# Patient Record
Sex: FEMALE | Race: WHITE | NOT HISPANIC OR LATINO | Employment: OTHER | URBAN - NONMETROPOLITAN AREA
[De-identification: names, ages, dates, MRNs, and addresses within clinical notes are randomized per-mention and may not be internally consistent; named-entity substitution may affect disease eponyms.]

---

## 2017-06-19 ENCOUNTER — OFFICE VISIT (OUTPATIENT)
Dept: URGENT CARE | Facility: PHYSICIAN GROUP | Age: 62
End: 2017-06-19
Payer: MEDICARE

## 2017-06-19 VITALS
SYSTOLIC BLOOD PRESSURE: 150 MMHG | HEART RATE: 88 BPM | RESPIRATION RATE: 14 BRPM | TEMPERATURE: 97.4 F | DIASTOLIC BLOOD PRESSURE: 92 MMHG | OXYGEN SATURATION: 98 %

## 2017-06-19 DIAGNOSIS — S91.301A WOUND, OPEN, FOOT, RIGHT, INITIAL ENCOUNTER: ICD-10-CM

## 2017-06-19 PROCEDURE — 99285 EMERGENCY DEPT VISIT HI MDM: CPT

## 2017-06-19 PROCEDURE — 94760 N-INVAS EAR/PLS OXIMETRY 1: CPT

## 2017-06-19 PROCEDURE — 99213 OFFICE O/P EST LOW 20 MIN: CPT | Performed by: PHYSICIAN ASSISTANT

## 2017-06-20 ENCOUNTER — APPOINTMENT (OUTPATIENT)
Dept: RADIOLOGY | Facility: MEDICAL CENTER | Age: 62
DRG: 593 | End: 2017-06-20
Attending: EMERGENCY MEDICINE
Payer: MEDICARE

## 2017-06-20 ENCOUNTER — APPOINTMENT (OUTPATIENT)
Dept: RADIOLOGY | Facility: MEDICAL CENTER | Age: 62
DRG: 593 | End: 2017-06-20
Attending: INTERNAL MEDICINE
Payer: MEDICARE

## 2017-06-20 ENCOUNTER — HOSPITAL ENCOUNTER (INPATIENT)
Facility: MEDICAL CENTER | Age: 62
LOS: 11 days | DRG: 593 | End: 2017-07-01
Attending: EMERGENCY MEDICINE | Admitting: INTERNAL MEDICINE
Payer: MEDICARE

## 2017-06-20 ENCOUNTER — RESOLUTE PROFESSIONAL BILLING HOSPITAL PROF FEE (OUTPATIENT)
Dept: HOSPITALIST | Facility: MEDICAL CENTER | Age: 62
End: 2017-06-20
Payer: MEDICARE

## 2017-06-20 DIAGNOSIS — L97.519 FOOT ULCER, RIGHT, WITH UNSPECIFIED SEVERITY (HCC): ICD-10-CM

## 2017-06-20 DIAGNOSIS — R29.898 WEAKNESS OF RIGHT LEG: ICD-10-CM

## 2017-06-20 LAB
ALBUMIN SERPL BCP-MCNC: 3.7 G/DL (ref 3.2–4.9)
ALP SERPL-CCNC: 69 U/L (ref 30–99)
ALT SERPL-CCNC: 37 U/L (ref 2–50)
ANION GAP SERPL CALC-SCNC: 14 MMOL/L (ref 0–11.9)
APTT PPP: 25.1 SEC (ref 24.7–36)
AST SERPL-CCNC: 38 U/L (ref 12–45)
BASOPHILS # BLD AUTO: 0.4 % (ref 0–1.8)
BASOPHILS # BLD: 0.04 K/UL (ref 0–0.12)
BILIRUB CONJ SERPL-MCNC: <0.1 MG/DL (ref 0.1–0.5)
BILIRUB INDIRECT SERPL-MCNC: NORMAL MG/DL (ref 0–1)
BILIRUB SERPL-MCNC: 0.4 MG/DL (ref 0.1–1.5)
BUN SERPL-MCNC: 9 MG/DL (ref 8–22)
CALCIUM SERPL-MCNC: 9.2 MG/DL (ref 8.5–10.5)
CHLORIDE SERPL-SCNC: 108 MMOL/L (ref 96–112)
CO2 SERPL-SCNC: 14 MMOL/L (ref 20–33)
CREAT SERPL-MCNC: 0.73 MG/DL (ref 0.5–1.4)
CRP SERPL HS-MCNC: 0.62 MG/DL (ref 0–0.75)
EOSINOPHIL # BLD AUTO: 0.09 K/UL (ref 0–0.51)
EOSINOPHIL NFR BLD: 0.9 % (ref 0–6.9)
ERYTHROCYTE [DISTWIDTH] IN BLOOD BY AUTOMATED COUNT: 47.8 FL (ref 35.9–50)
ERYTHROCYTE [SEDIMENTATION RATE] IN BLOOD BY WESTERGREN METHOD: 27 MM/HOUR (ref 0–30)
EST. AVERAGE GLUCOSE BLD GHB EST-MCNC: 111 MG/DL
GFR SERPL CREATININE-BSD FRML MDRD: >60 ML/MIN/1.73 M 2
GLUCOSE SERPL-MCNC: 108 MG/DL (ref 65–99)
GRAM STN SPEC: NORMAL
HBA1C MFR BLD: 5.5 % (ref 0–5.6)
HCT VFR BLD AUTO: 43.8 % (ref 37–47)
HGB BLD-MCNC: 14.3 G/DL (ref 12–16)
IMM GRANULOCYTES # BLD AUTO: 0.04 K/UL (ref 0–0.11)
IMM GRANULOCYTES NFR BLD AUTO: 0.4 % (ref 0–0.9)
INR PPP: 0.96 (ref 0.87–1.13)
LACTATE BLD-SCNC: 1.3 MMOL/L (ref 0.5–2)
LACTATE BLD-SCNC: 1.6 MMOL/L (ref 0.5–2)
LYMPHOCYTES # BLD AUTO: 3.5 K/UL (ref 1–4.8)
LYMPHOCYTES NFR BLD: 35.8 % (ref 22–41)
MAGNESIUM SERPL-MCNC: 1.8 MG/DL (ref 1.5–2.5)
MCH RBC QN AUTO: 28.5 PG (ref 27–33)
MCHC RBC AUTO-ENTMCNC: 32.6 G/DL (ref 33.6–35)
MCV RBC AUTO: 87.4 FL (ref 81.4–97.8)
MONOCYTES # BLD AUTO: 0.52 K/UL (ref 0–0.85)
MONOCYTES NFR BLD AUTO: 5.3 % (ref 0–13.4)
NEUTROPHILS # BLD AUTO: 5.59 K/UL (ref 2–7.15)
NEUTROPHILS NFR BLD: 57.2 % (ref 44–72)
NRBC # BLD AUTO: 0 K/UL
NRBC BLD AUTO-RTO: 0 /100 WBC
PHOSPHATE SERPL-MCNC: 2.4 MG/DL (ref 2.5–4.5)
PLATELET # BLD AUTO: 491 K/UL (ref 164–446)
PMV BLD AUTO: 9 FL (ref 9–12.9)
POTASSIUM SERPL-SCNC: 3.6 MMOL/L (ref 3.6–5.5)
PREALB SERPL-MCNC: 28 MG/DL (ref 18–38)
PROT SERPL-MCNC: 7.3 G/DL (ref 6–8.2)
PROTHROMBIN TIME: 13.1 SEC (ref 12–14.6)
RBC # BLD AUTO: 5.01 M/UL (ref 4.2–5.4)
SIGNIFICANT IND 70042: NORMAL
SITE SITE: NORMAL
SODIUM SERPL-SCNC: 136 MMOL/L (ref 135–145)
SOURCE SOURCE: NORMAL
TSH SERPL DL<=0.005 MIU/L-ACNC: 0.75 UIU/ML (ref 0.3–3.7)
WBC # BLD AUTO: 9.8 K/UL (ref 4.8–10.8)

## 2017-06-20 PROCEDURE — 85652 RBC SED RATE AUTOMATED: CPT

## 2017-06-20 PROCEDURE — 85025 COMPLETE CBC W/AUTO DIFF WBC: CPT

## 2017-06-20 PROCEDURE — 97161 PT EVAL LOW COMPLEX 20 MIN: CPT

## 2017-06-20 PROCEDURE — 84100 ASSAY OF PHOSPHORUS: CPT

## 2017-06-20 PROCEDURE — 99223 1ST HOSP IP/OBS HIGH 75: CPT | Mod: AI | Performed by: INTERNAL MEDICINE

## 2017-06-20 PROCEDURE — 84134 ASSAY OF PREALBUMIN: CPT

## 2017-06-20 PROCEDURE — 97597 DBRDMT OPN WND 1ST 20 CM/<: CPT

## 2017-06-20 PROCEDURE — 94760 N-INVAS EAR/PLS OXIMETRY 1: CPT

## 2017-06-20 PROCEDURE — 83605 ASSAY OF LACTIC ACID: CPT

## 2017-06-20 PROCEDURE — G8991 OTHER PT/OT GOAL STATUS: HCPCS | Mod: CH

## 2017-06-20 PROCEDURE — 85730 THROMBOPLASTIN TIME PARTIAL: CPT

## 2017-06-20 PROCEDURE — 87205 SMEAR GRAM STAIN: CPT

## 2017-06-20 PROCEDURE — 700105 HCHG RX REV CODE 258

## 2017-06-20 PROCEDURE — A9579 GAD-BASE MR CONTRAST NOS,1ML: HCPCS | Performed by: INTERNAL MEDICINE

## 2017-06-20 PROCEDURE — 87040 BLOOD CULTURE FOR BACTERIA: CPT | Mod: 91

## 2017-06-20 PROCEDURE — 73630 X-RAY EXAM OF FOOT: CPT | Mod: RT

## 2017-06-20 PROCEDURE — 700102 HCHG RX REV CODE 250 W/ 637 OVERRIDE(OP): Performed by: INTERNAL MEDICINE

## 2017-06-20 PROCEDURE — 84443 ASSAY THYROID STIM HORMONE: CPT

## 2017-06-20 PROCEDURE — 700105 HCHG RX REV CODE 258: Performed by: INTERNAL MEDICINE

## 2017-06-20 PROCEDURE — 83036 HEMOGLOBIN GLYCOSYLATED A1C: CPT

## 2017-06-20 PROCEDURE — 80048 BASIC METABOLIC PNL TOTAL CA: CPT

## 2017-06-20 PROCEDURE — 770006 HCHG ROOM/CARE - MED/SURG/GYN SEMI*

## 2017-06-20 PROCEDURE — 96375 TX/PRO/DX INJ NEW DRUG ADDON: CPT

## 2017-06-20 PROCEDURE — 80076 HEPATIC FUNCTION PANEL: CPT

## 2017-06-20 PROCEDURE — G8990 OTHER PT/OT CURRENT STATUS: HCPCS | Mod: CH

## 2017-06-20 PROCEDURE — 73719 MRI LOWER EXTREMITY W/DYE: CPT | Mod: RT

## 2017-06-20 PROCEDURE — 85610 PROTHROMBIN TIME: CPT

## 2017-06-20 PROCEDURE — 36415 COLL VENOUS BLD VENIPUNCTURE: CPT

## 2017-06-20 PROCEDURE — 93971 EXTREMITY STUDY: CPT

## 2017-06-20 PROCEDURE — A9270 NON-COVERED ITEM OR SERVICE: HCPCS | Performed by: INTERNAL MEDICINE

## 2017-06-20 PROCEDURE — 700111 HCHG RX REV CODE 636 W/ 250 OVERRIDE (IP)

## 2017-06-20 PROCEDURE — 83735 ASSAY OF MAGNESIUM: CPT

## 2017-06-20 PROCEDURE — 87077 CULTURE AEROBIC IDENTIFY: CPT

## 2017-06-20 PROCEDURE — 96366 THER/PROPH/DIAG IV INF ADDON: CPT

## 2017-06-20 PROCEDURE — 93971 EXTREMITY STUDY: CPT | Mod: 26 | Performed by: SURGERY

## 2017-06-20 PROCEDURE — 700117 HCHG RX CONTRAST REV CODE 255: Performed by: INTERNAL MEDICINE

## 2017-06-20 PROCEDURE — 700111 HCHG RX REV CODE 636 W/ 250 OVERRIDE (IP): Performed by: INTERNAL MEDICINE

## 2017-06-20 PROCEDURE — 87070 CULTURE OTHR SPECIMN AEROBIC: CPT

## 2017-06-20 PROCEDURE — 700111 HCHG RX REV CODE 636 W/ 250 OVERRIDE (IP): Performed by: EMERGENCY MEDICINE

## 2017-06-20 PROCEDURE — 86140 C-REACTIVE PROTEIN: CPT

## 2017-06-20 PROCEDURE — 96365 THER/PROPH/DIAG IV INF INIT: CPT

## 2017-06-20 RX ORDER — CHLORHEXIDINE GLUCONATE ORAL RINSE 1.2 MG/ML
15 SOLUTION DENTAL 2 TIMES DAILY
Status: DISCONTINUED | OUTPATIENT
Start: 2017-06-20 | End: 2017-06-22

## 2017-06-20 RX ORDER — BISACODYL 10 MG
10 SUPPOSITORY, RECTAL RECTAL
Status: DISCONTINUED | OUTPATIENT
Start: 2017-06-20 | End: 2017-07-01 | Stop reason: HOSPADM

## 2017-06-20 RX ORDER — HEPARIN SODIUM 5000 [USP'U]/ML
5000 INJECTION, SOLUTION INTRAVENOUS; SUBCUTANEOUS EVERY 8 HOURS
Status: DISCONTINUED | OUTPATIENT
Start: 2017-06-20 | End: 2017-07-01 | Stop reason: HOSPADM

## 2017-06-20 RX ORDER — TELMISARTAN 40 MG/1
40 TABLET ORAL DAILY
Status: DISCONTINUED | OUTPATIENT
Start: 2017-06-20 | End: 2017-07-01 | Stop reason: HOSPADM

## 2017-06-20 RX ORDER — OXYCODONE HYDROCHLORIDE 5 MG/1
5 TABLET ORAL
Status: DISCONTINUED | OUTPATIENT
Start: 2017-06-20 | End: 2017-06-25

## 2017-06-20 RX ORDER — ONDANSETRON 4 MG/1
4 TABLET, ORALLY DISINTEGRATING ORAL EVERY 4 HOURS PRN
Status: DISCONTINUED | OUTPATIENT
Start: 2017-06-20 | End: 2017-07-01 | Stop reason: HOSPADM

## 2017-06-20 RX ORDER — ZOLPIDEM TARTRATE 5 MG/1
10 TABLET ORAL NIGHTLY PRN
Status: DISCONTINUED | OUTPATIENT
Start: 2017-06-20 | End: 2017-06-20

## 2017-06-20 RX ORDER — PREGABALIN 75 MG/1
75 CAPSULE ORAL 3 TIMES DAILY
Status: DISCONTINUED | OUTPATIENT
Start: 2017-06-20 | End: 2017-06-24

## 2017-06-20 RX ORDER — MORPHINE SULFATE 4 MG/ML
2 INJECTION, SOLUTION INTRAMUSCULAR; INTRAVENOUS
Status: DISCONTINUED | OUTPATIENT
Start: 2017-06-20 | End: 2017-06-24

## 2017-06-20 RX ORDER — ONDANSETRON 2 MG/ML
4 INJECTION INTRAMUSCULAR; INTRAVENOUS EVERY 4 HOURS PRN
Status: DISCONTINUED | OUTPATIENT
Start: 2017-06-20 | End: 2017-07-01 | Stop reason: HOSPADM

## 2017-06-20 RX ORDER — CLONAZEPAM 0.5 MG/1
0.5 TABLET ORAL 2 TIMES DAILY PRN
Status: DISCONTINUED | OUTPATIENT
Start: 2017-06-20 | End: 2017-07-01 | Stop reason: HOSPADM

## 2017-06-20 RX ORDER — LABETALOL HYDROCHLORIDE 5 MG/ML
10 INJECTION, SOLUTION INTRAVENOUS EVERY 4 HOURS PRN
Status: DISCONTINUED | OUTPATIENT
Start: 2017-06-20 | End: 2017-06-25

## 2017-06-20 RX ORDER — ACETAMINOPHEN 325 MG/1
650 TABLET ORAL EVERY 6 HOURS PRN
Status: DISCONTINUED | OUTPATIENT
Start: 2017-06-20 | End: 2017-06-25

## 2017-06-20 RX ORDER — OXYCODONE HYDROCHLORIDE 5 MG/1
2.5 TABLET ORAL
Status: DISCONTINUED | OUTPATIENT
Start: 2017-06-20 | End: 2017-06-25

## 2017-06-20 RX ORDER — SODIUM CHLORIDE 9 MG/ML
INJECTION, SOLUTION INTRAVENOUS CONTINUOUS
Status: DISCONTINUED | OUTPATIENT
Start: 2017-06-20 | End: 2017-06-21

## 2017-06-20 RX ORDER — POLYETHYLENE GLYCOL 3350 17 G/17G
1 POWDER, FOR SOLUTION ORAL
Status: DISCONTINUED | OUTPATIENT
Start: 2017-06-20 | End: 2017-07-01 | Stop reason: HOSPADM

## 2017-06-20 RX ORDER — SODIUM CHLORIDE 9 MG/ML
1000 INJECTION, SOLUTION INTRAVENOUS
Status: DISCONTINUED | OUTPATIENT
Start: 2017-06-20 | End: 2017-07-01 | Stop reason: HOSPADM

## 2017-06-20 RX ORDER — DULOXETIN HYDROCHLORIDE 30 MG/1
30 CAPSULE, DELAYED RELEASE ORAL DAILY
Status: DISCONTINUED | OUTPATIENT
Start: 2017-06-20 | End: 2017-06-20

## 2017-06-20 RX ORDER — LABETALOL HYDROCHLORIDE 5 MG/ML
10 INJECTION, SOLUTION INTRAVENOUS EVERY 4 HOURS PRN
Status: DISCONTINUED | OUTPATIENT
Start: 2017-06-20 | End: 2017-06-20

## 2017-06-20 RX ORDER — NAPROXEN 250 MG/1
500-1000 TABLET ORAL PRN
COMMUNITY
End: 2017-07-17

## 2017-06-20 RX ORDER — AMOXICILLIN 250 MG
2 CAPSULE ORAL 2 TIMES DAILY
Status: DISCONTINUED | OUTPATIENT
Start: 2017-06-20 | End: 2017-07-01 | Stop reason: HOSPADM

## 2017-06-20 RX ORDER — POTASSIUM CHLORIDE 20 MEQ/1
40 TABLET, EXTENDED RELEASE ORAL ONCE
Status: COMPLETED | OUTPATIENT
Start: 2017-06-20 | End: 2017-06-20

## 2017-06-20 RX ORDER — VENLAFAXINE HYDROCHLORIDE 75 MG/1
75 CAPSULE, EXTENDED RELEASE ORAL DAILY
Status: DISCONTINUED | OUTPATIENT
Start: 2017-06-20 | End: 2017-06-20

## 2017-06-20 RX ORDER — SODIUM CHLORIDE 9 MG/ML
1000 INJECTION, SOLUTION INTRAVENOUS ONCE
Status: COMPLETED | OUTPATIENT
Start: 2017-06-20 | End: 2017-06-20

## 2017-06-20 RX ADMIN — OXYCODONE HYDROCHLORIDE 5 MG: 5 TABLET ORAL at 20:07

## 2017-06-20 RX ADMIN — CEFTRIAXONE SODIUM 2 G: 2 INJECTION, POWDER, FOR SOLUTION INTRAMUSCULAR; INTRAVENOUS at 09:41

## 2017-06-20 RX ADMIN — SODIUM CHLORIDE 1000 ML: 9 INJECTION, SOLUTION INTRAVENOUS at 09:52

## 2017-06-20 RX ADMIN — FENTANYL CITRATE 50 MCG: 50 INJECTION, SOLUTION INTRAMUSCULAR; INTRAVENOUS at 03:20

## 2017-06-20 RX ADMIN — HEPARIN SODIUM 5000 UNITS: 5000 INJECTION, SOLUTION INTRAVENOUS; SUBCUTANEOUS at 20:07

## 2017-06-20 RX ADMIN — SODIUM CHLORIDE: 9 INJECTION, SOLUTION INTRAVENOUS at 09:52

## 2017-06-20 RX ADMIN — PREGABALIN 75 MG: 75 CAPSULE ORAL at 09:40

## 2017-06-20 RX ADMIN — OXYCODONE HYDROCHLORIDE 5 MG: 5 TABLET ORAL at 11:39

## 2017-06-20 RX ADMIN — SENNOSIDES AND DOCUSATE SODIUM 2 TABLET: 8.6; 5 TABLET ORAL at 20:06

## 2017-06-20 RX ADMIN — GADODIAMIDE 20 ML: 287 INJECTION INTRAVENOUS at 18:31

## 2017-06-20 RX ADMIN — PREGABALIN 75 MG: 75 CAPSULE ORAL at 15:25

## 2017-06-20 RX ADMIN — HEPARIN SODIUM 5000 UNITS: 5000 INJECTION, SOLUTION INTRAVENOUS; SUBCUTANEOUS at 09:40

## 2017-06-20 RX ADMIN — TELMISARTAN 40 MG: 40 TABLET ORAL at 09:40

## 2017-06-20 RX ADMIN — POTASSIUM CHLORIDE 40 MEQ: 1500 TABLET, EXTENDED RELEASE ORAL at 09:40

## 2017-06-20 RX ADMIN — HEPARIN SODIUM 5000 UNITS: 5000 INJECTION, SOLUTION INTRAVENOUS; SUBCUTANEOUS at 13:14

## 2017-06-20 RX ADMIN — MORPHINE SULFATE 2 MG: 4 INJECTION INTRAVENOUS at 09:39

## 2017-06-20 RX ADMIN — CHLORHEXIDINE GLUCONATE 15 ML: 1.2 RINSE ORAL at 20:07

## 2017-06-20 RX ADMIN — ONDANSETRON 4 MG: 2 INJECTION INTRAMUSCULAR; INTRAVENOUS at 09:43

## 2017-06-20 RX ADMIN — OXYCODONE HYDROCHLORIDE 5 MG: 5 TABLET ORAL at 16:13

## 2017-06-20 RX ADMIN — VANCOMYCIN HYDROCHLORIDE 2000 MG: 100 INJECTION, POWDER, LYOPHILIZED, FOR SOLUTION INTRAVENOUS at 03:20

## 2017-06-20 RX ADMIN — PREGABALIN 75 MG: 75 CAPSULE ORAL at 23:42

## 2017-06-20 ASSESSMENT — ENCOUNTER SYMPTOMS
WEIGHT LOSS: 0
ABDOMINAL PAIN: 0
VOMITING: 0
FEVER: 1
FALLS: 1
DIARRHEA: 0
MYALGIAS: 1
CONSTIPATION: 0
COUGH: 0
CLAUDICATION: 0
NAUSEA: 1
CHILLS: 1
SHORTNESS OF BREATH: 0

## 2017-06-20 ASSESSMENT — COPD QUESTIONNAIRES
DURING THE PAST 4 WEEKS HOW MUCH DID YOU FEEL SHORT OF BREATH: NONE/LITTLE OF THE TIME
DO YOU EVER COUGH UP ANY MUCUS OR PHLEGM?: NO/ONLY WITH OCCASIONAL COLDS OR INFECTIONS
HAVE YOU SMOKED AT LEAST 100 CIGARETTES IN YOUR ENTIRE LIFE: NO/DON'T KNOW
COPD SCREENING SCORE: 2

## 2017-06-20 ASSESSMENT — PATIENT HEALTH QUESTIONNAIRE - PHQ9
SUM OF ALL RESPONSES TO PHQ9 QUESTIONS 1 AND 2: 0
2. FEELING DOWN, DEPRESSED, IRRITABLE, OR HOPELESS: NOT AT ALL
1. LITTLE INTEREST OR PLEASURE IN DOING THINGS: NOT AT ALL
SUM OF ALL RESPONSES TO PHQ QUESTIONS 1-9: 0

## 2017-06-20 ASSESSMENT — PAIN SCALES - GENERAL
PAINLEVEL_OUTOF10: 7
PAINLEVEL_OUTOF10: 0
PAINLEVEL_OUTOF10: 4
PAINLEVEL_OUTOF10: 8
PAINLEVEL_OUTOF10: 5
PAINLEVEL_OUTOF10: 4
PAINLEVEL_OUTOF10: 6
PAINLEVEL_OUTOF10: 3

## 2017-06-20 ASSESSMENT — COGNITIVE AND FUNCTIONAL STATUS - GENERAL
MOVING FROM LYING ON BACK TO SITTING ON SIDE OF FLAT BED: A LITTLE
MOBILITY SCORE: 18
CLIMB 3 TO 5 STEPS WITH RAILING: TOTAL
DAILY ACTIVITIY SCORE: 24
WALKING IN HOSPITAL ROOM: A LOT
SUGGESTED CMS G CODE MODIFIER DAILY ACTIVITY: CH
SUGGESTED CMS G CODE MODIFIER MOBILITY: CK

## 2017-06-20 ASSESSMENT — LIFESTYLE VARIABLES
ALCOHOL_USE: NO
EVER_SMOKED: NEVER

## 2017-06-20 NOTE — ED NOTES
"Chief Complaint   Patient presents with   • Abscess     R foot      Pt partially paralyzed to R foot, did not notice wound to foot. Went to [facility] today and was told to come to ER for IV antibiotics. 2 cm open wound noted to R lateral foot.        /114 mmHg  Pulse 118  Temp(Src) 36.9 °C (98.5 °F)  Resp 18  Ht 1.676 m (5' 6\")  Wt 81.647 kg (180 lb)  BMI 29.07 kg/m2  SpO2 97%  LMP 04/09/1977      Pt Informed regarding triage process and verbalized understanding to inform triage tech or RN for any changes in condition.  Placed in lobby.    "

## 2017-06-20 NOTE — PROGRESS NOTES
Pt a+ox4, up from ED. Pt oriented to room. Commode at bedside - pt with paralyzation of R leg- leg elevated ,wound consult placed for R ankle wound. Pt able to transfer to commode with SBA  - alarm on. Pt with order for MRI - screening sheet faxed and sent. Pt medicated for pain per MAR . 2 RN skin check with Radha RN- wound to R ankle- wound care consult in; r heel red but blanching. All other areas intact .

## 2017-06-20 NOTE — IP AVS SNAPSHOT
7/1/2017    Joan Olivares  [address] [place] [address]    Dear Joan:    [facility] wants to ensure your discharge home is safe and you or your loved ones have had all of your questions answered regarding your care after you leave the hospital.    Below is a list of resources and contact information should you have any questions regarding your hospital stay, follow-up instructions, or active medical symptoms.    Questions or Concerns Regarding… Contact   Medical Questions Related to Your Discharge  (7 days a week, 8am-5pm) Contact a Nurse Care Coordinator   [phone]   Medical Questions Not Related to Your Discharge  (24 hours a day / 7 days a week)  Contact the Nurse Health Line   [phone]    Medications or Discharge Instructions Refer to your discharge packet   or contact your [facility] Primary Care Provider   [phone]   Follow-up Appointment(s) Schedule your appointment via [employer]   or contact Scheduling [phone]   Billing Review your statement via [employer]  or contact Billing [phone]   Medical Records Review your records via [employer]   or contact Medical Records [phone]     You may receive a telephone call within two days of discharge. This call is to make certain you understand your discharge instructions and have the opportunity to have any questions answered. You can also easily access your medical information, test results and upcoming appointments via the [employer] free online health management tool. You can learn more and sign up at [employer]. For assistance setting up your [employer] account, please call [phone].    Once again, we want to ensure your discharge home is safe and that you have a clear understanding of any next steps in your care. If you have any questions or concerns, please do not hesitate to contact us, we are here for you. Thank you for choosing [facility] for your healthcare needs.    Sincerely,    Your [facility] Healthcare Team

## 2017-06-20 NOTE — ED NOTES
"Pt requesting pain medication for \"my whole right side of my body and my lymph nodes are swollen\"   "

## 2017-06-20 NOTE — H&P
DATE OF SERVICE:  06/20/2017    CHIEF COMPLAINT:  Right foot ulcer.    HISTORY OF PRESENTING ILLNESS:  This is a pleasant 62-year-old female who   presents to the emergency room for further evaluation of her right dorsal foot   ulcer.  Patient reports that roughly 2 years ago, she was found unresponsive   at her home for 3 days after she was persistently laying on her right lower   extremity and this was complicated by development of right lower extremity   neuropathy and chronic weakness.  She reports that she is chronically weak in   the right lower extremity and does not feel anything in the right lower   extremity inferior to her knee.  She reports living with her niece and son.    Patient reports that roughly 3-4 weeks ago, she started having some pain in   her right foot, but given she does not feel anything in the leg or cannot see   anything on this leg, she continued to ignore these findings.  She reports   that roughly 3 days ago, her niece noticed that there was blood on her socks   and subsequently she took them off and noticed that there was a big ulcer on   the medial aspect of the dorsal aspect of the right foot.  She subsequently   put some Betadine on this and started triple antibiotic ointment on it.    Patient reports that because of her prior injury, she is chronically   wheelchair bound.  She reports that she uses appropriate right foot to   transfer from bed to the wheelchair and from wheelchair out.  She reports that   this has been worsening over the last 3 days now.  She was evaluated in   urgent care yesterday and upon evaluation, she was advised to come into the   emergency room for initiation of intravenous antibiotics and consideration of   debridement.  Otherwise, patient reports that over the course of last 3 weeks,   she has had worsening fatigue, lethargy, and malaise.  She denies having any   fevers, but has been having chills at home and has been having nausea and lack   of  appetite with these symptoms.  She otherwise denies any vomiting.  She   denies any chest pain, shortness of breath, or cough.  She denies any   abdominal pain, diarrhea, constipation, blood in bowel movements or melena.    She has chronic history of alternating diarrhea and constipation, but at this   point in time, she reports having normal bowel movements.  Otherwise, she   denies any dysuria, frequency, urgency, or hematuria.  She has chronic   bilateral lower extremity swelling.  She does have a history of deep venous   thrombosis and pulmonary embolism for which she was previously on Eliquis,   which has been stopped.    HOME MEDICATIONS:  As listed in the electronic medical record system.  These   have been reviewed by me.  Please refer to the electronic medical record   system.    PAST MEDICAL HISTORY:  1.  Hypertension.  2.  Chronic pain syndrome.  3.  Fibromyalgia.  4.  Anxiety.  5.  Neuropathy.  6.  Depression.  7.  Gastroesophageal reflux disease.  8.  Scarlet fever.  9.  Pneumonia.  10.  Migraines.  11.  History of deep venous thrombosis and pulmonary embolism.  12.  Hemorrhoids.  13.  Arthritis.  14.  Nephrolithiasis.  15.  History of being unconscious on the right side of her body for 3 days   with subsequent right lower extremity weakness.    PAST SURGICAL HISTORY:  1.  Tonsillectomy.  2.  Right leg open reduction and internal fixation.  3.  Appendectomy.  4.  Thyroid lobectomy.  5.  Hysterectomy.    FAMILY HISTORY:  Father with history of lung cancer.    SOCIAL HISTORY:  Patient denies smoking, use of alcohol, or illicit drugs of   abuse.    ALLERGIES:  Patient has reported ALLERGIES TO PENICILLIN.    REVIEW OF SYSTEMS:  A detailed review of system was reviewed with the patient   and negative other than as listed in the history of presenting illness and   past medical history.    PHYSICAL EXAMINATION:  VITAL SIGNS:  On presentation, temperature 36.9 degrees Celsius, pulse of 118,   respiratory rate  of 18, blood pressure of 190/114, weight of 81.6 kg, and   oxygen saturation of 97% on room air.  GENERAL:  Patient is alert and oriented x4 in no acute distress.  HEAD, EYES, AND ENT:  Head is normocephalic.  Extraocular movements are   intact.  Bilateral pupils are equal and reactive to light and accommodation.    No conjunctival pallor or scleral icterus.  No nasal discharge.  No   oropharyngeal exudates or erythema.  Overall, patient is noted to have   abnormal gums and poor dental hygiene.  Gum swelling is noted.  NECK:  No jugular venous distention.  CARDIOVASCULAR:  Regular rate and rhythm.  S1 plus S2.  No murmurs, rubs, or   gallops.  RESPIRATORY:  Clear to auscultation bilaterally, slightly diminished in the   bases, otherwise no wheezes, rhonchi, or bronchial breath sounds.  ABDOMEN:  Soft, nontender, and nondistended.  Bowel sounds positive and normal   in frequency.  GENITOURINARY:  System not examined.  MUSCULOSKELETAL:  No joint swelling or tenderness on examination.  Skin exam   as below.  SKIN:  The patient is noted to have an ulcer overlying the dorsal aspect of   the foot near the medial malleolus.  This roughly measures 2 cm in diameter.    Surrounding this ulcer, there is a rim of erythema and overall swelling.    There appears to be an underlying fluid collection.  Overall, the ulcer base   appears to be noninfected.  NEUROLOGICAL:  Cranial nerves without any gross deficit.  Motor strength of   5/5 in bilateral upper and lower extremities.  No gross sensory deficits.  EXTREMITIES:  Pulses 1+ in bilateral lower extremity.  Bilateral lower   extremity edema.  No cyanosis.    LABORATORY STUDIES:  White blood cell count of 9.8, hemoglobin of 14.3, and   platelet count of 491.  Sodium of 136, potassium of 3.6, BUN of 9, and   creatinine of 0.73.    IMAGING STUDIES:  A foot x-ray obtained on presentation reveals diffuse   lateral soft tissue swelling of right foot with foot ulceration over the  fifth   metatarsal base.  No gross bony destruction; however, osteomyelitis is not   excluded by pain.  No fracture or dislocation.  Possible metallic foreign body   within the plantar soft tissue of great toe.    ASSESSMENT:  1.  Right dorsal foot ulcer with underlying fluid collection.  2.  Chronic right lower extremity weakness and sensory loss.  3.  Chronic debility, wheelchair bound status.  4.  History of deep venous thrombosis and pulmonary embolism.  5.  Gum swelling.  6.  History of hypertension.  7.  Chronic pain syndrome.  8.  Fibromyalgia.  9.  Anxiety.  10.  Neuropathy.  11.  Depression  12.  History of gastroesophageal reflux disease.  13.  Arthritis.  14.  Insomnia.    PLAN:  At this point, this patient will be admitted to the hospital.  Based on   this patient's presentation, I anticipate her inpatient stay will exceed   greater than 2 midnights in duration, requiring use of intravenous antibiotics   and surgical consultation and potentially debridement of the underlying ulcer   base.  At this point in time, I would recommend further evaluation with a   contrasted MRI of her right foot.  This has been requested by me and pending   at the time of this dictation.  In addition, recommend evaluation for deep   venous thrombosis with duplex study, this has been requested.  Meanwhile,   patient will be initiated on intravenous antibiotics including the use of   intravenous vancomycin and ceftriaxone therapy.  Antibiotics will be   deescalated as clinically appropriate.  We will request consultation from limb   preservation team.  Recommend consultation from the surgical team and   infectious diseases surgical team to consider debridement for underlying   abscess/fluid collection.  Otherwise, blood cultures will be obtained.  ESR,   CRP will be checked.  Limb preservation team will be consulted.  Continue   broad-spectrum antibiotics and deescalate as clinically appropriate.    Recommend checking a  hemoglobin A1c to rule out underlying diabetes mellitus.    Patient does not have any evidence of sepsis on presentation.  Irrespectively   given her acidosis and concern for development of sepsis, she will be   initiated on sepsis protocol with ongoing monitoring of her lactic acid levels   during her hospitalization and ongoing monitoring of her hemodynamic   parameters per sepsis protocol.  Otherwise, this patient is noted to have   gingival swelling for which we will check a B12, folate, and B1 levels.    Patient will be initiated on oral Peridex solution.  Otherwise, we will   continue this patient's chronic medications for underlying chronic comorbid   conditions.  Patient has stopped taking his Eliquis.  Hypertensive control   will be initiated and as needed intravenous labetalol will be available.  This   will be titrated and adequate hypertension control will be achieved during   hospitalization.  Otherwise, DVT preventive prophylaxis with sequential   compression device and subcutaneous heparin has been ordered.  Stress ulcer   prophylaxis are not indicated.  Patient is being admitted to the hospital   under a full code status.       ____________________________________     MD PRASHANTH ESTRADA / FELICITAS    DD:  06/20/2017 07:39:37  DT:  06/20/2017 08:33:30    D#:  [id number]  Job#:  [id number]

## 2017-06-20 NOTE — PROGRESS NOTES
Chief Complaint   Patient presents with   • Foot Problem     possible infection       HISTORY OF PRESENT ILLNESS: Patient is a 62 y.o. female who presents today because she has an open wound on the right side of her foot. She has no feeling in her right leg from previous injury. This is been getting worse over the last several weeks at least, but she has not noticed it, has not noticed that it has been getting. Her friend. She will return to the urgent care for evaluation.    Patient Active Problem List    Diagnosis Date Noted   • HTN (hypertension) 11/10/2015     Priority: High   • DVT (deep venous thrombosis) (CMS-HCC) 11/10/2015     Priority: High   • Syncope 11/09/2015     Priority: High   • Fibromyalgia 11/10/2015   • Chronic pain syndrome 11/10/2015   • Anxiety 11/10/2015   • Vaginal high risk HPV DNA test positive 02/17/2012       Allergies:Penicillins    Current Outpatient Prescriptions Ordered in [facility]   Medication Sig Dispense Refill   • estradiol (ESTRACE) 2 MG TABS Take 2 mg by mouth every day.     • telmisartan (MICARDIS) 40 MG Tab Take 1 Tab by mouth every day. 30 Tab 0   • pregabalin (LYRICA) 75 MG Cap Take 75 mg by mouth 3 times a day.     • zolpidem (AMBIEN) 10 MG Tab Take 10 mg by mouth at bedtime as needed for Sleep.     • clonazepam (KLONOPIN) 0.5 MG Tab Take 0.5 mg by mouth 2 times a day.     • apixaban (ELIQUIS) 5mg Tab Take 5 mg by mouth 2 Times a Day.     • potassium chloride SA (K-DUR) 10 MEQ Tab CR Take 10 mEq by mouth every day.     • furosemide (LASIX) 40 MG Tab Take 40 mg by mouth every day.     • carisoprodol (SOMA) 350 MG Tab Take 350 mg by mouth 4 times a day.     • venlafaxine XR (EFFEXOR XR) 75 MG CAPSULE SR 24 HR Take 75 mg by mouth every day.     • duloxetine (CYMBALTA) 30 MG Cap DR Particles Take 30 mg by mouth every day.       No current [facility]-ordered facility-administered medications on file.       Past Medical History   Diagnosis Date   • Heart burn    • Pain    • Unspecified  disorder of thyroid    • Kidney stones    • Arthritis    • ASTHMA    • Back pain    • Bladder infection    • Bronchitis    • Hemorrhoids    • Migraine    • Pleurisy    • Pneumonia    • Ulcer (CMS-HCC)    • Scarlet fever    • Hypertension        Social History   Substance Use Topics   • Smoking status: Never Smoker    • Smokeless tobacco: None   • Alcohol Use: No       Family Status   Relation Status Death Age   • Mother     • Father       Family History   Problem Relation Age of Onset   • Lung Disease Mother    • Cancer Father    • Heart Disease Father    • Lung Disease Father    • Thyroid Sister    • Cancer Brother    • Psychiatry Son        ROS:  Review of Systems   Constitutional: Negative for fever, chills, weight loss and malaise/fatigue.   HENT: Negative for ear pain, nosebleeds, congestion, sore throat and neck pain.    Eyes: Negative for blurred vision.   Respiratory: Negative for cough, sputum production, shortness of breath and wheezing.    Cardiovascular: Negative for chest pain, palpitations, orthopnea and leg swelling.   Gastrointestinal: Negative for heartburn, nausea, vomiting and abdominal pain.       Exam:  Blood pressure 150/92, pulse 88, temperature 36.3 °C (97.4 °F), resp. rate 14, last menstrual period 1977, SpO2 98 %.  General:  Well nourished, well developed female in NAD  Head:Normocephalic, atraumatic  Extremities: no clubbing, cyanosis, or edema. On the lateral aspect of her right foot, along the fifth metatarsal, she has a 3-4 cm diameter open sores surrounding erythema, and the underlying tissue is erythematous without any significant drainage.    Please note that this dictation was created using voice recognition software. I have made every reasonable attempt to correct obvious errors, but I expect that there are errors of grammar and possibly content that I did not discover before finalizing the note.    Assessment/Plan:  1. Wound, open, foot, right, initial  encounter      patient was advised to go to the emergency room. She made debridement, further evaluation and treatment since she has no feeling in her right foot.    Patient has someone here who will drive her to the emergency room.

## 2017-06-20 NOTE — IP AVS SNAPSHOT
" <p align=\"LEFT\"><IMG SRC=\"//[email or web address]\" alt=\"Image\" WIDTH=\"50%\" HEIGHT=\"200\" BORDER=\"\"></p>                   Name:Joan Olivares  Medical Record Number:[id number]  CSN: [id number]    YOB: 1955   Age: 62 y.o.  Sex: female  HT:1.676 m (5' 6\") WT: 113.5 kg (250 lb 3.6 oz)          Admit Date: 6/20/2017     Discharge Date:   Today's Date: 7/1/2017  Attending Doctor:  Shameka Phelan D.O.                  Allergies:  Penicillins          Follow-up Information     1. Follow up with Dl Tsai M.D.. Schedule an appointment as soon as possible for a visit in 2 weeks.    Specialty:  Internal Medicine    Why:  Hospital follow-up appointment with PCP    Contact information    [address]  [place] [address]  [phone]          2. Follow up with Trino Bocanegra ([facility]).    Specialty:  Home Health Services    Contact information    [address]  [place] [address]  [phone]        3. Follow up with Dl Tsai M.D. In 1 week.    Specialty:  Internal Medicine    Contact information    [address]  [place] [address]  [phone]           Medication List      Take these Medications        Instructions    albuterol 108 (90 BASE) MCG/ACT Aers inhalation aerosol    Inhale 2 Puffs by mouth every four hours as needed.   Dose:  2 Puff       alprazolam 0.25 MG Tabs   Commonly known as:  XANAX    Take 1 Tab by mouth 3 times a day as needed for Anxiety.   Dose:  0.25 mg       carvedilol 6.25 MG Tabs   Commonly known as:  COREG    Take 1 Tab by mouth 2 times a day, with meals.   Dose:  6.25 mg       estradiol 2 MG Tabs   Commonly known as:  ESTRACE    Take 2 mg by mouth every day.   Dose:  2 mg       linezolid 600 MG Tabs   Start taking on:  7/17/2017   Commonly known as:  ZYVOX    Take 1 Tab by mouth 2 times a day for 14 days.   Dose:  600 mg       naproxen 250 MG Tabs   Commonly known as:  NAPROSYN    Take 500-1,000 mg by mouth as needed. " Indications: Mild to Moderate Pain   Dose:  500-1000 mg       NS SOLN 100 mL with cefTRIAXone 2 GM SOLR 2 g    2 g by Intravenous route every 24 hours for 18 days.   Dose:  2 g       nystatin powder   Commonly known as:  MYCOSTATIN    apply to affected area bid       * oxyCODONE CR 20 MG T12a   Commonly known as:  OXYCONTIN    Take 1 Tab by mouth every 12 hours.   Dose:  20 mg       * oxycodone immediate-release 5 MG Tabs   Commonly known as:  ROXICODONE    Take 1 Tab by mouth every 3 hours as needed for Severe Pain.   Dose:  5 mg       pregabalin 75 MG Caps   Commonly known as:  LYRICA    Take 75 mg by mouth 3 times a day.   Dose:  75 mg       prochlorperazine 10 MG Tabs   Commonly known as:  COMPAZINE    Take 1 Tab by mouth every 6 hours as needed for Nausea/Vomiting.   Dose:  10 mg       senna-docusate 8.6-50 MG Tabs   Commonly known as:  PERICOLACE or SENOKOT S    Take 2 Tabs by mouth 2 Times a Day.   Dose:  2 Tab       telmisartan 40 MG Tabs   Commonly known as:  MICARDIS    Take 1 Tab by mouth every day.   Dose:  40 mg       TYLENOL PM EXTRA STRENGTH PO    Take 2-4 Tabs by mouth as needed (For pain and sleep).   Dose:  2-4 Tab       * Notice:  This list has 2 medication(s) that are the same as other medications prescribed for you. Read the directions carefully, and ask your doctor or other care provider to review them with you.

## 2017-06-20 NOTE — ED PROVIDER NOTES
ED Provider Note    Scribed for Radha Wilburn M.D. by Candelario Martin. 6/20/2017, 1:36 AM.    Primary care provider: Pcp Not In Computer  Means of arrival: Wheel Chair  History obtained from: Patient  History limited by: None    CHIEF COMPLAINT  Chief Complaint   Patient presents with   • Abscess     R foot        HPI  Joan Olivares is a 62 y.o. female who presents to the Emergency Department complaining of an ulcer to the right foot onset 2-3 week ago. She went to an Urgent Care in [place] where she was told to come to the ER to receive IV antibiotics. The patient's right leg is paralyzed from the knee down due to a trauma multiple years ago. Due to the patient's size and paralysis, she did not notice the issue with her feet until recently. The patient has tried physical therapy on two different occasions to try and fix her leg. Denies a history of diabetes. Her last tetanus shot was multiple years ago.     REVIEW OF SYSTEMS  See HPI for further details. All other systems are negative.    C.    PAST MEDICAL HISTORY   has a past medical history of Heart burn; Pain; Unspecified disorder of thyroid; Kidney stones; Arthritis; ASTHMA; Back pain; Bladder infection; Bronchitis; Hemorrhoids; Migraine; Pleurisy; Pneumonia; Ulcer (CMS-HCC); Scarlet fever; Hypertension; and Paresthesia of right foot.    SURGICAL HISTORY   has past surgical history that includes tonsillectomy and adenoidectomy; other orthopedic surgery (2004); appendectomy; thyroid lobectomy (11/11/2009); and hysterectomy laparoscopy.    SOCIAL HISTORY  Social History   Substance Use Topics   • Smoking status: Never Smoker    • Alcohol Use: No      History   Drug Use No       FAMILY HISTORY  Family History   Problem Relation Age of Onset   • Lung Disease Mother    • Cancer Father    • Heart Disease Father    • Lung Disease Father    • Thyroid Sister    • Cancer Brother    • Psychiatry Son        CURRENT MEDICATIONS  Reviewed. See Encounter Summary.  "    ALLERGIES  Allergies   Allergen Reactions   • Penicillins Swelling       PHYSICAL EXAM  VITAL SIGNS: /114 mmHg  Pulse 118  Temp(Src) 36.9 °C (98.5 °F)  Resp 18  Ht 1.676 m (5' 6\")  Wt 81.647 kg (180 lb)  BMI 29.07 kg/m2  SpO2 97%  LMP 04/09/1977   Pulse ox interpretation: I interpret this pulse ox as normal.  Constitutional: Alert in no apparent distress.  HENT: No signs of trauma, Bilateral external ears normal, Nose normal.   Eyes: Pupils are equal and reactive, Conjunctiva normal, Non-icteric.   Neck: Normal range of motion, No tenderness, Supple, No stridor.   Lymphatic: No lymphadenopathy noted.   Cardiovascular: Regular rate and rhythm, no murmurs.   Thorax & Lungs: Normal breath sounds, No respiratory distress, No wheezing, No chest tenderness.   Abdomen: Bowel sounds normal, Soft, No tenderness, No masses, No pulsatile masses. No peritoneal signs.  Skin: Warm, Dry, 2cm open ulcer to right lateral aspect of dorsum of foot with surrounding erythema, non tender.  Back: No bony tenderness, No CVA tenderness.   Extremities: Intact distal pulses, 2cm open ulcer to right lateral aspect of dorsum of foot with surrounding erythema, non tender, right foot is medially rotated and extended without sensation, no movement at ankle. No tenderness, No cyanosis,  Negative Lucina's sign.   Musculoskeletal: Good range of motion in all major joints. No tenderness to palpation or major deformities noted.   Neurologic: Alert , Normal motor function, Normal sensory function, No focal deficits noted.   Psychiatric: Affect normal, Judgment normal, Mood normal.    DIFFERENTIAL DIAGNOSIS AND WORK UP PLAN    1:36 AM Patient seen and examined at bedside. The patient presents with right foot ulcer and the differential diagnosis includes but is not limited to Pressure ulcer, infection vs osteomyelitis. Ordered for Estimated GFR, CBC, BMP, Westergren SED Rate, DX-Foot to evaluate.    DIAGNOSTIC STUDIES / PROCEDURES "     LABS  Labs Reviewed   CBC WITH DIFFERENTIAL - Abnormal; Notable for the following:     MCHC 32.6 (*)     Platelet Count 491 (*)     All other components within normal limits   BASIC METABOLIC PANEL - Abnormal; Notable for the following:     Co2 14 (*)     Glucose 108 (*)     Anion Gap 14.0 (*)     All other components within normal limits   WESTERGREN SED RATE   ESTIMATED GFR     All labs were reviewed by me.    RADIOLOGY  DX-FOOT-COMPLETE 3+ RIGHT   Final Result      1.  Diffuse lateral soft tissue swelling of RIGHT foot with focal ulceration over the 5th metatarsal base.   2.  No gross bony destruction, however osteomyelitis is not excluded by plain film.   3.  No fracture or dislocation.   4.  Possible metallic foreign body within the plantar soft tissues of great toe.        The radiologist's interpretation of all radiological studies have been reviewed by me.    COURSE & MEDICAL DECISION MAKING  Pertinent Labs & Imaging studies reviewed. (See chart for details)    2:54 AM - Treated with 50mcg Sublimaze.    3:34 AM - Paged Hospitalist.    3:53 AM - spoke w Dr Escobar for admission    This is a 62 y.o. year old female who presents with infected ulcer of the right foot to the history of paralysis that foot however she does use a part of her leg to transfer and cannot do currently due to pain in the infection. Patient was given vancomycin on arrival has a normal ESR I doubt that she has osteomyelitis without a history of diabetes. She'll be admitted for further wound care and antibiotic    DISPOSITION:  Patient will be admitted to [facility] in guarded condition.    FINAL IMPRESSION  1. Infected foot ulcer  2. R lower extremity paralysis  3. RLE contracture       Candelario LAIRD (Scrjaime), am scribing for, and in the presence of, Radha Wilburn M.D..    Electronically signed by: Candelario Martin (Jayjay), 6/20/2017    Radha LAIRD M.D. personally performed the services described in this documentation, as  scribed by Candelario Martin in my presence, and it is both accurate and complete.    The note accurately reflects work and decisions made by me.  Radha Cosmo  6/20/2017  4:35 AM    This dictation has been created using voice recognition software and/or scribes. The accuracy of the dictation is limited by the abilities of the software and the expertise of the scribes. I expect there may be some errors of grammar and possibly content. I made every attempt to manually correct the errors within my dictation. However, errors related to voice recognition software and/or scribes may still exist and should be interpreted within the appropriate context.

## 2017-06-20 NOTE — CARE PLAN
Problem: Safety  Goal: Will remain free from falls  Intervention: Assess risk factors for falls  Bed alarm placed on pt. Call bell within reach

## 2017-06-20 NOTE — ED NOTES
Assumed care of patient. Patient complains of right foot ulcer x days and went to Urgent Care today and sent to ED for IV antibiotics.  Patient alert and oriented x 4. Patient denies any other complaints at this time. Patient side rails up x 2 and call bell within reach.

## 2017-06-20 NOTE — IP AVS SNAPSHOT
[employer] Access Code: [email or web address]  Expires: 7/31/2017 12:23 PM    Your email address is not on file at [facility].  Email Addresses are required for you to sign up for [employer], please contact [phone] to verify your personal information and to provide your email address prior to attempting to register for [employer].    Joan Laureano Olivares  [address] [place] [address] [place] [address]    [employer]  A secure, online tool to manage your health information     [facility]’s [employer]® is a secure, online tool that connects you to your personalized health information from the privacy of your home -- day or night - making it very easy for you to manage your healthcare. Once the activation process is completed, you can even access your medical information using the [employer] vinicio, which is available for free in the Apple Vinicio store or Google Play store.     To learn more about [employer], visit [email or web address]    There are two levels of access available (as shown below):   My Chart Features  [facility] Primary Care Doctor [facility]  Specialists [facility]  Urgent  Care Non-[facility] Primary Care Doctor   Email your healthcare team securely and privately 24/7 X X X    Manage appointments: schedule your next appointment; view details of past/upcoming appointments X      Request prescription refills. X      View recent personal medical records, including lab and immunizations X X X X   View health record, including health history, allergies, medications X X X X   Read reports about your outpatient visits, procedures, consult and ER notes X X X X   See your discharge summary, which is a recap of your hospital and/or ER visit that includes your diagnosis, lab results, and care plan X X  X     How to register for [employer]:  Once your e-mail address has been verified, follow the following steps to sign up for [employer].     1. Go to  [email or web address]  2. Click on the Sign Up Now box, which takes you to the New Member Sign Up page.  You will need to provide the following information:  a. Enter your [employer] Access Code exactly as it appears at the top of this page. (You will not need to use this code after you’ve completed the sign-up process. If you do not sign up before the expiration date, you must request a new code.)   b. Enter your date of birth.   c. Enter your home email address.   d. Click Submit, and follow the next screen’s instructions.  3. Create a [employer] ID. This will be your [employer] login ID and cannot be changed, so think of one that is secure and easy to remember.  4. Create a [employer] password. You can change your password at any time.  5. Enter your Password Reset Question and Answer. This can be used at a later time if you forget your password.   6. Enter your e-mail address. This allows you to receive e-mail notifications when new information is available in [employer].  7. Click Sign Up. You can now view your health information.    For assistance activating your [employer] account, call [phone]

## 2017-06-20 NOTE — WOUND TEAM
"[facility]  Inpatient Services  Initial Wound & Skin Care Evaluation    Admission Date: 6/20/17          HPI, PMH, SH: Reviewed  Unit where seen by Wound Team:  S [address]2    WOUND CONSULT RELATED TO:  Right lateral foot wound     SUBJECTIVE:  \"I have no feeling in the right lower leg or foot.\"     Self Report / Pain Level: 0/10 - insensate secondary to neurological injury     OBJECTIVE:  Pt supine in bed, wound to right lateral foot covered with gauze and tape     WOUND TYPE, LOCATION, CHARACTERISTICS (Pressure ulcers: location, stage, POA or date identified)    Wound Type/Location:  Neuropathic ulceration, right lateral foot     Periwound: erythema, edema, warmth      Drainage:  Min serosanguinous      Tissue Type and %:  50% red, 50% yellow    Wound Edges:  Open, attached     Odor:  none     Exposed structure(s):  none   S&S of Infection:  Erythema, edema, warmth     Measurements: taken 6/20/17    Length:    2.0cm   Width:     2.0cm   Depth:    ua   Tracts/undermining:   none      INTERVENTIONS BY WOUND TEAM:   Previous dressing removed, cleansed site with NS and patted dry.  Using curette, CSWD to remove ~50% adherent necrotic tissue to reveal bleeding base beneath.  Measured and photographed.  Covered wound bed with hydrafera blue followed by silicone ad foam.      Dressing types: hydrafera blue, ad foam     Interdisciplinary Collaboration: RN, Pt, Dr Wu     EVALUATION:  Pt presents with a wound to the lateral aspect of her right foot.  Pt's foot is deformed secondary to neurological injury 1-2 years ago.  Dr Wu with ortho present with wound team to assess the site and make recommendations for corrective surgery to address the deformity of the right foot hopefully to prevent future skin breakdown events.  Wound team to retun and apply a NPWT dressing in 24 hours as requested by ortho.          Factors affecting wound healing:  Neuro damage, insensate, chronicity, infection, foot deformity "     Goals:  Wound to be 80% viable in 24 hours    NURSING PLAN OF CARE: (X)  Dressing changes: See Dressing Maintenance orders: X  Skin care: See Skin Care orders:   Rectal tube care: See Rectal Tube Care orders:   Other orders:    RSKIN: CURRENT (X) ORDERED (O)  Q shift Brad:  X  Q shift pressure point assessments:  X  Atmosair   X     PALOMA      Bariatric PALOMA      Bariatric foam        Heel float boots       Heels floated on pillows    X  Barrier wipes      Barrier Cream      Barrier paste      Sacral silicone dressing      Silicone O2 tubing      Anchorfast      Trach with Optifoam split foam       Waffle cushion      Rectal tube or BMS      Antifungal tx    Turn q 2 hours X  Up to chair    Ambulate   PT/OT     Dietician      PO  X   TF   TPN     PVN    NPO   # days   Other       WOUND TEAM PLAN OF CARE (X):   NPWT change 3 x week:        Dressing changes by wound team:       Follow up as needed:  X - wound team to return 6/21/17 for NPWT dressing placement as per ortho      Other (explain):    Anticipated discharge plans (X):  SNF:           Home Care:           Outpatient Wound Center:            Self Care:            Other:  TBD

## 2017-06-21 PROBLEM — L97.519 FOOT ULCER, RIGHT (HCC): Status: ACTIVE | Noted: 2017-06-21

## 2017-06-21 PROBLEM — G83.10: Status: ACTIVE | Noted: 2017-06-21

## 2017-06-21 LAB
ALBUMIN SERPL BCP-MCNC: 3.1 G/DL (ref 3.2–4.9)
ALBUMIN/GLOB SERPL: 1.1 G/DL
ALP SERPL-CCNC: 62 U/L (ref 30–99)
ALT SERPL-CCNC: 24 U/L (ref 2–50)
ANION GAP SERPL CALC-SCNC: 5 MMOL/L (ref 0–11.9)
AST SERPL-CCNC: 25 U/L (ref 12–45)
BILIRUB SERPL-MCNC: 0.3 MG/DL (ref 0.1–1.5)
BUN SERPL-MCNC: 13 MG/DL (ref 8–22)
CALCIUM SERPL-MCNC: 8.8 MG/DL (ref 8.5–10.5)
CHLORIDE SERPL-SCNC: 113 MMOL/L (ref 96–112)
CHOLEST SERPL-MCNC: 154 MG/DL (ref 100–199)
CO2 SERPL-SCNC: 20 MMOL/L (ref 20–33)
CREAT SERPL-MCNC: 0.89 MG/DL (ref 0.5–1.4)
ERYTHROCYTE [DISTWIDTH] IN BLOOD BY AUTOMATED COUNT: 49.9 FL (ref 35.9–50)
GFR SERPL CREATININE-BSD FRML MDRD: >60 ML/MIN/1.73 M 2
GLOBULIN SER CALC-MCNC: 2.8 G/DL (ref 1.9–3.5)
GLUCOSE SERPL-MCNC: 110 MG/DL (ref 65–99)
HCT VFR BLD AUTO: 37.6 % (ref 37–47)
HDLC SERPL-MCNC: 29 MG/DL
HGB BLD-MCNC: 12 G/DL (ref 12–16)
LDLC SERPL CALC-MCNC: 86 MG/DL
MAGNESIUM SERPL-MCNC: 1.8 MG/DL (ref 1.5–2.5)
MCH RBC QN AUTO: 28.9 PG (ref 27–33)
MCHC RBC AUTO-ENTMCNC: 31.9 G/DL (ref 33.6–35)
MCV RBC AUTO: 90.6 FL (ref 81.4–97.8)
PHOSPHATE SERPL-MCNC: 2.6 MG/DL (ref 2.5–4.5)
PLATELET # BLD AUTO: 363 K/UL (ref 164–446)
PMV BLD AUTO: 8.9 FL (ref 9–12.9)
POTASSIUM SERPL-SCNC: 4.2 MMOL/L (ref 3.6–5.5)
PROT SERPL-MCNC: 5.9 G/DL (ref 6–8.2)
RBC # BLD AUTO: 4.15 M/UL (ref 4.2–5.4)
SODIUM SERPL-SCNC: 138 MMOL/L (ref 135–145)
TRIGL SERPL-MCNC: 195 MG/DL (ref 0–149)
WBC # BLD AUTO: 6.2 K/UL (ref 4.8–10.8)

## 2017-06-21 PROCEDURE — 306263 VAC CANNISTER W/GEL 500ML: Performed by: ORTHOPAEDIC SURGERY

## 2017-06-21 PROCEDURE — 770006 HCHG ROOM/CARE - MED/SURG/GYN SEMI*

## 2017-06-21 PROCEDURE — G8978 MOBILITY CURRENT STATUS: HCPCS | Mod: CJ

## 2017-06-21 PROCEDURE — 36415 COLL VENOUS BLD VENIPUNCTURE: CPT

## 2017-06-21 PROCEDURE — 80061 LIPID PANEL: CPT

## 2017-06-21 PROCEDURE — 85027 COMPLETE CBC AUTOMATED: CPT

## 2017-06-21 PROCEDURE — 700111 HCHG RX REV CODE 636 W/ 250 OVERRIDE (IP)

## 2017-06-21 PROCEDURE — 700102 HCHG RX REV CODE 250 W/ 637 OVERRIDE(OP): Performed by: NURSE PRACTITIONER

## 2017-06-21 PROCEDURE — 700105 HCHG RX REV CODE 258: Performed by: INTERNAL MEDICINE

## 2017-06-21 PROCEDURE — A9270 NON-COVERED ITEM OR SERVICE: HCPCS | Performed by: INTERNAL MEDICINE

## 2017-06-21 PROCEDURE — 700102 HCHG RX REV CODE 250 W/ 637 OVERRIDE(OP): Performed by: INTERNAL MEDICINE

## 2017-06-21 PROCEDURE — G8987 SELF CARE CURRENT STATUS: HCPCS | Mod: CI

## 2017-06-21 PROCEDURE — 97162 PT EVAL MOD COMPLEX 30 MIN: CPT

## 2017-06-21 PROCEDURE — 99233 SBSQ HOSP IP/OBS HIGH 50: CPT | Performed by: INTERNAL MEDICINE

## 2017-06-21 PROCEDURE — G8988 SELF CARE GOAL STATUS: HCPCS | Mod: CH

## 2017-06-21 PROCEDURE — 80053 COMPREHEN METABOLIC PANEL: CPT

## 2017-06-21 PROCEDURE — 83735 ASSAY OF MAGNESIUM: CPT

## 2017-06-21 PROCEDURE — 97165 OT EVAL LOW COMPLEX 30 MIN: CPT

## 2017-06-21 PROCEDURE — A9270 NON-COVERED ITEM OR SERVICE: HCPCS | Performed by: NURSE PRACTITIONER

## 2017-06-21 PROCEDURE — G8979 MOBILITY GOAL STATUS: HCPCS | Mod: CI

## 2017-06-21 PROCEDURE — 306373 DRESSING,VAC SIMPLACE SMALL: Performed by: ORTHOPAEDIC SURGERY

## 2017-06-21 PROCEDURE — 84100 ASSAY OF PHOSPHORUS: CPT

## 2017-06-21 PROCEDURE — 700111 HCHG RX REV CODE 636 W/ 250 OVERRIDE (IP): Performed by: INTERNAL MEDICINE

## 2017-06-21 PROCEDURE — 700105 HCHG RX REV CODE 258

## 2017-06-21 PROCEDURE — 97605 NEG PRS WND THER DME<=50SQCM: CPT

## 2017-06-21 RX ORDER — ZOLPIDEM TARTRATE 5 MG/1
5 TABLET ORAL NIGHTLY PRN
Status: DISCONTINUED | OUTPATIENT
Start: 2017-06-21 | End: 2017-07-01 | Stop reason: HOSPADM

## 2017-06-21 RX ADMIN — OXYCODONE HYDROCHLORIDE 5 MG: 5 TABLET ORAL at 21:42

## 2017-06-21 RX ADMIN — OXYCODONE HYDROCHLORIDE 5 MG: 5 TABLET ORAL at 11:32

## 2017-06-21 RX ADMIN — HEPARIN SODIUM 5000 UNITS: 5000 INJECTION, SOLUTION INTRAVENOUS; SUBCUTANEOUS at 05:14

## 2017-06-21 RX ADMIN — SENNOSIDES AND DOCUSATE SODIUM 2 TABLET: 8.6; 5 TABLET ORAL at 20:22

## 2017-06-21 RX ADMIN — OXYCODONE HYDROCHLORIDE 5 MG: 5 TABLET ORAL at 04:14

## 2017-06-21 RX ADMIN — ZOLPIDEM TARTRATE 5 MG: 5 TABLET, FILM COATED ORAL at 21:42

## 2017-06-21 RX ADMIN — PREGABALIN 75 MG: 75 CAPSULE ORAL at 08:09

## 2017-06-21 RX ADMIN — HEPARIN SODIUM 5000 UNITS: 5000 INJECTION, SOLUTION INTRAVENOUS; SUBCUTANEOUS at 13:38

## 2017-06-21 RX ADMIN — POLYETHYLENE GLYCOL 3350 1 PACKET: 17 POWDER, FOR SOLUTION ORAL at 16:09

## 2017-06-21 RX ADMIN — OXYCODONE HYDROCHLORIDE 5 MG: 5 TABLET ORAL at 08:09

## 2017-06-21 RX ADMIN — OXYCODONE HYDROCHLORIDE 5 MG: 5 TABLET ORAL at 15:16

## 2017-06-21 RX ADMIN — SODIUM CHLORIDE: 9 INJECTION, SOLUTION INTRAVENOUS at 00:00

## 2017-06-21 RX ADMIN — TELMISARTAN 40 MG: 40 TABLET ORAL at 08:09

## 2017-06-21 RX ADMIN — OXYCODONE HYDROCHLORIDE 5 MG: 5 TABLET ORAL at 18:18

## 2017-06-21 RX ADMIN — VANCOMYCIN HYDROCHLORIDE 1600 MG: 100 INJECTION, POWDER, LYOPHILIZED, FOR SOLUTION INTRAVENOUS at 04:14

## 2017-06-21 RX ADMIN — SENNOSIDES AND DOCUSATE SODIUM 2 TABLET: 8.6; 5 TABLET ORAL at 08:09

## 2017-06-21 RX ADMIN — CEFTRIAXONE SODIUM 2 G: 2 INJECTION, POWDER, FOR SOLUTION INTRAMUSCULAR; INTRAVENOUS at 08:09

## 2017-06-21 RX ADMIN — HEPARIN SODIUM 5000 UNITS: 5000 INJECTION, SOLUTION INTRAVENOUS; SUBCUTANEOUS at 20:22

## 2017-06-21 RX ADMIN — PREGABALIN 75 MG: 75 CAPSULE ORAL at 15:16

## 2017-06-21 ASSESSMENT — PAIN SCALES - GENERAL
PAINLEVEL_OUTOF10: 5
PAINLEVEL_OUTOF10: 5
PAINLEVEL_OUTOF10: 3
PAINLEVEL_OUTOF10: 3
PAINLEVEL_OUTOF10: 5
PAINLEVEL_OUTOF10: 6
PAINLEVEL_OUTOF10: 5
PAINLEVEL_OUTOF10: 5

## 2017-06-21 ASSESSMENT — ENCOUNTER SYMPTOMS
DIZZINESS: 0
COUGH: 0
HEADACHES: 0
FEVER: 0
HEARTBURN: 0

## 2017-06-21 ASSESSMENT — COGNITIVE AND FUNCTIONAL STATUS - GENERAL
MOBILITY SCORE: 18
CLIMB 3 TO 5 STEPS WITH RAILING: A LOT
SUGGESTED CMS G CODE MODIFIER DAILY ACTIVITY: CH
DAILY ACTIVITIY SCORE: 24
SUGGESTED CMS G CODE MODIFIER MOBILITY: CK
MOVING FROM LYING ON BACK TO SITTING ON SIDE OF FLAT BED: A LITTLE
STANDING UP FROM CHAIR USING ARMS: A LITTLE
WALKING IN HOSPITAL ROOM: A LOT

## 2017-06-21 ASSESSMENT — ACTIVITIES OF DAILY LIVING (ADL): TOILETING: INDEPENDENT

## 2017-06-21 ASSESSMENT — GAIT ASSESSMENTS: GAIT LEVEL OF ASSIST: UNABLE TO PARTICIPATE

## 2017-06-21 NOTE — PROGRESS NOTES
"Pharmacy Kinetics 62 y.o. female on vancomycin day #1 2017    Received vancomycin 25mg/kg in the Er (2000mg)    Indication for Treatment: SSTI    Pertinent history per medical record: Admitted on 2017 for right foot ulcer.     Other antibiotics: Ceftriaxone 2g q24hr    Allergies: Penicillins     List concerns for renal function: obesity, low muscle mass due to chronic weakness/wheelchair bound    Pertinent cultures to date:   17 - blood cultures x2 needs to be colelcted  17 - wound culture in process    Recent Labs      17   0153   WBC  9.8   NEUTSPOLYS  57.20     Recent Labs      17   0153   BUN  9   CREATININE  0.73     No results for input(s): VANCOTROUGH, VANCOPEAK, VANCORANDOM in the last 72 hours.  Intake/Output Summary (Last 24 hours) at 17 1702  Last data filed at 17 1100   Gross per 24 hour   Intake    100 ml   Output      0 ml   Net    100 ml      Blood pressure 121/79, pulse 94, temperature 36.9 °C (98.4 °F), resp. rate 16, height 1.676 m (5' 6\"), weight 81.647 kg (180 lb), last menstrual period 1977, SpO2 97 %, not currently breastfeeding. Temp (24hrs), Av.9 °C (98.4 °F), Min:36.8 °C (98.3 °F), Max:36.9 °C (98.5 °F)      A/P   1. Vancomycin dose change: Start vancomycin 20 mg/kg (1600mg q24hr)  2. Next vancomycin level: Prior to the 3rd or 4th dose or sooner if clinically indicated  3. Goal trough: 12-16 mcg/ml  4. Comments: Examined wound in patients room. Minimal erythema around ulcer. Possible abscess, if abscess is excluded likely does not need vancomycin. Dosing per protocol above. Monitor renal closely due to above risk factors. Pharmacy will follow. Narrow therapy as able.     Jose Alcocer, PharmD, BCPS      "

## 2017-06-21 NOTE — PROGRESS NOTES
"Pharmacy Kinetics 62 y.o. female on vancomycin day #2  2017    Currently on Vancomycin 1600 mg iv q24hr (0400)    Indication for Treatment: SSTI - RLE cellulitis and ulcer    Pertinent history per medical record: Admitted on 2017 for worsening ulcer on the right foot.  Pt noted blood on her socks but was unsure of any drainage as she cannot physically see the wound.  An xray of the foot was done upon admission which revealed possible metallic foreign body within the plantar soft tissues of great toes.  ID and LPS consulted.  Surgery planned for Friday for tendon release.    Other antibiotics: rocephin 2 g IV q24h    Allergies: Penicillins     List concerns for renal function: contrast     Pertinent cultures to date:   17: wound (right foot) POS - streptococcus agalactiae (group B), light growth    Recent Labs      17   0153  17   0225   WBC  9.8  6.2   NEUTSPOLYS  57.20   --      Recent Labs      17   0153  17   1146  17   0225   BUN  9   --   13   CREATININE  0.73   --   0.89   ALBUMIN   --   3.7  3.1*     Intake/Output Summary (Last 24 hours) at 17 1410  Last data filed at 17 0921   Gross per 24 hour   Intake    580 ml   Output      0 ml   Net    580 ml      Blood pressure 129/79, pulse 86, temperature 36.7 °C (98.1 °F), resp. rate 14, height 1.676 m (5' 6\"), weight 81.647 kg (180 lb), last menstrual period 1977, SpO2 96 %, not currently breastfeeding. Temp (24hrs), Av.8 °C (98.2 °F), Min:36.6 °C (97.8 °F), Max:37 °C (98.6 °F)      A/P   1. Vancomycin dose change: not indicated  2. Next vancomycin level:  @ 0330 (not ordered)  3. Goal trough: 12-16 mcg/ml  4. Comments: minimal concern for accumulation.  Will continue current dosing and plan for a level prior to 4th total dose.  Pharmacy will continue to monitor and adjust dosing if needed.      Denisha Frausto, PharmD        "

## 2017-06-21 NOTE — DIETARY
Nutrition Services: Pt with noted poor PO per admit screen. Diet= regular. Labs/Meds reviewed. Wt is 81.6 kg/ 180 lbs and BMI of 29.07. Per wound care RN assessment, pt with Neuropathic ulceration, right lateral foot. Per ADL, pt ate 50-75% of lunch yesterday (minimal PO records available).    Plan/Rec:  Nutrition Representative to see pt daily for snacks/meal preferences as appropriate with diet order. RD to monitor for consistently adequate intake.

## 2017-06-21 NOTE — PROGRESS NOTES
Pt is a/o x 4, shift assessment completed.C/o of pain, meds given. Pt is resting comfortably at this time. No unmet needs. Will continue to monitor.

## 2017-06-21 NOTE — THERAPY
"Occupational Therapy Evaluation completed.   Functional Status:  Pt very pleasant & motivated. Pt able to perform basic ADL's with SBA from a seated base.  Pt is able to transfer with SBA but has very poor technique which is what likely lead her to having a wound on her foot.  Pt instructed to use FWW for transfers & to avoid twisting & WB on her right ankle during transfers.  Plan of Care: Will benefit from Occupational Therapy 2 times per week  Discharge Recommendations:  Equipment: Will Continue to Assess for Equipment Needs. Post-acute therapy Discharge may depend on medical course during hospital stay.    See \"Rehab Therapy-Acute\" Patient Summary Report for complete documentation.    "

## 2017-06-21 NOTE — CARE PLAN
Problem: Safety  Goal: Will remain free from injury  Outcome: PROGRESSING AS EXPECTED  Bed in lowest position, call light within reach.
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
## 2017-06-21 NOTE — CARE PLAN
Problem: Safety  Goal: Will remain free from injury  Intervention: Provide assistance with mobility  Bed alarm on, call bell within reach. Commode stored in bathroom
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
## 2017-06-21 NOTE — CARE PLAN
Problem: Pain Management  Goal: Pain level will decrease to patient’s comfort goal  Intervention: Follow pain managment plan developed in collaboration with patient and Interdisciplinary Team  Pain assessed, meds given in a timely manner.

## 2017-06-21 NOTE — WOUND TEAM
"[facility]  Inpatient Services  Wound & Skin Care Treatment    Admission Date: 6/20/17          HPI, PMH, SH: Reviewed  Unit where seen by Wound Team:  S [address]-2    WOUND CONSULT RELATED TO:  Right lateral foot wound - placement of NPWT     SUBJECTIVE:  \"I'm excited to have this thing heal.\"     Self Report / Pain Level: 0/10 - insensate secondary to neurological injury     OBJECTIVE:  Pt supine in bed, wound to right lateral foot covered with wound team dressing form 6/20/17     WOUND TYPE, LOCATION, CHARACTERISTICS (Pressure ulcers: location, stage, POA or date identified)    Wound Type/Location:  Neuropathic ulceration, right lateral foot     Periwound: pink, mild maceration      Drainage:  Min serosanguinous      Tissue Type and %:  80% red, 20% yellow    Wound Edges:  Open, attached     Odor:  none     Exposed structure(s):  none   S&S of Infection:  none     Measurements: taken 6/20/17    Length:    2.0cm   Width:     2.0cm   Depth:    ua   Tracts/undermining:   none      INTERVENTIONS BY WOUND TEAM:   Previous dressing removed, cleansed site with NS and patted dry.  Benzoin and drape to periwound skin,  covered wound bed with 1 piece of black sponge and applied the trac pad.  125 mmHg negative pressure initiated without issue.       Dressing types: NPWT @ 125mmHg    Interdisciplinary Collaboration: RN, Pt, Dr Wu     EVALUATION:  Wound with increased viable tissue from initial evaluation on 6/20/17.  NPWT to expedite healing and promote granular tissue formation.         Factors affecting wound healing:  Neuro damage, insensate, chronicity, infection, foot deformity     Goals:  Wound to be 1% smaller in 1 week    NURSING PLAN OF CARE: (X)  Dressing changes: See Dressing Maintenance orders: X  Skin care: See Skin Care orders:   Rectal tube care: See Rectal Tube Care orders:   Other orders:    WOUND TEAM PLAN OF CARE (X):   NPWT change 3 x week:    X    Dressing changes by wound team:     "   Follow up as needed:    Other (explain):    Anticipated discharge plans (X):  SNF:           Home Care:           Outpatient Wound Center:            Self Care:            Other:  TBD

## 2017-06-21 NOTE — PROGRESS NOTES
Pt a+ox4, complaints of R leg pain medicated per MAR. Pt cont on iv abx. dsg to R ankle CDI . SBA to BSC. Pt with no further complaints at this time     --wound vac placed in am- working appropriately. Authorization for release of health information faxed to [facility] per nurse communication order.     --pt refusing bed alarm- educated on safety. Pt calling appropriately before attempting to get oob.

## 2017-06-22 PROBLEM — R29.898 WEAKNESS OF RIGHT LEG: Status: ACTIVE | Noted: 2017-06-22

## 2017-06-22 LAB
ALBUMIN SERPL BCP-MCNC: 3 G/DL (ref 3.2–4.9)
ALBUMIN/GLOB SERPL: 1.1 G/DL
ALP SERPL-CCNC: 57 U/L (ref 30–99)
ALT SERPL-CCNC: 19 U/L (ref 2–50)
ANION GAP SERPL CALC-SCNC: 4 MMOL/L (ref 0–11.9)
AST SERPL-CCNC: 18 U/L (ref 12–45)
BACTERIA WND AEROBE CULT: ABNORMAL
BACTERIA WND AEROBE CULT: ABNORMAL
BASOPHILS # BLD AUTO: 0.7 % (ref 0–1.8)
BASOPHILS # BLD: 0.04 K/UL (ref 0–0.12)
BILIRUB SERPL-MCNC: 0.2 MG/DL (ref 0.1–1.5)
BUN SERPL-MCNC: 11 MG/DL (ref 8–22)
CALCIUM SERPL-MCNC: 8.7 MG/DL (ref 8.5–10.5)
CHLORIDE SERPL-SCNC: 110 MMOL/L (ref 96–112)
CO2 SERPL-SCNC: 22 MMOL/L (ref 20–33)
CREAT SERPL-MCNC: 0.74 MG/DL (ref 0.5–1.4)
EOSINOPHIL # BLD AUTO: 0.34 K/UL (ref 0–0.51)
EOSINOPHIL NFR BLD: 6.1 % (ref 0–6.9)
ERYTHROCYTE [DISTWIDTH] IN BLOOD BY AUTOMATED COUNT: 49.7 FL (ref 35.9–50)
GFR SERPL CREATININE-BSD FRML MDRD: >60 ML/MIN/1.73 M 2
GLOBULIN SER CALC-MCNC: 2.7 G/DL (ref 1.9–3.5)
GLUCOSE SERPL-MCNC: 121 MG/DL (ref 65–99)
GRAM STN SPEC: ABNORMAL
HCT VFR BLD AUTO: 35.5 % (ref 37–47)
HGB BLD-MCNC: 11.3 G/DL (ref 12–16)
IMM GRANULOCYTES # BLD AUTO: 0.03 K/UL (ref 0–0.11)
IMM GRANULOCYTES NFR BLD AUTO: 0.5 % (ref 0–0.9)
LYMPHOCYTES # BLD AUTO: 2.45 K/UL (ref 1–4.8)
LYMPHOCYTES NFR BLD: 44.1 % (ref 22–41)
MCH RBC QN AUTO: 28.5 PG (ref 27–33)
MCHC RBC AUTO-ENTMCNC: 31.8 G/DL (ref 33.6–35)
MCV RBC AUTO: 89.6 FL (ref 81.4–97.8)
MONOCYTES # BLD AUTO: 0.45 K/UL (ref 0–0.85)
MONOCYTES NFR BLD AUTO: 8.1 % (ref 0–13.4)
NEUTROPHILS # BLD AUTO: 2.24 K/UL (ref 2–7.15)
NEUTROPHILS NFR BLD: 40.5 % (ref 44–72)
NRBC # BLD AUTO: 0 K/UL
NRBC BLD AUTO-RTO: 0 /100 WBC
PLATELET # BLD AUTO: 344 K/UL (ref 164–446)
PMV BLD AUTO: 9.2 FL (ref 9–12.9)
POTASSIUM SERPL-SCNC: 3.7 MMOL/L (ref 3.6–5.5)
PROT SERPL-MCNC: 5.7 G/DL (ref 6–8.2)
RBC # BLD AUTO: 3.96 M/UL (ref 4.2–5.4)
SIGNIFICANT IND 70042: ABNORMAL
SITE SITE: ABNORMAL
SODIUM SERPL-SCNC: 136 MMOL/L (ref 135–145)
SOURCE SOURCE: ABNORMAL
WBC # BLD AUTO: 5.6 K/UL (ref 4.8–10.8)

## 2017-06-22 PROCEDURE — 770006 HCHG ROOM/CARE - MED/SURG/GYN SEMI*

## 2017-06-22 PROCEDURE — 36415 COLL VENOUS BLD VENIPUNCTURE: CPT

## 2017-06-22 PROCEDURE — 700102 HCHG RX REV CODE 250 W/ 637 OVERRIDE(OP): Performed by: NURSE PRACTITIONER

## 2017-06-22 PROCEDURE — 700111 HCHG RX REV CODE 636 W/ 250 OVERRIDE (IP)

## 2017-06-22 PROCEDURE — 85025 COMPLETE CBC W/AUTO DIFF WBC: CPT

## 2017-06-22 PROCEDURE — 700105 HCHG RX REV CODE 258: Performed by: INTERNAL MEDICINE

## 2017-06-22 PROCEDURE — 700102 HCHG RX REV CODE 250 W/ 637 OVERRIDE(OP): Performed by: INTERNAL MEDICINE

## 2017-06-22 PROCEDURE — 700111 HCHG RX REV CODE 636 W/ 250 OVERRIDE (IP): Performed by: INTERNAL MEDICINE

## 2017-06-22 PROCEDURE — 700105 HCHG RX REV CODE 258

## 2017-06-22 PROCEDURE — A9270 NON-COVERED ITEM OR SERVICE: HCPCS | Performed by: INTERNAL MEDICINE

## 2017-06-22 PROCEDURE — 99232 SBSQ HOSP IP/OBS MODERATE 35: CPT | Performed by: INTERNAL MEDICINE

## 2017-06-22 PROCEDURE — 80053 COMPREHEN METABOLIC PANEL: CPT

## 2017-06-22 PROCEDURE — A9270 NON-COVERED ITEM OR SERVICE: HCPCS | Performed by: NURSE PRACTITIONER

## 2017-06-22 RX ADMIN — OXYCODONE HYDROCHLORIDE 5 MG: 5 TABLET ORAL at 17:29

## 2017-06-22 RX ADMIN — VANCOMYCIN HYDROCHLORIDE 1600 MG: 100 INJECTION, POWDER, LYOPHILIZED, FOR SOLUTION INTRAVENOUS at 04:30

## 2017-06-22 RX ADMIN — HEPARIN SODIUM 5000 UNITS: 5000 INJECTION, SOLUTION INTRAVENOUS; SUBCUTANEOUS at 21:58

## 2017-06-22 RX ADMIN — SENNOSIDES AND DOCUSATE SODIUM 2 TABLET: 8.6; 5 TABLET ORAL at 21:58

## 2017-06-22 RX ADMIN — MORPHINE SULFATE 2 MG: 4 INJECTION INTRAVENOUS at 19:56

## 2017-06-22 RX ADMIN — CEFTRIAXONE SODIUM 2 G: 2 INJECTION, POWDER, FOR SOLUTION INTRAMUSCULAR; INTRAVENOUS at 10:37

## 2017-06-22 RX ADMIN — TELMISARTAN 40 MG: 40 TABLET ORAL at 09:04

## 2017-06-22 RX ADMIN — PREGABALIN 75 MG: 75 CAPSULE ORAL at 00:00

## 2017-06-22 RX ADMIN — PREGABALIN 75 MG: 75 CAPSULE ORAL at 09:03

## 2017-06-22 RX ADMIN — OXYCODONE HYDROCHLORIDE 5 MG: 5 TABLET ORAL at 10:37

## 2017-06-22 RX ADMIN — OXYCODONE HYDROCHLORIDE 5 MG: 5 TABLET ORAL at 14:32

## 2017-06-22 RX ADMIN — OXYCODONE HYDROCHLORIDE 5 MG: 5 TABLET ORAL at 01:15

## 2017-06-22 RX ADMIN — PREGABALIN 75 MG: 75 CAPSULE ORAL at 21:58

## 2017-06-22 RX ADMIN — PREGABALIN 75 MG: 75 CAPSULE ORAL at 16:24

## 2017-06-22 RX ADMIN — MORPHINE SULFATE 2 MG: 4 INJECTION INTRAVENOUS at 23:50

## 2017-06-22 RX ADMIN — MORPHINE SULFATE 2 MG: 4 INJECTION INTRAVENOUS at 09:16

## 2017-06-22 RX ADMIN — OXYCODONE HYDROCHLORIDE 5 MG: 5 TABLET ORAL at 21:58

## 2017-06-22 RX ADMIN — SENNOSIDES AND DOCUSATE SODIUM 2 TABLET: 8.6; 5 TABLET ORAL at 09:03

## 2017-06-22 RX ADMIN — ZOLPIDEM TARTRATE 5 MG: 5 TABLET, FILM COATED ORAL at 23:50

## 2017-06-22 RX ADMIN — MORPHINE SULFATE 2 MG: 4 INJECTION INTRAVENOUS at 16:29

## 2017-06-22 ASSESSMENT — ENCOUNTER SYMPTOMS
HEADACHES: 1
DIZZINESS: 0
HEADACHES: 0
NAUSEA: 0
SENSORY CHANGE: 1
ABDOMINAL PAIN: 0
FEVER: 0
COUGH: 0
HEARTBURN: 0
CHILLS: 0
DIARRHEA: 0
WHEEZING: 1
SHORTNESS OF BREATH: 0
CONSTIPATION: 1
VOMITING: 0

## 2017-06-22 ASSESSMENT — PAIN SCALES - GENERAL
PAINLEVEL_OUTOF10: 5
PAINLEVEL_OUTOF10: 8
PAINLEVEL_OUTOF10: 7

## 2017-06-22 NOTE — PROGRESS NOTES
Pt is a/o x 4, shift assessment completed.C/o of pain, meds given. Pt is resting comfortably at this time. No unmet needs. Bed in lowest position, call light within reach. Will continue to monitor.

## 2017-06-22 NOTE — CARE PLAN
Problem: Safety  Goal: Will remain free from injury  Patient will be reminded to use the call light when in need of assistance

## 2017-06-22 NOTE — ASSESSMENT & PLAN NOTE
Dr. Wu (ortho) does not plan to do any surgery unless there is proof the patient's leg weakness is irreversible.  Continue woundvac.  Home health / wound vac  Needs home abx infusion arranged

## 2017-06-22 NOTE — ASSESSMENT & PLAN NOTE
MRI of lumbar spine neg for cause.  Outpatient for nerve conduction study.   Ordered DME for home use.

## 2017-06-22 NOTE — PROGRESS NOTES
"Ortho PN    Pt in house w vac on RLE.  Concerned about her son in [place] today    /76 mmHg  Pulse 102  Temp(Src) 37 °C (98.6 °F)  Resp 13  Ht 1.676 m (5' 6\")  Wt 81.647 kg (180 lb)  BMI 29.07 kg/m2  SpO2 97%  LMP 04/09/1977  Breastfeeding? No      RLE: Fixed equinovarus, palp pulses, no sensation below knee      -Continue wound care per inpatient serivce  -Recommend complete neurologic workup including nerve conduction studies prior to proceeding with any reconstructive procedure.  Continue wound care per LPS.  I am happy to follow pt if workup done in house or have her present to my clinic as an outpatient.    Koko Wu MD    "

## 2017-06-22 NOTE — ASSESSMENT & PLAN NOTE
Min narc and use conservative measures when possible.  Pain improved control  On oxycontin 20 bid,   Limit prn use  Encourage PT/OT
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
## 2017-06-22 NOTE — PROGRESS NOTES
Pt is under the impression she is going to have surgery on her foot tomorrow 6/23 by orthopedic surgeon. No orders for surgery have been placed. Left message with [facility]/Dr. Wu to clarify pt need for surgery, if any.     1426 Call back from Dr. Wu's Medical Assistant, Alie. She will speak with Dr. Wu to clarify his consult notes and call this RN back.     1540 Received call-back from Alie, Dr. Wu does not have any surgeries scheduled nor planned for patient at this time.

## 2017-06-22 NOTE — CARE PLAN
Problem: Pain Management  Goal: Pain level will decrease to patient’s comfort goal  Intervention: Follow pain managment plan developed in collaboration with patient and Interdisciplinary Team  Pain assessed, pain meds given in a timely manner.
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
## 2017-06-23 LAB
ALBUMIN SERPL BCP-MCNC: 3.1 G/DL (ref 3.2–4.9)
ALBUMIN/GLOB SERPL: 1.1 G/DL
ALP SERPL-CCNC: 57 U/L (ref 30–99)
ALT SERPL-CCNC: 15 U/L (ref 2–50)
ANION GAP SERPL CALC-SCNC: 5 MMOL/L (ref 0–11.9)
AST SERPL-CCNC: 15 U/L (ref 12–45)
BASOPHILS # BLD AUTO: 0.6 % (ref 0–1.8)
BASOPHILS # BLD: 0.03 K/UL (ref 0–0.12)
BILIRUB SERPL-MCNC: 0.2 MG/DL (ref 0.1–1.5)
BUN SERPL-MCNC: 8 MG/DL (ref 8–22)
CALCIUM SERPL-MCNC: 8.8 MG/DL (ref 8.5–10.5)
CHLORIDE SERPL-SCNC: 110 MMOL/L (ref 96–112)
CO2 SERPL-SCNC: 25 MMOL/L (ref 20–33)
CREAT SERPL-MCNC: 0.6 MG/DL (ref 0.5–1.4)
EOSINOPHIL # BLD AUTO: 0.25 K/UL (ref 0–0.51)
EOSINOPHIL NFR BLD: 5.1 % (ref 0–6.9)
ERYTHROCYTE [DISTWIDTH] IN BLOOD BY AUTOMATED COUNT: 48.8 FL (ref 35.9–50)
GFR SERPL CREATININE-BSD FRML MDRD: >60 ML/MIN/1.73 M 2
GLOBULIN SER CALC-MCNC: 2.8 G/DL (ref 1.9–3.5)
GLUCOSE SERPL-MCNC: 114 MG/DL (ref 65–99)
HCT VFR BLD AUTO: 36.1 % (ref 37–47)
HGB BLD-MCNC: 11.5 G/DL (ref 12–16)
IMM GRANULOCYTES # BLD AUTO: 0.01 K/UL (ref 0–0.11)
IMM GRANULOCYTES NFR BLD AUTO: 0.2 % (ref 0–0.9)
LYMPHOCYTES # BLD AUTO: 2.08 K/UL (ref 1–4.8)
LYMPHOCYTES NFR BLD: 42 % (ref 22–41)
MCH RBC QN AUTO: 28.8 PG (ref 27–33)
MCHC RBC AUTO-ENTMCNC: 31.9 G/DL (ref 33.6–35)
MCV RBC AUTO: 90.3 FL (ref 81.4–97.8)
MONOCYTES # BLD AUTO: 0.47 K/UL (ref 0–0.85)
MONOCYTES NFR BLD AUTO: 9.5 % (ref 0–13.4)
NEUTROPHILS # BLD AUTO: 2.11 K/UL (ref 2–7.15)
NEUTROPHILS NFR BLD: 42.6 % (ref 44–72)
NRBC # BLD AUTO: 0 K/UL
NRBC BLD AUTO-RTO: 0 /100 WBC
PLATELET # BLD AUTO: 325 K/UL (ref 164–446)
PMV BLD AUTO: 9.2 FL (ref 9–12.9)
POTASSIUM SERPL-SCNC: 3.9 MMOL/L (ref 3.6–5.5)
PROT SERPL-MCNC: 5.9 G/DL (ref 6–8.2)
RBC # BLD AUTO: 4 M/UL (ref 4.2–5.4)
SODIUM SERPL-SCNC: 140 MMOL/L (ref 135–145)
WBC # BLD AUTO: 5 K/UL (ref 4.8–10.8)

## 2017-06-23 PROCEDURE — A9270 NON-COVERED ITEM OR SERVICE: HCPCS | Performed by: NURSE PRACTITIONER

## 2017-06-23 PROCEDURE — 99232 SBSQ HOSP IP/OBS MODERATE 35: CPT | Performed by: INTERNAL MEDICINE

## 2017-06-23 PROCEDURE — 80053 COMPREHEN METABOLIC PANEL: CPT

## 2017-06-23 PROCEDURE — 700101 HCHG RX REV CODE 250

## 2017-06-23 PROCEDURE — 94640 AIRWAY INHALATION TREATMENT: CPT

## 2017-06-23 PROCEDURE — 97535 SELF CARE MNGMENT TRAINING: CPT

## 2017-06-23 PROCEDURE — 36415 COLL VENOUS BLD VENIPUNCTURE: CPT

## 2017-06-23 PROCEDURE — A9270 NON-COVERED ITEM OR SERVICE: HCPCS | Performed by: INTERNAL MEDICINE

## 2017-06-23 PROCEDURE — 700102 HCHG RX REV CODE 250 W/ 637 OVERRIDE(OP): Performed by: INTERNAL MEDICINE

## 2017-06-23 PROCEDURE — 97605 NEG PRS WND THER DME<=50SQCM: CPT

## 2017-06-23 PROCEDURE — 700105 HCHG RX REV CODE 258: Performed by: INTERNAL MEDICINE

## 2017-06-23 PROCEDURE — 85025 COMPLETE CBC W/AUTO DIFF WBC: CPT

## 2017-06-23 PROCEDURE — 770006 HCHG ROOM/CARE - MED/SURG/GYN SEMI*

## 2017-06-23 PROCEDURE — 700111 HCHG RX REV CODE 636 W/ 250 OVERRIDE (IP): Performed by: INTERNAL MEDICINE

## 2017-06-23 PROCEDURE — 94760 N-INVAS EAR/PLS OXIMETRY 1: CPT

## 2017-06-23 PROCEDURE — 700102 HCHG RX REV CODE 250 W/ 637 OVERRIDE(OP): Performed by: NURSE PRACTITIONER

## 2017-06-23 RX ORDER — ALBUTEROL SULFATE 90 UG/1
2 AEROSOL, METERED RESPIRATORY (INHALATION) EVERY 4 HOURS PRN
Status: DISCONTINUED | OUTPATIENT
Start: 2017-06-23 | End: 2017-07-01 | Stop reason: HOSPADM

## 2017-06-23 RX ORDER — LORAZEPAM 2 MG/ML
1 INJECTION INTRAMUSCULAR
Status: DISCONTINUED | OUTPATIENT
Start: 2017-06-23 | End: 2017-06-25

## 2017-06-23 RX ORDER — IPRATROPIUM BROMIDE AND ALBUTEROL SULFATE 2.5; .5 MG/3ML; MG/3ML
SOLUTION RESPIRATORY (INHALATION)
Status: COMPLETED
Start: 2017-06-23 | End: 2017-06-23

## 2017-06-23 RX ADMIN — TELMISARTAN 40 MG: 40 TABLET ORAL at 08:35

## 2017-06-23 RX ADMIN — OXYCODONE HYDROCHLORIDE 5 MG: 5 TABLET ORAL at 20:37

## 2017-06-23 RX ADMIN — HEPARIN SODIUM 5000 UNITS: 5000 INJECTION, SOLUTION INTRAVENOUS; SUBCUTANEOUS at 20:40

## 2017-06-23 RX ADMIN — HEPARIN SODIUM 5000 UNITS: 5000 INJECTION, SOLUTION INTRAVENOUS; SUBCUTANEOUS at 14:40

## 2017-06-23 RX ADMIN — OXYCODONE HYDROCHLORIDE 5 MG: 5 TABLET ORAL at 17:22

## 2017-06-23 RX ADMIN — SENNOSIDES AND DOCUSATE SODIUM 2 TABLET: 8.6; 5 TABLET ORAL at 08:35

## 2017-06-23 RX ADMIN — MORPHINE SULFATE 2 MG: 4 INJECTION INTRAVENOUS at 04:07

## 2017-06-23 RX ADMIN — CEFTRIAXONE SODIUM 2 G: 2 INJECTION, POWDER, FOR SOLUTION INTRAMUSCULAR; INTRAVENOUS at 11:05

## 2017-06-23 RX ADMIN — PREGABALIN 75 MG: 75 CAPSULE ORAL at 14:40

## 2017-06-23 RX ADMIN — PREGABALIN 75 MG: 75 CAPSULE ORAL at 23:07

## 2017-06-23 RX ADMIN — MORPHINE SULFATE 2 MG: 4 INJECTION INTRAVENOUS at 20:45

## 2017-06-23 RX ADMIN — OXYCODONE HYDROCHLORIDE 5 MG: 5 TABLET ORAL at 06:00

## 2017-06-23 RX ADMIN — IPRATROPIUM BROMIDE AND ALBUTEROL SULFATE: .5; 3 SOLUTION RESPIRATORY (INHALATION) at 08:00

## 2017-06-23 RX ADMIN — OXYCODONE HYDROCHLORIDE 5 MG: 5 TABLET ORAL at 11:05

## 2017-06-23 RX ADMIN — MORPHINE SULFATE 2 MG: 4 INJECTION INTRAVENOUS at 14:33

## 2017-06-23 RX ADMIN — SENNOSIDES AND DOCUSATE SODIUM 2 TABLET: 8.6; 5 TABLET ORAL at 20:43

## 2017-06-23 RX ADMIN — ALBUTEROL SULFATE 2 PUFF: 90 AEROSOL, METERED RESPIRATORY (INHALATION) at 23:46

## 2017-06-23 RX ADMIN — PREGABALIN 75 MG: 75 CAPSULE ORAL at 08:35

## 2017-06-23 RX ADMIN — OXYCODONE HYDROCHLORIDE 5 MG: 5 TABLET ORAL at 02:06

## 2017-06-23 RX ADMIN — HEPARIN SODIUM 5000 UNITS: 5000 INJECTION, SOLUTION INTRAVENOUS; SUBCUTANEOUS at 05:59

## 2017-06-23 RX ADMIN — MORPHINE SULFATE 2 MG: 4 INJECTION INTRAVENOUS at 08:36

## 2017-06-23 RX ADMIN — ZOLPIDEM TARTRATE 5 MG: 5 TABLET, FILM COATED ORAL at 23:48

## 2017-06-23 ASSESSMENT — PAIN SCALES - GENERAL
PAINLEVEL_OUTOF10: 8
PAINLEVEL_OUTOF10: 8
PAINLEVEL_OUTOF10: 5
PAINLEVEL_OUTOF10: 7
PAINLEVEL_OUTOF10: 6
PAINLEVEL_OUTOF10: 8
PAINLEVEL_OUTOF10: 8
PAINLEVEL_OUTOF10: 5
PAINLEVEL_OUTOF10: 8

## 2017-06-23 ASSESSMENT — ENCOUNTER SYMPTOMS
SHORTNESS OF BREATH: 0
SENSORY CHANGE: 1
FEVER: 0
CONSTIPATION: 1
DIZZINESS: 0
WHEEZING: 1
CHILLS: 0
HEADACHES: 0
HEARTBURN: 0
COUGH: 0
ABDOMINAL PAIN: 0
NAUSEA: 0
DIARRHEA: 0
VOMITING: 0

## 2017-06-23 ASSESSMENT — COGNITIVE AND FUNCTIONAL STATUS - GENERAL
PERSONAL GROOMING: A LITTLE
DAILY ACTIVITIY SCORE: 18
DRESSING REGULAR UPPER BODY CLOTHING: A LITTLE
SUGGESTED CMS G CODE MODIFIER DAILY ACTIVITY: CK
DRESSING REGULAR LOWER BODY CLOTHING: A LITTLE
EATING MEALS: A LITTLE
TOILETING: A LITTLE
HELP NEEDED FOR BATHING: A LITTLE

## 2017-06-23 NOTE — DISCHARGE PLANNING
PMR order received from Dr. Lewis.  A foot ulcer with no surgical intervention does not meet CMS criteria.  Perhaps HH with wound follow up. This referral will not be forwarded to Physiatry.  I do appreciate the referral.

## 2017-06-23 NOTE — PROGRESS NOTES
Pt received on her room AxOx4, with PIV on her left FA patent with IVF running. With wound vac on her right foot CDI. TORREZ, numbness on her right LE noted, POA. Up to the commode with SBA and she tolerated well. Saturating well on RA. Pain well controlled with Morphine and Oxy IR given alternatively. Needs attended. Call light within reach. Hourly rounding practiced.

## 2017-06-23 NOTE — FACE TO FACE
Face to Face Note  -  Durable Medical Equipment    Edson Lewis D.O. - NPI: [id number]  I certify that this patient is under my care and that they have had a durable medical equipment(DME)face to face encounter by myself that meets the physician DME face-to-face encounter requirements with this patient on:    Date of encounter:   Patient:                    MRN:                       YOB: 2019  Joan Olivares  [id number]  1955     The encounter with the patient was in whole, or in part, for the following medical condition, which is the primary reason for durable medical equipment:  Wound Care and Other - abnormal gait due to right leg weakness    I certify that, based on my findings, the following durable medical equipment is medically necessary:  Walkers, Wound Vac, 3 in 1 Bedside Comode and Other DME Equipment - shower chair and grab bars.    HOME O2 Saturation Measurements:(Values must be present for Home Oxygen orders)         ,     ,         My Clinical findings support the need for the above equipment due to:  Abnormal Gait, Wound infection    Supporting Symptoms: right leg numbness and weakness     ------------------------------------------------------------------------------------------------------------------    Face to Face Supporting Documentation - Home Health    The encounter with this patient was in whole or in part the primary reason for home health admission.    Date of encounter:   Patient:                    MRN:                       YOB: 2019  Joan Tinsley Marshall  [id number]  1955     Home health to see patient for:  Skilled Nursing care for assessment, interventions & education, Wound Care, Physical Therapy evaluation and treatment and Occupational therapy evaluation and treatment    Skilled need for:  Comment: wound care    Skilled nursing interventions to include:  Wound Care    Homebound evidenced status by:  Need the aid of supportive  devices such as crutches, canes, wheelchairs or walkers. Leaving home must require a considerable and taxing effort. There must exist a normal inability to leave the home.    Community Physician to provide follow up care: Pcp Not In Computer     Optional Interventions    Wound information & treatment:    Home Infusion Therapy orders:    Line/Drain/Airway:    I certify the face to face encounter for this home care referral meets the CMS requirements and the encounter/clinical assessment with the patient was, in whole, or in part, for the medical condition(s) listed above, which is the primary reason for home health care. Based on my clinical findings: the service(s) are medically necessary, support the need for home health care, and the homebound criteria are met.  I certify that this patient has had a face to face encounter by myself.  Edson Lewis D.O. - NPI: [id number]    *Debility, frailty and advanced age in the absence of an acute deterioration or exacerbation of a condition do not qualify a patient for home health.

## 2017-06-23 NOTE — THERAPY
"Occupational Therapy Treatment completed with focus on ADLs, ADL transfers and patient education.  Functional Status:  CG/S with ADLs on BSC and CG/SBA with FWW mobility benefiting from cues to maintain NWB t/o each transfer.   Plan of Care: Will benefit from Occupational Therapy 2 times per week  Discharge Recommendations:  Equipment Front-Wheel Walker, Shower Chair, Bedside Commode, Grab Bars and Will Continue to Assess for Equipment Needs. Post-acute therapy Discharge to home with outpatient or home health for additional skilled therapy services.    Patient seen for OT treat focused on ADLs and transfers while maintaining R LE NWB to promote PLOF and healing. Patient required CG/S with ADLs on BSC and CG/SBA with FWW mobility benefiting from cues to maintain NWB t/o each transfer. Patient report steps to safe ADL routine at home. Patient pending for sx and nerve activation f/u prior to making decision regarding next steps in R LE needs/capacity. Patient would continue to benefit from skilled OT in this setting prior to DC home with . x2 week.     See \"Rehab Therapy-Acute\" Patient Summary Report for complete documentation.   "

## 2017-06-23 NOTE — CARE PLAN
Problem: Safety  Goal: Free from accidental injury  Calls appropriately. Call light within reach. Hourly rounding practiced.     Problem: Risk for Deep Vein Thrombosis/Venous Thromboembolism  Goal: DVT/VTE Prevention Measures in Place  Heparin SQ given per order.    Goal: Patient participates in DVT/VTE Prevention Measures  Intervention: Ambulates in Hallway three times per day  Ambulated to the  BR and commode. Denies any discomfort when walking.    Problem: Pain  Goal: Alleviation of Pain or a reduction in pain to the patient’s comfort goal  Pain well controlled with Oxy 5 mg tab and Morphine given alternatively per pt request.
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
## 2017-06-23 NOTE — CARE PLAN
Problem: Safety  Goal: Will remain free from injury  Intervention: Provide assistance with mobility  Remind pt to call for assistance.
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
## 2017-06-24 LAB
ALBUMIN SERPL BCP-MCNC: 3.2 G/DL (ref 3.2–4.9)
ALBUMIN/GLOB SERPL: 1.1 G/DL
ALP SERPL-CCNC: 58 U/L (ref 30–99)
ALT SERPL-CCNC: 13 U/L (ref 2–50)
ANION GAP SERPL CALC-SCNC: 7 MMOL/L (ref 0–11.9)
APPEARANCE UR: CLEAR
AST SERPL-CCNC: 13 U/L (ref 12–45)
BASOPHILS # BLD AUTO: 0.8 % (ref 0–1.8)
BASOPHILS # BLD: 0.05 K/UL (ref 0–0.12)
BILIRUB SERPL-MCNC: 0.2 MG/DL (ref 0.1–1.5)
BILIRUB UR QL STRIP.AUTO: NEGATIVE
BUN SERPL-MCNC: 12 MG/DL (ref 8–22)
CALCIUM SERPL-MCNC: 9.1 MG/DL (ref 8.5–10.5)
CHLORIDE SERPL-SCNC: 105 MMOL/L (ref 96–112)
CO2 SERPL-SCNC: 25 MMOL/L (ref 20–33)
COLOR UR: NORMAL
CREAT SERPL-MCNC: 0.71 MG/DL (ref 0.5–1.4)
EOSINOPHIL # BLD AUTO: 0.24 K/UL (ref 0–0.51)
EOSINOPHIL NFR BLD: 3.7 % (ref 0–6.9)
ERYTHROCYTE [DISTWIDTH] IN BLOOD BY AUTOMATED COUNT: 47.8 FL (ref 35.9–50)
GFR SERPL CREATININE-BSD FRML MDRD: >60 ML/MIN/1.73 M 2
GLOBULIN SER CALC-MCNC: 2.9 G/DL (ref 1.9–3.5)
GLUCOSE SERPL-MCNC: 108 MG/DL (ref 65–99)
GLUCOSE UR STRIP.AUTO-MCNC: NEGATIVE MG/DL
HCT VFR BLD AUTO: 36.8 % (ref 37–47)
HGB BLD-MCNC: 11.7 G/DL (ref 12–16)
IMM GRANULOCYTES # BLD AUTO: 0.02 K/UL (ref 0–0.11)
IMM GRANULOCYTES NFR BLD AUTO: 0.3 % (ref 0–0.9)
KETONES UR STRIP.AUTO-MCNC: NEGATIVE MG/DL
LEUKOCYTE ESTERASE UR QL STRIP.AUTO: NEGATIVE
LYMPHOCYTES # BLD AUTO: 2.34 K/UL (ref 1–4.8)
LYMPHOCYTES NFR BLD: 36.3 % (ref 22–41)
MCH RBC QN AUTO: 28.2 PG (ref 27–33)
MCHC RBC AUTO-ENTMCNC: 31.8 G/DL (ref 33.6–35)
MCV RBC AUTO: 88.7 FL (ref 81.4–97.8)
MICRO URNS: NORMAL
MONOCYTES # BLD AUTO: 0.48 K/UL (ref 0–0.85)
MONOCYTES NFR BLD AUTO: 7.4 % (ref 0–13.4)
NEUTROPHILS # BLD AUTO: 3.32 K/UL (ref 2–7.15)
NEUTROPHILS NFR BLD: 51.5 % (ref 44–72)
NITRITE UR QL STRIP.AUTO: NEGATIVE
NRBC # BLD AUTO: 0 K/UL
NRBC BLD AUTO-RTO: 0 /100 WBC
PH UR STRIP.AUTO: 6.5 [PH]
PLATELET # BLD AUTO: 349 K/UL (ref 164–446)
PMV BLD AUTO: 9.5 FL (ref 9–12.9)
POTASSIUM SERPL-SCNC: 3.8 MMOL/L (ref 3.6–5.5)
PROT SERPL-MCNC: 6.1 G/DL (ref 6–8.2)
PROT UR QL STRIP: NEGATIVE MG/DL
RBC # BLD AUTO: 4.15 M/UL (ref 4.2–5.4)
RBC UR QL AUTO: NEGATIVE
SODIUM SERPL-SCNC: 137 MMOL/L (ref 135–145)
SP GR UR STRIP.AUTO: 1.01
WBC # BLD AUTO: 6.5 K/UL (ref 4.8–10.8)

## 2017-06-24 PROCEDURE — 85025 COMPLETE CBC W/AUTO DIFF WBC: CPT

## 2017-06-24 PROCEDURE — 770006 HCHG ROOM/CARE - MED/SURG/GYN SEMI*

## 2017-06-24 PROCEDURE — 36415 COLL VENOUS BLD VENIPUNCTURE: CPT

## 2017-06-24 PROCEDURE — 700111 HCHG RX REV CODE 636 W/ 250 OVERRIDE (IP): Performed by: INTERNAL MEDICINE

## 2017-06-24 PROCEDURE — 99232 SBSQ HOSP IP/OBS MODERATE 35: CPT | Performed by: INTERNAL MEDICINE

## 2017-06-24 PROCEDURE — 81003 URINALYSIS AUTO W/O SCOPE: CPT

## 2017-06-24 PROCEDURE — A9270 NON-COVERED ITEM OR SERVICE: HCPCS | Performed by: INTERNAL MEDICINE

## 2017-06-24 PROCEDURE — 700101 HCHG RX REV CODE 250: Performed by: INTERNAL MEDICINE

## 2017-06-24 PROCEDURE — 80053 COMPREHEN METABOLIC PANEL: CPT

## 2017-06-24 PROCEDURE — A9270 NON-COVERED ITEM OR SERVICE: HCPCS | Performed by: NURSE PRACTITIONER

## 2017-06-24 PROCEDURE — 700102 HCHG RX REV CODE 250 W/ 637 OVERRIDE(OP): Performed by: NURSE PRACTITIONER

## 2017-06-24 PROCEDURE — 700105 HCHG RX REV CODE 258: Performed by: INTERNAL MEDICINE

## 2017-06-24 PROCEDURE — 700102 HCHG RX REV CODE 250 W/ 637 OVERRIDE(OP): Performed by: INTERNAL MEDICINE

## 2017-06-24 RX ORDER — OXYCODONE HCL 10 MG/1
10 TABLET, FILM COATED, EXTENDED RELEASE ORAL EVERY 12 HOURS
Status: DISCONTINUED | OUTPATIENT
Start: 2017-06-24 | End: 2017-06-29

## 2017-06-24 RX ORDER — IPRATROPIUM BROMIDE AND ALBUTEROL SULFATE 2.5; .5 MG/3ML; MG/3ML
3 SOLUTION RESPIRATORY (INHALATION)
Status: DISCONTINUED | OUTPATIENT
Start: 2017-06-24 | End: 2017-07-01 | Stop reason: HOSPADM

## 2017-06-24 RX ORDER — IPRATROPIUM BROMIDE AND ALBUTEROL SULFATE 2.5; .5 MG/3ML; MG/3ML
3 SOLUTION RESPIRATORY (INHALATION) ONCE
Status: DISCONTINUED | OUTPATIENT
Start: 2017-06-24 | End: 2017-06-24

## 2017-06-24 RX ORDER — PREGABALIN 75 MG/1
75 CAPSULE ORAL 3 TIMES DAILY
Status: DISCONTINUED | OUTPATIENT
Start: 2017-06-25 | End: 2017-07-01 | Stop reason: HOSPADM

## 2017-06-24 RX ADMIN — ONDANSETRON 4 MG: 4 TABLET, ORALLY DISINTEGRATING ORAL at 18:02

## 2017-06-24 RX ADMIN — PREGABALIN 75 MG: 75 CAPSULE ORAL at 07:45

## 2017-06-24 RX ADMIN — HEPARIN SODIUM 5000 UNITS: 5000 INJECTION, SOLUTION INTRAVENOUS; SUBCUTANEOUS at 14:12

## 2017-06-24 RX ADMIN — OXYCODONE HYDROCHLORIDE 5 MG: 5 TABLET ORAL at 10:49

## 2017-06-24 RX ADMIN — ZOLPIDEM TARTRATE 5 MG: 5 TABLET, FILM COATED ORAL at 23:25

## 2017-06-24 RX ADMIN — CEFTRIAXONE SODIUM 2 G: 2 INJECTION, POWDER, FOR SOLUTION INTRAMUSCULAR; INTRAVENOUS at 07:45

## 2017-06-24 RX ADMIN — OXYCODONE HYDROCHLORIDE 5 MG: 5 TABLET ORAL at 05:44

## 2017-06-24 RX ADMIN — OXYCODONE HYDROCHLORIDE 5 MG: 5 TABLET ORAL at 14:12

## 2017-06-24 RX ADMIN — OXYCODONE HYDROCHLORIDE 5 MG: 5 TABLET ORAL at 01:34

## 2017-06-24 RX ADMIN — OXYCODONE HYDROCHLORIDE 5 MG: 5 TABLET ORAL at 18:02

## 2017-06-24 RX ADMIN — MORPHINE SULFATE 2 MG: 4 INJECTION INTRAVENOUS at 03:29

## 2017-06-24 RX ADMIN — TELMISARTAN 40 MG: 40 TABLET ORAL at 07:46

## 2017-06-24 RX ADMIN — HEPARIN SODIUM 5000 UNITS: 5000 INJECTION, SOLUTION INTRAVENOUS; SUBCUTANEOUS at 21:06

## 2017-06-24 RX ADMIN — OXYCODONE HYDROCHLORIDE 10 MG: 10 TABLET, FILM COATED, EXTENDED RELEASE ORAL at 12:40

## 2017-06-24 RX ADMIN — CLONAZEPAM 0.5 MG: 0.5 TABLET ORAL at 21:06

## 2017-06-24 RX ADMIN — LABETALOL HYDROCHLORIDE 10 MG: 5 INJECTION, SOLUTION INTRAVENOUS at 16:18

## 2017-06-24 RX ADMIN — MORPHINE SULFATE 2 MG: 4 INJECTION INTRAVENOUS at 07:47

## 2017-06-24 RX ADMIN — PREGABALIN 75 MG: 75 CAPSULE ORAL at 21:04

## 2017-06-24 RX ADMIN — OXYCODONE HYDROCHLORIDE 10 MG: 10 TABLET, FILM COATED, EXTENDED RELEASE ORAL at 21:03

## 2017-06-24 RX ADMIN — PREGABALIN 75 MG: 75 CAPSULE ORAL at 16:18

## 2017-06-24 RX ADMIN — HEPARIN SODIUM 5000 UNITS: 5000 INJECTION, SOLUTION INTRAVENOUS; SUBCUTANEOUS at 05:44

## 2017-06-24 RX ADMIN — ONDANSETRON 4 MG: 4 TABLET, ORALLY DISINTEGRATING ORAL at 14:11

## 2017-06-24 ASSESSMENT — ENCOUNTER SYMPTOMS
CONSTIPATION: 1
HEARTBURN: 0
CHILLS: 0
HEADACHES: 0
COUGH: 0
SENSORY CHANGE: 1
WHEEZING: 1
DIZZINESS: 0
SHORTNESS OF BREATH: 0
DIARRHEA: 0
FEVER: 0
VOMITING: 0
NAUSEA: 0
ABDOMINAL PAIN: 0

## 2017-06-24 ASSESSMENT — PAIN SCALES - GENERAL
PAINLEVEL_OUTOF10: 3
PAINLEVEL_OUTOF10: 8
PAINLEVEL_OUTOF10: 7
PAINLEVEL_OUTOF10: 8
PAINLEVEL_OUTOF10: 8
PAINLEVEL_OUTOF10: 6
PAINLEVEL_OUTOF10: 8
PAINLEVEL_OUTOF10: 2
PAINLEVEL_OUTOF10: 8

## 2017-06-24 NOTE — PROGRESS NOTES
"LIMB PRESERVATION SERVICE     62-year-old female with a history of fibromyalgia and right lower leg paralysis, admitted for an infected ulcer to her right lateral foot. 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    Dr. Wu consulted on 6/20, recommended full neuro workup, and wound vac to open wound.  Possible surgery in the future to correct deformity. No plans at this time.   Discussed with Dr. Lewis who spoke with Neuro Dr. Harvey who states nerve conduction studies are completed as outpt.  MRI L spine pending.     /87 mmHg  Pulse 98  Temp(Src) 36.8 °C (98.2 °F)  Resp 17  Ht 1.676 m (5' 6\")  Wt 81.647 kg (180 lb)  BMI 29.07 kg/m2  SpO2 92%  LMP 04/09/1977  Breastfeeding? No  MRI R foot completed-neg osteo    R lateral foot:  Wound VAC in place, changed yesterday  Equinovarus deformity  Pulses present    IV Ceftriaxone per ID        PLAN  -Continue wound care per IP wound team  -NWB on right foot  -Abx per ID    D/C plan: home with  for wound VAC changes  F/U with Jai, F/U with Dr. Wu once nerve conduction studies completed.  "

## 2017-06-24 NOTE — CARE PLAN
Problem: Safety  Goal: Will remain free from injury  Outcome: PROGRESSING AS EXPECTED  Pt calls appropriately, bed in low locked position. Call light within reach.    Problem: Pain Management  Goal: Pain level will decrease to patient’s comfort goal  Outcome: PROGRESSING AS EXPECTED  Pt c/o pain and was medicated per MAR.

## 2017-06-24 NOTE — PROGRESS NOTES
"LIMB PRESERVATION SERVICE     62-year-old female with a history of fibromyalgia and right lower leg paralysis, admitted for an infected ulcer to her right lateral foot. 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    Dr. Wu consulted on 6/20, recommended full neuro workup, and wound vac to open wound.  Possible surgery in the future to correct deformity.    Wound VAC in place, changed today by wound team.    No neuro consult as yet.      IV Ceftriaxone per ID    /69 mmHg  Pulse 90  Temp(Src) 36.3 °C (97.4 °F)  Resp 17  Ht 1.676 m (5' 6\")  Wt 81.647 kg (180 lb)  BMI 29.07 kg/m2  SpO2 92%  LMP 04/09/1977  Breastfeeding? No     PLAN  -Continue wound care per IP wound team  -NWB on right foot  -Abx per ID  -Ck with hospitalist re neurology consult  -LPS to follow  "

## 2017-06-24 NOTE — PROGRESS NOTES
Pt requested to have private room.  Dale ASH informed.  He already knew that pt's request.  No private room available at this time.  If no private room, pt wants bed by window and closer to bathroom.  Silvio RN informed.

## 2017-06-24 NOTE — PROGRESS NOTES
Pt asked for morphine.  RN went in to give morphine but iv wasn't working.  New iv started and gave morphine.  Pt also asked for Ambien.  RN tried not to give them together but pt stated that she got them last night.  Pt did receive morphine and Ambien together previous night shift.

## 2017-06-24 NOTE — WOUND TEAM
[facility]  Inpatient Services  Wound & Skin Care Treatment Note    Admission Date: 6/20/17          HPI, PMH, SH: Reviewed  Unit where seen by Wound Team:  S6    WOUND CONSULT RELATED TO:  Scheduled npwt drsg change     SUBJECTIVE:  Pleasant/agreeable    Self Report / Pain Level: 0/10 - insensate secondary to neurological injury     OBJECTIVE:  Prev npwt drsg remained intact    WOUND TYPE, LOCATION, CHARACTERISTICS (Pressure ulcers: location, stage, POA or date identified)    Wound Type/Location:   Neuropathic ulceration, right lateral foot     Periwound:   Intact; excoriated     Drainage:     Scant serosanguinous      Tissue Type and %:    100% pink/red   Wound Edges:   attached     Odor:     none     Exposed structure(s):   none   S&S of Infection:    none     Measurements:   taken 6/20/17    Length:    2.0cm   Width:     2.0cm   Depth:    ua   Tracts/undermining:   none      INTERVENTIONS BY WOUND TEAM:   Previous dressing removed, cleansed site with wnd cleanser and patted dry.  Benzoin and drape to periwound skin. Wnd covered with 1 piece black foam/bridge plus a button on the dorsum of the foot. Sealed with drape and neg pressure applied at 125mmhg/dpc      Interdisciplinary Collaboration: RN, Pt     EVALUATION:  wnd appears  and should cont to progress with npwt         Factors affecting wound healing:  Neuro damage, insensate, chronicity, infection, foot deformity     Goals:  Wound to be 1% smaller in 1 week -- 100% granulation in 2 weeks    NURSING PLAN OF CARE: (x)  Dressing changes: See Dressing Maintenance orders: x  Skin care: See Skin Care orders: x  Rectal tube care: See Rectal Tube Care orders:   Other orders:    WOUND TEAM PLAN OF CARE (x):   NPWT change 3 x week:    x    Dressing changes by wound team:       Follow up as needed:  x  Other (explain):    Anticipated discharge plans (x):  SNF:           Home Care:           Outpatient Wound Center:            Self Care:             Other:  tbd

## 2017-06-24 NOTE — PROGRESS NOTES
Pt AOx4. Pt refuses bed alarm. Education provided regarding increased fall risk, patient safety, and bed alarm rationale. Pt verbalized understanding of education and continues to refuse bed alarm.

## 2017-06-24 NOTE — CARE PLAN
Problem: Pain Management  Goal: Pain level will decrease to patient’s comfort goal  Intervention: Follow pain managment plan developed in collaboration with patient and Interdisciplinary Team  Pain med given.        Problem: Respiratory:  Goal: Respiratory status will improve  Intervention: Collaborate with respiratory therapist and Interdisciplinary Team on treatment measures to improve respiratory function  Wheezing noted.  RT called for treatment.

## 2017-06-24 NOTE — PROGRESS NOTES
Pt's wound vac tubing got detach from black foam.  Re attached and tried to create seal with transparent dressing.  Seems like it's working again.  Will call wound RN and leave message to see pt to just make sure everything is working properly.

## 2017-06-24 NOTE — PROGRESS NOTES
Alert and oriented x 4. Pt refused the bed alarm despite education about the importance of bed alarm for safety. Encouraged pt to use the call light and telephone to call for assistance.  Pt verbalized understanding.  Other fall precaution measures in place.
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
## 2017-06-25 ENCOUNTER — APPOINTMENT (OUTPATIENT)
Dept: RADIOLOGY | Facility: MEDICAL CENTER | Age: 62
DRG: 593 | End: 2017-06-25
Attending: INTERNAL MEDICINE
Payer: MEDICARE

## 2017-06-25 PROBLEM — N20.0 NEPHROLITHIASIS: Status: ACTIVE | Noted: 2017-06-25

## 2017-06-25 PROBLEM — G47.00 INSOMNIA DISORDER: Status: ACTIVE | Noted: 2017-06-25

## 2017-06-25 PROBLEM — R53.81 PHYSICAL DEBILITY: Status: ACTIVE | Noted: 2017-06-25

## 2017-06-25 PROBLEM — E66.01 MORBID OBESITY WITH BMI OF 40.0-44.9, ADULT (HCC): Status: ACTIVE | Noted: 2017-06-25

## 2017-06-25 LAB
ALBUMIN SERPL BCP-MCNC: 3.5 G/DL (ref 3.2–4.9)
ALBUMIN/GLOB SERPL: 1.3 G/DL
ALP SERPL-CCNC: 53 U/L (ref 30–99)
ALT SERPL-CCNC: 8 U/L (ref 2–50)
ANION GAP SERPL CALC-SCNC: 8 MMOL/L (ref 0–11.9)
AST SERPL-CCNC: 10 U/L (ref 12–45)
BACTERIA BLD CULT: NORMAL
BACTERIA BLD CULT: NORMAL
BASOPHILS # BLD AUTO: 0.6 % (ref 0–1.8)
BASOPHILS # BLD: 0.03 K/UL (ref 0–0.12)
BILIRUB SERPL-MCNC: 0.3 MG/DL (ref 0.1–1.5)
BUN SERPL-MCNC: 11 MG/DL (ref 8–22)
CALCIUM SERPL-MCNC: 9.2 MG/DL (ref 8.5–10.5)
CHLORIDE SERPL-SCNC: 106 MMOL/L (ref 96–112)
CO2 SERPL-SCNC: 27 MMOL/L (ref 20–33)
CREAT SERPL-MCNC: 0.63 MG/DL (ref 0.5–1.4)
EOSINOPHIL # BLD AUTO: 0.25 K/UL (ref 0–0.51)
EOSINOPHIL NFR BLD: 4.9 % (ref 0–6.9)
ERYTHROCYTE [DISTWIDTH] IN BLOOD BY AUTOMATED COUNT: 47.8 FL (ref 35.9–50)
GFR SERPL CREATININE-BSD FRML MDRD: >60 ML/MIN/1.73 M 2
GLOBULIN SER CALC-MCNC: 2.8 G/DL (ref 1.9–3.5)
GLUCOSE SERPL-MCNC: 110 MG/DL (ref 65–99)
HCT VFR BLD AUTO: 37.4 % (ref 37–47)
HGB BLD-MCNC: 11.8 G/DL (ref 12–16)
IMM GRANULOCYTES # BLD AUTO: 0.02 K/UL (ref 0–0.11)
IMM GRANULOCYTES NFR BLD AUTO: 0.4 % (ref 0–0.9)
LYMPHOCYTES # BLD AUTO: 2.38 K/UL (ref 1–4.8)
LYMPHOCYTES NFR BLD: 47 % (ref 22–41)
MCH RBC QN AUTO: 28.1 PG (ref 27–33)
MCHC RBC AUTO-ENTMCNC: 31.6 G/DL (ref 33.6–35)
MCV RBC AUTO: 89 FL (ref 81.4–97.8)
MONOCYTES # BLD AUTO: 0.42 K/UL (ref 0–0.85)
MONOCYTES NFR BLD AUTO: 8.3 % (ref 0–13.4)
NEUTROPHILS # BLD AUTO: 1.96 K/UL (ref 2–7.15)
NEUTROPHILS NFR BLD: 38.8 % (ref 44–72)
NRBC # BLD AUTO: 0 K/UL
NRBC BLD AUTO-RTO: 0 /100 WBC
PLATELET # BLD AUTO: 328 K/UL (ref 164–446)
PMV BLD AUTO: 9.4 FL (ref 9–12.9)
POTASSIUM SERPL-SCNC: 3.9 MMOL/L (ref 3.6–5.5)
PROT SERPL-MCNC: 6.3 G/DL (ref 6–8.2)
RBC # BLD AUTO: 4.2 M/UL (ref 4.2–5.4)
SIGNIFICANT IND 70042: NORMAL
SIGNIFICANT IND 70042: NORMAL
SITE SITE: NORMAL
SITE SITE: NORMAL
SODIUM SERPL-SCNC: 141 MMOL/L (ref 135–145)
SOURCE SOURCE: NORMAL
SOURCE SOURCE: NORMAL
WBC # BLD AUTO: 5.1 K/UL (ref 4.8–10.8)

## 2017-06-25 PROCEDURE — 700102 HCHG RX REV CODE 250 W/ 637 OVERRIDE(OP): Performed by: NURSE PRACTITIONER

## 2017-06-25 PROCEDURE — 700102 HCHG RX REV CODE 250 W/ 637 OVERRIDE(OP): Performed by: HOSPITALIST

## 2017-06-25 PROCEDURE — 700102 HCHG RX REV CODE 250 W/ 637 OVERRIDE(OP): Performed by: INTERNAL MEDICINE

## 2017-06-25 PROCEDURE — 700111 HCHG RX REV CODE 636 W/ 250 OVERRIDE (IP): Performed by: INTERNAL MEDICINE

## 2017-06-25 PROCEDURE — 85025 COMPLETE CBC W/AUTO DIFF WBC: CPT

## 2017-06-25 PROCEDURE — 80053 COMPREHEN METABOLIC PANEL: CPT

## 2017-06-25 PROCEDURE — 72148 MRI LUMBAR SPINE W/O DYE: CPT

## 2017-06-25 PROCEDURE — A9270 NON-COVERED ITEM OR SERVICE: HCPCS | Performed by: INTERNAL MEDICINE

## 2017-06-25 PROCEDURE — A9270 NON-COVERED ITEM OR SERVICE: HCPCS | Performed by: HOSPITALIST

## 2017-06-25 PROCEDURE — 36415 COLL VENOUS BLD VENIPUNCTURE: CPT

## 2017-06-25 PROCEDURE — A9270 NON-COVERED ITEM OR SERVICE: HCPCS | Performed by: NURSE PRACTITIONER

## 2017-06-25 PROCEDURE — 99233 SBSQ HOSP IP/OBS HIGH 50: CPT | Performed by: HOSPITALIST

## 2017-06-25 PROCEDURE — 700105 HCHG RX REV CODE 258: Performed by: HOSPITALIST

## 2017-06-25 PROCEDURE — 700111 HCHG RX REV CODE 636 W/ 250 OVERRIDE (IP): Performed by: HOSPITALIST

## 2017-06-25 PROCEDURE — 700105 HCHG RX REV CODE 258: Performed by: INTERNAL MEDICINE

## 2017-06-25 PROCEDURE — 770006 HCHG ROOM/CARE - MED/SURG/GYN SEMI*

## 2017-06-25 RX ORDER — SODIUM CHLORIDE 9 MG/ML
INJECTION, SOLUTION INTRAVENOUS CONTINUOUS
Status: DISCONTINUED | OUTPATIENT
Start: 2017-06-25 | End: 2017-06-28

## 2017-06-25 RX ORDER — ACETAMINOPHEN 325 MG/1
650 TABLET ORAL EVERY 6 HOURS
Status: DISCONTINUED | OUTPATIENT
Start: 2017-06-25 | End: 2017-07-01 | Stop reason: HOSPADM

## 2017-06-25 RX ORDER — CARVEDILOL 3.12 MG/1
3.12 TABLET ORAL 2 TIMES DAILY WITH MEALS
Status: DISCONTINUED | OUTPATIENT
Start: 2017-06-25 | End: 2017-06-25

## 2017-06-25 RX ORDER — OXYCODONE HYDROCHLORIDE 10 MG/1
10 TABLET ORAL EVERY 4 HOURS PRN
Status: DISCONTINUED | OUTPATIENT
Start: 2017-06-25 | End: 2017-06-26

## 2017-06-25 RX ORDER — OXYCODONE HYDROCHLORIDE 5 MG/1
5 TABLET ORAL EVERY 4 HOURS PRN
Status: DISCONTINUED | OUTPATIENT
Start: 2017-06-25 | End: 2017-06-26

## 2017-06-25 RX ORDER — LORAZEPAM 2 MG/ML
1 INJECTION INTRAMUSCULAR ONCE
Status: COMPLETED | OUTPATIENT
Start: 2017-06-25 | End: 2017-06-25

## 2017-06-25 RX ORDER — ESTRADIOL 1 MG/1
2 TABLET ORAL DAILY
Status: DISCONTINUED | OUTPATIENT
Start: 2017-06-25 | End: 2017-07-01 | Stop reason: HOSPADM

## 2017-06-25 RX ORDER — CARVEDILOL 6.25 MG/1
6.25 TABLET ORAL 2 TIMES DAILY WITH MEALS
Status: DISCONTINUED | OUTPATIENT
Start: 2017-06-25 | End: 2017-07-01 | Stop reason: HOSPADM

## 2017-06-25 RX ADMIN — ACETAMINOPHEN 650 MG: 325 TABLET, FILM COATED ORAL at 17:50

## 2017-06-25 RX ADMIN — SENNOSIDES AND DOCUSATE SODIUM 2 TABLET: 8.6; 5 TABLET ORAL at 09:52

## 2017-06-25 RX ADMIN — OXYCODONE HYDROCHLORIDE 10 MG: 10 TABLET, FILM COATED, EXTENDED RELEASE ORAL at 20:42

## 2017-06-25 RX ADMIN — OXYCODONE HYDROCHLORIDE 10 MG: 10 TABLET ORAL at 20:41

## 2017-06-25 RX ADMIN — OXYCODONE HYDROCHLORIDE 5 MG: 5 TABLET ORAL at 00:28

## 2017-06-25 RX ADMIN — PREGABALIN 75 MG: 75 CAPSULE ORAL at 14:40

## 2017-06-25 RX ADMIN — TELMISARTAN 40 MG: 40 TABLET ORAL at 09:51

## 2017-06-25 RX ADMIN — SENNOSIDES AND DOCUSATE SODIUM 2 TABLET: 8.6; 5 TABLET ORAL at 20:41

## 2017-06-25 RX ADMIN — LORAZEPAM 1 MG: 2 INJECTION INTRAMUSCULAR; INTRAVENOUS at 09:58

## 2017-06-25 RX ADMIN — CARVEDILOL 6.25 MG: 6.25 TABLET, FILM COATED ORAL at 17:50

## 2017-06-25 RX ADMIN — ACETAMINOPHEN 650 MG: 325 TABLET, FILM COATED ORAL at 09:52

## 2017-06-25 RX ADMIN — HEPARIN SODIUM 5000 UNITS: 5000 INJECTION, SOLUTION INTRAVENOUS; SUBCUTANEOUS at 06:19

## 2017-06-25 RX ADMIN — OXYCODONE HYDROCHLORIDE 10 MG: 10 TABLET, FILM COATED, EXTENDED RELEASE ORAL at 09:52

## 2017-06-25 RX ADMIN — HEPARIN SODIUM 5000 UNITS: 5000 INJECTION, SOLUTION INTRAVENOUS; SUBCUTANEOUS at 20:43

## 2017-06-25 RX ADMIN — ZOLPIDEM TARTRATE 5 MG: 5 TABLET, FILM COATED ORAL at 23:49

## 2017-06-25 RX ADMIN — OXYCODONE HYDROCHLORIDE 10 MG: 10 TABLET ORAL at 11:42

## 2017-06-25 RX ADMIN — SODIUM CHLORIDE: 9 INJECTION, SOLUTION INTRAVENOUS at 09:00

## 2017-06-25 RX ADMIN — CARVEDILOL 6.25 MG: 6.25 TABLET, FILM COATED ORAL at 09:52

## 2017-06-25 RX ADMIN — OXYCODONE HYDROCHLORIDE 10 MG: 10 TABLET ORAL at 15:51

## 2017-06-25 RX ADMIN — ACETAMINOPHEN 650 MG: 325 TABLET, FILM COATED ORAL at 23:49

## 2017-06-25 RX ADMIN — PREGABALIN 75 MG: 75 CAPSULE ORAL at 09:52

## 2017-06-25 RX ADMIN — CEFTRIAXONE SODIUM 2 G: 2 INJECTION, POWDER, FOR SOLUTION INTRAMUSCULAR; INTRAVENOUS at 09:52

## 2017-06-25 RX ADMIN — PREGABALIN 75 MG: 75 CAPSULE ORAL at 20:42

## 2017-06-25 RX ADMIN — ALBUTEROL SULFATE 2 PUFF: 90 AEROSOL, METERED RESPIRATORY (INHALATION) at 20:48

## 2017-06-25 RX ADMIN — ESTRADIOL 2 MG: 1 TABLET ORAL at 15:51

## 2017-06-25 RX ADMIN — HEPARIN SODIUM 5000 UNITS: 5000 INJECTION, SOLUTION INTRAVENOUS; SUBCUTANEOUS at 14:41

## 2017-06-25 RX ADMIN — OXYCODONE HYDROCHLORIDE 5 MG: 5 TABLET ORAL at 07:25

## 2017-06-25 RX ADMIN — ONDANSETRON 4 MG: 4 TABLET, ORALLY DISINTEGRATING ORAL at 16:38

## 2017-06-25 ASSESSMENT — PAIN SCALES - GENERAL
PAINLEVEL_OUTOF10: 5
PAINLEVEL_OUTOF10: 8
PAINLEVEL_OUTOF10: 0
PAINLEVEL_OUTOF10: 0
PAINLEVEL_OUTOF10: 3
PAINLEVEL_OUTOF10: 3
PAINLEVEL_OUTOF10: 9
PAINLEVEL_OUTOF10: 8
PAINLEVEL_OUTOF10: 7
PAINLEVEL_OUTOF10: 0
PAINLEVEL_OUTOF10: 7

## 2017-06-25 ASSESSMENT — ENCOUNTER SYMPTOMS
FEVER: 0
DIARRHEA: 0
COUGH: 0
SENSORY CHANGE: 1
SHORTNESS OF BREATH: 0
VOMITING: 0
PALPITATIONS: 0
HEADACHES: 0
CONSTIPATION: 1
ABDOMINAL PAIN: 0
CHILLS: 0
WHEEZING: 1
NAUSEA: 0

## 2017-06-25 NOTE — PROGRESS NOTES
AAox4, TORREZ while in bed, foot drop on RLE, paresthesia on RLE from knee down, wound vac cdi, prn meds per MAR, POC discussed, MRI and Iv antibiotics.

## 2017-06-25 NOTE — CARE PLAN
Problem: Knowledge Deficit  Goal: Knowledge of disease process/condition, treatment plan, diagnostic tests, and medications will improve  Intervention: Explain information regarding disease process/condition, treatment plan, diagnostic tests, and medications and document in education  MRI LUmbar, wound vac, iv antibiotics      Problem: Pain Management  Goal: Pain level will decrease to patient’s comfort goal  Intervention: Follow pain managment plan developed in collaboration with patient and Interdisciplinary Team  Prn meds per MAR

## 2017-06-25 NOTE — PROGRESS NOTES
[facility]    Date of Service: 2017    Chief Complaint  62 y.o. female admitted 2017 with R-foot ulcer.    Interval Problem Update  S/p wound vac placement.  Just returned from MRI.  No new complaints.    Consultants/Specialty  ID and ortho.    Disposition          Review of Systems   Respiratory: Negative for cough.    Cardiovascular: Negative for chest pain and palpitations.   Gastrointestinal: Negative for nausea and vomiting.   Skin: Negative for itching and rash.   All other systems reviewed and are negative.     Physical Exam  Laboratory/Imaging   Hemodynamics  Temp (24hrs), Av.7 °C (98.1 °F), Min:36.2 °C (97.2 °F), Max:37.2 °C (98.9 °F)   Temperature: 36.2 °C (97.2 °F)  Pulse  Av.6  Min: 74  Max: 118   Blood Pressure: 154/86 mmHg      Respiratory      Respiration: 18, Pulse Oximetry: 92 %        RUL Breath Sounds: Clear, RML Breath Sounds: Clear, RLL Breath Sounds: Clear, EL Breath Sounds: Clear, LLL Breath Sounds: Clear    Fluids  No intake or output data in the 24 hours ending 17 1405    Nutrition  Orders Placed This Encounter   Procedures   • Diet Order     Standing Status: Standing      Number of Occurrences: 1      Standing Expiration Date:      Order Specific Question:  Diet:     Answer:  Regular [1]     Physical Exam  Nursing note and vitals reviewed.  Constitutional: She is oriented to person, place, and time. She appears well-developed,  well-nourished and obese.   HENT:   Head: Normocephalic and atraumatic.   Right Ear: External ear normal.   Left Ear: External ear normal.   Nose: Nose normal.   Mouth/Throat: Oropharynx is small with Mallanpati score of 4.  Mucosa is clear and moist.   Eyes: Conjunctivae and extraocular motions are normal. Pupils are equal, round, and reactive to light.   Neck: Normal range of motion. Neck supple.   Cardiovascular: Normal rate, regular rhythm, normal heart sounds and intact distal pulses.    Pulmonary/Chest: Effort  normal and breath sounds normal.   Abdominal: Soft. Bowel sounds are normal.   Musculoskeletal: Normal range of motion.   Neurological: She is alert and oriented to person, place, and time.   Skin: Skin is warm and dry.  R-foot wound vac in place.      Recent Labs      06/23/17 0311 06/24/17 0214 06/25/17 0319   WBC  5.0  6.5  5.1   RBC  4.00*  4.15*  4.20   HEMOGLOBIN  11.5*  11.7*  11.8*   HEMATOCRIT  36.1*  36.8*  37.4   MCV  90.3  88.7  89.0   MCH  28.8  28.2  28.1   MCHC  31.9*  31.8*  31.6*   RDW  48.8  47.8  47.8   PLATELETCT  325  349  328   MPV  9.2  9.5  9.4     Recent Labs      06/23/17 0311 06/24/17 0214 06/25/17 0319   SODIUM  140  137  141   POTASSIUM  3.9  3.8  3.9   CHLORIDE  110  105  106   CO2  25  25  27   GLUCOSE  114*  108*  110*   BUN  8  12  11   CREATININE  0.60  0.71  0.63   CALCIUM  8.8  9.1  9.2                      Assessment/Plan     * Foot ulcer, right (CMS-HCC)  Assessment & Plan  Per Dr. Wu, he does not plan to do any surgery unless there is proof the patient's leg weakness is irreversible.  Continue woundvac.  Home health referral and to outpatient wound care clinic.  Referral to outpatient PMR clinic.    HTN (hypertension) (present on admission)  Assessment & Plan  Start Coreg.    Fibromyalgia (present on admission)  Assessment & Plan  As above.    Chronic pain syndrome (present on admission)  Assessment & Plan  Min narc and use conservative measures when possible.    Monoparesis of lower extremity (CMS-HCC)  Assessment & Plan  Awaiting for MRI of lumbar spine to be done.   Discussed the patient's case with neurologist, Dr. Harvey, who states that nerve conduction study is an outpatient procedure.  He recommends that the patient follow up with neurologist outpatient for nerve conduction study.   Ordered DME for home use.    Morbid obesity with BMI of 40.0-44.9, adult (CMS-HCC)  Assessment & Plan  Encourage Kcal restriction.    Nephrolithiasis  Assessment &  Plan  Asymptomatic.  Outpatient follow up.    Insomnia disorder  Assessment & Plan  Outpatient follow up.    Physical debility  Assessment & Plan  PT/OT and increase activity.    Medications reviewed, Radiology images reviewed and Labs reviewed  Brady catheter: No Brady      DVT Prophylaxis: Heparin    Ulcer prophylaxis: Not indicated  Antibiotics: Treating active infection/contamination beyond 24 hours perioperative coverage  Assessed for rehab: Patient was assess for and/or received rehabilitation services during this hospitalization      Stable issues - med hx (hemorrhoids, migrain, scarlet fever, anx/dep),    Preventives - IS, Vax, stool soft, DVTP.    Dispo - complex/guarded.

## 2017-06-25 NOTE — CARE PLAN
Problem: Safety  Goal: Will remain free from injury  Refuses the bed alarm.  Safety teaching reinforced

## 2017-06-25 NOTE — PROGRESS NOTES
Infectious Disease Progress Note    Author: Guerline Reed M.D. DOS & Time created: 2017  11:35 AM    Chief Complaint:  Chief Complaint   Patient presents with   • Abscess     R foot        Interval History:   AF, WBC 5.6, c/o headache and sore gums, c/o wheezing but no SOB, tolerating abx without issues, has not had a BM in 5 days, cx +grp B strep    AF, WBC 5, still has some wheezing but respiratory treatments helping, no plans for surgery, plan for wound vac change today   AF WBC 6.5 continued wheezing   AF WBC 5 up to commade-breathing better today  Labs Reviewed, Medications Reviewed, Radiology Reviewed and Wound Reviewed.    Review of Systems:  Review of Systems   Constitutional: Negative for fever and chills.   Respiratory: Positive for wheezing. Negative for shortness of breath.         Improved   Gastrointestinal: Positive for constipation. Negative for nausea, vomiting, abdominal pain and diarrhea.   Genitourinary: Negative for dysuria.   Neurological: Positive for sensory change. Negative for headaches.       Hemodynamics:  Temp (24hrs), Av.7 °C (98.1 °F), Min:36.2 °C (97.2 °F), Max:37.2 °C (98.9 °F)  Temperature: 36.2 °C (97.2 °F)  Pulse  Av.8  Min: 74  Max: 118  Blood Pressure: (!) 173/108 mmHg (nurse aware)       Physical Exam:  Physical Exam   Constitutional: She is oriented to person, place, and time. She appears well-developed. No distress.   Obese   HENT:   Head: Normocephalic and atraumatic.   Eyes: EOM are normal. Pupils are equal, round, and reactive to light.   Neck: Neck supple.   Cardiovascular: Normal rate and regular rhythm.    Pulmonary/Chest: Effort normal. No respiratory distress. She has wheezes. She has no rales.   Abdominal: Soft. She exhibits no distension. There is no tenderness.   Musculoskeletal: She exhibits edema.   R foot deformity  Wound vac on dorsum of R foot    R TKA surgical scar well healed and clean   Neurological: She is alert and  oriented to person, place, and time.   Nursing note and vitals reviewed.      Meds:    Current facility-administered medications:   •  acetaminophen (TYLENOL) tablet 650 mg, 650 mg, Oral, Q6HRS, Noel Green M.D., 650 mg at 06/25/17 0952  •  oxycodone immediate-release (ROXICODONE) tablet 5 mg, 5 mg, Oral, Q4HRS PRN **OR** oxycodone immediate release (ROXICODONE) tablet 10 mg, 10 mg, Oral, Q4HRS PRN, Noel Green M.D.  •  NS infusion, , Intravenous, Continuous, Noel Green M.D., Last Rate: 60 mL/hr at 06/25/17 0900  •  carvedilol (COREG) tablet 6.25 mg, 6.25 mg, Oral, BID WITH MEALS, Noel Green M.D., 6.25 mg at 06/25/17 0952  •  oxyCODONE CR (OXYCONTIN) tablet 10 mg, 10 mg, Oral, Q12HRS, Edson Lewis D.OJacki, 10 mg at 06/25/17 0952  •  ipratropium-albuterol (DUONEB) nebulizer solution 3 mL, 3 mL, Nebulization, Q4H PRN (RT), FADI Martin.LINWOOD.  •  pregabalin (LYRICA) capsule 75 mg, 75 mg, Oral, TID, Grace Escobar M.D., 75 mg at 06/25/17 0952  •  albuterol inhaler 2 Puff, 2 Puff, Inhalation, Q4HRS PRN, Edson Lewis D.OJacki, 2 Puff at 06/23/17 2346  •  zolpidem (AMBIEN) tablet 5 mg, 5 mg, Oral, HS PRN, Liz Norris, A.P.N., 5 mg at 06/24/17 2325  •  cefTRIAXone (ROCEPHIN) 2 g in  mL IVPB, 2 g, Intravenous, Q24HRS, Grace Escobar M.D., Last Rate: 200 mL/hr at 06/25/17 0952, 2 g at 06/25/17 0952  •  NS (BOLUS) infusion 1,000 mL, 1,000 mL, Intravenous, Once PRN, Grace Escobar M.D.  •  senna-docusate (PERICOLACE or SENOKOT S) 8.6-50 MG per tablet 2 Tab, 2 Tab, Oral, BID, 2 Tab at 06/25/17 0952 **AND** polyethylene glycol/lytes (MIRALAX) PACKET 1 Packet, 1 Packet, Oral, QDAY PRN, 1 Packet at 06/21/17 1609 **AND** magnesium hydroxide (MILK OF MAGNESIA) suspension 30 mL, 30 mL, Oral, QDAY PRN **AND** bisacodyl (DULCOLAX) suppository 10 mg, 10 mg, Rectal, QDAY PRN, Grace Escobar M.D.  •  Respiratory Care per Protocol, , Nebulization, Continuous RT, Grace Escobar M.D.  •  heparin injection 5,000 Units, 5,000 Units,  Subcutaneous, Q8HRS, Grace Escobar M.D., 5,000 Units at 06/25/17 0619  •  ondansetron (ZOFRAN) syringe/vial injection 4 mg, 4 mg, Intravenous, Q4HRS PRN, Grace Escobar M.D., 4 mg at 06/20/17 0943  •  ondansetron (ZOFRAN ODT) dispertab 4 mg, 4 mg, Oral, Q4HRS PRN, Grace Escobar M.D., 4 mg at 06/24/17 1802  •  Notify provider if pain remains uncontrolled, , , CONTINUOUS **AND** Use the numeric rating scale (NRS-11) on regular floors and Critical-Care Pain Observation Tool (CPOT) on ICUs/Trauma to assess pain, , , CONTINUOUS **AND** Pulse Ox (Oximetry), , , CONTINUOUS **AND** Pharmacy Consult Request ...Pain Management Review, , Other, PRN **AND** If patient difficult to arouse and/or has respiratory depression, stop any opiates that are currently infusing and call a Rapid Response., , , CONTINUOUS **AND** [DISCONTINUED] oxycodone immediate-release (ROXICODONE) tablet 2.5 mg, 2.5 mg, Oral, Q3HRS PRN, Stopped at 06/24/17 1411 **AND** [DISCONTINUED] oxycodone immediate-release (ROXICODONE) tablet 5 mg, 5 mg, Oral, Q3HRS PRN, 5 mg at 06/25/17 0725 **AND** [DISCONTINUED] morphine (pf) 4 mg/ml injection 2 mg, 2 mg, Intravenous, Q3HRS PRN, Grace Escobar M.D., 2 mg at 06/24/17 0747  •  clonazepam (KLONOPIN) tablet 0.5 mg, 0.5 mg, Oral, BID PRN, Grace Escobar M.D., 0.5 mg at 06/24/17 2106  •  telmisartan (MICARDIS) tablet 40 mg, 40 mg, Oral, DAILY, Grace Escobar M.D., 40 mg at 06/25/17 0951    Labs:  Recent Labs      06/23/17   0311  06/24/17 0214 06/25/17 0319   WBC  5.0  6.5  5.1   RBC  4.00*  4.15*  4.20   HEMOGLOBIN  11.5*  11.7*  11.8*   HEMATOCRIT  36.1*  36.8*  37.4   MCV  90.3  88.7  89.0   MCH  28.8  28.2  28.1   RDW  48.8  47.8  47.8   PLATELETCT  325  349  328   MPV  9.2  9.5  9.4   NEUTSPOLYS  42.60*  51.50  38.80*   LYMPHOCYTES  42.00*  36.30  47.00*   MONOCYTES  9.50  7.40  8.30   EOSINOPHILS  5.10  3.70  4.90   BASOPHILS  0.60  0.80  0.60     Recent Labs      06/23/17 0311 06/24/17 0214 06/25/17 0319    SODIUM  140  137  141   POTASSIUM  3.9  3.8  3.9   CHLORIDE  110  105  106   CO2  25  25  27   GLUCOSE  114*  108*  110*   BUN  8  12  11     Recent Labs      06/23/17   0311  06/24/17   0214  06/25/17   0319   ALBUMIN  3.1*  3.2  3.5   TBILIRUBIN  0.2  0.2  0.3   ALKPHOSPHAT  57  58  53   TOTPROTEIN  5.9*  6.1  6.3   ALTSGPT  15  13  8   ASTSGOT  15  13  10*   CREATININE  0.60  0.71  0.63       Imaging:  Dx-foot-complete 3+ Right  6/20/2017  1.  Diffuse lateral soft tissue swelling of RIGHT foot with focal ulceration over the 5th metatarsal base. 2.  No gross bony destruction, however osteomyelitis is not excluded by plain film. 3.  No fracture or dislocation. 4.  Possible metallic foreign body within the plantar soft tissues of great toe.    Mr-foot-with Right  6/21/2017  Fourth and fifth TMT centered marrow edema, marginal spurring and joint effusion. No clear bony destruction. 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    Micro:  BLOOD CULTURE   Date Value Ref Range Status   06/20/2017   Preliminary    No Growth    Note: Blood cultures are incubated for 5 days and  are monitored continuously.Positive blood cultures  are called to the RN and reported as soon as  they are identified.        Results     Procedure Component Value Units Date/Time    URINALYSIS [[id number]] Collected:  06/24/17 1200    Order Status:  Completed Updated:  06/24/17 1237     Color Lt. Yellow      Character Clear      Specific Gravity 1.009      Ph 6.5      Glucose Negative mg/dL      Ketones  "Negative mg/dL      Protein Negative mg/dL      Bilirubin Negative      Nitrite Negative      Leukocyte Esterase Negative      Occult Blood Negative      Micro Urine Req see below      Comment: Microscopic examination not performed when specimen is clear  and chemically negative for protein, blood, leukocyte esterase  and nitrite.         URINALYSIS [[id number]]     Order Status:  No result Specimen Information:  Urine from Urine, Clean Catch     CULTURE WOUND W/ GRAM STAIN [[id number]]  (Abnormal) Collected:  06/20/17 0810    Order Status:  Completed Specimen Information:  Wound from Right Foot Updated:  06/22/17 0846     Gram Stain Result --      Result:        Rare WBCs.  Rare Gram positive cocci.       Significant Indicator POS (POS)      Source WND      Site RIGHT FOOT      Culture Result Wound -- (A)      Culture Result Wound -- (A)      Result:        Streptococcus agalactiae (Group B)  Light growth      BLOOD CULTURE [[id number]] Collected:  06/20/17 1904    Order Status:  Completed Specimen Information:  Blood from Peripheral Updated:  06/21/17 0633     Significant Indicator NEG      Source BLD      Site PERIPHERAL      Blood Culture --      Result:        No Growth    Note: Blood cultures are incubated for 5 days and  are monitored continuously.Positive blood cultures  are called to the RN and reported as soon as  they are identified.      Narrative:      Per Hospital Policy: Only change Specimen Src: to \"Line\" if  specified by physician order.    BLOOD CULTURE [[id number]] Collected:  06/20/17 1907    Order Status:  Completed Specimen Information:  Blood from Peripheral Updated:  06/21/17 0633     Significant Indicator NEG      Source BLD      Site PERIPHERAL      Blood Culture --      Result:        No Growth    Note: Blood cultures are incubated for 5 days and  are monitored continuously.Positive blood cultures  are called to the RN and reported as soon as  they are identified.      Narrative:      Per " "Hospital Policy: Only change Specimen Src: to \"Line\" if  specified by physician order.    GRAM STAIN [[id number]] Collected:  06/20/17 0810    Order Status:  Completed Specimen Information:  Wound Updated:  06/20/17 2146     Significant Indicator .      Source WND      Site RIGHT FOOT      Gram Stain Result --      Result:        Rare WBCs.  Rare Gram positive cocci.              Assessment:  Active Hospital Problems    Diagnosis   • *Foot ulcer, right (CMS-HCC) [L97.519]   • HTN (hypertension) [I10]   • Monoparesis of lower extremity (CMS-[facility]) [G83.10]   • Fibromyalgia [M79.7]   • Chronic pain syndrome [G89.4]       Plan:  Right foot ulcer  Afebrile  No leukocytosis  Wound cx - grp B strep  MRI possible OM  Continue ceftriaxone (PCN allergy).   Anticipate 4-6 weeks of abx   Last 2 weeks can be Zyvox if does not tolerat IV  No plans for surgery at this time per ortho  Wound care - wound vac in place.   Order PICC  Estim stop date 8/1/2017    No s/RLE paresthesias  2/2 traumatic injury 2 years ago  Resulting in above    Discussed with Dr. Lewis  "

## 2017-06-25 NOTE — FACE TO FACE
Face to Face Supporting Documentation - Home Health    The encounter with this patient was in whole or in part the primary reason for home health admission.    Date of encounter:   Patient:                    MRN:                       YOB: 2019  Joan Olivares  [id number]  1955     Home health to see patient for:  Wound Care    Skilled need for:  New Onset Medical Diagnosis R-foot ulcer     Skilled nursing interventions to include:  Wound Care    Homebound status evidenced by:  Needs the assistance of another person in order to leave the home. Leaving home requires a considerable and taxing effort. There is a normal inability to leave the home.    Community Physician to provide follow up care: Dl Tsai M.D.     Optional Interventions? No      I certify the face to face encounter for this home health care referral meets the CMS requirements and the encounter/clinical assessment with the patient was, in whole, or in part, for the medical condition(s) listed above, which is the primary reason for home health care. Based on my clinical findings: the service(s) are medically necessary, support the need for home health care, and the homebound criteria are met.  I certify that this patient has had a face to face encounter by myself.  Noel Green M.D. - NPI: [id number]

## 2017-06-26 ENCOUNTER — APPOINTMENT (OUTPATIENT)
Dept: RADIOLOGY | Facility: MEDICAL CENTER | Age: 62
DRG: 593 | End: 2017-06-26
Attending: INTERNAL MEDICINE
Payer: MEDICARE

## 2017-06-26 PROBLEM — M62.838 MUSCLE SPASMS OF LOWER EXTREMITY: Status: ACTIVE | Noted: 2017-06-26

## 2017-06-26 PROCEDURE — 700105 HCHG RX REV CODE 258: Performed by: HOSPITALIST

## 2017-06-26 PROCEDURE — 700102 HCHG RX REV CODE 250 W/ 637 OVERRIDE(OP): Performed by: INTERNAL MEDICINE

## 2017-06-26 PROCEDURE — 700102 HCHG RX REV CODE 250 W/ 637 OVERRIDE(OP): Performed by: HOSPITALIST

## 2017-06-26 PROCEDURE — 36569 INSJ PICC 5 YR+ W/O IMAGING: CPT

## 2017-06-26 PROCEDURE — 700105 HCHG RX REV CODE 258: Performed by: INTERNAL MEDICINE

## 2017-06-26 PROCEDURE — A9270 NON-COVERED ITEM OR SERVICE: HCPCS | Performed by: NURSE PRACTITIONER

## 2017-06-26 PROCEDURE — 700102 HCHG RX REV CODE 250 W/ 637 OVERRIDE(OP): Performed by: NURSE PRACTITIONER

## 2017-06-26 PROCEDURE — 700111 HCHG RX REV CODE 636 W/ 250 OVERRIDE (IP): Performed by: INTERNAL MEDICINE

## 2017-06-26 PROCEDURE — A9270 NON-COVERED ITEM OR SERVICE: HCPCS | Performed by: INTERNAL MEDICINE

## 2017-06-26 PROCEDURE — 770006 HCHG ROOM/CARE - MED/SURG/GYN SEMI*

## 2017-06-26 PROCEDURE — 76937 US GUIDE VASCULAR ACCESS: CPT

## 2017-06-26 PROCEDURE — A9270 NON-COVERED ITEM OR SERVICE: HCPCS | Performed by: HOSPITALIST

## 2017-06-26 PROCEDURE — 97605 NEG PRS WND THER DME<=50SQCM: CPT

## 2017-06-26 PROCEDURE — 99232 SBSQ HOSP IP/OBS MODERATE 35: CPT | Performed by: HOSPITALIST

## 2017-06-26 RX ORDER — OXYCODONE HYDROCHLORIDE 5 MG/1
5 TABLET ORAL
Status: DISCONTINUED | OUTPATIENT
Start: 2017-06-26 | End: 2017-07-01 | Stop reason: HOSPADM

## 2017-06-26 RX ORDER — OXYCODONE HYDROCHLORIDE 10 MG/1
10 TABLET ORAL EVERY 4 HOURS PRN
Status: DISCONTINUED | OUTPATIENT
Start: 2017-06-26 | End: 2017-07-01 | Stop reason: HOSPADM

## 2017-06-26 RX ADMIN — CARVEDILOL 6.25 MG: 6.25 TABLET, FILM COATED ORAL at 07:50

## 2017-06-26 RX ADMIN — OXYCODONE HYDROCHLORIDE 10 MG: 10 TABLET ORAL at 18:57

## 2017-06-26 RX ADMIN — CARVEDILOL 6.25 MG: 6.25 TABLET, FILM COATED ORAL at 17:35

## 2017-06-26 RX ADMIN — OXYCODONE HYDROCHLORIDE 10 MG: 10 TABLET ORAL at 04:03

## 2017-06-26 RX ADMIN — ESTRADIOL 2 MG: 1 TABLET ORAL at 07:49

## 2017-06-26 RX ADMIN — PREGABALIN 75 MG: 75 CAPSULE ORAL at 07:49

## 2017-06-26 RX ADMIN — CEFTRIAXONE SODIUM 2 G: 2 INJECTION, POWDER, FOR SOLUTION INTRAMUSCULAR; INTRAVENOUS at 07:49

## 2017-06-26 RX ADMIN — HEPARIN SODIUM 5000 UNITS: 5000 INJECTION, SOLUTION INTRAVENOUS; SUBCUTANEOUS at 21:23

## 2017-06-26 RX ADMIN — SODIUM CHLORIDE: 9 INJECTION, SOLUTION INTRAVENOUS at 07:48

## 2017-06-26 RX ADMIN — OXYCODONE HYDROCHLORIDE 10 MG: 10 TABLET, FILM COATED, EXTENDED RELEASE ORAL at 21:23

## 2017-06-26 RX ADMIN — OXYCODONE HYDROCHLORIDE 10 MG: 10 TABLET ORAL at 00:42

## 2017-06-26 RX ADMIN — ALBUTEROL SULFATE 2 PUFF: 90 AEROSOL, METERED RESPIRATORY (INHALATION) at 01:17

## 2017-06-26 RX ADMIN — HEPARIN SODIUM 5000 UNITS: 5000 INJECTION, SOLUTION INTRAVENOUS; SUBCUTANEOUS at 14:26

## 2017-06-26 RX ADMIN — PREGABALIN 75 MG: 75 CAPSULE ORAL at 14:26

## 2017-06-26 RX ADMIN — ALBUTEROL SULFATE 2 PUFF: 90 AEROSOL, METERED RESPIRATORY (INHALATION) at 21:27

## 2017-06-26 RX ADMIN — OXYCODONE HYDROCHLORIDE 10 MG: 10 TABLET ORAL at 23:44

## 2017-06-26 RX ADMIN — ACETAMINOPHEN 650 MG: 325 TABLET, FILM COATED ORAL at 05:19

## 2017-06-26 RX ADMIN — SENNOSIDES AND DOCUSATE SODIUM 2 TABLET: 8.6; 5 TABLET ORAL at 07:52

## 2017-06-26 RX ADMIN — TELMISARTAN 40 MG: 40 TABLET ORAL at 07:49

## 2017-06-26 RX ADMIN — SENNOSIDES AND DOCUSATE SODIUM 2 TABLET: 8.6; 5 TABLET ORAL at 21:22

## 2017-06-26 RX ADMIN — PREGABALIN 75 MG: 75 CAPSULE ORAL at 21:23

## 2017-06-26 RX ADMIN — OXYCODONE HYDROCHLORIDE 5 MG: 5 TABLET ORAL at 15:16

## 2017-06-26 RX ADMIN — ZOLPIDEM TARTRATE 5 MG: 5 TABLET, FILM COATED ORAL at 23:44

## 2017-06-26 RX ADMIN — HEPARIN SODIUM 5000 UNITS: 5000 INJECTION, SOLUTION INTRAVENOUS; SUBCUTANEOUS at 05:19

## 2017-06-26 RX ADMIN — OXYCODONE HYDROCHLORIDE 10 MG: 10 TABLET, FILM COATED, EXTENDED RELEASE ORAL at 09:13

## 2017-06-26 RX ADMIN — OXYCODONE HYDROCHLORIDE 10 MG: 10 TABLET ORAL at 07:52

## 2017-06-26 RX ADMIN — ACETAMINOPHEN 650 MG: 325 TABLET, FILM COATED ORAL at 17:35

## 2017-06-26 RX ADMIN — ACETAMINOPHEN 650 MG: 325 TABLET, FILM COATED ORAL at 11:10

## 2017-06-26 RX ADMIN — OXYCODONE HYDROCHLORIDE 10 MG: 10 TABLET ORAL at 12:16

## 2017-06-26 RX ADMIN — ACETAMINOPHEN 650 MG: 325 TABLET, FILM COATED ORAL at 23:44

## 2017-06-26 ASSESSMENT — PAIN SCALES - GENERAL
PAINLEVEL_OUTOF10: 8
PAINLEVEL_OUTOF10: 3
PAINLEVEL_OUTOF10: 7
PAINLEVEL_OUTOF10: 8
PAINLEVEL_OUTOF10: 8
PAINLEVEL_OUTOF10: 10
PAINLEVEL_OUTOF10: 7
PAINLEVEL_OUTOF10: 5
PAINLEVEL_OUTOF10: 7
PAINLEVEL_OUTOF10: 8

## 2017-06-26 ASSESSMENT — ENCOUNTER SYMPTOMS
ABDOMINAL PAIN: 0
SENSORY CHANGE: 1
HEADACHES: 0
DIARRHEA: 0
COUGH: 0
WHEEZING: 1
NAUSEA: 0
MYALGIAS: 1
PALPITATIONS: 0
VOMITING: 0
CHILLS: 0
SHORTNESS OF BREATH: 0
FEVER: 0
CONSTIPATION: 1

## 2017-06-26 NOTE — PROGRESS NOTES
Infectious Disease Progress Note    Author: Guerline Reed M.D. DOS & Time created: 2017  1:50 PM    Chief Complaint:  Chief Complaint   Patient presents with   • Abscess     R foot        Interval History:   AF, WBC 5.6, c/o headache and sore gums, c/o wheezing but no SOB, tolerating abx without issues, has not had a BM in 5 days, cx +grp B strep    AF, WBC 5, still has some wheezing but respiratory treatments helping, no plans for surgery, plan for wound vac change today   AF WBC 6.5 continued wheezing   AF WBC 5 up to commade-breathing better today   AF 5.1 got PICC today-still wheezing Foot hurts more today  Labs Reviewed, Medications Reviewed, Radiology Reviewed and Wound Reviewed.    Review of Systems:  Review of Systems   Constitutional: Negative for fever and chills.   Respiratory: Positive for wheezing. Negative for shortness of breath.         Improved   Gastrointestinal: Positive for constipation. Negative for nausea, vomiting, abdominal pain and diarrhea.   Genitourinary: Negative for dysuria.   Neurological: Positive for sensory change. Negative for headaches.       Hemodynamics:  Temp (24hrs), Av.6 °C (97.9 °F), Min:36.1 °C (96.9 °F), Max:37 °C (98.6 °F)  Temperature: 36.6 °C (97.8 °F)  Pulse  Av.5  Min: 74  Max: 118  Blood Pressure: 128/78 mmHg (Simultaneous filing. User may not have seen previous data.)       Physical Exam:  Physical Exam   Constitutional: She is oriented to person, place, and time. She appears well-developed. No distress.   Obese   HENT:   Head: Normocephalic and atraumatic.   Eyes: EOM are normal. Pupils are equal, round, and reactive to light.   Neck: Neck supple.   Cardiovascular: Normal rate and regular rhythm.    Pulmonary/Chest: Effort normal. No respiratory distress. She has wheezes. She has no rales.   Abdominal: Soft. She exhibits no distension. There is no tenderness.   Musculoskeletal: She exhibits edema.   R foot deformity  Wound  vac on dorsum of R foot  Edema ankle    LUE PICC   Neurological: She is alert and oriented to person, place, and time.   Skin: No rash noted.   Nursing note and vitals reviewed.      Meds:    Current facility-administered medications:   •  PICC Line Insertion has been implemented, , , Once **AND** May use Lidocaine 1% not to exceed 3 mls for local at insertion site, , , CONTINUOUS **AND** NOTIFY MD, , , Once **AND** Tip to dwell in the superior vena cava, , , CONTINUOUS **AND** Do not use PICC Line until placement verified by Chest X Ray, , , CONTINUOUS **AND** DX-CHEST-FOR PICC LINE Perform procedure in:: PICC Room, , , Once **AND** If radiologist reading of chest X-ray states any of the following the PICC should be used, , , CONTINUOUS **AND** Further evaluation of the PICC placement can be retrieved from X-Ray and Imaging, , , CONTINUOUS **AND** Blood draws through PICC line; draws by RN only, , , CONTINUOUS **AND** FLUSHING GUIDELINES WHEN IN USE, , , CONTINUOUS **AND** normal saline PF 10-20 mL, 10-20 mL, Intravenous, PRN **AND** FLUSHING GUIDELINES WHEN NOT IN USE, , , CONTINUOUS **AND** DRESSING MAINTENANCE, , , Once **AND** Change needleless pressure ports and IV tubing every 72 hours per hospital policy, , , CONTINUOUS **AND** TUBING, , , CONTINUOUS **AND** If there is an MD order to remove the PICC line, any RN may remove the PICC line, , , CONTINUOUS **AND** [] PATIENT EDUCATION MATERIALS, , , Prior to discharge **AND** NURSING COMMUNICATION, , , CONTINUOUS, Guerline Reed M.D.  •  acetaminophen (TYLENOL) tablet 650 mg, 650 mg, Oral, Q6HRS, Noel Green M.D., 650 mg at 17 1110  •  oxycodone immediate-release (ROXICODONE) tablet 5 mg, 5 mg, Oral, Q4HRS PRN **OR** oxycodone immediate release (ROXICODONE) tablet 10 mg, 10 mg, Oral, Q4HRS PRN, Noel Green M.D., 10 mg at 17 1216  •  NS infusion, , Intravenous, Continuous, Noel Green M.D., Last Rate: 60 mL/hr at 17 3832  •   carvedilol (COREG) tablet 6.25 mg, 6.25 mg, Oral, BID WITH MEALS, Noel Green M.D., 6.25 mg at 06/26/17 0750  •  estradiol (ESTRACE) tablet 2 mg, 2 mg, Oral, DAILY, Noel Green M.D., 2 mg at 06/26/17 0749  •  oxyCODONE CR (OXYCONTIN) tablet 10 mg, 10 mg, Oral, Q12HRS, Edson Lewis D.O., 10 mg at 06/26/17 0913  •  ipratropium-albuterol (DUONEB) nebulizer solution 3 mL, 3 mL, Nebulization, Q4H PRN (RT), Edson Lewis D.O.  •  pregabalin (LYRICA) capsule 75 mg, 75 mg, Oral, TID, Grace Escobar M.D., 75 mg at 06/26/17 0749  •  albuterol inhaler 2 Puff, 2 Puff, Inhalation, Q4HRS PRN, Edson Lewis D.O., 2 Puff at 06/26/17 0117  •  zolpidem (AMBIEN) tablet 5 mg, 5 mg, Oral, HS PRN, Liz Norris, A.P.N., 5 mg at 06/25/17 2349  •  cefTRIAXone (ROCEPHIN) 2 g in  mL IVPB, 2 g, Intravenous, Q24HRS, Grace Escobar M.D., Stopped at 06/26/17 0819  •  NS (BOLUS) infusion 1,000 mL, 1,000 mL, Intravenous, Once PRN, Grace Escobar M.D.  •  senna-docusate (PERICOLACE or SENOKOT S) 8.6-50 MG per tablet 2 Tab, 2 Tab, Oral, BID, 2 Tab at 06/26/17 0752 **AND** polyethylene glycol/lytes (MIRALAX) PACKET 1 Packet, 1 Packet, Oral, QDAY PRN, 1 Packet at 06/21/17 1609 **AND** magnesium hydroxide (MILK OF MAGNESIA) suspension 30 mL, 30 mL, Oral, QDAY PRN **AND** bisacodyl (DULCOLAX) suppository 10 mg, 10 mg, Rectal, QDAY PRN, Grace Escobar M.D.  •  Respiratory Care per Protocol, , Nebulization, Continuous RT, Grace Escobar M.D.  •  heparin injection 5,000 Units, 5,000 Units, Subcutaneous, Q8HRS, Grace Escobar M.D., 5,000 Units at 06/26/17 0519  •  ondansetron (ZOFRAN) syringe/vial injection 4 mg, 4 mg, Intravenous, Q4HRS PRN, Grace Escobar M.D., 4 mg at 06/20/17 0943  •  ondansetron (ZOFRAN ODT) dispertab 4 mg, 4 mg, Oral, Q4HRS PRN, Grace Escobar M.D., 4 mg at 06/25/17 1638  •  Notify provider if pain remains uncontrolled, , , CONTINUOUS **AND** Use the numeric rating scale (NRS-11) on regular floors and Critical-Care Pain Observation Tool  (CPOT) on ICUs/Trauma to assess pain, , , CONTINUOUS **AND** Pulse Ox (Oximetry), , , CONTINUOUS **AND** Pharmacy Consult Request ...Pain Management Review, , Other, PRN **AND** If patient difficult to arouse and/or has respiratory depression, stop any opiates that are currently infusing and call a Rapid Response., , , CONTINUOUS **AND** [DISCONTINUED] oxycodone immediate-release (ROXICODONE) tablet 2.5 mg, 2.5 mg, Oral, Q3HRS PRN, Stopped at 06/24/17 1411 **AND** [DISCONTINUED] oxycodone immediate-release (ROXICODONE) tablet 5 mg, 5 mg, Oral, Q3HRS PRN, 5 mg at 06/25/17 0725 **AND** [DISCONTINUED] morphine (pf) 4 mg/ml injection 2 mg, 2 mg, Intravenous, Q3HRS PRN, Grace Escobar M.D., 2 mg at 06/24/17 0747  •  clonazepam (KLONOPIN) tablet 0.5 mg, 0.5 mg, Oral, BID PRN, Grace Escobar M.D., 0.5 mg at 06/24/17 2106  •  telmisartan (MICARDIS) tablet 40 mg, 40 mg, Oral, DAILY, Grace Escobar M.D., 40 mg at 06/26/17 0749    Labs:  Recent Labs      06/24/17 0214 06/25/17 0319   WBC  6.5  5.1   RBC  4.15*  4.20   HEMOGLOBIN  11.7*  11.8*   HEMATOCRIT  36.8*  37.4   MCV  88.7  89.0   MCH  28.2  28.1   RDW  47.8  47.8   PLATELETCT  349  328   MPV  9.5  9.4   NEUTSPOLYS  51.50  38.80*   LYMPHOCYTES  36.30  47.00*   MONOCYTES  7.40  8.30   EOSINOPHILS  3.70  4.90   BASOPHILS  0.80  0.60     Recent Labs      06/24/17 0214 06/25/17 0319   SODIUM  137  141   POTASSIUM  3.8  3.9   CHLORIDE  105  106   CO2  25  27   GLUCOSE  108*  110*   BUN  12  11     Recent Labs      06/24/17   0214  06/25/17   0319   ALBUMIN  3.2  3.5   TBILIRUBIN  0.2  0.3   ALKPHOSPHAT  58  53   TOTPROTEIN  6.1  6.3   ALTSGPT  13  8   ASTSGOT  13  10*   CREATININE  0.71  0.63       Imaging:  Dx-foot-complete 3+ Right  6/20/2017  1.  Diffuse lateral soft tissue swelling of RIGHT foot with focal ulceration over the 5th metatarsal base. 2.  No gross bony destruction, however osteomyelitis is not excluded by plain film. 3.  No fracture or dislocation. 4.   "Possible metallic foreign body within the plantar soft tissues of great toe.    Mr-foot-with Right  6/21/2017  Fourth and fifth TMT centered marrow edema, marginal spurring and joint effusion. No clear bony destruction. This is more likely secondary to degenerative change than septic arthropathy and osteomyelitis although given the overlying skin ulceration, infection cannot be excluded Lateral forefoot cellulitis with medium depth ulceration overlying the fifth TMT Multiple bone infarctions without articular surface collapse    Le Venous Duplex - Dvt ([facility] Only)    6/20/2017   Vascular Laboratory  CONCLUSIONS  Normal right lower extremity superficial and deep venous examination.    Micro:  BLOOD CULTURE   Date Value Ref Range Status   06/20/2017 No growth after 5 days of incubation.  Final      Results     Procedure Component Value Units Date/Time    BLOOD CULTURE [[id number]] Collected:  06/20/17 1907    Order Status:  Completed Specimen Information:  Blood from Peripheral Updated:  06/25/17 2100     Significant Indicator NEG      Source BLD      Site PERIPHERAL      Blood Culture No growth after 5 days of incubation.     Narrative:      Per Hospital Policy: Only change Specimen Src: to \"Line\" if  specified by physician order.    BLOOD CULTURE [[id number]] Collected:  06/20/17 1904    Order Status:  Completed Specimen Information:  Blood from Peripheral Updated:  06/25/17 2100     Significant Indicator NEG      Source BLD      Site PERIPHERAL      Blood Culture No growth after 5 days of incubation.     Narrative:      Per Hospital Policy: Only change Specimen Src: to \"Line\" if  specified by physician order.    URINALYSIS [[id number]] Collected:  06/24/17 1200    Order Status:  Completed Updated:  06/24/17 1237     Color Lt. Yellow      Character Clear      Specific Gravity 1.009      Ph 6.5      Glucose Negative mg/dL      Ketones Negative mg/dL      Protein Negative mg/dL      Bilirubin Negative "      Nitrite Negative      Leukocyte Esterase Negative      Occult Blood Negative      Micro Urine Req see below      Comment: Microscopic examination not performed when specimen is clear  and chemically negative for protein, blood, leukocyte esterase  and nitrite.         URINALYSIS [[id number]]     Order Status:  No result Specimen Information:  Urine from Urine, Clean Catch     CULTURE WOUND W/ GRAM STAIN [[id number]]  (Abnormal) Collected:  06/20/17 0810    Order Status:  Completed Specimen Information:  Wound from Right Foot Updated:  06/22/17 0846     Gram Stain Result --      Result:        Rare WBCs.  Rare Gram positive cocci.       Significant Indicator POS (POS)      Source WND      Site RIGHT FOOT      Culture Result Wound -- (A)      Culture Result Wound -- (A)      Result:        Streptococcus agalactiae (Group B)  Light growth      GRAM STAIN [[id number]] Collected:  06/20/17 0810    Order Status:  Completed Specimen Information:  Wound Updated:  06/20/17 2146     Significant Indicator .      Source WND      Site RIGHT FOOT      Gram Stain Result --      Result:        Rare WBCs.  Rare Gram positive cocci.              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   Anticipate 4-6 weeks of abx   Last 2 weeks can be Zyvox if does not tolerat IV  Wound care - wound vac in place.   Estim stop date 8/1/2017    No s/RLE paresthesias  2/2 traumatic injury 2 years ago  Resulting in above

## 2017-06-26 NOTE — CARE PLAN
Problem: Nutritional:  Goal: Achieve adequate nutritional intake  Patient will consume >50% of meals   Outcome: MET Date Met:  06/26/17

## 2017-06-26 NOTE — PROGRESS NOTES
Patient alert and oriented, vss, complains of pain in R foot and leg. Relieved with PRN pain medication. Patient had PICC placed in L arm today- chest xray done for PICC placement confirmation. PICC ready to use per chest xray. Patient continues on IV antibiotics. Planning for long term antibiotics outpatient now that PICC is in place. Patient up independently to commode and assessed by RN- patient steady while moving to commode. Patient refusing bed alarm and stating will call before moving out of bed any further than commode. Will monitor.

## 2017-06-26 NOTE — CARE PLAN
Problem: Pain Management  Goal: Pain level will decrease to patient’s comfort goal  Pain assessed Q2h. Pain medication given per orders, see MAR.

## 2017-06-26 NOTE — PROGRESS NOTES
Pt is A&Ox4.  VSS.  C/O pain, left foot.  Will give pain meds per orders.  SBA up to BSC.  Calls appropriately.  Wound vac dressings to left foot are CDI. Pt updated on POC, needs met and questions answered.  Showered tonight.  Call light within reach and working properly.

## 2017-06-26 NOTE — PROGRESS NOTES
"Pt calls stating she needs pain meds (2nd time within an hour), informed pt earlier pain meds are not due till almost an hour.  Pain is 8/10, right foot, shooting.  Pt states \"I was given IV Morphine before alternated with my pills\".  Informed pt IV pain meds were dc'd but will call MD to ask for order.  Spoke to Liz HAWK who states it is ok to give ordered Roxicodone early now x1.    TO/ Liz Norris RBO/ Carly Domingo RN  "

## 2017-06-26 NOTE — PROGRESS NOTES
Wound vac tubing became doslodged from foam dreessing- called wound team and given instructions to replace tubing. Dislodged at 3pm.     Tubing replaced and wound vac intact and functioning.

## 2017-06-26 NOTE — PROGRESS NOTES
[facility] Hospitalist Progress Note    Date of Service: 2017    Chief Complaint  62 y.o. female admitted 2017 with R-foot ulcer.    Interval Problem Update  S/p wound vac placement.  Haiving spasm of RLE.  She would like muscle relaxants.    Consultants/Specialty  ID and ortho.    Disposition          Review of Systems   Respiratory: Negative for cough.    Cardiovascular: Negative for chest pain and palpitations.   Gastrointestinal: Negative for nausea and vomiting.   Musculoskeletal: Positive for myalgias.   All other systems reviewed and are negative.     Physical Exam  Laboratory/Imaging   Hemodynamics  Temp (24hrs), Av.6 °C (97.9 °F), Min:36.1 °C (96.9 °F), Max:37 °C (98.6 °F)   Temperature: 36.6 °C (97.8 °F)  Pulse  Av.5  Min: 74  Max: 118   Blood Pressure: 128/78 mmHg (Simultaneous filing. User may not have seen previous data.)      Respiratory      Respiration: 16, Pulse Oximetry: 90 %     Work Of Breathing / Effort: Mild  RUL Breath Sounds: Expiratory Wheezes, RML Breath Sounds: Diminished, RLL Breath Sounds: Diminished, EL Breath Sounds: Expiratory Wheezes, LLL Breath Sounds: Diminished    Fluids    Intake/Output Summary (Last 24 hours) at 17 1317  Last data filed at 17 0500   Gross per 24 hour   Intake    140 ml   Output      0 ml   Net    140 ml       Nutrition  Orders Placed This Encounter   Procedures   • Diet Order     Standing Status: Standing      Number of Occurrences: 1      Standing Expiration Date:      Order Specific Question:  Diet:     Answer:  Regular [1]     Physical Exam  Nursing note and vitals reviewed.  Constitutional: She is oriented to person, place, and time. She appears well-developed and well-nourished.   HENT:   Head: Normocephalic and atraumatic.   Right Ear: External ear normal.   Left Ear: External ear normal.   Nose: Nose normal.   Eyes: Conjunctivae and extraocular motions are normal.    Neck: Normal range of motion. Neck supple.   Cardiovascular:  Normal rate.    Pulmonary/Chest: Effort normal.   Abdominal: Soft. Bowel sounds are normal.   Musculoskeletal: Normal range of motion.   Neurological: She is alert and oriented to person, place, and time.   Skin: Skin is warm and dry.  R-foot wound vac in place.      Recent Labs      06/24/17 0214 06/25/17 0319   WBC  6.5  5.1   RBC  4.15*  4.20   HEMOGLOBIN  11.7*  11.8*   HEMATOCRIT  36.8*  37.4   MCV  88.7  89.0   MCH  28.2  28.1   MCHC  31.8*  31.6*   RDW  47.8  47.8   PLATELETCT  349  328   MPV  9.5  9.4     Recent Labs      06/24/17 0214 06/25/17 0319   SODIUM  137  141   POTASSIUM  3.8  3.9   CHLORIDE  105  106   CO2  25  27   GLUCOSE  108*  110*   BUN  12  11   CREATININE  0.71  0.63   CALCIUM  9.1  9.2                      Assessment/Plan     * Foot ulcer, right (CMS-HCC)  Assessment & Plan  Dr. Wu (ortho) does not plan to do any surgery unless there is proof the patient's leg weakness is irreversible.  Continue woundvac.  Home health referral and to outpatient wound care clinic.  Referral to outpatient wound clinic.    Fibromyalgia (present on admission)  Assessment & Plan  As above.    Chronic pain syndrome (present on admission)  Assessment & Plan  Min narc and use conservative measures when possible.    Monoparesis of lower extremity (CMS-HCC)  Assessment & Plan  MRI of lumbar spine neg for cause.  Outpatient for nerve conduction study.   Ordered DME for home use.    Weakness of right leg  Assessment & Plan  PT/OT and increase activity.    Morbid obesity with BMI of 40.0-44.9, adult (CMS-HCC)  Assessment & Plan  Encourage Kcal restriction.    Nephrolithiasis  Assessment & Plan  Asymptomatic.  Outpatient follow up.    Insomnia disorder  Assessment & Plan  Outpatient follow up.    Physical debility  Assessment & Plan  PT/OT and increase activity.    Muscle spasms of lower extremity  Assessment & Plan  PRN heat pack.      Medications reviewed and Labs reviewed  Brady catheter: No  Brady      DVT Prophylaxis: Heparin    Ulcer prophylaxis: Not indicated  Antibiotics: Treating active infection/contamination beyond 24 hours perioperative coverage  Assessed for rehab: Patient was assess for and/or received rehabilitation services during this hospitalization      Stable issues - med hx (hemorrhoids, migrain, scarlet fever, anx/dep), preventives, HTN    Dispo - complex/guarded.

## 2017-06-26 NOTE — PROGRESS NOTES
Patient complains of 10/10 pain in R foot post wound vac change with no PRN medication available to give at thsi time. Called hospitalist RN to ask Dr. Green for an additional pain medication. Will monitor.

## 2017-06-26 NOTE — PROGRESS NOTES
PICC Insertion Procedure Note    Consents confirmed, vessel patency confirmed with ultrasound. Risks and benefits of procedure explained to patient/family and education regarding central line associated bloodstream infections provided. Questions answered.     PICC placed in LUE per MD order with ultrasound guidance. 4 Fr, single lumen PICC placed in brachial vein after 1 attempt(s). 2 cc's of 1% lidocaine injected intradermally, 21 gauge microintroducer needle and modified Seldinger technique used. 43 cm catheter inserted with good blood return. Secured at 0cm marker. Each lumen flushed without resistance with 10 mL 0.9% normal saline. PICC line secured with Biopatch, statlock and Tegaderm.    CXR ordered, PICC placement confirmation will be provided by the Radiologist via interpretation of the follow-up chest x-ray to confirm tip location. Pt tolerated procedure well.  Patient condition relayed to unit RN or ordering physician via this post procedure note in the EMR.     Gen One Cig Power PICC ref # [id number], Lot # [id number]

## 2017-06-27 PROBLEM — R06.2 WHEEZE: Status: ACTIVE | Noted: 2017-06-27

## 2017-06-27 PROCEDURE — 700102 HCHG RX REV CODE 250 W/ 637 OVERRIDE(OP): Performed by: INTERNAL MEDICINE

## 2017-06-27 PROCEDURE — 770006 HCHG ROOM/CARE - MED/SURG/GYN SEMI*

## 2017-06-27 PROCEDURE — 700111 HCHG RX REV CODE 636 W/ 250 OVERRIDE (IP): Performed by: INTERNAL MEDICINE

## 2017-06-27 PROCEDURE — A9270 NON-COVERED ITEM OR SERVICE: HCPCS | Performed by: INTERNAL MEDICINE

## 2017-06-27 PROCEDURE — L4398 FOOT DROP SPLINT PRE OTS: HCPCS

## 2017-06-27 PROCEDURE — A9270 NON-COVERED ITEM OR SERVICE: HCPCS | Performed by: HOSPITALIST

## 2017-06-27 PROCEDURE — 99233 SBSQ HOSP IP/OBS HIGH 50: CPT | Performed by: INTERNAL MEDICINE

## 2017-06-27 PROCEDURE — 700102 HCHG RX REV CODE 250 W/ 637 OVERRIDE(OP): Performed by: HOSPITALIST

## 2017-06-27 PROCEDURE — 700105 HCHG RX REV CODE 258: Performed by: INTERNAL MEDICINE

## 2017-06-27 PROCEDURE — 700105 HCHG RX REV CODE 258: Performed by: HOSPITALIST

## 2017-06-27 RX ADMIN — OXYCODONE HYDROCHLORIDE 10 MG: 10 TABLET ORAL at 03:28

## 2017-06-27 RX ADMIN — OXYCODONE HYDROCHLORIDE 10 MG: 10 TABLET, FILM COATED, EXTENDED RELEASE ORAL at 08:06

## 2017-06-27 RX ADMIN — SENNOSIDES AND DOCUSATE SODIUM 2 TABLET: 8.6; 5 TABLET ORAL at 08:07

## 2017-06-27 RX ADMIN — SODIUM CHLORIDE: 9 INJECTION, SOLUTION INTRAVENOUS at 20:11

## 2017-06-27 RX ADMIN — OXYCODONE HYDROCHLORIDE 10 MG: 10 TABLET ORAL at 18:10

## 2017-06-27 RX ADMIN — ACETAMINOPHEN 650 MG: 325 TABLET, FILM COATED ORAL at 11:32

## 2017-06-27 RX ADMIN — CARVEDILOL 6.25 MG: 6.25 TABLET, FILM COATED ORAL at 08:04

## 2017-06-27 RX ADMIN — PREGABALIN 75 MG: 75 CAPSULE ORAL at 20:07

## 2017-06-27 RX ADMIN — ACETAMINOPHEN 650 MG: 325 TABLET, FILM COATED ORAL at 05:42

## 2017-06-27 RX ADMIN — PREGABALIN 75 MG: 75 CAPSULE ORAL at 08:07

## 2017-06-27 RX ADMIN — HEPARIN SODIUM 5000 UNITS: 5000 INJECTION, SOLUTION INTRAVENOUS; SUBCUTANEOUS at 05:42

## 2017-06-27 RX ADMIN — TELMISARTAN 40 MG: 40 TABLET ORAL at 08:07

## 2017-06-27 RX ADMIN — OXYCODONE HYDROCHLORIDE 10 MG: 10 TABLET ORAL at 22:17

## 2017-06-27 RX ADMIN — HEPARIN SODIUM 5000 UNITS: 5000 INJECTION, SOLUTION INTRAVENOUS; SUBCUTANEOUS at 14:00

## 2017-06-27 RX ADMIN — SENNOSIDES AND DOCUSATE SODIUM 2 TABLET: 8.6; 5 TABLET ORAL at 20:06

## 2017-06-27 RX ADMIN — HEPARIN SODIUM 5000 UNITS: 5000 INJECTION, SOLUTION INTRAVENOUS; SUBCUTANEOUS at 20:07

## 2017-06-27 RX ADMIN — SODIUM CHLORIDE: 9 INJECTION, SOLUTION INTRAVENOUS at 02:37

## 2017-06-27 RX ADMIN — CARVEDILOL 6.25 MG: 6.25 TABLET, FILM COATED ORAL at 18:10

## 2017-06-27 RX ADMIN — PREGABALIN 75 MG: 75 CAPSULE ORAL at 14:01

## 2017-06-27 RX ADMIN — OXYCODONE HYDROCHLORIDE 10 MG: 10 TABLET, FILM COATED, EXTENDED RELEASE ORAL at 20:07

## 2017-06-27 RX ADMIN — OXYCODONE HYDROCHLORIDE 10 MG: 10 TABLET ORAL at 10:02

## 2017-06-27 RX ADMIN — ALBUTEROL SULFATE 2 PUFF: 90 AEROSOL, METERED RESPIRATORY (INHALATION) at 08:06

## 2017-06-27 RX ADMIN — CEFTRIAXONE SODIUM 2 G: 2 INJECTION, POWDER, FOR SOLUTION INTRAMUSCULAR; INTRAVENOUS at 08:05

## 2017-06-27 RX ADMIN — ESTRADIOL 2 MG: 1 TABLET ORAL at 08:05

## 2017-06-27 RX ADMIN — OXYCODONE HYDROCHLORIDE 10 MG: 10 TABLET ORAL at 14:02

## 2017-06-27 ASSESSMENT — ENCOUNTER SYMPTOMS
NAUSEA: 0
FEVER: 0
HEADACHES: 0
SENSORY CHANGE: 1
CONSTIPATION: 1
BACK PAIN: 0
WHEEZING: 1
DIARRHEA: 0
NERVOUS/ANXIOUS: 0
ABDOMINAL PAIN: 0
COUGH: 0
DEPRESSION: 0
MYALGIAS: 1
VOMITING: 0
CHILLS: 0
FOCAL WEAKNESS: 0
WEAKNESS: 1
PALPITATIONS: 0
SHORTNESS OF BREATH: 0

## 2017-06-27 ASSESSMENT — PAIN SCALES - GENERAL
PAINLEVEL_OUTOF10: 8
PAINLEVEL_OUTOF10: 9
PAINLEVEL_OUTOF10: 8
PAINLEVEL_OUTOF10: 7
PAINLEVEL_OUTOF10: 6
PAINLEVEL_OUTOF10: 9
PAINLEVEL_OUTOF10: 8
PAINLEVEL_OUTOF10: 8

## 2017-06-27 NOTE — PROGRESS NOTES
Patient c/o constant pain to right foot and back. Taking prn and scheduled pain meds every 4 hours. Wound vac intact with no leaks and dressing dry and intact. OOB to BSC with stand by assist. PICC intact, flushes easily with good blood return.

## 2017-06-27 NOTE — PROGRESS NOTES
Pt a&o.  Up to bedside commode, pivots. Non-weight bearing RLE. Wound vac intact and functioning. Complaints of back and leg pain. Med given with good result. Pt has expiratory wheezing tonight with exertion. Albuterol given. IV antibiotics given as ordered. Resting and calm right now. Calls appropriately.

## 2017-06-27 NOTE — WOUND TEAM
"[facility]  Inpatient Services  Wound & Skin Care Treatment Note    Admission Date: 6/20/17          HPI, PMH, SH: Reviewed  Unit where seen by Wound Team:  S6    WOUND CONSULT RELATED TO:  Scheduled npwt drsg change     SUBJECTIVE:  Pleasant/agreeable    Self Report / Pain Level:  C/o foot pain \"inside\" not related to drsg change    OBJECTIVE:  Prev npwt drsg remained intact    WOUND TYPE, LOCATION, CHARACTERISTICS (Pressure ulcers: location, stage, POA or date identified)    Wound Type/Location:   Neuropathic ulceration, right lateral foot     Periwound:   Intact; excoriated     Drainage:     Scant serosanguinous      Tissue Type and %:    100% pink/red   Wound Edges:   attached     Odor:     none     Exposed structure(s):   none   S&S of Infection:    None      Measurements: (cm)  taken 6/26/17    Length:    1.7  Width:     2.0  Depth:    0.3      INTERVENTIONS BY WOUND TEAM:   Prev drsg removed -- wnd irrigated with wnd cleanser -- measurements and photograph done -- benzoin and drape aquilino-wnd -- wnd covered with 1 piece black foam/button -- sealed with drape and neg pressure applied at 125mmhg/dpc      Interdisciplinary Collaboration: RN, Pt     EVALUATION:  wnd remains clean; scant drainage; measurements slightly smaller; should cont to progress with npwt       Factors affecting wound healing:  Neuro damage, insensate, chronicity, infection, foot deformity     Goals:  Wound to be 5% smaller in 2 weeks -- 100% granulation in 2 weeks    NURSING PLAN OF CARE: (x)  Dressing changes: See Dressing Maintenance orders: x  Skin care: See Skin Care orders: x  Rectal tube care: See Rectal Tube Care orders:   Other orders:    WOUND TEAM PLAN OF CARE (x):   NPWT change 3 x week:    x    Dressing changes by wound team:       Follow up as needed:  x  Other (explain):    Anticipated discharge plans (x):  SNF:           Home Care:           Outpatient Wound Center:            Self Care:            Other:  tbd  "

## 2017-06-27 NOTE — PROGRESS NOTES
Infectious Disease Progress Note    Author: Guerline Reed M.D. DOS & Time created: 2017  1:07 PM    Chief Complaint:  Chief Complaint   Patient presents with   • Abscess     R foot        Interval History:   AF, WBC 5.6, c/o headache and sore gums, c/o wheezing but no SOB, tolerating abx without issues, has not had a BM in 5 days, cx +grp B strep    AF, WBC 5, still has some wheezing but respiratory treatments helping, no plans for surgery, plan for wound vac change today   AF WBC 6.5 continued wheezing   AF WBC 5 up to commade-breathing better today   AF 5.1 got PICC today-still wheezing Foot hurts more today   AF wheezing better-having stinging pain inright foot  Labs Reviewed, Medications Reviewed, Radiology Reviewed and Wound Reviewed.    Review of Systems:  Review of Systems   Constitutional: Negative for fever and chills.   Respiratory: Positive for wheezing. Negative for shortness of breath.         Improved   Gastrointestinal: Positive for constipation. Negative for nausea, vomiting, abdominal pain and diarrhea.   Genitourinary: Negative for dysuria.   Neurological: Positive for sensory change. Negative for headaches.       Hemodynamics:  Temp (24hrs), Av.3 °C (97.3 °F), Min:36 °C (96.8 °F), Max:36.5 °C (97.7 °F)  Temperature: 36.5 °C (97.7 °F)  Pulse  Av.4  Min: 74  Max: 118  Blood Pressure: 158/81 mmHg       Physical Exam:  Physical Exam   Constitutional: She is oriented to person, place, and time. She appears well-developed. No distress.   Obese   HENT:   Head: Normocephalic and atraumatic.   Eyes: EOM are normal. Pupils are equal, round, and reactive to light.   Neck: Neck supple.   Cardiovascular: Normal rate and regular rhythm.    Pulmonary/Chest: Effort normal. No respiratory distress. She has wheezes. She has no rales.   Abdominal: Soft. She exhibits no distension. There is no tenderness.   Musculoskeletal: She exhibits edema.   R foot deformity  Wound vac  on dorsum of R foot  Edema ankle    LUE PICC   Neurological: She is alert and oriented to person, place, and time.   Skin: No rash noted.   Nursing note and vitals reviewed.      Meds:    Current facility-administered medications:   •  PICC Line Insertion has been implemented, , , Once **AND** May use Lidocaine 1% not to exceed 3 mls for local at insertion site, , , CONTINUOUS **AND** NOTIFY MD, , , Once **AND** Tip to dwell in the superior vena cava, , , CONTINUOUS **AND** Do not use PICC Line until placement verified by Chest X Ray, , , CONTINUOUS **AND** DX-CHEST-FOR PICC LINE Perform procedure in:: PICC Room, , , Once **AND** If radiologist reading of chest X-ray states any of the following the PICC should be used, , , CONTINUOUS **AND** Further evaluation of the PICC placement can be retrieved from X-Ray and Imaging, , , CONTINUOUS **AND** Blood draws through PICC line; draws by RN only, , , CONTINUOUS **AND** FLUSHING GUIDELINES WHEN IN USE, , , CONTINUOUS **AND** normal saline PF 10-20 mL, 10-20 mL, Intravenous, PRN **AND** FLUSHING GUIDELINES WHEN NOT IN USE, , , CONTINUOUS **AND** DRESSING MAINTENANCE, , , Once **AND** Change needleless pressure ports and IV tubing every 72 hours per hospital policy, , , CONTINUOUS **AND** TUBING, , , CONTINUOUS **AND** If there is an MD order to remove the PICC line, any RN may remove the PICC line, , , CONTINUOUS **AND** [] PATIENT EDUCATION MATERIALS, , , Prior to discharge **AND** NURSING COMMUNICATION, , , CONTINUOUS, Guerline Reed M.D.  •  oxycodone immediate-release (ROXICODONE) tablet 5 mg, 5 mg, Oral, Q3HRS PRN, 5 mg at 17 1516 **OR** oxycodone immediate release (ROXICODONE) tablet 10 mg, 10 mg, Oral, Q4HRS PRN, Noel Green M.D., 10 mg at 17 1002  •  acetaminophen (TYLENOL) tablet 650 mg, 650 mg, Oral, Q6HRS, Noel Green M.D., 650 mg at 17 1132  •  NS infusion, , Intravenous, Continuous, Noel Green M.D., Last Rate: 60 mL/hr at  06/27/17 0237  •  carvedilol (COREG) tablet 6.25 mg, 6.25 mg, Oral, BID WITH MEALS, Noel Green M.D., 6.25 mg at 06/27/17 0804  •  estradiol (ESTRACE) tablet 2 mg, 2 mg, Oral, DAILY, Noel Green M.D., 2 mg at 06/27/17 0805  •  oxyCODONE CR (OXYCONTIN) tablet 10 mg, 10 mg, Oral, Q12HRS, Edson Lewis D.O., 10 mg at 06/27/17 0806  •  ipratropium-albuterol (DUONEB) nebulizer solution 3 mL, 3 mL, Nebulization, Q4H PRN (RT), FADI Martin.LINWOOD.  •  pregabalin (LYRICA) capsule 75 mg, 75 mg, Oral, TID, Grace Escobar M.D., 75 mg at 06/27/17 0807  •  albuterol inhaler 2 Puff, 2 Puff, Inhalation, Q4HRS PRN, Edson Lewis D.OJacki, 2 Puff at 06/27/17 0806  •  zolpidem (AMBIEN) tablet 5 mg, 5 mg, Oral, HS PRN, Liz Norris, A.P.N., 5 mg at 06/26/17 2344  •  cefTRIAXone (ROCEPHIN) 2 g in  mL IVPB, 2 g, Intravenous, Q24HRS, Grace Escobar M.D., Stopped at 06/27/17 0835  •  NS (BOLUS) infusion 1,000 mL, 1,000 mL, Intravenous, Once PRN, Grace Escobar M.D.  •  senna-docusate (PERICOLACE or SENOKOT S) 8.6-50 MG per tablet 2 Tab, 2 Tab, Oral, BID, 2 Tab at 06/27/17 0807 **AND** polyethylene glycol/lytes (MIRALAX) PACKET 1 Packet, 1 Packet, Oral, QDAY PRN, 1 Packet at 06/21/17 1609 **AND** magnesium hydroxide (MILK OF MAGNESIA) suspension 30 mL, 30 mL, Oral, QDAY PRN **AND** bisacodyl (DULCOLAX) suppository 10 mg, 10 mg, Rectal, QDAY PRN, Grace Escobar M.D.  •  Respiratory Care per Protocol, , Nebulization, Continuous RT, Grace Escobar M.D.  •  heparin injection 5,000 Units, 5,000 Units, Subcutaneous, Q8HRS, Grace Escobar M.D., 5,000 Units at 06/27/17 0542  •  ondansetron (ZOFRAN) syringe/vial injection 4 mg, 4 mg, Intravenous, Q4HRS PRN, rGace Escobar M.D., 4 mg at 06/20/17 0943  •  ondansetron (ZOFRAN ODT) dispertab 4 mg, 4 mg, Oral, Q4HRS PRN, Grace Escobar M.D., 4 mg at 06/25/17 1638  •  Notify provider if pain remains uncontrolled, , , CONTINUOUS **AND** Use the numeric rating scale (NRS-11) on regular floors and Critical-Care Pain  Observation Tool (CPOT) on ICUs/Trauma to assess pain, , , CONTINUOUS **AND** Pulse Ox (Oximetry), , , CONTINUOUS **AND** Pharmacy Consult Request ...Pain Management Review, , Other, PRN **AND** If patient difficult to arouse and/or has respiratory depression, stop any opiates that are currently infusing and call a Rapid Response., , , CONTINUOUS **AND** [DISCONTINUED] oxycodone immediate-release (ROXICODONE) tablet 2.5 mg, 2.5 mg, Oral, Q3HRS PRN, Stopped at 06/24/17 1411 **AND** [DISCONTINUED] oxycodone immediate-release (ROXICODONE) tablet 5 mg, 5 mg, Oral, Q3HRS PRN, 5 mg at 06/25/17 0725 **AND** [DISCONTINUED] morphine (pf) 4 mg/ml injection 2 mg, 2 mg, Intravenous, Q3HRS PRN, Grace Escobar M.D., 2 mg at 06/24/17 0747  •  clonazepam (KLONOPIN) tablet 0.5 mg, 0.5 mg, Oral, BID PRN, Grace Escobar M.D., 0.5 mg at 06/24/17 2106  •  telmisartan (MICARDIS) tablet 40 mg, 40 mg, Oral, DAILY, Grace Escobar M.D., 40 mg at 06/27/17 0807    Labs:  Recent Labs      06/25/17 0319   WBC  5.1   RBC  4.20   HEMOGLOBIN  11.8*   HEMATOCRIT  37.4   MCV  89.0   MCH  28.1   RDW  47.8   PLATELETCT  328   MPV  9.4   NEUTSPOLYS  38.80*   LYMPHOCYTES  47.00*   MONOCYTES  8.30   EOSINOPHILS  4.90   BASOPHILS  0.60     Recent Labs      06/25/17 0319   SODIUM  141   POTASSIUM  3.9   CHLORIDE  106   CO2  27   GLUCOSE  110*   BUN  11     Recent Labs      06/25/17 0319   ALBUMIN  3.5   TBILIRUBIN  0.3   ALKPHOSPHAT  53   TOTPROTEIN  6.3   ALTSGPT  8   ASTSGOT  10*   CREATININE  0.63       Imaging:  Dx-foot-complete 3+ Right  6/20/2017  1.  Diffuse lateral soft tissue swelling of RIGHT foot with focal ulceration over the 5th metatarsal base. 2.  No gross bony destruction, however osteomyelitis is not excluded by plain film. 3.  No fracture or dislocation. 4.  Possible metallic foreign body within the plantar soft tissues of great toe.    Mr-foot-with Right  6/21/2017  Fourth and fifth TMT centered marrow edema, marginal spurring and joint  "effusion. No clear bony destruction. This is more likely secondary to degenerative change than septic arthropathy and osteomyelitis although given the overlying skin ulceration, infection cannot be excluded Lateral forefoot cellulitis with medium depth ulceration overlying the fifth TMT Multiple bone infarctions without articular surface collapse    Le Venous Duplex - Dvt ([facility] [place] [facility] Only)    6/20/2017   Vascular Laboratory  CONCLUSIONS  Normal right lower extremity superficial and deep venous examination.    Micro:  BLOOD CULTURE   Date Value Ref Range Status   06/20/2017 No growth after 5 days of incubation.  Final      Results     Procedure Component Value Units Date/Time    BLOOD CULTURE [[id number]] Collected:  06/20/17 1907    Order Status:  Completed Specimen Information:  Blood from Peripheral Updated:  06/25/17 2100     Significant Indicator NEG      Source BLD      Site PERIPHERAL      Blood Culture No growth after 5 days of incubation.     Narrative:      Per Hospital Policy: Only change Specimen Src: to \"Line\" if  specified by physician order.    BLOOD CULTURE [[id number]] Collected:  06/20/17 1904    Order Status:  Completed Specimen Information:  Blood from Peripheral Updated:  06/25/17 2100     Significant Indicator NEG      Source BLD      Site PERIPHERAL      Blood Culture No growth after 5 days of incubation.     Narrative:      Per Hospital Policy: Only change Specimen Src: to \"Line\" if  specified by physician order.    URINALYSIS [[id number]] Collected:  06/24/17 1200    Order Status:  Completed Updated:  06/24/17 1237     Color Lt. Yellow      Character Clear      Specific Gravity 1.009      Ph 6.5      Glucose Negative mg/dL      Ketones Negative mg/dL      Protein Negative mg/dL      Bilirubin Negative      Nitrite Negative      Leukocyte Esterase Negative      Occult Blood Negative      Micro Urine Req see below      Comment: Microscopic examination not performed when specimen is " clear  and chemically negative for protein, blood, leukocyte esterase  and nitrite.         URINALYSIS [[id number]]     Order Status:  No result Specimen Information:  Urine from Urine, Clean Catch     CULTURE WOUND W/ GRAM STAIN [[id number]]  (Abnormal) Collected:  06/20/17 0810    Order Status:  Completed Specimen Information:  Wound from Right Foot Updated:  06/22/17 0846     Gram Stain Result --      Result:        Rare WBCs.  Rare Gram positive cocci.       Significant Indicator POS (POS)      Source WND      Site RIGHT FOOT      Culture Result Wound -- (A)      Culture Result Wound -- (A)      Result:        Streptococcus agalactiae (Group B)  Light growth      GRAM STAIN [[id number]] Collected:  06/20/17 0810    Order Status:  Completed Specimen Information:  Wound Updated:  06/20/17 2146     Significant Indicator .      Source WND      Site RIGHT FOOT      Gram Stain Result --      Result:        Rare WBCs.  Rare Gram positive cocci.              Assessment:  Active Hospital Problems    Diagnosis   • *Foot ulcer, right (CMS-[facility]) [L97.519]   • HTN (hypertension) [I10]   • Monoparesis of lower extremity (CMS-[facility]) [G83.10]   • Fibromyalgia [M79.7]   • Chronic pain syndrome [G89.4]       Plan:  Right foot ulcer  Afebrile  No leukocytosis  Wound cx - grp B strep  MRI possible OM-No plans for surgery at this time per ortho  Continue ceftriaxone (PCN allergy).   Anticipate 4-6 weeks of abx   Last 2 weeks can be Zyvox   Wound care - wound vac in place.   Stop date 8/1/2017    No s/RLE paresthesias  2/2 traumatic injury 2 years ago  Resulting in above

## 2017-06-27 NOTE — CARE PLAN
Problem: Venous Thromboembolism (VTW)/Deep Vein Thrombosis (DVT) Prevention:  Goal: Patient will participate in Venous Thrombosis (VTE)/Deep Vein Thrombosis (DVT)Prevention Measures  Heparin per dvt prophylactic. No s/sx of active bleeding noted.     Problem: Pain Management  Goal: Pain level will decrease to patient’s comfort goal  Pt on scheduled oxycontin and tylenol. Oxycodone given as BTP with good result. Resting and calm.

## 2017-06-27 NOTE — PROGRESS NOTES
[facility] Hospitalist Progress Note    Date of Service: 2017    Chief Complaint  62 y.o. female admitted 2017 with R-foot ulcer.    Interval Problem Update  Min pain  Wound vac in place    Consultants/Specialty  ID and ortho.    Disposition  To home with home health and wound vac once abx arranged  ID to provide rx, sw assisting        Review of Systems   Constitutional: Negative for fever and chills.   HENT: Negative for congestion and hearing loss.    Respiratory: Negative for cough and shortness of breath.    Cardiovascular: Positive for leg swelling. Negative for chest pain and palpitations.   Gastrointestinal: Negative for nausea, vomiting and abdominal pain.   Musculoskeletal: Positive for myalgias. Negative for back pain and joint pain.   Neurological: Positive for weakness. Negative for focal weakness and headaches.   Psychiatric/Behavioral: Negative for depression. The patient is not nervous/anxious.    All other systems reviewed and are negative.     Physical Exam  Laboratory/Imaging   Hemodynamics  Temp (24hrs), Av.3 °C (97.3 °F), Min:36 °C (96.8 °F), Max:36.5 °C (97.7 °F)   Temperature: 36.4 °C (97.5 °F)  Pulse  Av.1  Min: 74  Max: 118   Blood Pressure: 101/59 mmHg      Respiratory      Respiration: 18, Pulse Oximetry: 92 %     Work Of Breathing / Effort: Mild  RUL Breath Sounds: Expiratory Wheezes, RML Breath Sounds: Expiratory Wheezes, RLL Breath Sounds: Diminished, EL Breath Sounds: Expiratory Wheezes, LLL Breath Sounds: Diminished    Fluids    Intake/Output Summary (Last 24 hours) at 17 1621  Last data filed at 17 0500   Gross per 24 hour   Intake   2600 ml   Output      0 ml   Net   2600 ml       Nutrition  Orders Placed This Encounter   Procedures   • Diet Order     Standing Status: Standing      Number of Occurrences: 1      Standing Expiration Date:      Order Specific Question:  Diet:     Answer:  Regular [1]     Physical Exam   Constitutional: She is oriented to  person, place, and time. She appears well-nourished. No distress.   Morbid obesity   HENT:   Head: Normocephalic and atraumatic.   Nose: Nose normal.   Eyes: EOM are normal. Pupils are equal, round, and reactive to light.   Neck: Neck supple. No thyromegaly present.   Cardiovascular: Normal rate and intact distal pulses.    Pulmonary/Chest: Effort normal and breath sounds normal. No respiratory distress.   Abdominal: Soft. Bowel sounds are normal. She exhibits no distension. There is no tenderness.   Musculoskeletal: She exhibits tenderness. She exhibits no edema.   RLE wound vac   Neurological: She is alert and oriented to person, place, and time. No cranial nerve deficit. She exhibits abnormal muscle tone. Coordination normal.   Skin: Skin is warm and dry. No rash noted. She is not diaphoretic. No erythema.   Psychiatric: She has a normal mood and affect. Her behavior is normal. Judgment and thought content normal.   Nursing note and vitals reviewed.  Nursing note and vitals reviewed.  Constitutional: She is oriented to person, place, and time. She appears well-developed and well-nourished.   HENT:   Head: Normocephalic and atraumatic.   Right Ear: External ear normal.   Left Ear: External ear normal.   Nose: Nose normal.   Eyes: Conjunctivae and extraocular motions are normal.    Neck: Normal range of motion. Neck supple.   Cardiovascular: Normal rate.    Pulmonary/Chest: Effort normal.   Abdominal: Soft. Bowel sounds are normal.   Musculoskeletal: Normal range of motion.   Neurological: She is alert and oriented to person, place, and time.   Skin: Skin is warm and dry.  R-foot wound vac in place.      Recent Labs      06/25/17 0319   WBC  5.1   RBC  4.20   HEMOGLOBIN  11.8*   HEMATOCRIT  37.4   MCV  89.0   MCH  28.1   MCHC  31.6*   RDW  47.8   PLATELETCT  328   MPV  9.4     Recent Labs      06/25/17 0319   SODIUM  141   POTASSIUM  3.9   CHLORIDE  106   CO2  27   GLUCOSE  110*   BUN  11   CREATININE  0.63    CALCIUM  9.2                      Assessment/Plan     * Foot ulcer, right (CMS-HCC)  Assessment & Plan  Dr. Wu (ortho) does not plan to do any surgery unless there is proof the patient's leg weakness is irreversible.  Continue woundvac.  Home health / wound vac  Needs home abx infusion arranged    Fibromyalgia (present on admission)  Assessment & Plan  As above.    Chronic pain syndrome (present on admission)  Assessment & Plan  Min narc and use conservative measures when possible.    Monoparesis of lower extremity (CMS-HCC)  Assessment & Plan  MRI of lumbar spine neg for cause.  Outpatient for nerve conduction study.   Ordered DME for home use.    Weakness of right leg  Assessment & Plan  PT/OT and increase activity.    Morbid obesity with BMI of 40.0-44.9, adult (CMS-HCC)  Assessment & Plan  Encourage Kcal restriction.    Nephrolithiasis  Assessment & Plan  Asymptomatic.  Outpatient follow up.    Insomnia disorder  Assessment & Plan  Outpatient follow up.    Physical debility  Assessment & Plan  PT/OT and increase activity.    Muscle spasms of lower extremity  Assessment & Plan  PRN heat pack.    Wheeze  Assessment & Plan  ?SAM  - O2 prn  - encourage IS use and RT treatment prn, albuterol prn      Medications reviewed and Labs reviewed  Brady catheter: No Brady      DVT Prophylaxis: Heparin    Ulcer prophylaxis: Not indicated  Antibiotics: Treating active infection/contamination beyond 24 hours perioperative coverage  Assessed for rehab: Patient was assess for and/or received rehabilitation services during this hospitalization      Stable issues - med hx (hemorrhoids, migrain, scarlet fever, anx/dep), preventives, HTN    Dispo - complex/guarded.

## 2017-06-28 PROBLEM — F41.9 ANXIETY DISORDER: Status: ACTIVE | Noted: 2017-06-28

## 2017-06-28 PROCEDURE — 700102 HCHG RX REV CODE 250 W/ 637 OVERRIDE(OP): Performed by: INTERNAL MEDICINE

## 2017-06-28 PROCEDURE — 700105 HCHG RX REV CODE 258: Performed by: HOSPITALIST

## 2017-06-28 PROCEDURE — A9270 NON-COVERED ITEM OR SERVICE: HCPCS | Performed by: NURSE PRACTITIONER

## 2017-06-28 PROCEDURE — A9270 NON-COVERED ITEM OR SERVICE: HCPCS | Performed by: INTERNAL MEDICINE

## 2017-06-28 PROCEDURE — 700111 HCHG RX REV CODE 636 W/ 250 OVERRIDE (IP): Performed by: INTERNAL MEDICINE

## 2017-06-28 PROCEDURE — 99233 SBSQ HOSP IP/OBS HIGH 50: CPT | Performed by: INTERNAL MEDICINE

## 2017-06-28 PROCEDURE — 700105 HCHG RX REV CODE 258: Performed by: INTERNAL MEDICINE

## 2017-06-28 PROCEDURE — 97605 NEG PRS WND THER DME<=50SQCM: CPT

## 2017-06-28 PROCEDURE — 302131 K PAD MOTOR: Performed by: INTERNAL MEDICINE

## 2017-06-28 PROCEDURE — 302151 K-PAD 3X20: Performed by: INTERNAL MEDICINE

## 2017-06-28 PROCEDURE — A9270 NON-COVERED ITEM OR SERVICE: HCPCS | Performed by: HOSPITALIST

## 2017-06-28 PROCEDURE — 770006 HCHG ROOM/CARE - MED/SURG/GYN SEMI*

## 2017-06-28 PROCEDURE — 700102 HCHG RX REV CODE 250 W/ 637 OVERRIDE(OP): Performed by: NURSE PRACTITIONER

## 2017-06-28 PROCEDURE — 700102 HCHG RX REV CODE 250 W/ 637 OVERRIDE(OP): Performed by: HOSPITALIST

## 2017-06-28 RX ORDER — ALPRAZOLAM 0.25 MG/1
0.25 TABLET ORAL 3 TIMES DAILY PRN
Status: DISCONTINUED | OUTPATIENT
Start: 2017-06-28 | End: 2017-07-01 | Stop reason: HOSPADM

## 2017-06-28 RX ADMIN — ESTRADIOL 2 MG: 1 TABLET ORAL at 08:05

## 2017-06-28 RX ADMIN — PREGABALIN 75 MG: 75 CAPSULE ORAL at 14:13

## 2017-06-28 RX ADMIN — ZOLPIDEM TARTRATE 5 MG: 5 TABLET, FILM COATED ORAL at 22:33

## 2017-06-28 RX ADMIN — HEPARIN SODIUM 5000 UNITS: 5000 INJECTION, SOLUTION INTRAVENOUS; SUBCUTANEOUS at 13:03

## 2017-06-28 RX ADMIN — OXYCODONE HYDROCHLORIDE 10 MG: 10 TABLET ORAL at 10:15

## 2017-06-28 RX ADMIN — OXYCODONE HYDROCHLORIDE 10 MG: 10 TABLET ORAL at 02:40

## 2017-06-28 RX ADMIN — ACETAMINOPHEN 650 MG: 325 TABLET, FILM COATED ORAL at 12:11

## 2017-06-28 RX ADMIN — ACETAMINOPHEN 650 MG: 325 TABLET, FILM COATED ORAL at 05:36

## 2017-06-28 RX ADMIN — HEPARIN SODIUM 5000 UNITS: 5000 INJECTION, SOLUTION INTRAVENOUS; SUBCUTANEOUS at 05:36

## 2017-06-28 RX ADMIN — SENNOSIDES AND DOCUSATE SODIUM 2 TABLET: 8.6; 5 TABLET ORAL at 21:06

## 2017-06-28 RX ADMIN — ALPRAZOLAM 0.25 MG: 0.25 TABLET ORAL at 13:02

## 2017-06-28 RX ADMIN — ALBUTEROL SULFATE 2 PUFF: 90 AEROSOL, METERED RESPIRATORY (INHALATION) at 06:21

## 2017-06-28 RX ADMIN — PREGABALIN 75 MG: 75 CAPSULE ORAL at 21:06

## 2017-06-28 RX ADMIN — ZOLPIDEM TARTRATE 5 MG: 5 TABLET, FILM COATED ORAL at 00:22

## 2017-06-28 RX ADMIN — CARVEDILOL 6.25 MG: 6.25 TABLET, FILM COATED ORAL at 08:05

## 2017-06-28 RX ADMIN — CARVEDILOL 6.25 MG: 6.25 TABLET, FILM COATED ORAL at 16:50

## 2017-06-28 RX ADMIN — OXYCODONE HYDROCHLORIDE 10 MG: 10 TABLET ORAL at 06:22

## 2017-06-28 RX ADMIN — OXYCODONE HYDROCHLORIDE 10 MG: 10 TABLET ORAL at 18:06

## 2017-06-28 RX ADMIN — OXYCODONE HYDROCHLORIDE 10 MG: 10 TABLET, FILM COATED, EXTENDED RELEASE ORAL at 08:05

## 2017-06-28 RX ADMIN — ACETAMINOPHEN 650 MG: 325 TABLET, FILM COATED ORAL at 00:21

## 2017-06-28 RX ADMIN — SODIUM CHLORIDE: 9 INJECTION, SOLUTION INTRAVENOUS at 14:14

## 2017-06-28 RX ADMIN — ALPRAZOLAM 0.25 MG: 0.25 TABLET ORAL at 20:07

## 2017-06-28 RX ADMIN — CEFTRIAXONE SODIUM 2 G: 2 INJECTION, POWDER, FOR SOLUTION INTRAMUSCULAR; INTRAVENOUS at 08:05

## 2017-06-28 RX ADMIN — HEPARIN SODIUM 5000 UNITS: 5000 INJECTION, SOLUTION INTRAVENOUS; SUBCUTANEOUS at 21:05

## 2017-06-28 RX ADMIN — SENNOSIDES AND DOCUSATE SODIUM 2 TABLET: 8.6; 5 TABLET ORAL at 08:05

## 2017-06-28 RX ADMIN — ACETAMINOPHEN 650 MG: 325 TABLET, FILM COATED ORAL at 16:52

## 2017-06-28 RX ADMIN — PREGABALIN 75 MG: 75 CAPSULE ORAL at 08:06

## 2017-06-28 RX ADMIN — OXYCODONE HYDROCHLORIDE 10 MG: 10 TABLET, FILM COATED, EXTENDED RELEASE ORAL at 21:06

## 2017-06-28 RX ADMIN — OXYCODONE HYDROCHLORIDE 10 MG: 10 TABLET ORAL at 22:33

## 2017-06-28 RX ADMIN — OXYCODONE HYDROCHLORIDE 10 MG: 10 TABLET ORAL at 14:13

## 2017-06-28 RX ADMIN — TELMISARTAN 40 MG: 40 TABLET ORAL at 08:05

## 2017-06-28 ASSESSMENT — ENCOUNTER SYMPTOMS
TREMORS: 0
HEADACHES: 0
WEAKNESS: 1
MEMORY LOSS: 0
BACK PAIN: 0
ABDOMINAL PAIN: 0
NAUSEA: 0
SENSORY CHANGE: 0
FOCAL WEAKNESS: 0
SENSORY CHANGE: 1
WHEEZING: 1
COUGH: 0
DIARRHEA: 0
FEVER: 0
NERVOUS/ANXIOUS: 0
SPEECH CHANGE: 0
SHORTNESS OF BREATH: 0
DIAPHORESIS: 0
CHILLS: 0
PALPITATIONS: 0
VOMITING: 0
MYALGIAS: 1

## 2017-06-28 ASSESSMENT — PAIN SCALES - GENERAL
PAINLEVEL_OUTOF10: 8
PAINLEVEL_OUTOF10: 9
PAINLEVEL_OUTOF10: 10
PAINLEVEL_OUTOF10: 8
PAINLEVEL_OUTOF10: 6
PAINLEVEL_OUTOF10: 8
PAINLEVEL_OUTOF10: 6
PAINLEVEL_OUTOF10: 8
PAINLEVEL_OUTOF10: 5
PAINLEVEL_OUTOF10: 8

## 2017-06-28 ASSESSMENT — COGNITIVE AND FUNCTIONAL STATUS - GENERAL
SUGGESTED CMS G CODE MODIFIER MOBILITY: CJ
MOBILITY SCORE: 20
WALKING IN HOSPITAL ROOM: A LITTLE
STANDING UP FROM CHAIR USING ARMS: A LITTLE
CLIMB 3 TO 5 STEPS WITH RAILING: A LOT

## 2017-06-28 ASSESSMENT — GAIT ASSESSMENTS
GAIT LEVEL OF ASSIST: STAND BY ASSIST
DISTANCE (FEET): 15
ASSISTIVE DEVICE: 4 WHEEL WALKER

## 2017-06-28 NOTE — PROGRESS NOTES
"LIMB PRESERVATION SERVICE     62-year-old female with a history of fibromyalgia and right lower leg paralysis, admitted for an infected ulcer to her right lateral foot. 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    Dr. Wu consulted on 6/20, recommended full neuro workup, and wound vac to open wound.  Possible surgery in the future to correct deformity. No plans at this time.    MRI of lumbar spine shows mild degenerative disease    Patient is to f/u with neurologist, Dr. Harvey, as outpatient     /76 mmHg  Pulse 79  Temp(Src) 36.4 °C (97.6 °F)  Resp 17  Ht 1.676 m (5' 6\")  Wt 113.5 kg (250 lb 3.6 oz)  BMI 40.41 kg/m2  SpO2 92%  LMP 04/09/1977  Breastfeeding? No      R lateral foot:  Wound VAC in place, changed yesterday  Equinovarus deformity  Pulses palpable    IV Ceftriaxone per ID        PLAN  -Continue wound care per IP wound team  -NWB on right foot  -Abx per ID    D/C plan: home with  for wound VAC changes  F/U with Jai, F/U with Dr. uW once nerve conduction studies completed.  "

## 2017-06-28 NOTE — PROGRESS NOTES
Infectious Disease Progress Note    Author: Guerline Reed M.D. DOS & Time created: 2017  1:16 PM    Chief Complaint:  Chief Complaint   Patient presents with   • Abscess     R foot        Interval History:   AF, WBC 5.6, c/o headache and sore gums, c/o wheezing but no SOB, tolerating abx without issues, has not had a BM in 5 days, cx +grp B strep    AF, WBC 5, still has some wheezing but respiratory treatments helping, no plans for surgery, plan for wound vac change today   AF WBC 6.5 continued wheezing   AF WBC 5 up to commade-breathing better today   AF 5.1 got PICC today-still wheezing Foot hurts more today   AF wheezing better-having stinging pain inright foot   AF still having stinging pain  Labs Reviewed, Medications Reviewed, Radiology Reviewed and Wound Reviewed.    Review of Systems:  Review of Systems   Constitutional: Negative for fever and chills.   Respiratory: Positive for wheezing. Negative for shortness of breath.         Improved   Gastrointestinal: Negative for nausea, vomiting, abdominal pain and diarrhea.   Genitourinary: Negative for dysuria.   Neurological: Positive for sensory change. Negative for headaches.       Hemodynamics:  Temp (24hrs), Av.6 °C (97.9 °F), Min:36.4 °C (97.5 °F), Max:36.9 °C (98.4 °F)  Temperature: 36.4 °C (97.6 °F)  Pulse  Av.4  Min: 74  Max: 118  Blood Pressure: 156/90 mmHg       Physical Exam:  Physical Exam   Constitutional: She is oriented to person, place, and time. She appears well-developed. No distress.   Obese   HENT:   Head: Normocephalic and atraumatic.   Eyes: EOM are normal. Pupils are equal, round, and reactive to light.   Neck: Neck supple.   Cardiovascular: Normal rate and regular rhythm.    Pulmonary/Chest: Effort normal. No respiratory distress. She has wheezes. She has no rales.   Abdominal: Soft. She exhibits no distension. There is no tenderness.   Musculoskeletal: She exhibits edema.   R foot  deformity  Wound vac on dorsum of R foot -2 cm ulcer approx 2-3 mm depth +granulation  Edema ankle    LUE PICC   Neurological: She is alert and oriented to person, place, and time.   Skin: No rash noted.   Nursing note and vitals reviewed.      Meds:    Current facility-administered medications:   •  alprazolam (XANAX) tablet 0.25 mg, 0.25 mg, Oral, TID PRN, Shameka Phelan D.O., 0.25 mg at 17 1302  •  PICC Line Insertion has been implemented, , , Once **AND** May use Lidocaine 1% not to exceed 3 mls for local at insertion site, , , CONTINUOUS **AND** NOTIFY MD, , , Once **AND** Tip to dwell in the superior vena cava, , , CONTINUOUS **AND** Do not use PICC Line until placement verified by Chest X Ray, , , CONTINUOUS **AND** DX-CHEST-FOR PICC LINE Perform procedure in:: PICC Room, , , Once **AND** If radiologist reading of chest X-ray states any of the following the PICC should be used, , , CONTINUOUS **AND** Further evaluation of the PICC placement can be retrieved from X-Ray and Imaging, , , CONTINUOUS **AND** Blood draws through PICC line; draws by RN only, , , CONTINUOUS **AND** FLUSHING GUIDELINES WHEN IN USE, , , CONTINUOUS **AND** normal saline PF 10-20 mL, 10-20 mL, Intravenous, PRN **AND** FLUSHING GUIDELINES WHEN NOT IN USE, , , CONTINUOUS **AND** DRESSING MAINTENANCE, , , Once **AND** Change needleless pressure ports and IV tubing every 72 hours per hospital policy, , , CONTINUOUS **AND** TUBING, , , CONTINUOUS **AND** If there is an MD order to remove the PICC line, any RN may remove the PICC line, , , CONTINUOUS **AND** [] PATIENT EDUCATION MATERIALS, , , Prior to discharge **AND** NURSING COMMUNICATION, , , CONTINUOUS, Guerline Reed M.D.  •  oxycodone immediate-release (ROXICODONE) tablet 5 mg, 5 mg, Oral, Q3HRS PRN, 5 mg at 17 1516 **OR** oxycodone immediate release (ROXICODONE) tablet 10 mg, 10 mg, Oral, Q4HRS PRN, Noel Green M.D., 10 mg at 17 1015  •  acetaminophen  (TYLENOL) tablet 650 mg, 650 mg, Oral, Q6HRS, Noel Green M.D., 650 mg at 06/28/17 1211  •  NS infusion, , Intravenous, Continuous, Noel Green M.D., Last Rate: 60 mL/hr at 06/27/17 2011  •  carvedilol (COREG) tablet 6.25 mg, 6.25 mg, Oral, BID WITH MEALS, Noel Green M.D., 6.25 mg at 06/28/17 0805  •  estradiol (ESTRACE) tablet 2 mg, 2 mg, Oral, DAILY, Noel Green M.D., 2 mg at 06/28/17 0805  •  oxyCODONE CR (OXYCONTIN) tablet 10 mg, 10 mg, Oral, Q12HRS, Edson Lewis D.O., 10 mg at 06/28/17 0805  •  ipratropium-albuterol (DUONEB) nebulizer solution 3 mL, 3 mL, Nebulization, Q4H PRN (RT), FADI Martin.O.  •  pregabalin (LYRICA) capsule 75 mg, 75 mg, Oral, TID, Grace Escobar M.D., 75 mg at 06/28/17 0806  •  albuterol inhaler 2 Puff, 2 Puff, Inhalation, Q4HRS PRN, Edson Lewis D.OJacki, 2 Puff at 06/28/17 0621  •  zolpidem (AMBIEN) tablet 5 mg, 5 mg, Oral, HS PRN, Liz Norris A.P.N., 5 mg at 06/28/17 0022  •  cefTRIAXone (ROCEPHIN) 2 g in  mL IVPB, 2 g, Intravenous, Q24HRS, Grace Escobar M.D., Stopped at 06/28/17 0835  •  NS (BOLUS) infusion 1,000 mL, 1,000 mL, Intravenous, Once PRN, Grace Escobar M.D.  •  senna-docusate (PERICOLACE or SENOKOT S) 8.6-50 MG per tablet 2 Tab, 2 Tab, Oral, BID, 2 Tab at 06/28/17 0805 **AND** polyethylene glycol/lytes (MIRALAX) PACKET 1 Packet, 1 Packet, Oral, QDAY PRN, 1 Packet at 06/21/17 1609 **AND** magnesium hydroxide (MILK OF MAGNESIA) suspension 30 mL, 30 mL, Oral, QDAY PRN **AND** bisacodyl (DULCOLAX) suppository 10 mg, 10 mg, Rectal, QDAY PRN, Grace Escobar M.D.  •  Respiratory Care per Protocol, , Nebulization, Continuous RT, Grace Escobar M.D.  •  heparin injection 5,000 Units, 5,000 Units, Subcutaneous, Q8HRS, Grace Escobar M.D., 5,000 Units at 06/28/17 1303  •  ondansetron (ZOFRAN) syringe/vial injection 4 mg, 4 mg, Intravenous, Q4HRS PRN, Grace Escobar M.D., 4 mg at 06/20/17 0943  •  ondansetron (ZOFRAN ODT) dispertab 4 mg, 4 mg, Oral, Q4HRS PRN, Grace Escobar M.D.,  4 mg at 06/25/17 1638  •  Notify provider if pain remains uncontrolled, , , CONTINUOUS **AND** Use the numeric rating scale (NRS-11) on regular floors and Critical-Care Pain Observation Tool (CPOT) on ICUs/Trauma to assess pain, , , CONTINUOUS **AND** Pulse Ox (Oximetry), , , CONTINUOUS **AND** Pharmacy Consult Request ...Pain Management Review, , Other, PRN **AND** If patient difficult to arouse and/or has respiratory depression, stop any opiates that are currently infusing and call a Rapid Response., , , CONTINUOUS **AND** [DISCONTINUED] oxycodone immediate-release (ROXICODONE) tablet 2.5 mg, 2.5 mg, Oral, Q3HRS PRN, Stopped at 06/24/17 1411 **AND** [DISCONTINUED] oxycodone immediate-release (ROXICODONE) tablet 5 mg, 5 mg, Oral, Q3HRS PRN, 5 mg at 06/25/17 0725 **AND** [DISCONTINUED] morphine (pf) 4 mg/ml injection 2 mg, 2 mg, Intravenous, Q3HRS PRN, Grace Escobar M.D., 2 mg at 06/24/17 0747  •  clonazepam (KLONOPIN) tablet 0.5 mg, 0.5 mg, Oral, BID PRN, Grace Escobar M.D., 0.5 mg at 06/24/17 2106  •  telmisartan (MICARDIS) tablet 40 mg, 40 mg, Oral, DAILY, Grace Escobar M.D., 40 mg at 06/28/17 0805    Labs:  No results for input(s): WBC, RBC, HEMOGLOBIN, HEMATOCRIT, MCV, MCH, RDW, PLATELETCT, MPV, NEUTSPOLYS, LYMPHOCYTES, MONOCYTES, EOSINOPHILS, BASOPHILS, RBCMORPHOLO in the last 72 hours.  No results for input(s): SODIUM, POTASSIUM, CHLORIDE, CO2, GLUCOSE, BUN, CPKTOTAL in the last 72 hours.  No results for input(s): ALBUMIN, TBILIRUBIN, ALKPHOSPHAT, TOTPROTEIN, ALTSGPT, ASTSGOT, CREATININE in the last 72 hours.    Imaging:  Dx-foot-complete 3+ Right  6/20/2017  1.  Diffuse lateral soft tissue swelling of RIGHT foot with focal ulceration over the 5th metatarsal base. 2.  No gross bony destruction, however osteomyelitis is not excluded by plain film. 3.  No fracture or dislocation. 4.  Possible metallic foreign body within the plantar soft tissues of great toe.    Mr-foot-with Right  6/21/2017  Fourth and fifth TMT  "centered marrow edema, marginal spurring and joint effusion. No clear bony destruction. This is more likely secondary to degenerative change than septic arthropathy and osteomyelitis although given the overlying skin ulceration, infection cannot be excluded Lateral forefoot cellulitis with medium depth ulceration overlying the fifth TMT Multiple bone infarctions without articular surface collapse    Le Venous Duplex - Dvt ([facility] [place] [facility] Only)    6/20/2017   Vascular Laboratory  CONCLUSIONS  Normal right lower extremity superficial and deep venous examination.    Micro:  BLOOD CULTURE   Date Value Ref Range Status   06/20/2017 No growth after 5 days of incubation.  Final      Results     Procedure Component Value Units Date/Time    BLOOD CULTURE [[id number]] Collected:  06/20/17 1907    Order Status:  Completed Specimen Information:  Blood from Peripheral Updated:  06/25/17 2100     Significant Indicator NEG      Source BLD      Site PERIPHERAL      Blood Culture No growth after 5 days of incubation.     Narrative:      Per Hospital Policy: Only change Specimen Src: to \"Line\" if  specified by physician order.    BLOOD CULTURE [[id number]] Collected:  06/20/17 1904    Order Status:  Completed Specimen Information:  Blood from Peripheral Updated:  06/25/17 2100     Significant Indicator NEG      Source BLD      Site PERIPHERAL      Blood Culture No growth after 5 days of incubation.     Narrative:      Per Hospital Policy: Only change Specimen Src: to \"Line\" if  specified by physician order.    URINALYSIS [[id number]] Collected:  06/24/17 1200    Order Status:  Completed Updated:  06/24/17 1237     Color Lt. Yellow      Character Clear      Specific Gravity 1.009      Ph 6.5      Glucose Negative mg/dL      Ketones Negative mg/dL      Protein Negative mg/dL      Bilirubin Negative      Nitrite Negative      Leukocyte Esterase Negative      Occult Blood Negative      Micro Urine Req see below      Comment: " Microscopic examination not performed when specimen is clear  and chemically negative for protein, blood, leukocyte esterase  and nitrite.         URINALYSIS [[id number]]     Order Status:  No result Specimen Information:  Urine from Urine, Clean Catch     CULTURE WOUND W/ GRAM STAIN [[id number]]  (Abnormal) Collected:  06/20/17 0810    Order Status:  Completed Specimen Information:  Wound from Right Foot Updated:  06/22/17 0846     Gram Stain Result --      Result:        Rare WBCs.  Rare Gram positive cocci.       Significant Indicator POS (POS)      Source WND      Site RIGHT FOOT      Culture Result Wound -- (A)      Culture Result Wound -- (A)      Result:        Streptococcus agalactiae (Group B)  Light growth              Assessment:  Active Hospital Problems    Diagnosis   • *Foot ulcer, right (CMS-HCC) [L97.519]   • Monoparesis of lower extremity (CMS-HCC) [G83.10]   • Fibromyalgia [M79.7]   • Chronic pain syndrome [G89.4]       Plan:  Right foot ulcer  Afebrile  No leukocytosis  Wound cx - grp B strep  MRI possible OM-No plans for surgery at this time per ortho  Wound healing during VAC change  Continue ceftriaxone (PCN allergy).   Anticipate 4-6 weeks of abx   Last 2 weeks can be Zyvox   Stop date 7/31/2017    No s/RLE paresthesias  2/2 traumatic injury 2 years ago  Resulting in above    DW IM Dr Phelan

## 2017-06-28 NOTE — PROGRESS NOTES
"Pt a&o. Anxious at times. Some scratches noted at right lateral part of foot. Pt states \"i might have scratched it but didn't notice\". Appears scabbed over. For protection of right foot and prevent additional abrasions, I ordered prafo foot drop boot. Her feet is slightly contracted inward however \"needs surgery to loosen the tendon\". Instructed to call staff if needs readjustment or if need assist. Verbalized understanding and agrees.   "

## 2017-06-28 NOTE — PROGRESS NOTES
[facility] Hospitalist Progress Note    Date of Service: 2017    Chief Complaint  62 y.o. female admitted 2017 with R-foot ulcer.    Interval Problem Update  Reports min anxiety, able to self transfer to   Lives with family    Consultants/Specialty  ID and ortho.    Disposition  To home with home health and wound vac once abx arranged  ID to provide rx, sw assisting        Review of Systems   Constitutional: Negative for fever, malaise/fatigue and diaphoresis.   HENT: Negative for hearing loss.    Respiratory: Positive for wheezing. Negative for cough and shortness of breath.    Cardiovascular: Positive for leg swelling. Negative for palpitations.   Gastrointestinal: Negative for abdominal pain.   Musculoskeletal: Positive for myalgias. Negative for back pain and joint pain.   Neurological: Positive for weakness. Negative for tremors, sensory change, speech change and focal weakness.   Psychiatric/Behavioral: Negative for memory loss. The patient is not nervous/anxious.    All other systems reviewed and are negative.     Physical Exam  Laboratory/Imaging   Hemodynamics  Temp (24hrs), Av.7 °C (98 °F), Min:36.4 °C (97.6 °F), Max:36.9 °C (98.4 °F)   Temperature: 36.4 °C (97.6 °F)  Pulse  Av.4  Min: 74  Max: 118   Blood Pressure: 156/90 mmHg      Respiratory      Respiration: 17, Pulse Oximetry: 94 %     Work Of Breathing / Effort: Mild  RUL Breath Sounds: Expiratory Wheezes, RML Breath Sounds: Diminished, RLL Breath Sounds: Diminished, EL Breath Sounds: Expiratory Wheezes, LLL Breath Sounds: Diminished    Fluids    Intake/Output Summary (Last 24 hours) at 17 1621  Last data filed at 17 1500   Gross per 24 hour   Intake   2190 ml   Output      0 ml   Net   2190 ml       Nutrition  Orders Placed This Encounter   Procedures   • Diet Order     Standing Status: Standing      Number of Occurrences: 1      Standing Expiration Date:      Order Specific Question:  Diet:     Answer:  Regular [1]      Physical Exam   Constitutional: She is oriented to person, place, and time. She appears well-nourished. No distress.   Morbid obesity   HENT:   Head: Normocephalic and atraumatic.   Nose: Nose normal.   Eyes: EOM are normal. Pupils are equal, round, and reactive to light.   Neck: Neck supple.   Cardiovascular: Normal rate and intact distal pulses.    Pulmonary/Chest: Effort normal. No respiratory distress.   Abdominal: Soft. Bowel sounds are normal. She exhibits no distension.   Musculoskeletal: She exhibits tenderness. She exhibits no edema.   RLE wound vac   Neurological: She is alert and oriented to person, place, and time. No cranial nerve deficit. She exhibits abnormal muscle tone.   Skin: Skin is warm and dry. No erythema.   Psychiatric: She has a normal mood and affect. Her behavior is normal. Judgment and thought content normal.   Nursing note and vitals reviewed.  Nursing note and vitals reviewed.  Constitutional: She is oriented to person, place, and time. She appears well-developed and well-nourished.   HENT:   Head: Normocephalic and atraumatic.   Right Ear: External ear normal.   Left Ear: External ear normal.   Nose: Nose normal.   Eyes: Conjunctivae and extraocular motions are normal.    Neck: Normal range of motion. Neck supple.   Cardiovascular: Normal rate.    Pulmonary/Chest: Effort normal.   Abdominal: Soft. Bowel sounds are normal.   Musculoskeletal: Normal range of motion.   Neurological: She is alert and oriented to person, place, and time.   Skin: Skin is warm and dry.  R-foot wound vac in place.                               Assessment/Plan     * Foot ulcer, right (CMS-HCC)  Assessment & Plan  Dr. Wu (ortho) does not plan to do any surgery unless there is proof the patient's leg weakness is irreversible.  Continue woundvac.  Home health / wound vac  Needs home abx infusion arranged    Anxiety disorder  Assessment & Plan  Xanax prn added    Fibromyalgia (present on  admission)  Assessment & Plan  As above.    Chronic pain syndrome (present on admission)  Assessment & Plan  Min narc and use conservative measures when possible.    Monoparesis of lower extremity (CMS-[facility])  Assessment & Plan  MRI of lumbar spine neg for cause.  Outpatient for nerve conduction study.   Ordered DME for home use.    Weakness of right leg  Assessment & Plan  PT/OT and increase activity.    Morbid obesity with BMI of 40.0-44.9, adult (CMS-[facility])  Assessment & Plan  Encourage Kcal restriction.    Nephrolithiasis  Assessment & Plan  Asymptomatic.  Outpatient follow up.    Insomnia disorder  Assessment & Plan  Outpatient follow up.    Physical debility  Assessment & Plan  PT/OT and increase activity.    Muscle spasms of lower extremity  Assessment & Plan  PRN heat pack.    Wheeze  Assessment & Plan  ?SAM  - O2 prn  - encourage IS use and RT treatment prn, albuterol prn      Medications reviewed and Labs reviewed  Brady catheter: No Brady      DVT Prophylaxis: Heparin    Ulcer prophylaxis: Not indicated  Antibiotics: Treating active infection/contamination beyond 24 hours perioperative coverage  Assessed for rehab: Patient was assess for and/or received rehabilitation services during this hospitalization      Stable issues - med hx (hemorrhoids, migrain, scarlet fever, anx/dep), preventives, HTN    Dispo - complex/guarded.

## 2017-06-28 NOTE — CARE PLAN
Problem: Infection  Goal: Will remain free from infection  Outcome: PROGRESSING AS EXPECTED  Patient with no new signs of infection. Wound vac in place and antibiotics ordered.

## 2017-06-28 NOTE — PROGRESS NOTES
Patient alert and oriented, vss, pain in R foot- medicated with PRN pain medication. Patient up self to commode- calls for assistance as needed. Patient with wound vac in place- scant drainage and due to be changed today. Patient with expiratory wheezes which is new since this admission. Will make MD aware if not already made aware. Patient needs discharge plan for home IV antibiotics and wound care. Will monitor.

## 2017-06-28 NOTE — THERAPY
"Physical Therapy Treatment completed.   Bed Mobility:  Supine to Sit: Supervised  Transfers: Sit to Stand: Stand by Assist  Gait: Level Of Assist: Stand by Assist with Front-Wheel Walker       Plan of Care: Will benefit from Physical Therapy 1 times per week and Plan to complete next treatment by Monday 7/3  Discharge Recommendations: Equipment: Will Continue to Assess for Equipment Needs. Post-acute therapy Discharge to home with outpatient or home health for additional skilled therapy services.     Rehab Therapy-Acute\" Patient Summary Report for complete documentation.       "

## 2017-06-28 NOTE — PROGRESS NOTES
Applied foot drop boot to pt's right lower extremity <2 sec cap refill. Pt has been instructed on use of foot drop and to call rn for any questions or concerns.

## 2017-06-28 NOTE — CARE PLAN
Problem: Bowel/Gastric:  Goal: Normal bowel function is maintained or improved  Pt had bowel movement tonight. No n/v. Abdomen rounded, soft.     Problem: Respiratory:  Goal: Respiratory status will improve  No wheezing noted tonight. On ra. Respirations equal and  Unlabored.

## 2017-06-29 PROCEDURE — A9270 NON-COVERED ITEM OR SERVICE: HCPCS | Performed by: INTERNAL MEDICINE

## 2017-06-29 PROCEDURE — 770006 HCHG ROOM/CARE - MED/SURG/GYN SEMI*

## 2017-06-29 PROCEDURE — 99233 SBSQ HOSP IP/OBS HIGH 50: CPT | Performed by: INTERNAL MEDICINE

## 2017-06-29 PROCEDURE — 700102 HCHG RX REV CODE 250 W/ 637 OVERRIDE(OP): Performed by: NURSE PRACTITIONER

## 2017-06-29 PROCEDURE — 97535 SELF CARE MNGMENT TRAINING: CPT

## 2017-06-29 PROCEDURE — 700102 HCHG RX REV CODE 250 W/ 637 OVERRIDE(OP): Performed by: HOSPITALIST

## 2017-06-29 PROCEDURE — 700111 HCHG RX REV CODE 636 W/ 250 OVERRIDE (IP): Performed by: INTERNAL MEDICINE

## 2017-06-29 PROCEDURE — 700105 HCHG RX REV CODE 258: Performed by: INTERNAL MEDICINE

## 2017-06-29 PROCEDURE — 700102 HCHG RX REV CODE 250 W/ 637 OVERRIDE(OP): Performed by: INTERNAL MEDICINE

## 2017-06-29 PROCEDURE — A9270 NON-COVERED ITEM OR SERVICE: HCPCS | Performed by: NURSE PRACTITIONER

## 2017-06-29 PROCEDURE — A9270 NON-COVERED ITEM OR SERVICE: HCPCS | Performed by: HOSPITALIST

## 2017-06-29 RX ORDER — CARVEDILOL 6.25 MG/1
6.25 TABLET ORAL 2 TIMES DAILY WITH MEALS
Qty: 60 TAB | Refills: 0 | Status: SHIPPED | OUTPATIENT
Start: 2017-06-29 | End: 2019-05-17

## 2017-06-29 RX ORDER — NYSTATIN 100000 [USP'U]/G
POWDER TOPICAL 2 TIMES DAILY
Status: DISCONTINUED | OUTPATIENT
Start: 2017-06-29 | End: 2017-07-01 | Stop reason: HOSPADM

## 2017-06-29 RX ORDER — OXYCODONE HCL 20 MG/1
20 TABLET, FILM COATED, EXTENDED RELEASE ORAL EVERY 12 HOURS
Qty: 60 EACH | Refills: 0 | Status: SHIPPED | OUTPATIENT
Start: 2017-06-29 | End: 2019-05-17

## 2017-06-29 RX ORDER — ALPRAZOLAM 0.25 MG/1
0.25 TABLET ORAL 3 TIMES DAILY PRN
Qty: 30 TAB | Refills: 0 | Status: SHIPPED | OUTPATIENT
Start: 2017-06-29 | End: 2019-05-17

## 2017-06-29 RX ORDER — NYSTATIN 100000 [USP'U]/G
POWDER TOPICAL
Qty: 15 G | Refills: 0 | Status: SHIPPED | OUTPATIENT
Start: 2017-06-29 | End: 2017-07-17

## 2017-06-29 RX ORDER — ALBUTEROL SULFATE 90 UG/1
2 AEROSOL, METERED RESPIRATORY (INHALATION) EVERY 4 HOURS PRN
Qty: 8.5 G | Refills: 1 | Status: SHIPPED | OUTPATIENT
Start: 2017-06-29 | End: 2019-05-17

## 2017-06-29 RX ORDER — AMOXICILLIN 250 MG
2 CAPSULE ORAL 2 TIMES DAILY
Qty: 30 TAB | Refills: 0 | Status: SHIPPED | OUTPATIENT
Start: 2017-06-29 | End: 2017-07-17

## 2017-06-29 RX ORDER — LINEZOLID 600 MG/1
600 TABLET, FILM COATED ORAL 2 TIMES DAILY
Qty: 28 TAB | Refills: 0 | Status: SHIPPED | OUTPATIENT
Start: 2017-07-17 | End: 2017-07-31

## 2017-06-29 RX ORDER — OXYCODONE HYDROCHLORIDE 5 MG/1
5 TABLET ORAL
Qty: 30 TAB | Refills: 0 | Status: SHIPPED | OUTPATIENT
Start: 2017-06-29 | End: 2019-05-17

## 2017-06-29 RX ORDER — OXYCODONE HCL 20 MG/1
20 TABLET, FILM COATED, EXTENDED RELEASE ORAL EVERY 12 HOURS
Status: DISCONTINUED | OUTPATIENT
Start: 2017-06-29 | End: 2017-07-01 | Stop reason: HOSPADM

## 2017-06-29 RX ORDER — LINEZOLID 600 MG/1
600 TABLET, FILM COATED ORAL 2 TIMES DAILY
Qty: 28 TAB | Refills: 0 | Status: SHIPPED | OUTPATIENT
Start: 2017-07-18 | End: 2017-06-29

## 2017-06-29 RX ADMIN — ALPRAZOLAM 0.25 MG: 0.25 TABLET ORAL at 14:10

## 2017-06-29 RX ADMIN — CARVEDILOL 6.25 MG: 6.25 TABLET, FILM COATED ORAL at 07:55

## 2017-06-29 RX ADMIN — ONDANSETRON 4 MG: 2 INJECTION INTRAMUSCULAR; INTRAVENOUS at 20:41

## 2017-06-29 RX ADMIN — HEPARIN SODIUM 5000 UNITS: 5000 INJECTION, SOLUTION INTRAVENOUS; SUBCUTANEOUS at 20:27

## 2017-06-29 RX ADMIN — NYSTATIN 1500000 UNITS: 100000 POWDER TOPICAL at 20:42

## 2017-06-29 RX ADMIN — NYSTATIN AND TRIAMCINOLONE ACETONIDE: 100000; 1 CREAM TOPICAL at 13:22

## 2017-06-29 RX ADMIN — ESTRADIOL 2 MG: 1 TABLET ORAL at 07:55

## 2017-06-29 RX ADMIN — OXYCODONE HYDROCHLORIDE 5 MG: 5 TABLET ORAL at 22:11

## 2017-06-29 RX ADMIN — ONDANSETRON 4 MG: 2 INJECTION INTRAMUSCULAR; INTRAVENOUS at 16:21

## 2017-06-29 RX ADMIN — OXYCODONE HYDROCHLORIDE 10 MG: 10 TABLET ORAL at 09:23

## 2017-06-29 RX ADMIN — OXYCODONE HYDROCHLORIDE 20 MG: 20 TABLET, FILM COATED, EXTENDED RELEASE ORAL at 20:27

## 2017-06-29 RX ADMIN — OXYCODONE HYDROCHLORIDE 10 MG: 10 TABLET ORAL at 13:22

## 2017-06-29 RX ADMIN — ACETAMINOPHEN 650 MG: 325 TABLET, FILM COATED ORAL at 05:54

## 2017-06-29 RX ADMIN — ALPRAZOLAM 0.25 MG: 0.25 TABLET ORAL at 22:12

## 2017-06-29 RX ADMIN — CARVEDILOL 6.25 MG: 6.25 TABLET, FILM COATED ORAL at 16:26

## 2017-06-29 RX ADMIN — SENNOSIDES AND DOCUSATE SODIUM 2 TABLET: 8.6; 5 TABLET ORAL at 07:55

## 2017-06-29 RX ADMIN — PREGABALIN 75 MG: 75 CAPSULE ORAL at 07:55

## 2017-06-29 RX ADMIN — ZOLPIDEM TARTRATE 5 MG: 5 TABLET, FILM COATED ORAL at 22:11

## 2017-06-29 RX ADMIN — ACETAMINOPHEN 650 MG: 325 TABLET, FILM COATED ORAL at 12:10

## 2017-06-29 RX ADMIN — OXYCODONE HYDROCHLORIDE 10 MG: 10 TABLET ORAL at 20:27

## 2017-06-29 RX ADMIN — OXYCODONE HYDROCHLORIDE 10 MG: 10 TABLET ORAL at 03:46

## 2017-06-29 RX ADMIN — OXYCODONE HYDROCHLORIDE 5 MG: 5 TABLET ORAL at 16:26

## 2017-06-29 RX ADMIN — SENNOSIDES AND DOCUSATE SODIUM 2 TABLET: 8.6; 5 TABLET ORAL at 20:27

## 2017-06-29 RX ADMIN — OXYCODONE HYDROCHLORIDE 10 MG: 10 TABLET, FILM COATED, EXTENDED RELEASE ORAL at 07:55

## 2017-06-29 RX ADMIN — HEPARIN SODIUM 5000 UNITS: 5000 INJECTION, SOLUTION INTRAVENOUS; SUBCUTANEOUS at 13:22

## 2017-06-29 RX ADMIN — ACETAMINOPHEN 650 MG: 325 TABLET, FILM COATED ORAL at 00:05

## 2017-06-29 RX ADMIN — ALPRAZOLAM 0.25 MG: 0.25 TABLET ORAL at 05:55

## 2017-06-29 RX ADMIN — CEFTRIAXONE SODIUM 2 G: 2 INJECTION, POWDER, FOR SOLUTION INTRAMUSCULAR; INTRAVENOUS at 07:55

## 2017-06-29 RX ADMIN — ACETAMINOPHEN 650 MG: 325 TABLET, FILM COATED ORAL at 17:08

## 2017-06-29 RX ADMIN — HEPARIN SODIUM 5000 UNITS: 5000 INJECTION, SOLUTION INTRAVENOUS; SUBCUTANEOUS at 05:54

## 2017-06-29 RX ADMIN — TELMISARTAN 40 MG: 40 TABLET ORAL at 07:55

## 2017-06-29 RX ADMIN — NYSTATIN AND TRIAMCINOLONE ACETONIDE: 100000; 1 CREAM TOPICAL at 20:42

## 2017-06-29 RX ADMIN — PREGABALIN 75 MG: 75 CAPSULE ORAL at 20:27

## 2017-06-29 RX ADMIN — NYSTATIN 1500000 UNITS: 100000 POWDER TOPICAL at 13:22

## 2017-06-29 RX ADMIN — PREGABALIN 75 MG: 75 CAPSULE ORAL at 14:09

## 2017-06-29 RX ADMIN — ALPRAZOLAM 0.25 MG: 0.25 TABLET ORAL at 03:47

## 2017-06-29 ASSESSMENT — COGNITIVE AND FUNCTIONAL STATUS - GENERAL
HELP NEEDED FOR BATHING: A LITTLE
SUGGESTED CMS G CODE MODIFIER DAILY ACTIVITY: CI
DAILY ACTIVITIY SCORE: 23

## 2017-06-29 ASSESSMENT — PAIN SCALES - GENERAL
PAINLEVEL_OUTOF10: 9
PAINLEVEL_OUTOF10: 6
PAINLEVEL_OUTOF10: 8

## 2017-06-29 ASSESSMENT — ENCOUNTER SYMPTOMS
DIZZINESS: 0
NERVOUS/ANXIOUS: 0
DIAPHORESIS: 0
WHEEZING: 1
TREMORS: 0
WHEEZING: 0
MYALGIAS: 1
VOMITING: 0
ABDOMINAL PAIN: 0
CHILLS: 0
NECK PAIN: 0
SHORTNESS OF BREATH: 0
NAUSEA: 0
HEADACHES: 0
PALPITATIONS: 0
BACK PAIN: 0
WEAKNESS: 1
FOCAL WEAKNESS: 0
SENSORY CHANGE: 1
FEVER: 0
DIARRHEA: 0

## 2017-06-29 NOTE — PROGRESS NOTES
[facility] Hospitalist Progress Note    Date of Service: 2017    Chief Complaint  62 y.o. female admitted 2017 with R-foot ulcer.    Interval Problem Update  Reports ongoing generalized pain, P6-7/10, appears comfortable on exam  Increased activity tolerance    Consultants/Specialty  ID and ortho.    Disposition  To home with home health and wound vac once abx arranged   D/w ID, zyvox rx provided to SW for preauth assistance  Cont Rocephin         Review of Systems   Constitutional: Negative for fever, malaise/fatigue and diaphoresis.   HENT: Negative for congestion and hearing loss.    Respiratory: Negative for shortness of breath and wheezing.    Cardiovascular: Positive for leg swelling. Negative for palpitations.   Gastrointestinal: Negative for nausea and abdominal pain.   Musculoskeletal: Positive for myalgias. Negative for back pain and neck pain.   Neurological: Positive for weakness. Negative for dizziness, tremors, focal weakness and headaches.   Psychiatric/Behavioral: The patient is not nervous/anxious.    All other systems reviewed and are negative.     Physical Exam  Laboratory/Imaging   Hemodynamics  Temp (24hrs), Av.7 °C (98 °F), Min:36.2 °C (97.1 °F), Max:37.3 °C (99.1 °F)   Temperature: 37.3 °C (99.1 °F)  Pulse  Av.2  Min: 74  Max: 118   Blood Pressure: 108/86 mmHg      Respiratory      Respiration: 16, Pulse Oximetry: 90 %     Work Of Breathing / Effort: Mild  RUL Breath Sounds: Expiratory Wheezes, RML Breath Sounds: Diminished, RLL Breath Sounds: Diminished, EL Breath Sounds: Expiratory Wheezes, LLL Breath Sounds: Diminished    Fluids    Intake/Output Summary (Last 24 hours) at 17 1421  Last data filed at 17 1400   Gross per 24 hour   Intake   1900 ml   Output      0 ml   Net   1900 ml       Nutrition  Orders Placed This Encounter   Procedures   • Diet Order     Standing Status: Standing      Number of Occurrences: 1      Standing Expiration Date:      Order  Specific Question:  Diet:     Answer:  Regular [1]     Physical Exam   Constitutional: She is oriented to person, place, and time. She appears well-nourished. No distress.   Morbid obesity   HENT:   Head: Normocephalic and atraumatic.   Eyes: EOM are normal. Pupils are equal, round, and reactive to light.   Neck: Neck supple.   Cardiovascular: Normal rate and intact distal pulses.    Pulmonary/Chest: Effort normal. No respiratory distress.   Abdominal: Soft. Bowel sounds are normal. She exhibits no distension.   Musculoskeletal: She exhibits no edema or tenderness.   RLE wound vac   Neurological: She is alert and oriented to person, place, and time. No cranial nerve deficit. She exhibits abnormal muscle tone.   Skin: Skin is warm and dry. She is not diaphoretic.   Psychiatric: She has a normal mood and affect. Her behavior is normal. Thought content normal.   Nursing note and vitals reviewed.  Nursing note and vitals reviewed.  Constitutional: She is oriented to person, place, and time. She appears well-developed and well-nourished.   HENT:   Head: Normocephalic and atraumatic.   Right Ear: External ear normal.   Left Ear: External ear normal.   Nose: Nose normal.   Eyes: Conjunctivae and extraocular motions are normal.    Neck: Normal range of motion. Neck supple.   Cardiovascular: Normal rate.    Pulmonary/Chest: Effort normal.   Abdominal: Soft. Bowel sounds are normal.   Musculoskeletal: Normal range of motion.   Neurological: She is alert and oriented to person, place, and time.   Skin: Skin is warm and dry.  R-foot wound vac in place.                               Assessment/Plan     * Foot ulcer, right (CMS-[facility])  Assessment & Plan  Dr. Wu (ortho) does not plan to do any surgery unless there is proof the patient's leg weakness is irreversible.  Continue woundvac.  Home health / wound vac  Needs home abx infusion arranged    Anxiety disorder  Assessment & Plan  Xanax prn added    Fibromyalgia (present on  admission)  Assessment & Plan  As above.    Chronic pain syndrome (present on admission)  Assessment & Plan  Min narc and use conservative measures when possible.  Continuous complaints of pain, increase oxycontin, limit prn use  Encourage PT/OT    Monoparesis of lower extremity (CMS-[facility])  Assessment & Plan  MRI of lumbar spine neg for cause.  Outpatient for nerve conduction study.   Ordered DME for home use.    Weakness of right leg  Assessment & Plan  PT/OT and increase activity.    Morbid obesity with BMI of 40.0-44.9, adult (CMS-[facility])  Assessment & Plan  Encourage Kcal restriction.    Nephrolithiasis  Assessment & Plan  Asymptomatic.  Outpatient follow up.    Insomnia disorder  Assessment & Plan  Outpatient follow up.    Physical debility  Assessment & Plan  PT/OT and increase activity.    Muscle spasms of lower extremity  Assessment & Plan  PRN heat pack.    Wheeze  Assessment & Plan  ?SAM  - O2 prn  - encourage IS use and RT treatment prn, albuterol prn      Medications reviewed and Labs reviewed  Brady catheter: No Brady      DVT Prophylaxis: Heparin    Ulcer prophylaxis: Not indicated  Antibiotics: Treating active infection/contamination beyond 24 hours perioperative coverage  Assessed for rehab: Patient was assess for and/or received rehabilitation services during this hospitalization      Stable issues - med hx (hemorrhoids, migrain, scarlet fever, anx/dep), preventives, HTN    Dispo - complex/guarded.

## 2017-06-29 NOTE — PROGRESS NOTES
Infectious Disease Progress Note    Author: Guerline Reed M.D. DOS & Time created: 2017  11:39 AM    Chief Complaint:  Chief Complaint   Patient presents with   • Abscess     R foot        Interval History:   AF, WBC 5.6, c/o headache and sore gums, c/o wheezing but no SOB, tolerating abx without issues, has not had a BM in 5 days, cx +grp B strep    AF, WBC 5, still has some wheezing but respiratory treatments helping, no plans for surgery, plan for wound vac change today   AF WBC 6.5 continued wheezing   AF WBC 5 up to commade-breathing better today   AF 5.1 got PICC today-still wheezing Foot hurts more today   AF wheezing better-having stinging pain inright foot   AF still having stinging pain  Labs Reviewed, Medications Reviewed, Radiology Reviewed and Wound Reviewed.    Review of Systems:  Review of Systems   Constitutional: Negative for fever and chills.   Respiratory: Positive for wheezing. Negative for shortness of breath.         Improved   Gastrointestinal: Negative for nausea, vomiting, abdominal pain and diarrhea.   Genitourinary: Negative for dysuria.   Neurological: Positive for sensory change. Negative for headaches.       Hemodynamics:  Temp (24hrs), Av.7 °C (98 °F), Min:36.2 °C (97.1 °F), Max:37.3 °C (99.1 °F)  Temperature: 37.3 °C (99.1 °F)  Pulse  Av.2  Min: 74  Max: 118  Blood Pressure: 108/86 mmHg       Physical Exam:  Physical Exam   Constitutional: She is oriented to person, place, and time. She appears well-developed. No distress.   Obese   HENT:   Head: Normocephalic and atraumatic.   Eyes: EOM are normal. Pupils are equal, round, and reactive to light.   Neck: Neck supple.   Cardiovascular: Normal rate and regular rhythm.    Pulmonary/Chest: Effort normal. No respiratory distress. She has wheezes. She has no rales.   Abdominal: Soft. She exhibits no distension. There is no tenderness.   Musculoskeletal: She exhibits edema.   R foot  deformity  Wound vac on dorsum of R foot -2 cm ulcer approx 2-3 mm depth +granulation  Edema ankle    LUE PICC   Neurological: She is alert and oriented to person, place, and time.   Skin: No rash noted.   Nursing note and vitals reviewed.      Meds:    Current facility-administered medications:   •  alprazolam (XANAX) tablet 0.25 mg, 0.25 mg, Oral, TID PRN, Shameka Phelan D.O., 0.25 mg at 17 0555  •  PICC Line Insertion has been implemented, , , Once **AND** May use Lidocaine 1% not to exceed 3 mls for local at insertion site, , , CONTINUOUS **AND** NOTIFY MD, , , Once **AND** Tip to dwell in the superior vena cava, , , CONTINUOUS **AND** Do not use PICC Line until placement verified by Chest X Ray, , , CONTINUOUS **AND** DX-CHEST-FOR PICC LINE Perform procedure in:: PICC Room, , , Once **AND** If radiologist reading of chest X-ray states any of the following the PICC should be used, , , CONTINUOUS **AND** Further evaluation of the PICC placement can be retrieved from X-Ray and Imaging, , , CONTINUOUS **AND** Blood draws through PICC line; draws by RN only, , , CONTINUOUS **AND** FLUSHING GUIDELINES WHEN IN USE, , , CONTINUOUS **AND** normal saline PF 10-20 mL, 10-20 mL, Intravenous, PRN **AND** FLUSHING GUIDELINES WHEN NOT IN USE, , , CONTINUOUS **AND** DRESSING MAINTENANCE, , , Once **AND** Change needleless pressure ports and IV tubing every 72 hours per hospital policy, , , CONTINUOUS **AND** TUBING, , , CONTINUOUS **AND** If there is an MD order to remove the PICC line, any RN may remove the PICC line, , , CONTINUOUS **AND** [] PATIENT EDUCATION MATERIALS, , , Prior to discharge **AND** NURSING COMMUNICATION, , , CONTINUOUS, Guerline Reed M.D.  •  oxycodone immediate-release (ROXICODONE) tablet 5 mg, 5 mg, Oral, Q3HRS PRN, 5 mg at 17 1516 **OR** oxycodone immediate release (ROXICODONE) tablet 10 mg, 10 mg, Oral, Q4HRS PRN, Noel Green M.D., 10 mg at 17 0923  •  acetaminophen  (TYLENOL) tablet 650 mg, 650 mg, Oral, Q6HRS, Noel Green M.D., 650 mg at 06/29/17 0554  •  carvedilol (COREG) tablet 6.25 mg, 6.25 mg, Oral, BID WITH MEALS, Noel Green M.D., 6.25 mg at 06/29/17 0755  •  estradiol (ESTRACE) tablet 2 mg, 2 mg, Oral, DAILY, Noel Green M.D., 2 mg at 06/29/17 0755  •  oxyCODONE CR (OXYCONTIN) tablet 10 mg, 10 mg, Oral, Q12HRS, Edson Lewis D.O., 10 mg at 06/29/17 0755  •  ipratropium-albuterol (DUONEB) nebulizer solution 3 mL, 3 mL, Nebulization, Q4H PRN (RT), FADI Martin.O.  •  pregabalin (LYRICA) capsule 75 mg, 75 mg, Oral, TID, Grace Escobar M.D., 75 mg at 06/29/17 0755  •  albuterol inhaler 2 Puff, 2 Puff, Inhalation, Q4HRS PRN, YUN MartinOJacki, 2 Puff at 06/28/17 0621  •  zolpidem (AMBIEN) tablet 5 mg, 5 mg, Oral, HS PRN, Liz Norris, A.P.N., 5 mg at 06/28/17 2233  •  cefTRIAXone (ROCEPHIN) 2 g in  mL IVPB, 2 g, Intravenous, Q24HRS, Grace Escobar M.D., Stopped at 06/29/17 0825  •  NS (BOLUS) infusion 1,000 mL, 1,000 mL, Intravenous, Once PRN, Grace Escobar M.D.  •  senna-docusate (PERICOLACE or SENOKOT S) 8.6-50 MG per tablet 2 Tab, 2 Tab, Oral, BID, 2 Tab at 06/29/17 0755 **AND** polyethylene glycol/lytes (MIRALAX) PACKET 1 Packet, 1 Packet, Oral, QDAY PRN, 1 Packet at 06/21/17 1609 **AND** magnesium hydroxide (MILK OF MAGNESIA) suspension 30 mL, 30 mL, Oral, QDAY PRN **AND** bisacodyl (DULCOLAX) suppository 10 mg, 10 mg, Rectal, QDAY PRN, Grace Escobar M.D.  •  Respiratory Care per Protocol, , Nebulization, Continuous RT, Grace Escobar M.D.  •  heparin injection 5,000 Units, 5,000 Units, Subcutaneous, Q8HRS, Grace Escobar M.D., 5,000 Units at 06/29/17 0554  •  ondansetron (ZOFRAN) syringe/vial injection 4 mg, 4 mg, Intravenous, Q4HRS PRN, Grace Escobar M.D., 4 mg at 06/20/17 0943  •  ondansetron (ZOFRAN ODT) dispertab 4 mg, 4 mg, Oral, Q4HRS PRN, Grace Escobar M.D., 4 mg at 06/25/17 1638  •  Notify provider if pain remains uncontrolled, , , CONTINUOUS **AND** Use  the numeric rating scale (NRS-11) on regular floors and Critical-Care Pain Observation Tool (CPOT) on ICUs/Trauma to assess pain, , , CONTINUOUS **AND** Pulse Ox (Oximetry), , , CONTINUOUS **AND** Pharmacy Consult Request ...Pain Management Review, , Other, PRN **AND** If patient difficult to arouse and/or has respiratory depression, stop any opiates that are currently infusing and call a Rapid Response., , , CONTINUOUS **AND** [DISCONTINUED] oxycodone immediate-release (ROXICODONE) tablet 2.5 mg, 2.5 mg, Oral, Q3HRS PRN, Stopped at 06/24/17 1411 **AND** [DISCONTINUED] oxycodone immediate-release (ROXICODONE) tablet 5 mg, 5 mg, Oral, Q3HRS PRN, 5 mg at 06/25/17 0725 **AND** [DISCONTINUED] morphine (pf) 4 mg/ml injection 2 mg, 2 mg, Intravenous, Q3HRS PRN, Grace Escobar M.D., 2 mg at 06/24/17 0747  •  clonazepam (KLONOPIN) tablet 0.5 mg, 0.5 mg, Oral, BID PRN, Grace Escobar M.D., 0.5 mg at 06/24/17 2106  •  telmisartan (MICARDIS) tablet 40 mg, 40 mg, Oral, DAILY, Grace Escobar M.D., 40 mg at 06/29/17 0755    Labs:  No results for input(s): WBC, RBC, HEMOGLOBIN, HEMATOCRIT, MCV, MCH, RDW, PLATELETCT, MPV, NEUTSPOLYS, LYMPHOCYTES, MONOCYTES, EOSINOPHILS, BASOPHILS, RBCMORPHOLO in the last 72 hours.  No results for input(s): SODIUM, POTASSIUM, CHLORIDE, CO2, GLUCOSE, BUN, CPKTOTAL in the last 72 hours.  No results for input(s): ALBUMIN, TBILIRUBIN, ALKPHOSPHAT, TOTPROTEIN, ALTSGPT, ASTSGOT, CREATININE in the last 72 hours.    Imaging:  Dx-foot-complete 3+ Right  6/20/2017  1.  Diffuse lateral soft tissue swelling of RIGHT foot with focal ulceration over the 5th metatarsal base. 2.  No gross bony destruction, however osteomyelitis is not excluded by plain film. 3.  No fracture or dislocation. 4.  Possible metallic foreign body within the plantar soft tissues of great toe.    Mr-foot-with Right  6/21/2017  Fourth and fifth TMT centered marrow edema, marginal spurring and joint effusion. No clear bony destruction. This is  "more likely secondary to degenerative change than septic arthropathy and osteomyelitis although given the overlying skin ulceration, infection cannot be excluded Lateral forefoot cellulitis with medium depth ulceration overlying the fifth TMT Multiple bone infarctions without articular surface collapse    Le Venous Duplex - Dvt ([facility] Only)    6/20/2017   Vascular Laboratory  CONCLUSIONS  Normal right lower extremity superficial and deep venous examination.    Micro:  BLOOD CULTURE   Date Value Ref Range Status   06/20/2017 No growth after 5 days of incubation.  Final      Results     Procedure Component Value Units Date/Time    BLOOD CULTURE [[id number]] Collected:  06/20/17 1907    Order Status:  Completed Specimen Information:  Blood from Peripheral Updated:  06/25/17 2100     Significant Indicator NEG      Source BLD      Site PERIPHERAL      Blood Culture No growth after 5 days of incubation.     Narrative:      Per Hospital Policy: Only change Specimen Src: to \"Line\" if  specified by physician order.    BLOOD CULTURE [[id number]] Collected:  06/20/17 1904    Order Status:  Completed Specimen Information:  Blood from Peripheral Updated:  06/25/17 2100     Significant Indicator NEG      Source BLD      Site PERIPHERAL      Blood Culture No growth after 5 days of incubation.     Narrative:      Per Hospital Policy: Only change Specimen Src: to \"Line\" if  specified by physician order.    URINALYSIS [[id number]] Collected:  06/24/17 1200    Order Status:  Completed Updated:  06/24/17 1237     Color Lt. Yellow      Character Clear      Specific Gravity 1.009      Ph 6.5      Glucose Negative mg/dL      Ketones Negative mg/dL      Protein Negative mg/dL      Bilirubin Negative      Nitrite Negative      Leukocyte Esterase Negative      Occult Blood Negative      Micro Urine Req see below      Comment: Microscopic examination not performed when specimen is clear  and chemically negative for protein, " blood, leukocyte esterase  and nitrite.         URINALYSIS [[id number]]     Order Status:  No result Specimen Information:  Urine from Urine, Clean Catch             Assessment:  Active Hospital Problems    Diagnosis   • *Foot ulcer, right (CMS-[facility]) [L97.519]   • Monoparesis of lower extremity (CMS-[facility]) [G83.10]   • Fibromyalgia [M79.7]   • Chronic pain syndrome [G89.4]       Plan:  Right foot ulcer  Afebrile  No leukocytosis  Wound cx - group B strep  MRI possible OM-No plans for surgery at this time per ortho  Wound healing during VAC change  Continue ceftriaxone (PCN allergy).   Anticipate 4-6 weeks of abx   Last 2 weeks can be Zyvox   Stop date 7/31/2017-Orders faxed to  6/29    Cutaneous and vaginal candidiasis, new  Topical antifungal to skin folds  Topical antifingal to vagina  If no improvement, add fluc     s/RLE paresthesias  2/2 traumatic injury 2 years ago  Resulting in above    DW IM Dr Phelan

## 2017-06-29 NOTE — WOUND TEAM
"[facility]  Inpatient Services  Wound & Skin Care Treatment Note    Admission Date: 6/20/17          HPI, PMH, SH: Reviewed  Unit where seen by Wound Team:  S6    WOUND CONSULT RELATED TO:  Scheduled npwt drsg change     SUBJECTIVE:  Pleasant/agreeable    Self Report / Pain Level:  C/o foot pain \"inside\" not related to drsg change    OBJECTIVE:  Prev npwt drsg remained intact    WOUND TYPE, LOCATION, CHARACTERISTICS (Pressure ulcers: location, stage, POA or date identified)    Wound Type/Location:   Neuropathic ulceration, right lateral foot     Periwound:   Intact; excoriated; calloused?     Drainage:     Scant serosanguinous      Tissue Type and %:    100% pink/red   Wound Edges:   attached     Odor:     none     Exposed structure(s):   none   S&S of Infection:    None      Measurements: (cm)  taken 6/26/17    Length:    1.7  Width:     2.0  Depth:    0.3      INTERVENTIONS BY WOUND TEAM:   Prev drsg removed -- wnd irrigated with wnd cleanser -- benzoin and drape aquilino-wnd -- wnd covered with black foam/bridge plus a button -- sealed with drape and neg pressure applied at 125mmhg/dpc      Interdisciplinary Collaboration: RN, Pt     EVALUATION:  wnd stable; bridged to dorsum to allow better fit of the walking boot; may need to debride white tissue/callous? at edge of the wnd     Factors affecting wound healing:  Neuro damage, insensate, chronicity, infection, foot deformity     Goals:  Wound to be 5% smaller in 2 weeks -- 100% granulation in 2 weeks    NURSING PLAN OF CARE: (x)  Dressing changes: See Dressing Maintenance orders: x  Skin care: See Skin Care orders: x  Rectal tube care: See Rectal Tube Care orders:   Other orders:    WOUND TEAM PLAN OF CARE (x):   NPWT change 3 x week:    x    Dressing changes by wound team:       Follow up as needed:  x  Other (explain):    Anticipated discharge plans (x):  SNF:           Home Care:       x    Outpatient Wound Center:            Self Care:          "   Other:

## 2017-06-29 NOTE — PROGRESS NOTES
Patient alert and oriented, vss, complains of pain in R foot- medicated per MAR. Patient up self to commode and steady. Non weight bearing on R foot. Patient not to wear any kind of stabalizing boot per wound RN as this will creat more pressure on current R foot wound. Patient with questions regarding discharge and discharge plan- quentin from SW aware. Will monitor.

## 2017-06-29 NOTE — PROGRESS NOTES
Patient stating she is not receiving adequate relief with PRN pain medication. Will ask MD to adjust either dose or frequency.

## 2017-06-29 NOTE — THERAPY
"Occupational Therapy Treatment completed with focus on ADLs, ADL transfers and patient education.  Functional Status:  Pt seen for OT tx today.  Pt was pleasant and cooperative throughout the session but continues to be limited by endurance and IADLs.  Pt completed supine to sit, sit to stand, and stand pivot with supervised.  Pt completed  room ambulation using FWW from bed to toilet with supervision.  Pt will be returning home with help 24/7 and would benefit from continued home health therapy which pt is agreeable to.    Plan of Care: Will benefit from Occupational Therapy 3 times per week  Discharge Recommendations:  Equipment BSC due to toilet in a closet with no bars and pt remains NWB on L LE. Post-acute therapy Discharge to home with outpatient or home health for additional skilled therapy services.    See \"Rehab Therapy-Acute\" Patient Summary Report for complete documentation.   "

## 2017-06-29 NOTE — DISCHARGE PLANNING
Medical Social Work  Received IV ABX  Faxed this along with [facility] and [facility] to [facility].

## 2017-06-29 NOTE — DISCHARGE PLANNING
Received choice forms from [facility]). Referral sent to [facility] and [facility].

## 2017-06-30 ENCOUNTER — APPOINTMENT (OUTPATIENT)
Dept: RADIOLOGY | Facility: MEDICAL CENTER | Age: 62
DRG: 593 | End: 2017-06-30
Attending: ORTHOPAEDIC SURGERY
Payer: MEDICARE

## 2017-06-30 PROBLEM — R11.0 NAUSEA: Status: ACTIVE | Noted: 2017-06-30

## 2017-06-30 LAB
ANION GAP SERPL CALC-SCNC: 6 MMOL/L (ref 0–11.9)
BASOPHILS # BLD AUTO: 0.6 % (ref 0–1.8)
BASOPHILS # BLD: 0.05 K/UL (ref 0–0.12)
BUN SERPL-MCNC: 15 MG/DL (ref 8–22)
CALCIUM SERPL-MCNC: 9 MG/DL (ref 8.5–10.5)
CHLORIDE SERPL-SCNC: 103 MMOL/L (ref 96–112)
CO2 SERPL-SCNC: 26 MMOL/L (ref 20–33)
CREAT SERPL-MCNC: 0.77 MG/DL (ref 0.5–1.4)
EOSINOPHIL # BLD AUTO: 0.29 K/UL (ref 0–0.51)
EOSINOPHIL NFR BLD: 3.3 % (ref 0–6.9)
ERYTHROCYTE [DISTWIDTH] IN BLOOD BY AUTOMATED COUNT: 47.9 FL (ref 35.9–50)
GFR SERPL CREATININE-BSD FRML MDRD: >60 ML/MIN/1.73 M 2
GLUCOSE SERPL-MCNC: 111 MG/DL (ref 65–99)
HCT VFR BLD AUTO: 36.5 % (ref 37–47)
HGB BLD-MCNC: 11.3 G/DL (ref 12–16)
IMM GRANULOCYTES # BLD AUTO: 0.03 K/UL (ref 0–0.11)
IMM GRANULOCYTES NFR BLD AUTO: 0.3 % (ref 0–0.9)
LYMPHOCYTES # BLD AUTO: 3.06 K/UL (ref 1–4.8)
LYMPHOCYTES NFR BLD: 34.6 % (ref 22–41)
MCH RBC QN AUTO: 28.3 PG (ref 27–33)
MCHC RBC AUTO-ENTMCNC: 31 G/DL (ref 33.6–35)
MCV RBC AUTO: 91.3 FL (ref 81.4–97.8)
MONOCYTES # BLD AUTO: 0.75 K/UL (ref 0–0.85)
MONOCYTES NFR BLD AUTO: 8.5 % (ref 0–13.4)
NEUTROPHILS # BLD AUTO: 4.66 K/UL (ref 2–7.15)
NEUTROPHILS NFR BLD: 52.7 % (ref 44–72)
NRBC # BLD AUTO: 0 K/UL
NRBC BLD AUTO-RTO: 0 /100 WBC
PLATELET # BLD AUTO: 277 K/UL (ref 164–446)
PMV BLD AUTO: 10.1 FL (ref 9–12.9)
POTASSIUM SERPL-SCNC: 4.4 MMOL/L (ref 3.6–5.5)
RBC # BLD AUTO: 4 M/UL (ref 4.2–5.4)
SODIUM SERPL-SCNC: 135 MMOL/L (ref 135–145)
WBC # BLD AUTO: 8.8 K/UL (ref 4.8–10.8)

## 2017-06-30 PROCEDURE — A9270 NON-COVERED ITEM OR SERVICE: HCPCS | Performed by: INTERNAL MEDICINE

## 2017-06-30 PROCEDURE — 700102 HCHG RX REV CODE 250 W/ 637 OVERRIDE(OP): Performed by: INTERNAL MEDICINE

## 2017-06-30 PROCEDURE — 73701 CT LOWER EXTREMITY W/DYE: CPT | Mod: RT

## 2017-06-30 PROCEDURE — A9270 NON-COVERED ITEM OR SERVICE: HCPCS | Performed by: HOSPITALIST

## 2017-06-30 PROCEDURE — 85025 COMPLETE CBC W/AUTO DIFF WBC: CPT

## 2017-06-30 PROCEDURE — 80048 BASIC METABOLIC PNL TOTAL CA: CPT

## 2017-06-30 PROCEDURE — 700117 HCHG RX CONTRAST REV CODE 255: Performed by: INTERNAL MEDICINE

## 2017-06-30 PROCEDURE — 700102 HCHG RX REV CODE 250 W/ 637 OVERRIDE(OP): Performed by: HOSPITALIST

## 2017-06-30 PROCEDURE — 700111 HCHG RX REV CODE 636 W/ 250 OVERRIDE (IP): Performed by: INTERNAL MEDICINE

## 2017-06-30 PROCEDURE — 97605 NEG PRS WND THER DME<=50SQCM: CPT

## 2017-06-30 PROCEDURE — 700102 HCHG RX REV CODE 250 W/ 637 OVERRIDE(OP): Performed by: NURSE PRACTITIONER

## 2017-06-30 PROCEDURE — A9270 NON-COVERED ITEM OR SERVICE: HCPCS | Performed by: NURSE PRACTITIONER

## 2017-06-30 PROCEDURE — 700105 HCHG RX REV CODE 258: Performed by: INTERNAL MEDICINE

## 2017-06-30 PROCEDURE — 99233 SBSQ HOSP IP/OBS HIGH 50: CPT | Performed by: INTERNAL MEDICINE

## 2017-06-30 PROCEDURE — 770006 HCHG ROOM/CARE - MED/SURG/GYN SEMI*

## 2017-06-30 RX ORDER — PROCHLORPERAZINE MALEATE 5 MG/1
10 TABLET ORAL EVERY 6 HOURS PRN
Status: DISCONTINUED | OUTPATIENT
Start: 2017-06-30 | End: 2017-07-01 | Stop reason: HOSPADM

## 2017-06-30 RX ORDER — OMEPRAZOLE 20 MG/1
20 CAPSULE, DELAYED RELEASE ORAL DAILY
Status: DISCONTINUED | OUTPATIENT
Start: 2017-06-30 | End: 2017-07-01 | Stop reason: HOSPADM

## 2017-06-30 RX ADMIN — PREGABALIN 75 MG: 75 CAPSULE ORAL at 13:45

## 2017-06-30 RX ADMIN — PREGABALIN 75 MG: 75 CAPSULE ORAL at 20:28

## 2017-06-30 RX ADMIN — OXYCODONE HYDROCHLORIDE 10 MG: 10 TABLET ORAL at 13:45

## 2017-06-30 RX ADMIN — TELMISARTAN 40 MG: 40 TABLET ORAL at 08:44

## 2017-06-30 RX ADMIN — HEPARIN SODIUM 5000 UNITS: 5000 INJECTION, SOLUTION INTRAVENOUS; SUBCUTANEOUS at 13:44

## 2017-06-30 RX ADMIN — ACETAMINOPHEN 650 MG: 325 TABLET, FILM COATED ORAL at 05:37

## 2017-06-30 RX ADMIN — ACETAMINOPHEN 650 MG: 325 TABLET, FILM COATED ORAL at 01:46

## 2017-06-30 RX ADMIN — ALPRAZOLAM 0.25 MG: 0.25 TABLET ORAL at 05:38

## 2017-06-30 RX ADMIN — OMEPRAZOLE 20 MG: 20 CAPSULE, DELAYED RELEASE ORAL at 16:42

## 2017-06-30 RX ADMIN — OXYCODONE HYDROCHLORIDE 20 MG: 20 TABLET, FILM COATED, EXTENDED RELEASE ORAL at 08:44

## 2017-06-30 RX ADMIN — OXYCODONE HYDROCHLORIDE 10 MG: 10 TABLET ORAL at 05:37

## 2017-06-30 RX ADMIN — OXYCODONE HYDROCHLORIDE 10 MG: 10 TABLET ORAL at 01:46

## 2017-06-30 RX ADMIN — HEPARIN SODIUM 5000 UNITS: 5000 INJECTION, SOLUTION INTRAVENOUS; SUBCUTANEOUS at 20:28

## 2017-06-30 RX ADMIN — ESTRADIOL 2 MG: 1 TABLET ORAL at 08:44

## 2017-06-30 RX ADMIN — NYSTATIN 1500000 UNITS: 100000 POWDER TOPICAL at 08:43

## 2017-06-30 RX ADMIN — NYSTATIN AND TRIAMCINOLONE ACETONIDE: 100000; 1 CREAM TOPICAL at 08:43

## 2017-06-30 RX ADMIN — OXYCODONE HYDROCHLORIDE 10 MG: 10 TABLET ORAL at 23:14

## 2017-06-30 RX ADMIN — CLONAZEPAM 0.5 MG: 0.5 TABLET ORAL at 01:47

## 2017-06-30 RX ADMIN — IOHEXOL 100 ML: 350 INJECTION, SOLUTION INTRAVENOUS at 19:26

## 2017-06-30 RX ADMIN — CEFTRIAXONE SODIUM 2 G: 2 INJECTION, POWDER, FOR SOLUTION INTRAMUSCULAR; INTRAVENOUS at 08:43

## 2017-06-30 RX ADMIN — ALPRAZOLAM 0.25 MG: 0.25 TABLET ORAL at 23:14

## 2017-06-30 RX ADMIN — ALPRAZOLAM 0.25 MG: 0.25 TABLET ORAL at 13:45

## 2017-06-30 RX ADMIN — OXYCODONE HYDROCHLORIDE 10 MG: 10 TABLET ORAL at 08:43

## 2017-06-30 RX ADMIN — OXYCODONE HYDROCHLORIDE 20 MG: 20 TABLET, FILM COATED, EXTENDED RELEASE ORAL at 20:27

## 2017-06-30 RX ADMIN — CARVEDILOL 6.25 MG: 6.25 TABLET, FILM COATED ORAL at 17:44

## 2017-06-30 RX ADMIN — ACETAMINOPHEN 650 MG: 325 TABLET, FILM COATED ORAL at 11:33

## 2017-06-30 RX ADMIN — CARVEDILOL 6.25 MG: 6.25 TABLET, FILM COATED ORAL at 08:44

## 2017-06-30 RX ADMIN — NYSTATIN 1500000 UNITS: 100000 POWDER TOPICAL at 20:28

## 2017-06-30 RX ADMIN — PROCHLORPERAZINE MALEATE 10 MG: 5 TABLET, FILM COATED ORAL at 16:42

## 2017-06-30 RX ADMIN — SENNOSIDES AND DOCUSATE SODIUM 2 TABLET: 8.6; 5 TABLET ORAL at 20:27

## 2017-06-30 RX ADMIN — ACETAMINOPHEN 650 MG: 325 TABLET, FILM COATED ORAL at 23:13

## 2017-06-30 RX ADMIN — SENNOSIDES AND DOCUSATE SODIUM 2 TABLET: 8.6; 5 TABLET ORAL at 08:44

## 2017-06-30 RX ADMIN — ALBUTEROL SULFATE 2 PUFF: 90 AEROSOL, METERED RESPIRATORY (INHALATION) at 11:34

## 2017-06-30 RX ADMIN — PREGABALIN 75 MG: 75 CAPSULE ORAL at 08:44

## 2017-06-30 RX ADMIN — ONDANSETRON 4 MG: 4 TABLET, ORALLY DISINTEGRATING ORAL at 01:48

## 2017-06-30 RX ADMIN — ACETAMINOPHEN 650 MG: 325 TABLET, FILM COATED ORAL at 17:44

## 2017-06-30 RX ADMIN — HEPARIN SODIUM 5000 UNITS: 5000 INJECTION, SOLUTION INTRAVENOUS; SUBCUTANEOUS at 05:37

## 2017-06-30 RX ADMIN — OXYCODONE HYDROCHLORIDE 10 MG: 10 TABLET ORAL at 17:44

## 2017-06-30 RX ADMIN — ZOLPIDEM TARTRATE 5 MG: 5 TABLET, FILM COATED ORAL at 23:14

## 2017-06-30 ASSESSMENT — ENCOUNTER SYMPTOMS
CONSTIPATION: 0
COUGH: 0
WHEEZING: 0
DIARRHEA: 0
NERVOUS/ANXIOUS: 0
HEADACHES: 0
CHILLS: 0
MYALGIAS: 1
VOMITING: 0
TREMORS: 0
MEMORY LOSS: 0
NAUSEA: 0
FEVER: 0
PALPITATIONS: 0
SENSORY CHANGE: 1
ABDOMINAL PAIN: 1
WHEEZING: 1
HEARTBURN: 1
WEAKNESS: 1
ABDOMINAL PAIN: 0
NAUSEA: 1
SHORTNESS OF BREATH: 0
FOCAL WEAKNESS: 0
BACK PAIN: 0

## 2017-06-30 ASSESSMENT — PAIN SCALES - GENERAL
PAINLEVEL_OUTOF10: 4
PAINLEVEL_OUTOF10: 4
PAINLEVEL_OUTOF10: 3
PAINLEVEL_OUTOF10: 7
PAINLEVEL_OUTOF10: 8
PAINLEVEL_OUTOF10: 4
PAINLEVEL_OUTOF10: 8
PAINLEVEL_OUTOF10: 8

## 2017-06-30 NOTE — WOUND TEAM
"[facility]  Inpatient Services  Wound & Skin Care Treatment Note    Admission Date: 6/20/17          HPI, PMH, SH: Reviewed  Unit where seen by Wound Team:  S6    WOUND CONSULT RELATED TO:  Scheduled npwt drsg change     SUBJECTIVE:  \"I'm just not feeling myself today.\"    Self Report / Pain Level:  No complaints of pain.    OBJECTIVE:  Prev npwt drsg remained intact    WOUND TYPE, LOCATION, CHARACTERISTICS (Pressure ulcers: location, stage, POA or date identified)    Wound Type/Location:   Neuropathic ulceration, right lateral foot     Periwound:   Intact; excoriated; calloused?     Drainage:     Scant serosanguinous      Tissue Type and %:    100% pink/red   Wound Edges:   attached     Odor:     none     Exposed structure(s):   none   S&S of Infection:    None      Measurements: (cm)  taken 6/26/17    Length:    1.7  Width:     2.0  Depth:    0.3      INTERVENTIONS BY WOUND TEAM:   Prev drsg removed -- wnd irrigated with wnd cleanser -- benzoin and drape aquilino-wnd -- wnd covered with black foam/bridge plus a button -- sealed with drape and neg pressure applied at 125mmhg/dpc      Interdisciplinary Collaboration: RN, Pt     EVALUATION:  wnd stable; bridged to dorsum to allow better fit of the walking boot; may need to debride white tissue/callous? at edge of the wnd.  May DC today.  Verbally went over home wound VAC and how to patch leak.       Factors affecting wound healing:  Neuro damage, insensate, chronicity, infection, foot deformity     Goals:  Wound to be 5% smaller in 2 weeks -- 100% granulation in 2 weeks    NURSING PLAN OF CARE: (x)  Dressing changes: See Dressing Maintenance orders: x  Skin care: See Skin Care orders: x  Rectal tube care: See Rectal Tube Care orders:   Other orders:    WOUND TEAM PLAN OF CARE (x):   NPWT change 3 x week:    x    Dressing changes by wound team:       Follow up as needed:  x  Other (explain):    Anticipated discharge plans (x):  SNF:           Home Care:    "    x    Outpatient Wound Center:            Self Care:            Other:

## 2017-06-30 NOTE — PROGRESS NOTES
Patient was provided second dose of breakthrough pain medication, along with 5mg Ambien at 2230. Patient is now resting comfortably, respirations are even and unlabored at 18. Will continue to monitor.

## 2017-06-30 NOTE — PROGRESS NOTES
Patient sitting in bed, eyes open, in no apparent distress. Patient is oriented to room, call light, and safety protocol. Patient reports pain 8 of 10 in right lower extremity, right flank, and lower back. Patient reports these pains are not new, and have been poorly controlled. Attending nurse informed patient of increased dose of Oxycontin, and provided Oxycodone 20mg immediate release for pain. Patient side rails are up X2, and patient has been educated on bed alarm and refuses. Will continue to monitor.

## 2017-06-30 NOTE — PROGRESS NOTES
Patient complains of headache, nausea, R foot pain that radiates up R leg and to lower back. Patient unsure is this is related to kidney pain or not. Patient states she does have some pain down in pubic area but thought this was related to yeast earlier today. Nystatin powder ordered per ID doctor. Pain meds increased per dr. Phelan to increase the long acting oxycontin. zofran given and schedule tylenol. Will monitor patient at this time. Consider UTI if symptoms continue?? VSS. Patient with UA done 6.24- results negative. Will monitor.

## 2017-06-30 NOTE — PROGRESS NOTES
Patient reports pain an 8 of 10. Requests every controlled substance on the MAR. Attending nurse asked patient about her need for xanax and clonipin after her requesting each medication along with Oxycodone. Patient says she can't sleep and needs something for her pain, and to help her sleep. Attending nurse attempted to educated patient on the extensive amount of sedative medications we have administered since beginning of shift, and the hesitancy to provide more sedating medication. Patient is alert and oriented with no signs of drowsiness, or sedation. Provided pain medication, and Klonipin per MAR. Will continue to monitor.Patient is resistant to the idea of trying any alterative to narcotic pain medication.
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
## 2017-06-30 NOTE — CARE PLAN
Problem: Discharge Barriers/Planning  Goal: Patient’s continuum of care needs will be met  Outcome: PROGRESSING SLOWER THAN EXPECTED  Pt d/c to home with home health but c/o she started feeling worse yesterday. Requested to stay another night or two.    Problem: Pain Management  Goal: Pain level will decrease to patient’s comfort goal  Outcome: PROGRESSING SLOWER THAN EXPECTED  Pt taking espinoza Q4 along with xanax and klonopin. Pt also receiving scheduled oxy and tylenol

## 2017-06-30 NOTE — PROGRESS NOTES
Pt sitting up in bed, eating breakfast. AOX4. Morning meds given per MAR. C/O pain, medicated per MAR. Pt refusing bed alarm, using BSC with self care. Bed in lowest position with wheels locked. Denies any further needs at this time, will continue to monitor.

## 2017-06-30 NOTE — PROGRESS NOTES
[facility] Hospitalist Progress Note    Date of Service: 2017    Chief Complaint  62 y.o. female admitted 2017 with R-foot ulcer.    Interval Problem Update  Reports onset of acid reflux with associated nausea this am  Not feeling well, afebrile, upper airway wheeze    Consultants/Specialty  ID and ortho.    Disposition  To home with home health and wound vac once abx arranged in am   D/w ID, zyvox rx provided to  for preauth assistance  Cont Rocephin   F/u am labs        Review of Systems   Constitutional: Negative for fever.   HENT: Negative for congestion and hearing loss.    Respiratory: Negative for cough, shortness of breath and wheezing.    Cardiovascular: Positive for leg swelling. Negative for palpitations.   Gastrointestinal: Positive for heartburn, nausea and abdominal pain. Negative for vomiting, diarrhea and constipation.   Musculoskeletal: Positive for myalgias and joint pain. Negative for back pain.   Neurological: Positive for weakness. Negative for tremors, focal weakness and headaches.   Psychiatric/Behavioral: Negative for memory loss. The patient is not nervous/anxious.    All other systems reviewed and are negative.     Physical Exam  Laboratory/Imaging   Hemodynamics  Temp (24hrs), Av.4 °C (97.6 °F), Min:36.1 °C (97 °F), Max:36.8 °C (98.3 °F)   Temperature: 36.8 °C (98.2 °F)  Pulse  Av.3  Min: 74  Max: 118   Blood Pressure: 119/67 mmHg      Respiratory      Respiration: 20, Pulse Oximetry: 92 %     Work Of Breathing / Effort: Mild  RUL Breath Sounds: Expiratory Wheezes, RML Breath Sounds: Expiratory Wheezes, RLL Breath Sounds: Expiratory Wheezes, EL Breath Sounds: Expiratory Wheezes, LLL Breath Sounds: Expiratory Wheezes    Fluids    Intake/Output Summary (Last 24 hours) at 17 1506  Last data filed at 17 1330   Gross per 24 hour   Intake    912 ml   Output      0 ml   Net    912 ml       Nutrition  Orders Placed This Encounter   Procedures   • Diet Order      Standing Status: Standing      Number of Occurrences: 1      Standing Expiration Date:      Order Specific Question:  Diet:     Answer:  Regular [1]     Physical Exam   Constitutional: She is oriented to person, place, and time. She appears well-nourished. No distress.   Morbid obesity   HENT:   Head: Normocephalic and atraumatic.   Eyes: EOM are normal. Pupils are equal, round, and reactive to light.   Neck: Normal range of motion. Neck supple.   Cardiovascular: Normal rate and intact distal pulses.    Pulmonary/Chest: Effort normal. No respiratory distress. She has no wheezes. She has no rales.   Abdominal: Soft. Bowel sounds are normal. She exhibits no distension. There is no tenderness.   Musculoskeletal: She exhibits no edema or tenderness.   RLE wound vac   Neurological: She is alert and oriented to person, place, and time. No cranial nerve deficit. She exhibits abnormal muscle tone.   Skin: Skin is warm and dry. No erythema.   Psychiatric: She has a normal mood and affect. Her behavior is normal. Judgment and thought content normal.   Nursing note and vitals reviewed.  Nursing note and vitals reviewed.  Constitutional: She is oriented to person, place, and time. She appears well-developed and well-nourished.   HENT:   Head: Normocephalic and atraumatic.   Right Ear: External ear normal.   Left Ear: External ear normal.   Nose: Nose normal.   Eyes: Conjunctivae and extraocular motions are normal.    Neck: Normal range of motion. Neck supple.   Cardiovascular: Normal rate.    Pulmonary/Chest: Effort normal.   Abdominal: Soft. Bowel sounds are normal.   Musculoskeletal: Normal range of motion.   Neurological: She is alert and oriented to person, place, and time.   Skin: Skin is warm and dry.  R-foot wound vac in place.                               Assessment/Plan     * Foot ulcer, right (CMS-HCC)  Assessment & Plan  Dr. Wu (ortho) does not plan to do any surgery unless there is proof the patient's leg  weakness is irreversible.  Continue woundvac.  Home health / wound vac  Needs home abx infusion arranged    Nausea  Assessment & Plan  With abd discomfort  GERD- start ppi  Compazine prn  Does not like zofran  F/u labs    Anxiety disorder  Assessment & Plan  Xanax prn added    Fibromyalgia (present on admission)  Assessment & Plan  As above.    Chronic pain syndrome (present on admission)  Assessment & Plan  Min narc and use conservative measures when possible.  Pain improved control  On oxycontin 20 bid,   Limit prn use  Encourage PT/OT    Monoparesis of lower extremity (CMS-[facility])  Assessment & Plan  MRI of lumbar spine neg for cause.  Outpatient for nerve conduction study.   Ordered DME for home use.    Weakness of right leg  Assessment & Plan  PT/OT and increase activity.    Morbid obesity with BMI of 40.0-44.9, adult (CMS-[facility])  Assessment & Plan  Encourage Kcal restriction.    Nephrolithiasis  Assessment & Plan  Asymptomatic.  Outpatient follow up.    Insomnia disorder  Assessment & Plan  Outpatient follow up.    Physical debility  Assessment & Plan  PT/OT and increase activity.    Muscle spasms of lower extremity  Assessment & Plan  PRN heat pack.    Wheeze  Assessment & Plan  ?SAM  - O2 prn  - encourage IS use and RT treatment prn, albuterol prn      Medications reviewed and Labs reviewed  Brady catheter: No Brady      DVT Prophylaxis: Heparin    Ulcer prophylaxis: Not indicated  Antibiotics: Treating active infection/contamination beyond 24 hours perioperative coverage  Assessed for rehab: Patient was assess for and/or received rehabilitation services during this hospitalization      Stable issues - med hx (hemorrhoids, migrain, scarlet fever, anx/dep), preventives, HTN    Dispo - complex/guarded.

## 2017-06-30 NOTE — DISCHARGE PLANNING
[facility] has accepted patient. [facility] still working on pts referral. Haylee([facility]) to contact this [facility] with any updates.

## 2017-06-30 NOTE — DISCHARGE PLANNING
[facility] has accepted patient. [facility] to deliver meds today. Per Alex(LYLA), patient discharging tomorrow. [facility] and [facility] notified.

## 2017-07-01 VITALS
DIASTOLIC BLOOD PRESSURE: 68 MMHG | BODY MASS INDEX: 40.21 KG/M2 | HEIGHT: 66 IN | SYSTOLIC BLOOD PRESSURE: 119 MMHG | HEART RATE: 77 BPM | RESPIRATION RATE: 16 BRPM | WEIGHT: 250.22 LBS | OXYGEN SATURATION: 92 % | TEMPERATURE: 99.1 F

## 2017-07-01 PROCEDURE — 99239 HOSP IP/OBS DSCHRG MGMT >30: CPT | Performed by: INTERNAL MEDICINE

## 2017-07-01 PROCEDURE — 700111 HCHG RX REV CODE 636 W/ 250 OVERRIDE (IP): Performed by: INTERNAL MEDICINE

## 2017-07-01 PROCEDURE — A9270 NON-COVERED ITEM OR SERVICE: HCPCS | Performed by: INTERNAL MEDICINE

## 2017-07-01 PROCEDURE — 700102 HCHG RX REV CODE 250 W/ 637 OVERRIDE(OP): Performed by: INTERNAL MEDICINE

## 2017-07-01 PROCEDURE — 700102 HCHG RX REV CODE 250 W/ 637 OVERRIDE(OP): Performed by: HOSPITALIST

## 2017-07-01 PROCEDURE — A9270 NON-COVERED ITEM OR SERVICE: HCPCS | Performed by: HOSPITALIST

## 2017-07-01 PROCEDURE — 700105 HCHG RX REV CODE 258: Performed by: INTERNAL MEDICINE

## 2017-07-01 RX ORDER — PROCHLORPERAZINE MALEATE 10 MG
10 TABLET ORAL EVERY 6 HOURS PRN
Qty: 20 TAB | Refills: 0 | Status: SHIPPED | OUTPATIENT
Start: 2017-07-01 | End: 2019-05-17

## 2017-07-01 RX ADMIN — OXYCODONE HYDROCHLORIDE 10 MG: 10 TABLET ORAL at 05:12

## 2017-07-01 RX ADMIN — OXYCODONE HYDROCHLORIDE 20 MG: 20 TABLET, FILM COATED, EXTENDED RELEASE ORAL at 07:52

## 2017-07-01 RX ADMIN — ALBUTEROL SULFATE 2 PUFF: 90 AEROSOL, METERED RESPIRATORY (INHALATION) at 08:03

## 2017-07-01 RX ADMIN — PREGABALIN 75 MG: 75 CAPSULE ORAL at 07:52

## 2017-07-01 RX ADMIN — ALPRAZOLAM 0.25 MG: 0.25 TABLET ORAL at 07:10

## 2017-07-01 RX ADMIN — OMEPRAZOLE 20 MG: 20 CAPSULE, DELAYED RELEASE ORAL at 07:52

## 2017-07-01 RX ADMIN — CEFTRIAXONE SODIUM 2 G: 2 INJECTION, POWDER, FOR SOLUTION INTRAMUSCULAR; INTRAVENOUS at 07:53

## 2017-07-01 RX ADMIN — PROCHLORPERAZINE MALEATE 10 MG: 5 TABLET, FILM COATED ORAL at 05:11

## 2017-07-01 RX ADMIN — HEPARIN SODIUM 5000 UNITS: 5000 INJECTION, SOLUTION INTRAVENOUS; SUBCUTANEOUS at 05:12

## 2017-07-01 RX ADMIN — NYSTATIN 1500000 UNITS: 100000 POWDER TOPICAL at 07:53

## 2017-07-01 RX ADMIN — CARVEDILOL 6.25 MG: 6.25 TABLET, FILM COATED ORAL at 07:52

## 2017-07-01 RX ADMIN — ESTRADIOL 2 MG: 1 TABLET ORAL at 07:51

## 2017-07-01 RX ADMIN — ALPRAZOLAM 0.25 MG: 0.25 TABLET ORAL at 11:54

## 2017-07-01 RX ADMIN — OXYCODONE HYDROCHLORIDE 10 MG: 10 TABLET ORAL at 11:55

## 2017-07-01 RX ADMIN — ONDANSETRON 4 MG: 4 TABLET, ORALLY DISINTEGRATING ORAL at 07:10

## 2017-07-01 RX ADMIN — ACETAMINOPHEN 650 MG: 325 TABLET, FILM COATED ORAL at 11:54

## 2017-07-01 RX ADMIN — SENNOSIDES AND DOCUSATE SODIUM 2 TABLET: 8.6; 5 TABLET ORAL at 07:52

## 2017-07-01 RX ADMIN — TELMISARTAN 40 MG: 40 TABLET ORAL at 07:51

## 2017-07-01 RX ADMIN — ACETAMINOPHEN 650 MG: 325 TABLET, FILM COATED ORAL at 05:11

## 2017-07-01 ASSESSMENT — ENCOUNTER SYMPTOMS
VOMITING: 0
ABDOMINAL PAIN: 1
NAUSEA: 1
FEVER: 0
CHILLS: 0
SHORTNESS OF BREATH: 0
SENSORY CHANGE: 1
DIARRHEA: 0
HEADACHES: 0
WHEEZING: 1

## 2017-07-01 ASSESSMENT — LIFESTYLE VARIABLES: EVER_SMOKED: NEVER

## 2017-07-01 ASSESSMENT — PAIN SCALES - GENERAL
PAINLEVEL_OUTOF10: 8
PAINLEVEL_OUTOF10: 5
PAINLEVEL_OUTOF10: 7

## 2017-07-01 NOTE — PROGRESS NOTES
Pt AOx4. Pt refuses bed alarm. Education provided regarding increased fall risk, patient safety, and bed alarm rationale. Pt verbalized understanding of education and continues to refuse bed alarm. Pt c/o 8/10 pain in abdomen and leg, pt given scheduled pain meds per MAR. Will continue to monitor.

## 2017-07-01 NOTE — DISCHARGE PLANNING
SW responded to page regarding pt dc and verification of HH services.     Per prior dc planning notes, pt was accepted by [facility] and they are aware that pt will dc home today. RN notified of above.

## 2017-07-01 NOTE — PROGRESS NOTES
Pt [facility]d [facility]  with pt transport.   set up to come to pt home in [place] tomorrow.

## 2017-07-01 NOTE — DISCHARGE INSTRUCTIONS
Discharge Instructions    Discharged to home by car with relative. Discharged via wheelchair, hospital escort: Yes.  Special equipment needed: Oxygen    Be sure to schedule a follow-up appointment with your primary care doctor or any specialists as instructed.     Discharge Plan:   Diet Plan: Discussed (regular)  Activity Level: Discussed (increase as tolerated)  Confirmed Follow up Appointment: Patient to Call and Schedule Appointment  Confirmed Symptoms Management: Discussed  Medication Reconciliation Updated: Yes  Influenza Vaccine Indication: Not indicated: Previously immunized this influenza season and > 8 years of age    I understand that a diet low in cholesterol, fat, and sodium is recommended for good health. Unless I have been given specific instructions below for another diet, I accept this instruction as my diet prescription.   Other diet: regular    Special Instructions: None    · Is patient discharged on Warfarin / Coumadin?   No     · Is patient Post Blood Transfusion?  No    Depression / Suicide Risk    As you are discharged from this [facility] facility, it is important to learn how to keep safe from harming yourself.    Recognize the warning signs:  · Abrupt changes in personality, positive or negative- including increase in energy   · Giving away possessions  · Change in eating patterns- significant weight changes-  positive or negative  · Change in sleeping patterns- unable to sleep or sleeping all the time   · Unwillingness or inability to communicate  · Depression  · Unusual sadness, discouragement and loneliness  · Talk of wanting to die  · Neglect of personal appearance   · Rebelliousness- reckless behavior  · Withdrawal from people/activities they love  · Confusion- inability to concentrate     If you or a loved one observes any of these behaviors or has concerns about self-harm, here's what you can do:  · Talk about it- your feelings and reasons for harming yourself  · Remove any means  that you might use to hurt yourself (examples: pills, rope, extension cords, firearm)  · Get professional help from the community (Mental Health, Substance Abuse, psychological counseling)  · Do not be alone:Call your Safe Contact- someone whom you trust who will be there for you.  · Call your local CRISIS HOTLINE 930-9922 or 765-969-1868  · Call your local Children's Mobile Crisis Response Team [place] (654) 652-0110 or wwwCAIS  · Call the toll free National Suicide Prevention Hotlines   · National Suicide Prevention Lifeline 224-022-ZRCZ (8544)  · National Hope Line Network 800-SUICIDE (450-1701)        Infection Control in the Home  If you have an infection or you are taking care of someone who has an infection, it is important to know how to keep the infection from spreading. Follow these guidelines to help stop the spread of infection, and talk to your health care provider.  HOW ARE INFECTIONS SPREAD?  In order for an infection to spread, the following must be present:  · A germ. This may be a virus, bacteria, fungus, or parasite.  · A place for the germ to live. This may be:  ¨ On or in a person, animal, plant, or food.  ¨ In soil or water.  ¨ On surfaces, such as a door handle.  · A susceptible host. This is a person or animal who does not have resistance (immunity) to the germ.  · A way for the germ to enter the host. This may occur by:  ¨ Direct contact. This may happen by making contact--such as shaking hands or hugging--with an infected person or animal. Some germs can also travel through the air and spread to you if an infected person coughs or sneezes on you or near you.  ¨ Indirect contact. This is when the germ enters the host through contact with an infected object. Examples include eating contaminated food, drinking contaminated water, or touching a contaminated surface with your hands and then touching your face, nose, or mouth soon after that.  HOW CAN I HELP TO PREVENT  INFECTION FROM SPREADING?  There are several things that you can do to help prevent infection from spreading.  Hand Washing  It is very important to wash your hands correctly, following these steps:  2. Wet your hands with clean, running water.  3. Apply soap to your hands. Liquid soap is better than bar soap.  4. Rub your hands together quickly to create lather.  5. Keep rubbing your hands together for at least 20 seconds. Thoroughly scrub all parts of your hands, including under your fingernails and between your fingers.  6. Rinse your hands with clean, running water until all of the soap is gone.  7. Dry your hands with an air dryer or a clean paper or cloth towel, or let your hands air-dry. Do not use your clothing or a soiled towel to dry your hands.  8. If you are in a public restroom, use your towel to turn off the water faucet and to open the bathroom door.  Make sure to wash your hands:  · Before:  ¨ Visiting a baby or anyone with a weakened or lowered defense (immune) system.  ¨ Putting in and taking out any contact lenses.  · After:  ¨ Working or playing outside.  ¨ Touching an animal or its toys or leash.  ¨ Handling livestock.  ¨ Using the bathroom or helping a child or adult to use the bathroom.  ¨ Using household  or toxic chemicals.  ¨ Touching or taking out the garbage.  ¨ Touching anything dirty around your home.  ¨ Handling soiled clothes or rags.  ¨ Taking care of a sick child. This includes touching used tissues, toys, and clothes.  ¨ Sneezing, coughing, or blowing your nose.  ¨ Using public transportation.  ¨ Shaking hands.  ¨ Using a phone, including your mobile phone.  ¨ Touching money.  · Before and after:  ¨ Preparing food.  ¨ Preparing a bottle for a baby.  ¨ Feeding a baby or a young child.  ¨ Eating.  ¨ Visiting or taking care of someone who is sick.  ¨ Changing a diaper.  ¨ Changing a bandage (dressing) or taking care of an injury or wound.  ¨ Giving or taking medicine.  Taking  Care of Your Home  · Make sure that you have enough cleaning supplies at all times. These include:  ¨ Disinfectants.  ¨ Reusable cleaning cloths. Wash these after each use.  ¨ Paper towels.  ¨ Utility gloves. Replace your gloves if they are cracked or torn or if they start to peel.  · Use bleach safely. Never mix it with other cleaning products, especially those that contain ammonia. This mixture can create a dangerous gas that may be deadly.  · Take care of your cleaning supplies. Toilet brushes, mops, and sponges can breed germs. Soak them in bleach and water for 5 minutes after each use.  · Do not pour used mop water down the sink. Pour it down the toilet instead.  · Maintain proper ventilation in your home.  · If you have a pet, ensure that your pet stays clean. Do not let people with weak immune systems touch bird droppings, fish tank water, or a litter box.  ¨ If you have a cat, be sure to change the litter every day.  · In the bathroom, make sure you:  ¨ Provide liquid soap.  ¨ Change towels and washcloths frequently. Avoid sharing towels and washcloths.  ¨ Change toothbrushes often and store them separately in a clean, dry place.  ¨ Disinfect the toilet.  ¨ Clean the tub, shower, and sink with standard cleaning products.    ¨ Mop the floor with a standard .  ¨ Do not share personal items, such as razors, toothbrushes, drinking glasses, deodorant, costello, brushes, towels, and washcloths.    · In the kitchen, make sure you:  ¨ Store food carefully.  ¨ Refrigerate leftovers promptly in covered containers.  ¨ Throw out stale or spoiled food.  ¨ Clean the inside of your refrigerator each week.  ¨ Keep your refrigerator set at 40°F (4°C) or less, and set your freezer at 0°F (-18°C) or less.  ¨ Thaw foods in the refrigerator or microwave, not at room temperature.  ¨ Serve foods at the proper temperature. Do not eat raw meat. Make sure it is cooked to the appropriate temperature. Cook eggs until they are  firm.  ¨ Wash fruits and vegetables under running water.  ¨ Use separate cutting boards, plates, and utensils for raw foods and cooked foods.  ¨ Keep work surfaces clean.  ¨ Use a clean spoon each time you sample food while cooking.  ¨ Wash your dishes in hot, soapy water. Air-dry your dishes or use a .  ¨ Do not share forks, cups, or spoons during meals.  · Wear gloves if laundry is visibly soiled.  · Change linens each week or whenever they are soiled.  · Do not shake soiled linens. Doing that may send germs into the air. Put dressings, sanitary or incontinence pads, diapers, and gloves in plastic garbage bags for disposal.     This information is not intended to replace advice given to you by your health care provider. Make sure you discuss any questions you have with your health care provider.     Document Released: 09/26/2009 Document Revised: 01/08/2016 Document Reviewed: 08/20/2015  [employer] Patient Education ©2016 [employer]

## 2017-07-01 NOTE — PROGRESS NOTES
"LIMB PRESERVATION SERVICE     62-year-old female with a history of fibromyalgia and right lower leg paralysis, admitted for an infected ulcer to her right lateral foot. 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    Dr. Wu consulted on 6/20, recommended full neuro workup, and wound vac to open wound.  Possible surgery in the future to correct deformity. No plans at this time.    MRI of lumbar spine shows mild degenerative disease    Patient is to f/u with neurologist, Dr. Harvey, as outpatient     /68 mmHg  Pulse 77  Temp(Src) 37.3 °C (99.1 °F)  Resp 16  Ht 1.676 m (5' 6\")  Wt 113.5 kg (250 lb 3.6 oz)  BMI 40.41 kg/m2  SpO2 92%  LMP 04/09/1977  Breastfeeding? No    CT completed:  No osseous erosion is seen to suggest the presence of osteomyelitis.    Soft tissue ulceration and swelling overlying the fifth tarsometatarsal joint. Septic joint not excluded.    No abscess collection is identified.    Patchy areas of sclerosis within the tarsal bones and distal tibia likely representing bone infarcts.    Demineralization.    5 mm metallic density within the plantar soft tissues of the first digit.           R lateral foot:  Wound VAC changed yesterday  Equinovarus deformity  Pulses palpable    IV Ceftriaxone per ID        PLAN  -Continue wound care per IP wound team  -NWB on right foot  -Abx per ID    D/C plan: home with  for wound VAC changes and IV abx. Plan to DC today  F/U with Jai, F/U with Dr. Wu once nerve conduction studies completed.      "

## 2017-07-01 NOTE — PROGRESS NOTES
"LIMB PRESERVATION SERVICE     62-year-old female with a history of fibromyalgia and right lower leg paralysis, admitted for an infected ulcer to her right lateral foot. 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    Dr. Wu consulted on 6/20, recommended full neuro workup, and wound vac to open wound.  Possible surgery in the future to correct deformity. No plans at this time.    MRI of lumbar spine shows mild degenerative disease    Patient is to f/u with neurologist, Dr. Harvey, as outpatient     /68 mmHg  Pulse 76  Temp(Src) 36.2 °C (97.2 °F)  Resp 16  Ht 1.676 m (5' 6\")  Wt 113.5 kg (250 lb 3.6 oz)  BMI 40.41 kg/m2  SpO2 96%  LMP 04/09/1977  Breastfeeding? No      R lateral foot:  Wound VAC changed today, wound decreasing in size.  Equinovarus deformity  Pulses palpable    IV Ceftriaxone per ID        PLAN  -Continue wound care per IP wound team  Obtain CT of R foot for reconstructive surgery  -NWB on right foot  -Abx per ID    D/C plan: home with  for wound VAC changes and IV abx  F/U with Jai, F/U with Dr. Wu once nerve conduction studies completed.    D/w: RN  "

## 2017-07-01 NOTE — PROGRESS NOTES
Infectious Disease Progress Note    Author: Amelia Odell M.D. DOS & Time created: 2017  8:46 AM    Chief Complaint:  Chief Complaint   Patient presents with   • Abscess     R foot        Interval History:   AF, WBC 5.6, c/o headache and sore gums, c/o wheezing but no SOB, tolerating abx without issues, has not had a BM in 5 days, cx +grp B strep    AF, WBC 5, still has some wheezing but respiratory treatments helping, no plans for surgery, plan for wound vac change today   AF WBC 6.5 continued wheezing   AF WBC 5 up to [place]ade-breathing better today   AF 5.1 got PICC today-still wheezing Foot hurts more today   AF wheezing better-having stinging pain inright foot   AF still having stinging pain   AF eating lunch no new complaints   AF, WBC 8.8, having nausea and abd pain this am  Labs Reviewed, Medications Reviewed, Radiology Reviewed and Wound Reviewed.    Review of Systems:  Review of Systems   Constitutional: Negative for fever and chills.   Respiratory: Positive for wheezing. Negative for shortness of breath.         Improved   Gastrointestinal: Positive for nausea and abdominal pain. Negative for vomiting and diarrhea.   Genitourinary: Negative for dysuria.   Neurological: Positive for sensory change. Negative for headaches.       Hemodynamics:  Temp (24hrs), Av.6 °C (97.9 °F), Min:36.2 °C (97.2 °F), Max:37.3 °C (99.1 °F)  Temperature: 37.3 °C (99.1 °F)  Pulse  Av.4  Min: 74  Max: 118  Blood Pressure: 119/68 mmHg       Physical Exam:  Physical Exam   Constitutional: She is oriented to person, place, and time. She appears well-developed. No distress.   Obese   HENT:   Head: Normocephalic and atraumatic.   Eyes: EOM are normal. Pupils are equal, round, and reactive to light.   Neck: Neck supple.   Cardiovascular: Normal rate and regular rhythm.    Pulmonary/Chest: Effort normal. No respiratory distress. She has wheezes. She has no rales.   Abdominal: Soft. She  exhibits no distension. There is no tenderness.   Musculoskeletal: She exhibits edema.   R foot deformity  Wound vac on dorsum of R foot -2 cm ulcer approx 2-3 mm depth +granulation  Edema ankle    LUE PICC   Neurological: She is alert and oriented to person, place, and time.   Skin: No rash noted.   Nursing note and vitals reviewed.      Meds:    Current facility-administered medications:   •  prochlorperazine (COMPAZINE) tablet 10 mg, 10 mg, Oral, Q6HRS PRN, Shameka Phelan D.O., 10 mg at 07/01/17 0511  •  omeprazole (PRILOSEC) capsule 20 mg, 20 mg, Oral, DAILY, Shameka Phelan D.O., 20 mg at 07/01/17 0752  •  nystatin (MYCOSTATIN) powder, , Topical, BID, Guerline Reed M.D., 1,500,000 Units at 07/01/17 0753  •  oxyCODONE CR (OXYCONTIN) tablet 20 mg, 20 mg, Oral, Q12HRS, Shameka Phelan D.O., 20 mg at 07/01/17 0752  •  alprazolam (XANAX) tablet 0.25 mg, 0.25 mg, Oral, TID PRN, Shameka Phelan D.O., 0.25 mg at 07/01/17 0710  •  PICC Line Insertion has been implemented, , , Once **AND** May use Lidocaine 1% not to exceed 3 mls for local at insertion site, , , CONTINUOUS **AND** NOTIFY MD, , , Once **AND** Tip to dwell in the superior vena cava, , , CONTINUOUS **AND** Do not use PICC Line until placement verified by Chest X Ray, , , CONTINUOUS **AND** DX-CHEST-FOR PICC LINE Perform procedure in:: PICC Room, , , Once **AND** If radiologist reading of chest X-ray states any of the following the PICC should be used, , , CONTINUOUS **AND** Further evaluation of the PICC placement can be retrieved from X-Ray and Imaging, , , CONTINUOUS **AND** Blood draws through PICC line; draws by RN only, , , CONTINUOUS **AND** FLUSHING GUIDELINES WHEN IN USE, , , CONTINUOUS **AND** normal saline PF 10-20 mL, 10-20 mL, Intravenous, PRN **AND** FLUSHING GUIDELINES WHEN NOT IN USE, , , CONTINUOUS **AND** DRESSING MAINTENANCE, , , Once **AND** Change needleless pressure ports and IV tubing every 72 hours per hospital policy, , , CONTINUOUS  **AND** TUBING, , , CONTINUOUS **AND** If there is an MD order to remove the PICC line, any RN may remove the PICC line, , , CONTINUOUS **AND** [] PATIENT EDUCATION MATERIALS, , , Prior to discharge **AND** NURSING COMMUNICATION, , , CONTINUOUS, Guerline Reed M.D.  •  oxycodone immediate-release (ROXICODONE) tablet 5 mg, 5 mg, Oral, Q3HRS PRN, 5 mg at 17 2211 **OR** oxycodone immediate release (ROXICODONE) tablet 10 mg, 10 mg, Oral, Q4HRS PRN, Noel Green M.D., 10 mg at 17 0512  •  acetaminophen (TYLENOL) tablet 650 mg, 650 mg, Oral, Q6HRS, Noel Green M.D., 650 mg at 17 0511  •  carvedilol (COREG) tablet 6.25 mg, 6.25 mg, Oral, BID WITH MEALS, Noel Green M.D., 6.25 mg at 17 0752  •  estradiol (ESTRACE) tablet 2 mg, 2 mg, Oral, DAILY, Noel Green M.D., 2 mg at 17 0751  •  ipratropium-albuterol (DUONEB) nebulizer solution 3 mL, 3 mL, Nebulization, Q4H PRN (RT), Edson Lewis D.O.  •  pregabalin (LYRICA) capsule 75 mg, 75 mg, Oral, TID, Grace Ecsobar M.D., 75 mg at 17 0752  •  albuterol inhaler 2 Puff, 2 Puff, Inhalation, Q4HRS PRN, Edson Lewis D.OJacki, 2 Puff at 17 0803  •  zolpidem (AMBIEN) tablet 5 mg, 5 mg, Oral, HS PRN, Liz Norris, A.P.N., 5 mg at 17 2314  •  cefTRIAXone (ROCEPHIN) 2 g in  mL IVPB, 2 g, Intravenous, Q24HRS, Grace Escobar M.D., Last Rate: 200 mL/hr at 17 0753, 2 g at 17 0753  •  NS (BOLUS) infusion 1,000 mL, 1,000 mL, Intravenous, Once PRN, Grace Escobar M.D.  •  senna-docusate (PERICOLACE or SENOKOT S) 8.6-50 MG per tablet 2 Tab, 2 Tab, Oral, BID, 2 Tab at 17 0752 **AND** polyethylene glycol/lytes (MIRALAX) PACKET 1 Packet, 1 Packet, Oral, QDAY PRN, 1 Packet at 17 1609 **AND** magnesium hydroxide (MILK OF MAGNESIA) suspension 30 mL, 30 mL, Oral, QDAY PRN **AND** bisacodyl (DULCOLAX) suppository 10 mg, 10 mg, Rectal, QDAY PRN, Grace Escobar M.D.  •  Respiratory Care per Protocol, , Nebulization,  Continuous RT, Grace Escobar M.D.  •  heparin injection 5,000 Units, 5,000 Units, Subcutaneous, Q8HRS, Grace Escobar M.D., 5,000 Units at 07/01/17 0512  •  ondansetron (ZOFRAN) syringe/vial injection 4 mg, 4 mg, Intravenous, Q4HRS PRN, Grace Escobar M.D., 4 mg at 06/29/17 2041  •  ondansetron (ZOFRAN ODT) dispertab 4 mg, 4 mg, Oral, Q4HRS PRN, Grace Escobar M.D., 4 mg at 07/01/17 0710  •  Notify provider if pain remains uncontrolled, , , CONTINUOUS **AND** Use the numeric rating scale (NRS-11) on regular floors and Critical-Care Pain Observation Tool (CPOT) on ICUs/Trauma to assess pain, , , CONTINUOUS **AND** Pulse Ox (Oximetry), , , CONTINUOUS **AND** Pharmacy Consult Request ...Pain Management Review, , Other, PRN **AND** If patient difficult to arouse and/or has respiratory depression, stop any opiates that are currently infusing and call a Rapid Response., , , CONTINUOUS **AND** [DISCONTINUED] oxycodone immediate-release (ROXICODONE) tablet 2.5 mg, 2.5 mg, Oral, Q3HRS PRN, Stopped at 06/24/17 1411 **AND** [DISCONTINUED] oxycodone immediate-release (ROXICODONE) tablet 5 mg, 5 mg, Oral, Q3HRS PRN, 5 mg at 06/25/17 0725 **AND** [DISCONTINUED] morphine (pf) 4 mg/ml injection 2 mg, 2 mg, Intravenous, Q3HRS PRN, Grace Escobar M.D., 2 mg at 06/24/17 0747  •  clonazepam (KLONOPIN) tablet 0.5 mg, 0.5 mg, Oral, BID PRN, Grace Escobar M.D., 0.5 mg at 06/30/17 0147  •  telmisartan (MICARDIS) tablet 40 mg, 40 mg, Oral, DAILY, Grace Escobar M.D., 40 mg at 07/01/17 0751    Labs:  Recent Labs      06/30/17   1800   WBC  8.8   RBC  4.00*   HEMOGLOBIN  11.3*   HEMATOCRIT  36.5*   MCV  91.3   MCH  28.3   RDW  47.9   PLATELETCT  277   MPV  10.1   NEUTSPOLYS  52.70   LYMPHOCYTES  34.60   MONOCYTES  8.50   EOSINOPHILS  3.30   BASOPHILS  0.60     Recent Labs      06/30/17   1800   SODIUM  135   POTASSIUM  4.4   CHLORIDE  103   CO2  26   GLUCOSE  111*   BUN  15     Recent Labs      06/30/17   1800   CREATININE  0.77  "      Imaging:  Dx-foot-complete 3+ Right  6/20/2017  1.  Diffuse lateral soft tissue swelling of RIGHT foot with focal ulceration over the 5th metatarsal base. 2.  No gross bony destruction, however osteomyelitis is not excluded by plain film. 3.  No fracture or dislocation. 4.  Possible metallic foreign body within the plantar soft tissues of great toe.    Mr-foot-with Right  6/21/2017  Fourth and fifth TMT centered marrow edema, marginal spurring and joint effusion. No clear bony destruction. This is more likely secondary to degenerative change than septic arthropathy and osteomyelitis although given the overlying skin ulceration, infection cannot be excluded Lateral forefoot cellulitis with medium depth ulceration overlying the fifth TMT Multiple bone infarctions without articular surface collapse    Le Venous Duplex - Dvt ([facility] Only)    6/20/2017   Vascular Laboratory  CONCLUSIONS  Normal right lower extremity superficial and deep venous examination.    Micro:  BLOOD CULTURE   Date Value Ref Range Status   06/20/2017 No growth after 5 days of incubation.  Final      Results     Procedure Component Value Units Date/Time    BLOOD CULTURE [[id number]] Collected:  06/20/17 1907    Order Status:  Completed Specimen Information:  Blood from Peripheral Updated:  06/25/17 2100     Significant Indicator NEG      Source BLD      Site PERIPHERAL      Blood Culture No growth after 5 days of incubation.     Narrative:      Per Hospital Policy: Only change Specimen Src: to \"Line\" if  specified by physician order.    BLOOD CULTURE [[id number]] Collected:  06/20/17 1904    Order Status:  Completed Specimen Information:  Blood from Peripheral Updated:  06/25/17 2100     Significant Indicator NEG      Source BLD      Site PERIPHERAL      Blood Culture No growth after 5 days of incubation.     Narrative:      Per Hospital Policy: Only change Specimen Src: to \"Line\" if  specified by physician order.    " URINALYSIS [[id number]] Collected:  06/24/17 1200    Order Status:  Completed Updated:  06/24/17 1237     Color Lt. Yellow      Character Clear      Specific Gravity 1.009      Ph 6.5      Glucose Negative mg/dL      Ketones Negative mg/dL      Protein Negative mg/dL      Bilirubin Negative      Nitrite Negative      Leukocyte Esterase Negative      Occult Blood Negative      Micro Urine Req see below      Comment: Microscopic examination not performed when specimen is clear  and chemically negative for protein, blood, leukocyte esterase  and nitrite.         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   Anticipate 4-6 weeks of abx   Last 2 weeks can be Zyvox if approved  CT foot - no OM  Stop date 7/31/2017-Orders faxed to  6/29    Cutaneous and vaginal candidiasis  Topical antifungal to skin folds  Topical antifingal to vagina  If no improvement, add fluc    RLE paresthesias  2/2 traumatic injury 2 years ago  Resulting in above    OK to DC from ID standpoint - likely today    FU ID clinic    DW IM Dr Phelan. ID signing off. Please reconsult if needed.

## 2017-07-01 NOTE — CARE PLAN
Problem: Discharge Barriers/Planning  Goal: Patient’s continuum of care needs will be met  Outcome: PROGRESSING AS EXPECTED    Problem: Pain Management  Goal: Pain level will decrease to patient’s comfort goal  Outcome: PROGRESSING AS EXPECTED

## 2017-07-01 NOTE — PROGRESS NOTES
Fall bundle in place, call light within reach, bed in low and lock position. Wound vac still intact. Pt remains on room air. Prn pain medication given to pt. Would continue to monitor pt.

## 2017-07-02 NOTE — DISCHARGE SUMMARY
CHIEF COMPLAINT ON ADMISSION  Chief Complaint   Patient presents with   • Abscess     R foot        CODE STATUS  Prior    HPI & HOSPITAL COURSE  This is a 62 y.o. female here with monoparesis of Lower extremity is chronic debility requiring use of a wheelchair admitted with R foot abscess.  Pt was seen by orthopedic surgeon Dr. Wu for evaluation and IV abx.  Per ortho, pt to have outpatient nerve conduction study and to f/u as outpatient for further orthopedic evaluation.  No plans for surgery yet.    Pt was seen by ID, with recommendations to complete 4-6 weeks IV abx.  Pt has been arranged to have home abx infusion with rocephin through 7/17, then transition to zyvox to complete full 6 week course.  Pt is to continue wound vac care with home health.  Wound culture is positive for Group B strep.  MRI with possible OM, to continue conservative management per orthopedic recommendations    Pt has returned to baseline physical function and plan to discharge home with family support and home health.    During the course of her stay, pt did have mild respiratory wheezing and sob, improved with respiratory treatment. She likely has underlying obstructive pulmonary disorder and advised to f/u with pulmonary for further evaluation as outpatient.    Pt has also been treated for cutaneous and vaginal candidiasis with nystatin powder.    At this point, pt has been cleared for dc to home with home health.    Therefore, she is discharged in good and stable condition with close outpatient follow-up.    SPECIFIC OUTPATIENT FOLLOW-UP  PCP in 1 week  ID in 2 weeks  Ortho in 2 weeks  To have outpatient nerve conduction study  Cont wound vac care  IV rocephin though 7/17 then zyvox through 7/31.    DISCHARGE PROBLEM LIST  Principal Problem:    Foot ulcer, right (CMS-HCC) POA: Unknown  Active Problems:    Nausea POA: Unknown    Anxiety disorder POA: Unknown    Fibromyalgia (Chronic) POA: Yes    Chronic pain syndrome (Chronic) POA:  Yes    Monoparesis of lower extremity (CMS-HCC) POA: Unknown    Weakness of right leg POA: Unknown    Morbid obesity with BMI of 40.0-44.9, adult (CMS-HCC) POA: Unknown    Nephrolithiasis POA: Unknown    Insomnia disorder POA: Unknown    Physical debility POA: Unknown    Muscle spasms of lower extremity POA: Unknown    Wheeze POA: Unknown  Resolved Problems:    HTN (hypertension) (Chronic) POA: Yes      FOLLOW UP  No future appointments.  Dl Tsai M.D.  [id number]  [facility] [address]  [phone]    Schedule an appointment as soon as possible for a visit in 2 weeks  Hospital follow-up appointment with PCP    Trino Bocanegra ([facility])  [address] [facility] [id number] [phone]        Dl Tsai M.D.  [address]  [facility] [address]  [phone]    In 1 week      [facility]  [address] [facility] [phone]          MEDICATIONS ON DISCHARGE   Joan Olivares   Home Medication Instructions SHENG:[id number]    Printed on:07/02/17 0805   Medication Information                      albuterol 108 (90 BASE) MCG/ACT Aero Soln inhalation aerosol  Inhale 2 Puffs by mouth every four hours as needed.             alprazolam (XANAX) 0.25 MG Tab  Take 1 Tab by mouth 3 times a day as needed for Anxiety.             carvedilol (COREG) 6.25 MG Tab  Take 1 Tab by mouth 2 times a day, with meals.             Diphenhydramine-APAP, sleep, (TYLENOL PM EXTRA STRENGTH PO)  Take 2-4 Tabs by mouth as needed (For pain and sleep).             estradiol (ESTRACE) 2 MG TABS  Take 2 mg by mouth every day.             linezolid (ZYVOX) 600 MG Tab  Take 1 Tab by mouth 2 times a day for 14 days.             naproxen (NAPROSYN) 250 MG Tab  Take 500-1,000 mg by mouth as needed. Indications: Mild to Moderate Pain             NS SOLN 100 mL with cefTRIAXone 2 GM SOLR 2 g  2 g by Intravenous route every 24 hours for 18 days.             nystatin (MYCOSTATIN)  powder  apply to affected area bid             oxyCODONE CR (OXYCONTIN) 20 MG Tablet Extended Release 12 hour Abuse-Deterrent  Take 1 Tab by mouth every 12 hours.             oxycodone immediate-release (ROXICODONE) 5 MG Tab  Take 1 Tab by mouth every 3 hours as needed for Severe Pain.             pregabalin (LYRICA) 75 MG Cap  Take 75 mg by mouth 3 times a day.             prochlorperazine (COMPAZINE) 10 MG Tab  Take 1 Tab by mouth every 6 hours as needed for Nausea/Vomiting.             senna-docusate (PERICOLACE OR SENOKOT S) 8.6-50 MG Tab  Take 2 Tabs by mouth 2 Times a Day.             telmisartan (MICARDIS) 40 MG Tab  Take 1 Tab by mouth every day.                 DIET  No orders of the defined types were placed in this encounter.       ACTIVITY  As tolerated.  Weight bearing as tolerated      CONSULTATIONS  Ortho elvie ku    PROCEDURES  none    LABORATORY  Lab Results   Component Value Date/Time    SODIUM 135 06/30/2017 06:00 PM    POTASSIUM 4.4 06/30/2017 06:00 PM    CHLORIDE 103 06/30/2017 06:00 PM    CO2 26 06/30/2017 06:00 PM    GLUCOSE 111* 06/30/2017 06:00 PM    BUN 15 06/30/2017 06:00 PM    CREATININE 0.77 06/30/2017 06:00 PM        Lab Results   Component Value Date/Time    WBC 8.8 06/30/2017 06:00 PM    HEMOGLOBIN 11.3* 06/30/2017 06:00 PM    HEMATOCRIT 36.5* 06/30/2017 06:00 PM    PLATELET COUNT 277 06/30/2017 06:00 PM        Total time of the discharge process exceeds 45 minutes

## 2017-07-07 ENCOUNTER — HOSPITAL ENCOUNTER (OUTPATIENT)
Facility: MEDICAL CENTER | Age: 62
End: 2017-07-07
Attending: INTERNAL MEDICINE
Payer: MEDICARE

## 2017-07-07 LAB
BASOPHILS # BLD AUTO: 1 % (ref 0–1.8)
BASOPHILS # BLD: 0.06 K/UL (ref 0–0.12)
EOSINOPHIL # BLD AUTO: 0.23 K/UL (ref 0–0.51)
EOSINOPHIL NFR BLD: 4 % (ref 0–6.9)
ERYTHROCYTE [DISTWIDTH] IN BLOOD BY AUTOMATED COUNT: 46.5 FL (ref 35.9–50)
HCT VFR BLD AUTO: 42 % (ref 37–47)
HGB BLD-MCNC: 12.9 G/DL (ref 12–16)
IMM GRANULOCYTES # BLD AUTO: 0.01 K/UL (ref 0–0.11)
IMM GRANULOCYTES NFR BLD AUTO: 0.2 % (ref 0–0.9)
LYMPHOCYTES # BLD AUTO: 2.35 K/UL (ref 1–4.8)
LYMPHOCYTES NFR BLD: 40.9 % (ref 22–41)
MCH RBC QN AUTO: 27.6 PG (ref 27–33)
MCHC RBC AUTO-ENTMCNC: 30.7 G/DL (ref 33.6–35)
MCV RBC AUTO: 89.9 FL (ref 81.4–97.8)
MONOCYTES # BLD AUTO: 0.41 K/UL (ref 0–0.85)
MONOCYTES NFR BLD AUTO: 7.1 % (ref 0–13.4)
NEUTROPHILS # BLD AUTO: 2.69 K/UL (ref 2–7.15)
NEUTROPHILS NFR BLD: 46.8 % (ref 44–72)
NRBC # BLD AUTO: 0 K/UL
NRBC BLD AUTO-RTO: 0 /100 WBC
PLATELET # BLD AUTO: 365 K/UL (ref 164–446)
PMV BLD AUTO: 10.4 FL (ref 9–12.9)
RBC # BLD AUTO: 4.67 M/UL (ref 4.2–5.4)
WBC # BLD AUTO: 5.8 K/UL (ref 4.8–10.8)

## 2017-07-07 PROCEDURE — 85025 COMPLETE CBC W/AUTO DIFF WBC: CPT

## 2017-07-10 ENCOUNTER — OFFICE VISIT (OUTPATIENT)
Dept: URGENT CARE | Facility: PHYSICIAN GROUP | Age: 62
End: 2017-07-10
Payer: MEDICARE

## 2017-07-10 ENCOUNTER — HOSPITAL ENCOUNTER (OUTPATIENT)
Facility: MEDICAL CENTER | Age: 62
End: 2017-07-10
Attending: INTERNAL MEDICINE
Payer: MEDICARE

## 2017-07-10 VITALS
TEMPERATURE: 98.2 F | DIASTOLIC BLOOD PRESSURE: 100 MMHG | SYSTOLIC BLOOD PRESSURE: 150 MMHG | HEART RATE: 90 BPM | BODY MASS INDEX: 36.96 KG/M2 | OXYGEN SATURATION: 98 % | HEIGHT: 66 IN | WEIGHT: 230 LBS | RESPIRATION RATE: 16 BRPM

## 2017-07-10 DIAGNOSIS — I10 ESSENTIAL HYPERTENSION: ICD-10-CM

## 2017-07-10 LAB
ALBUMIN SERPL BCP-MCNC: 4.1 G/DL (ref 3.2–4.9)
ALP SERPL-CCNC: 54 U/L (ref 30–99)
ALT SERPL-CCNC: 6 U/L (ref 2–50)
AST SERPL-CCNC: 16 U/L (ref 12–45)
BILIRUB CONJ SERPL-MCNC: <0.1 MG/DL (ref 0.1–0.5)
BILIRUB INDIRECT SERPL-MCNC: NORMAL MG/DL (ref 0–1)
BILIRUB SERPL-MCNC: 0.6 MG/DL (ref 0.1–1.5)
BUN SERPL-MCNC: 10 MG/DL (ref 8–22)
CALCIUM SERPL-MCNC: 9.6 MG/DL (ref 8.5–10.5)
CHLORIDE SERPL-SCNC: 108 MMOL/L (ref 96–112)
CO2 SERPL-SCNC: 20 MMOL/L (ref 20–33)
CREAT SERPL-MCNC: 0.79 MG/DL (ref 0.5–1.4)
GFR SERPL CREATININE-BSD FRML MDRD: >60 ML/MIN/1.73 M 2
GLUCOSE SERPL-MCNC: 81 MG/DL (ref 65–99)
PHOSPHATE SERPL-MCNC: 2.6 MG/DL (ref 2.5–4.5)
POTASSIUM SERPL-SCNC: 4.2 MMOL/L (ref 3.6–5.5)
PROT SERPL-MCNC: 7.4 G/DL (ref 6–8.2)
SODIUM SERPL-SCNC: 138 MMOL/L (ref 135–145)

## 2017-07-10 PROCEDURE — 99214 OFFICE O/P EST MOD 30 MIN: CPT | Performed by: NURSE PRACTITIONER

## 2017-07-10 PROCEDURE — 80076 HEPATIC FUNCTION PANEL: CPT

## 2017-07-10 PROCEDURE — 80069 RENAL FUNCTION PANEL: CPT

## 2017-07-10 RX ORDER — TELMISARTAN 40 MG/1
40 TABLET ORAL DAILY
Qty: 30 TAB | Refills: 0 | Status: SHIPPED | OUTPATIENT
Start: 2017-07-10 | End: 2019-05-17

## 2017-07-10 ASSESSMENT — ENCOUNTER SYMPTOMS
FOCAL WEAKNESS: 0
DIZZINESS: 0
SHORTNESS OF BREATH: 0
SPEECH CHANGE: 0
HEADACHES: 1
SENSORY CHANGE: 0
VOMITING: 0
NAUSEA: 0
FEVER: 0
MYALGIAS: 0
CHILLS: 0
COUGH: 0

## 2017-07-10 ASSESSMENT — PAIN SCALES - GENERAL: PAINLEVEL: 8=MODERATE-SEVERE PAIN

## 2017-07-10 ASSESSMENT — PATIENT HEALTH QUESTIONNAIRE - PHQ9: CLINICAL INTERPRETATION OF PHQ2 SCORE: 0

## 2017-07-10 NOTE — PROGRESS NOTES
"Subjective:      Joan Olivares is a 62 y.o. female who presents with Blood Pressure Problem            HPI Comments: Pt is here because home health told her that her blood pressure was high and she needed to go to urgent care. The patient has an appt with her pcp this afternoon. The patient is currently being tx with iv abx due to a foot infection.  She states she was in a lot of pain last night and did not sleep and did not take her pain medications.     Pt states she previously was on micardis and after she was in the hospital she was getting coreg. She was told on discharge and her discharge paper work states she should be on both coreg and micardis but was only given an rx for coreg. She has been taking coreg as directed.     The discharge summary was reviewed which indicates her discharge meds include both coreg and micardis. Her in house progress notes indicate she was on a number of different bp medications.       Review of Systems   Constitutional: Negative for fever and chills.   HENT: Negative for congestion.    Respiratory: Negative for cough and shortness of breath.    Cardiovascular: Negative for chest pain.   Gastrointestinal: Negative for nausea and vomiting.   Musculoskeletal: Positive for joint pain (right foot). Negative for myalgias.   Skin: Negative for rash.   Neurological: Positive for headaches. Negative for dizziness, sensory change, speech change and focal weakness.          Objective:     /100 mmHg  Pulse 90  Temp(Src) 36.8 °C (98.2 °F)  Resp 16  Ht 1.676 m (5' 5.98\")  Wt 104.327 kg (230 lb)  BMI 37.14 kg/m2  SpO2 98%  LMP 04/09/1977     Physical Exam   Constitutional: She appears well-developed and well-nourished. No distress.   HENT:   Head: Normocephalic and atraumatic.   Eyes: Conjunctivae are normal. Pupils are equal, round, and reactive to light.   Neck: Neck supple.   Cardiovascular: Normal rate.    Pulmonary/Chest: Effort normal. No respiratory distress. "   Neurological: She is alert.   Awake, alert, answering questions appropriately, moving all extremeties   Skin: Skin is warm and dry. No erythema.   Psychiatric: She has a normal mood and affect. Her behavior is normal.   Vitals reviewed.              Assessment/Plan:       1. Essential hypertension  telmisartan (MICARDIS) 40 MG Tab       Pt blood pressure was check by ma and by myself both were in the 160/100 area. I discussed with the pt she needs to f/u with her pcp as scheduled today for further care of her bp but I wrote a rx for micardis in care she does not seen her pcp. Her pcp office is in [place] and is a 2 hr drive from [place].

## 2017-07-11 ENCOUNTER — HOSPITAL ENCOUNTER (OUTPATIENT)
Dept: LAB | Facility: MEDICAL CENTER | Age: 62
End: 2017-07-11
Attending: INTERNAL MEDICINE
Payer: MEDICARE

## 2017-07-11 LAB
ALBUMIN SERPL BCP-MCNC: 4.1 G/DL (ref 3.2–4.9)
ALBUMIN/GLOB SERPL: 1.2 G/DL
ALP SERPL-CCNC: 57 U/L (ref 30–99)
ALT SERPL-CCNC: 10 U/L (ref 2–50)
ANION GAP SERPL CALC-SCNC: 8 MMOL/L (ref 0–11.9)
AST SERPL-CCNC: 12 U/L (ref 12–45)
BASOPHILS # BLD AUTO: 1.1 % (ref 0–1.8)
BASOPHILS # BLD: 0.08 K/UL (ref 0–0.12)
BILIRUB SERPL-MCNC: 0.5 MG/DL (ref 0.1–1.5)
BUN SERPL-MCNC: 9 MG/DL (ref 8–22)
CALCIUM SERPL-MCNC: 9.5 MG/DL (ref 8.5–10.5)
CHLORIDE SERPL-SCNC: 106 MMOL/L (ref 96–112)
CO2 SERPL-SCNC: 22 MMOL/L (ref 20–33)
CREAT SERPL-MCNC: 0.78 MG/DL (ref 0.5–1.4)
CRP SERPL HS-MCNC: 0.21 MG/DL (ref 0–0.75)
EOSINOPHIL # BLD AUTO: 0.19 K/UL (ref 0–0.51)
EOSINOPHIL NFR BLD: 2.7 % (ref 0–6.9)
ERYTHROCYTE [DISTWIDTH] IN BLOOD BY AUTOMATED COUNT: 44.5 FL (ref 35.9–50)
ERYTHROCYTE [SEDIMENTATION RATE] IN BLOOD BY WESTERGREN METHOD: 25 MM/HOUR (ref 0–30)
EST. AVERAGE GLUCOSE BLD GHB EST-MCNC: 111 MG/DL
GFR SERPL CREATININE-BSD FRML MDRD: >60 ML/MIN/1.73 M 2
GLOBULIN SER CALC-MCNC: 3.5 G/DL (ref 1.9–3.5)
GLUCOSE SERPL-MCNC: 108 MG/DL (ref 65–99)
HBA1C MFR BLD: 5.5 % (ref 0–5.6)
HCT VFR BLD AUTO: 42.9 % (ref 37–47)
HGB BLD-MCNC: 13.7 G/DL (ref 12–16)
IMM GRANULOCYTES # BLD AUTO: 0.02 K/UL (ref 0–0.11)
IMM GRANULOCYTES NFR BLD AUTO: 0.3 % (ref 0–0.9)
LYMPHOCYTES # BLD AUTO: 2.55 K/UL (ref 1–4.8)
LYMPHOCYTES NFR BLD: 36.6 % (ref 22–41)
MCH RBC QN AUTO: 27.9 PG (ref 27–33)
MCHC RBC AUTO-ENTMCNC: 31.9 G/DL (ref 33.6–35)
MCV RBC AUTO: 87.4 FL (ref 81.4–97.8)
MONOCYTES # BLD AUTO: 0.54 K/UL (ref 0–0.85)
MONOCYTES NFR BLD AUTO: 7.7 % (ref 0–13.4)
NEUTROPHILS # BLD AUTO: 3.59 K/UL (ref 2–7.15)
NEUTROPHILS NFR BLD: 51.6 % (ref 44–72)
NRBC # BLD AUTO: 0 K/UL
NRBC BLD AUTO-RTO: 0 /100 WBC
PLATELET # BLD AUTO: 334 K/UL (ref 164–446)
PMV BLD AUTO: 9.5 FL (ref 9–12.9)
POTASSIUM SERPL-SCNC: 3.8 MMOL/L (ref 3.6–5.5)
PROT SERPL-MCNC: 7.6 G/DL (ref 6–8.2)
RBC # BLD AUTO: 4.91 M/UL (ref 4.2–5.4)
SODIUM SERPL-SCNC: 136 MMOL/L (ref 135–145)
T4 FREE SERPL-MCNC: 0.75 NG/DL (ref 0.53–1.43)
TSH SERPL DL<=0.005 MIU/L-ACNC: 2.81 UIU/ML (ref 0.3–3.7)
WBC # BLD AUTO: 7 K/UL (ref 4.8–10.8)

## 2017-07-11 PROCEDURE — 85025 COMPLETE CBC W/AUTO DIFF WBC: CPT

## 2017-07-11 PROCEDURE — 85652 RBC SED RATE AUTOMATED: CPT

## 2017-07-11 PROCEDURE — 86140 C-REACTIVE PROTEIN: CPT

## 2017-07-11 PROCEDURE — 36415 COLL VENOUS BLD VENIPUNCTURE: CPT

## 2017-07-11 PROCEDURE — 84443 ASSAY THYROID STIM HORMONE: CPT

## 2017-07-11 PROCEDURE — 84439 ASSAY OF FREE THYROXINE: CPT

## 2017-07-11 PROCEDURE — 83036 HEMOGLOBIN GLYCOSYLATED A1C: CPT | Mod: GA

## 2017-07-11 PROCEDURE — 80053 COMPREHEN METABOLIC PANEL: CPT

## 2017-07-14 ENCOUNTER — TELEPHONE (OUTPATIENT)
Dept: INFECTIOUS DISEASES | Facility: MEDICAL CENTER | Age: 62
End: 2017-07-14

## 2017-07-14 NOTE — TELEPHONE ENCOUNTER
Called and spoke with pt. She is doing really well. The wound vac will be removed next week by home health RN. She will call to schedule an appointment if her foot stops healing well. ROBERT

## 2017-07-17 ENCOUNTER — APPOINTMENT (OUTPATIENT)
Dept: RADIOLOGY | Facility: MEDICAL CENTER | Age: 62
End: 2017-07-17
Attending: EMERGENCY MEDICINE
Payer: MEDICARE

## 2017-07-17 ENCOUNTER — TELEPHONE (OUTPATIENT)
Dept: INFECTIOUS DISEASES | Facility: MEDICAL CENTER | Age: 62
End: 2017-07-17

## 2017-07-17 ENCOUNTER — HOSPITAL ENCOUNTER (EMERGENCY)
Facility: MEDICAL CENTER | Age: 62
End: 2017-07-17
Attending: EMERGENCY MEDICINE
Payer: MEDICARE

## 2017-07-17 VITALS
WEIGHT: 225 LBS | TEMPERATURE: 99.2 F | HEIGHT: 66 IN | OXYGEN SATURATION: 94 % | RESPIRATION RATE: 18 BRPM | SYSTOLIC BLOOD PRESSURE: 169 MMHG | BODY MASS INDEX: 36.16 KG/M2 | HEART RATE: 98 BPM | DIASTOLIC BLOOD PRESSURE: 103 MMHG

## 2017-07-17 DIAGNOSIS — I10 ESSENTIAL HYPERTENSION: ICD-10-CM

## 2017-07-17 LAB
ALBUMIN SERPL BCP-MCNC: 4.1 G/DL (ref 3.2–4.9)
ALBUMIN/GLOB SERPL: 1.3 G/DL
ALP SERPL-CCNC: 60 U/L (ref 30–99)
ALT SERPL-CCNC: 14 U/L (ref 2–50)
ANION GAP SERPL CALC-SCNC: 13 MMOL/L (ref 0–11.9)
AST SERPL-CCNC: 16 U/L (ref 12–45)
BASOPHILS # BLD AUTO: 0.5 % (ref 0–1.8)
BASOPHILS # BLD: 0.04 K/UL (ref 0–0.12)
BILIRUB SERPL-MCNC: 0.3 MG/DL (ref 0.1–1.5)
BNP SERPL-MCNC: 24 PG/ML (ref 0–100)
BUN SERPL-MCNC: 8 MG/DL (ref 8–22)
CALCIUM SERPL-MCNC: 9.2 MG/DL (ref 8.4–10.2)
CHLORIDE SERPL-SCNC: 106 MMOL/L (ref 96–112)
CO2 SERPL-SCNC: 21 MMOL/L (ref 20–33)
CREAT SERPL-MCNC: 0.76 MG/DL (ref 0.5–1.4)
EOSINOPHIL # BLD AUTO: 0.2 K/UL (ref 0–0.51)
EOSINOPHIL NFR BLD: 2.3 % (ref 0–6.9)
ERYTHROCYTE [DISTWIDTH] IN BLOOD BY AUTOMATED COUNT: 44.4 FL (ref 35.9–50)
GFR SERPL CREATININE-BSD FRML MDRD: >60 ML/MIN/1.73 M 2
GLOBULIN SER CALC-MCNC: 3.2 G/DL (ref 1.9–3.5)
GLUCOSE SERPL-MCNC: 119 MG/DL (ref 65–99)
HCT VFR BLD AUTO: 43.2 % (ref 37–47)
HGB BLD-MCNC: 14.2 G/DL (ref 12–16)
IMM GRANULOCYTES # BLD AUTO: 0.04 K/UL (ref 0–0.11)
IMM GRANULOCYTES NFR BLD AUTO: 0.5 % (ref 0–0.9)
LYMPHOCYTES # BLD AUTO: 2.79 K/UL (ref 1–4.8)
LYMPHOCYTES NFR BLD: 31.5 % (ref 22–41)
MCH RBC QN AUTO: 28.1 PG (ref 27–33)
MCHC RBC AUTO-ENTMCNC: 32.9 G/DL (ref 33.6–35)
MCV RBC AUTO: 85.5 FL (ref 81.4–97.8)
MONOCYTES # BLD AUTO: 0.75 K/UL (ref 0–0.85)
MONOCYTES NFR BLD AUTO: 8.5 % (ref 0–13.4)
NEUTROPHILS # BLD AUTO: 5.03 K/UL (ref 2–7.15)
NEUTROPHILS NFR BLD: 56.7 % (ref 44–72)
NRBC # BLD AUTO: 0 K/UL
NRBC BLD AUTO-RTO: 0 /100 WBC
PLATELET # BLD AUTO: 310 K/UL (ref 164–446)
PMV BLD AUTO: 9.1 FL (ref 9–12.9)
POTASSIUM SERPL-SCNC: 3.6 MMOL/L (ref 3.6–5.5)
PROT SERPL-MCNC: 7.3 G/DL (ref 6–8.2)
RBC # BLD AUTO: 5.05 M/UL (ref 4.2–5.4)
SODIUM SERPL-SCNC: 140 MMOL/L (ref 135–145)
TROPONIN I SERPL-MCNC: <0.02 NG/ML (ref 0–0.04)
WBC # BLD AUTO: 8.9 K/UL (ref 4.8–10.8)

## 2017-07-17 PROCEDURE — 83880 ASSAY OF NATRIURETIC PEPTIDE: CPT

## 2017-07-17 PROCEDURE — A9270 NON-COVERED ITEM OR SERVICE: HCPCS | Performed by: EMERGENCY MEDICINE

## 2017-07-17 PROCEDURE — 85025 COMPLETE CBC W/AUTO DIFF WBC: CPT

## 2017-07-17 PROCEDURE — 71010 DX-CHEST-PORTABLE (1 VIEW): CPT

## 2017-07-17 PROCEDURE — 99284 EMERGENCY DEPT VISIT MOD MDM: CPT

## 2017-07-17 PROCEDURE — 84484 ASSAY OF TROPONIN QUANT: CPT

## 2017-07-17 PROCEDURE — 93005 ELECTROCARDIOGRAM TRACING: CPT | Performed by: EMERGENCY MEDICINE

## 2017-07-17 PROCEDURE — 36415 COLL VENOUS BLD VENIPUNCTURE: CPT

## 2017-07-17 PROCEDURE — 700102 HCHG RX REV CODE 250 W/ 637 OVERRIDE(OP): Performed by: EMERGENCY MEDICINE

## 2017-07-17 PROCEDURE — 80053 COMPREHEN METABOLIC PANEL: CPT

## 2017-07-17 RX ORDER — IPRATROPIUM BROMIDE AND ALBUTEROL SULFATE 2.5; .5 MG/3ML; MG/3ML
3 SOLUTION RESPIRATORY (INHALATION)
Status: DISCONTINUED | OUTPATIENT
Start: 2017-07-17 | End: 2017-07-17 | Stop reason: HOSPADM

## 2017-07-17 RX ORDER — OXYCODONE HYDROCHLORIDE 5 MG/1
5 TABLET ORAL ONCE
Status: COMPLETED | OUTPATIENT
Start: 2017-07-17 | End: 2017-07-17

## 2017-07-17 RX ADMIN — OXYCODONE HYDROCHLORIDE 5 MG: 5 TABLET ORAL at 16:28

## 2017-07-17 ASSESSMENT — PAIN SCALES - GENERAL
PAINLEVEL_OUTOF10: 8
PAINLEVEL_OUTOF10: 5

## 2017-07-17 NOTE — ED NOTES
Pt to er per HH, for elevated bp. Pt has wound vac to rt foot for abscess and has had frontal h/a x 1 week with elevated sbp and dbp per same. Denies visual disturbances, is wheelchair bound.

## 2017-07-17 NOTE — ED NOTES
Pt medicated as ordered for pain. Pt sitting up in a position of comfort. C/O pain in low back and foot. Side rail up x1 with call light in reach. Friend at bedside.

## 2017-07-17 NOTE — ED AVS SNAPSHOT
7/17/2017    Joan Olivares  [address] [place] [address]    Dear Joan:    [facility] wants to ensure your discharge home is safe and you or your loved ones have had all of your questions answered regarding your care after you leave the hospital.    Below is a list of resources and contact information should you have any questions regarding your hospital stay, follow-up instructions, or active medical symptoms.    Questions or Concerns Regarding… Contact   Medical Questions Related to Your Discharge  (7 days a week, 8am-5pm) Contact a Nurse Care Coordinator   [phone]   Medical Questions Not Related to Your Discharge  (24 hours a day / 7 days a week)  Contact the Nurse Health Line   [phone]    Medications or Discharge Instructions Refer to your discharge packet   or contact your [facility] Primary Care Provider   [phone]   Follow-up Appointment(s) Schedule your appointment via [employer]   or contact Scheduling [phone]   Billing Review your statement via [employer]  or contact Billing [phone]   Medical Records Review your records via [employer]   or contact Medical Records [phone]     You may receive a telephone call within two days of discharge. This call is to make certain you understand your discharge instructions and have the opportunity to have any questions answered. You can also easily access your medical information, test results and upcoming appointments via the [employer] free online health management tool. You can learn more and sign up at [employer]. For assistance setting up your [employer] account, please call [phone].    Once again, we want to ensure your discharge home is safe and that you have a clear understanding of any next steps in your care. If you have any questions or concerns, please do not hesitate to contact us, we are here for you. Thank you for choosing [facility] for your healthcare needs.    Sincerely,    Your [facility] Healthcare Team

## 2017-07-17 NOTE — ED PROVIDER NOTES
ED Provider Note    CHIEF COMPLAINT  Chief Complaint   Patient presents with   • Blood Pressure Problem   • Head Ache       HPI  Joan Olivares is a 62 y.o. female who presents with multiple complaints. Initially she came in because of the home health nurse noting high blood pressures at home. Elevated diastolic she went to urgent care was sent here. She also had the same complaint about a week ago [facility] and was started on Micardis. Now she takes Coreg twice a day and Micardis daily. She's been having ongoing headache for about a week mild she's also had achiness in her chest in the middle the night a few times this week none today. There is no arm neck or back pain is new some nausea but no vomiting she's taking Compazine for that. She is been discharged from a 12 day admission for a ulcer on her right leg which is paralyzed from previous trauma. That seems to be healing well and she is getting outpatient antibiotic treatment wound care. She's never had any heart disease had a normal stress type test 10 years ago according to her. I have no medical record to validate that. She has no shortness of breath intermittently mild right upper quadrant pain. Surgical history of appendectomy hysterectomy. Nonsmoker no alcohol or drugs. She did have a history DVT when she was laying on the floor for 3 days has been over 10 years ago. That is the reason for her paralysis of her lower leg. All other systems negative    REVIEW OF SYSTEMS  See HPI for further details    PAST MEDICAL HISTORY  Past Medical History   Diagnosis Date   • Heart burn    • Pain    • Unspecified disorder of thyroid    • Kidney stones    • Arthritis    • ASTHMA    • Back pain    • Bladder infection    • Bronchitis    • Hemorrhoids    • Migraine    • Pleurisy    • Pneumonia    • Ulcer (CMS-HCC)    • Scarlet fever    • Hypertension    • Paresthesia of right foot        FAMILY HISTORY  Family History   Problem Relation Age of Onset  "  • Lung Disease Mother    • Cancer Father    • Heart Disease Father    • Lung Disease Father    • Thyroid Sister    • Cancer Brother    • Psychiatry Son        SOCIAL HISTORY  Social History     Social History   • Marital Status:      Spouse Name: N/A   • Number of Children: N/A   • Years of Education: N/A     Social History Main Topics   • Smoking status: Never Smoker    • Smokeless tobacco: None   • Alcohol Use: No   • Drug Use: No   • Sexual Activity: No     Other Topics Concern   • None     Social History Narrative       SURGICAL HISTORY  Past Surgical History   Procedure Laterality Date   • Tonsillectomy and adenoidectomy     • Other orthopedic surgery  2004     Right Leg ORIF   • Appendectomy     • Thyroid lobectomy  11/11/2009     Performed by PERLITA HERNANDEZ at SUR[facility]   • Hysterectomy laparoscopy       bso/ 1977       CURRENT MEDICATIONS  Home Medications     **Home medications have not yet been reviewed for this encounter**          ALLERGIES  Allergies   Allergen Reactions   • Penicillins Rash and Swelling     Tolerated ceftriaxone on 6/20/17       PHYSICAL EXAM  VITAL SIGNS: /103 mmHg  Pulse 117  Temp(Src) 37.3 °C (99.2 °F)  Resp 18  Ht 1.676 m (5' 5.98\")  Wt 102.059 kg (225 lb)  BMI 36.33 kg/m2  SpO2 97%  LMP 04/09/1977    Constitutional: Patient is alert and oriented x3 in no distress   HENT: Moist mucous membranes  Eyes: No conjunctivitis or icterus  Neck: Trach is midline no palpable thyroid  Lymphatic: Negative anterior cervical lymphadenopathy  Cardiovascular: Normal heart rate   Thorax & Lungs: Scattered expiratory wheezes no rhonchi  Abdomen: Morbidly obese nontender  Neurologic: Normal motor sensation in the upper or lower extremities except the right extremity below the right knee.  Extremities: She has a dressing on an ulcerated wound was not taken down she has a wound VAC in that wound. There is no proximal cellulitis  Psychiatric: Affect normal, Judgment " normal, Mood normal.       EKG normal sinus rhythm rate 75 with a normal corrected QT interval normal QRS axis inferior Q waves possible old inferior MI left atrial enlargement otherwise normal EKG    Results for orders placed or performed during the hospital encounter of 07/17/17   CBC WITH DIFFERENTIAL   Result Value Ref Range    WBC 8.9 4.8 - 10.8 K/uL    RBC 5.05 4.20 - 5.40 M/uL    Hemoglobin 14.2 12.0 - 16.0 g/dL    Hematocrit 43.2 37.0 - 47.0 %    MCV 85.5 81.4 - 97.8 fL    MCH 28.1 27.0 - 33.0 pg    MCHC 32.9 (L) 33.6 - 35.0 g/dL    RDW 44.4 35.9 - 50.0 fL    Platelet Count 310 164 - 446 K/uL    MPV 9.1 9.0 - 12.9 fL    Neutrophils-Polys 56.70 44.00 - 72.00 %    Lymphocytes 31.50 22.00 - 41.00 %    Monocytes 8.50 0.00 - 13.40 %    Eosinophils 2.30 0.00 - 6.90 %    Basophils 0.50 0.00 - 1.80 %    Immature Granulocytes 0.50 0.00 - 0.90 %    Nucleated RBC 0.00 /100 WBC    Neutrophils (Absolute) 5.03 2.00 - 7.15 K/uL    Lymphs (Absolute) 2.79 1.00 - 4.80 K/uL    Monos (Absolute) 0.75 0.00 - 0.85 K/uL    Eos (Absolute) 0.20 0.00 - 0.51 K/uL    Baso (Absolute) 0.04 0.00 - 0.12 K/uL    Immature Granulocytes (abs) 0.04 0.00 - 0.11 K/uL    NRBC (Absolute) 0.00 K/uL   COMP METABOLIC PANEL   Result Value Ref Range    Sodium 140 135 - 145 mmol/L    Potassium 3.6 3.6 - 5.5 mmol/L    Chloride 106 96 - 112 mmol/L    Co2 21 20 - 33 mmol/L    Anion Gap 13.0 (H) 0.0 - 11.9    Glucose 119 (H) 65 - 99 mg/dL    Bun 8 8 - 22 mg/dL    Creatinine 0.76 0.50 - 1.40 mg/dL    Calcium 9.2 8.4 - 10.2 mg/dL    AST(SGOT) 16 12 - 45 U/L    ALT(SGPT) 14 2 - 50 U/L    Alkaline Phosphatase 60 30 - 99 U/L    Total Bilirubin 0.3 0.1 - 1.5 mg/dL    Albumin 4.1 3.2 - 4.9 g/dL    Total Protein 7.3 6.0 - 8.2 g/dL    Globulin 3.2 1.9 - 3.5 g/dL    A-G Ratio 1.3 g/dL   TROPONIN   Result Value Ref Range    Troponin I <0.02 0.00 - 0.04 ng/mL   BTYPE NATRIURETIC PEPTIDE   Result Value Ref Range    B Natriuretic Peptide 24 0 - 100 pg/mL   ESTIMATED GFR    Result Value Ref Range    GFR If African American >60 >60 mL/min/1.73 m 2    GFR If Non African American >60 >60 mL/min/1.73 m 2   EKG (NOW)   Result Value Ref Range    Report       [facility] Emergency [facility]    Test Date:  2017  Pt Name:    CESAR PLUMMER                Department: E[facility]M  MRN:        [id number]                      [facility]  Gender:     F                            Technician: [id number]  :        [id number]                   Requested By:YUVAL VILLASENOR  Order #:    [id number]                    Reading MD:    Measurements  Intervals                                Axis  Rate:       95                           P:          33  NJ:         167                          QRS:        9  QRSD:       107                          T:          41  QT:         368  QTc:        463    Interpretive Statements  Sinus rhythm  Probable left atrial enlargement  Inferior infarct, old  Compared to ECG 2015 13:08:32  No significant changes        DX-CHEST-PORTABLE (1 VIEW)   Final Result      No radiographic evidence of acute cardiopulmonary process.            COURSE & MEDICAL DECISION MAKING  Pertinent Labs & Imaging studies reviewed. (See chart for details)  Patient has multiple complaints including blood pressure. M and evaluate her for pneumonia congestive heart failure evaluator cardiac enzymes EKG as well. She is going to be given a DuoNeb treatment because she has obvious wheezing at this time. Time 3:14 PM    Patient was given DuoNeb feels better. Her cardiac enzymes are normal. Her blood pressures been measured with the highest diastolic in the low 100s. At this point I do not think she needs a medication changes had a recent addition of medication. A long discussion on her getting a blood pressure machine and it fits appropriately and documenting her blood pressures and contacting her physician for medication adjustment she's been given return precautions as  well    FINAL IMPRESSION  1.   1. Essential hypertension        2.   3.         Electronically signed by: Julio Rodriguez, 7/17/2017 3:10 PM

## 2017-07-17 NOTE — ED AVS SNAPSHOT
[employer] Access Code: [email or web address]  Expires: 7/31/2017 12:23 PM    Your email address is not on file at [facility].  Email Addresses are required for you to sign up for [employer], please contact [phone] to verify your personal information and to provide your email address prior to attempting to register for [employer].    Joan Laureano Olivares  [address] [place] [address] [place] [address]    [employer]  A secure, online tool to manage your health information     [facility]’s [employer]® is a secure, online tool that connects you to your personalized health information from the privacy of your home -- day or night - making it very easy for you to manage your healthcare. Once the activation process is completed, you can even access your medical information using the [employer] vinicio, which is available for free in the Apple Vinicio store or Google Play store.     To learn more about [employer], visit [email or web address]    There are two levels of access available (as shown below):   My Chart Features  [facility] Primary Care Doctor [facility]  Specialists [facility]  Urgent  Care Non-[facility] Primary Care Doctor   Email your healthcare team securely and privately 24/7 X X X    Manage appointments: schedule your next appointment; view details of past/upcoming appointments X      Request prescription refills. X      View recent personal medical records, including lab and immunizations X X X X   View health record, including health history, allergies, medications X X X X   Read reports about your outpatient visits, procedures, consult and ER notes X X X X   See your discharge summary, which is a recap of your hospital and/or ER visit that includes your diagnosis, lab results, and care plan X X  X     How to register for [employer]:  Once your e-mail address has been verified, follow the following steps to sign up for [employer].     1. Go to  [email or web address]  2. Click on the Sign Up Now box, which takes you to the New Member Sign Up page.  You will need to provide the following information:  a. Enter your [employer] Access Code exactly as it appears at the top of this page. (You will not need to use this code after you’ve completed the sign-up process. If you do not sign up before the expiration date, you must request a new code.)   b. Enter your date of birth.   c. Enter your home email address.   d. Click Submit, and follow the next screen’s instructions.  3. Create a BET Information Systemst ID. This will be your [employer] login ID and cannot be changed, so think of one that is secure and easy to remember.  4. Create a [employer] password. You can change your password at any time.  5. Enter your Password Reset Question and Answer. This can be used at a later time if you forget your password.   6. Enter your e-mail address. This allows you to receive e-mail notifications when new information is available in [employer].  7. Click Sign Up. You can now view your health information.    For assistance activating your [employer] account, call [phone]

## 2017-07-17 NOTE — TELEPHONE ENCOUNTER
Spoke to patient today to inform her that Wound Care would have to decide on the direction for her wound vac. She was given the number and affirmed her understanding.  - CR   [id number]

## 2017-07-17 NOTE — ED NOTES
IV started in RAC with 20g x1 attempt. Labs drawn from start, sent to lab for processing. IV flushed with NS without difficulty. Pt tolerated well. Pt tolerating PO fluids without incident. Side rail up x1 with call light in reach. Family at bedside.

## 2017-07-17 NOTE — ED NOTES
Pt has single lumen PICC in place for home antibx infusion. Returned to waiting room pending bed availability. encouraged to notify staff of chgs in status

## 2017-07-17 NOTE — ED AVS SNAPSHOT
Home Care Instructions                                                                                                                Joan Oliavres   MRN: [id number]    Department:  [facility], Emergency Dept   Date of Visit:  7/17/2017            [facility], Emergency Dept    [address] [facility]    [place] [address]    Phone:  [phone]      You were seen by     Julio Rodriguez M.D.      Your Diagnosis Was     Essential hypertension     I10       These are the medications you received during your hospitalization from 07/17/2017 1410 to 07/17/2017 1804     Date/Time Order Dose Route Action    07/17/2017 1628 oxycodone immediate-release (ROXICODONE) tablet 5 mg 5 mg Oral Given      Follow-up Information     1. Follow up with Dl Tsai M.D..    Specialty:  Internal Medicine    Contact information    [address] [facility] [address]  [facility] [address] [phone]        Medication Information     Review all of your home medications and newly ordered medications with your primary doctor and/or pharmacist as soon as possible. Follow medication instructions as directed by your doctor and/or pharmacist.     Please keep your complete medication list with you and share with your physician. Update the information when medications are discontinued, doses are changed, or new medications (including over-the-counter products) are added; and carry medication information at all times in the event of emergency situations.               Medication List      ASK your doctor about these medications        Instructions    Morning Afternoon Evening Bedtime    albuterol 108 (90 BASE) MCG/ACT Aers inhalation aerosol        Inhale 2 Puffs by mouth every four hours as needed.   Dose:  2 Puff                        alprazolam 0.25 MG Tabs   Commonly known as:  XANAX        Take 1 Tab by mouth 3 times a day as needed for Anxiety.   Dose:  0.25 mg                        carvedilol  6.25 MG Tabs   Commonly known as:  COREG        Take 1 Tab by mouth 2 times a day, with meals.   Dose:  6.25 mg                        estradiol 2 MG Tabs   Commonly known as:  ESTRACE        Take 2 mg by mouth every day.   Dose:  2 mg                        linezolid 600 MG Tabs   Commonly known as:  ZYVOX        Take 1 Tab by mouth 2 times a day for 14 days.   Dose:  600 mg                        NS SOLN 100 mL with cefTRIAXone 2 GM SOLR 2 g        2 g by Intravenous route every 24 hours for 18 days.   Dose:  2 g                        * oxyCODONE CR 20 MG T12a   Commonly known as:  OXYCONTIN        Take 1 Tab by mouth every 12 hours.   Dose:  20 mg                        * oxycodone immediate-release 5 MG Tabs   Last time this was given:  5 mg on 7/17/2017  4:28 PM   Commonly known as:  ROXICODONE        Take 1 Tab by mouth every 3 hours as needed for Severe Pain.   Dose:  5 mg                        prochlorperazine 10 MG Tabs   Commonly known as:  COMPAZINE        Take 1 Tab by mouth every 6 hours as needed for Nausea/Vomiting.   Dose:  10 mg                        telmisartan 40 MG Tabs   Commonly known as:  MICARDIS        Take 1 Tab by mouth every day.   Dose:  40 mg                        * Notice:  This list has 2 medication(s) that are the same as other medications prescribed for you. Read the directions carefully, and ask your doctor or other care provider to review them with you.            Procedures and tests performed during your visit     BTYPE NATRIURETIC PEPTIDE    CBC WITH DIFFERENTIAL    COMP METABOLIC PANEL    DX-CHEST-PORTABLE (1 VIEW)    EKG (NOW)    ESTIMATED GFR    IV SALINE LOCK    NPPB    Repeat Vitals Signs    TROPONIN        Discharge Instructions       Arterial Hypertension  Your blood pressure is somewhat high in the emergency department but not high enough for a dosage change. It is very important for you to get a home blood pressure machine take her blood pressures 3 times a day.  Make sure the cuff is size right. Call these pressures into your primary care physician for any medication adjustment return if worsening symptoms  Arterial hypertension (high blood pressure) is a condition of elevated pressure in your blood vessels. Hypertension over a long period of time is a risk factor for strokes, heart attacks, and heart failure. It is also the leading cause of kidney (renal) failure.   CAUSES   · In Adults -- Over 90% of all hypertension has no known cause. This is called essential or primary hypertension. In the other 10% of people with hypertension, the increase in blood pressure is caused by another disorder. This is called secondary hypertension. Important causes of secondary hypertension are:  · Heavy alcohol use.  · Obstructive sleep apnea.  · Hyperaldosterosim (Conn's syndrome).  · Steroid use.  · Chronic kidney failure.  · Hyperparathyroidism.  · Medications.  · Renal artery stenosis.  · Pheochromocytoma.  · Cushing's disease.  · Coarctation of the aorta.  · Scleroderma renal crisis.  · Licorice (in excessive amounts).  · Drugs (cocaine, methamphetamine).  Your caregiver can explain any items above that apply to you.  · In Children -- Secondary hypertension is more common and should always be considered.  · Pregnancy -- Few women of childbearing age have high blood pressure. However, up to 10% of them develop hypertension of pregnancy. Generally, this will not harm the woman. It may be a sign of 3 complications of pregnancy: preeclampsia, HELLP syndrome, and eclampsia. Follow up and control with medication is necessary.  SYMPTOMS   · This condition normally does not produce any noticeable symptoms. It is usually found during a routine exam.  · Malignant hypertension is a late problem of high blood pressure. It may have the following symptoms:  · Headaches.  · Blurred vision.  · End-organ damage (this means your kidneys, heart, lungs, and other organs are being damaged).  · Stressful  "situations can increase the blood pressure. If a person with normal blood pressure has their blood pressure go up while being seen by their caregiver, this is often termed \"white coat hypertension.\" Its importance is not known. It may be related with eventually developing hypertension or complications of hypertension.  · Hypertension is often confused with mental tension, stress, and anxiety.  DIAGNOSIS   The diagnosis is made by 3 separate blood pressure measurements. They are taken at least 1 week apart from each other. If there is organ damage from hypertension, the diagnosis may be made without repeat measurements.  Hypertension is usually identified by having blood pressure readings:  · Above 140/90 mmHg measured in both arms, at 3 separate times, over a couple weeks.  · Over 130/80 mmHg should be considered a risk factor and may require treatment in patients with diabetes.  Blood pressure readings over 120/80 mmHg are called \"pre-hypertension\" even in non-diabetic patients.  To get a true blood pressure measurement, use the following guidelines. Be aware of the factors that can alter blood pressure readings.  · Take measurements at least 1 hour after caffeine.  · Take measurements 30 minutes after smoking and without any stress. This is another reason to quit smoking  it raises your blood pressure.  · Use a proper cuff size. Ask your caregiver if you are not sure about your cuff size.  · Most home blood pressure cuffs are automatic. They will measure systolic and diastolic pressures. The systolic pressure is the pressure reading at the start of sounds. Diastolic pressure is the pressure at which the sounds disappear. If you are elderly, measure pressures in multiple postures. Try sitting, lying or standing.  · Sit at rest for a minimum of 5 minutes before taking measurements.  · You should not be on any medications like decongestants. These are found in many cold medications.  · Record your blood pressure " "readings and review them with your caregiver.  If you have hypertension:  · Your caregiver may do tests to be sure you do not have secondary hypertension (see \"causes\" above).  · Your caregiver may also look for signs of metabolic syndrome. This is also called Syndrome X or Insulin Resistance Syndrome. You may have this syndrome if you have type 2 diabetes, abdominal obesity, and abnormal blood lipids in addition to hypertension.  · Your caregiver will take your medical and family history and perform a physical exam.  · Diagnostic tests may include blood tests (for glucose, cholesterol, potassium, and kidney function), a urinalysis, or an EKG. Other tests may also be necessary depending on your condition.  PREVENTION   There are important lifestyle issues that you can adopt to reduce your chance of developing hypertension:  · Maintain a normal weight.  · Limit the amount of salt (sodium) in your diet.  · Exercise often.  · Limit alcohol intake.  · Get enough potassium in your diet. Discuss specific advice with your caregiver.  · Follow a DASH diet (dietary approaches to stop hypertension). This diet is rich in fruits, vegetables, and low-fat dairy products, and avoids certain fats.  PROGNOSIS   Essential hypertension cannot be cured. Lifestyle changes and medical treatment can lower blood pressure and reduce complications. The prognosis of secondary hypertension depends on the underlying cause. Many people whose hypertension is controlled with medicine or lifestyle changes can live a normal, healthy life.   RISKS AND COMPLICATIONS   While high blood pressure alone is not an illness, it often requires treatment due to its short- and long-term effects on many organs. Hypertension increases your risk for:  · CVAs or strokes (cerebrovascular accident).  · Heart failure due to chronically high blood pressure (hypertensive cardiomyopathy).  · Heart attack (myocardial infarction).  · Damage to the retina (hypertensive " "retinopathy).  · Kidney failure (hypertensive nephropathy).  Your caregiver can explain list items above that apply to you. Treatment of hypertension can significantly reduce the risk of complications.  TREATMENT   · For overweight patients, weight loss and regular exercise are recommended. Physical fitness lowers blood pressure.  · Mild hypertension is usually treated with diet and exercise. A diet rich in fruits and vegetables, fat-free dairy products, and foods low in fat and salt (sodium) can help lower blood pressure. Decreasing salt intake decreases blood pressure in a 1/3 of people.  · Stop smoking if you are a smoker.  The steps above are highly effective in reducing blood pressure. While these actions are easy to suggest, they are difficult to achieve. Most patients with moderate or severe hypertension end up requiring medications to bring their blood pressure down to a normal level. There are several classes of medications for treatment. Blood pressure pills (antihypertensives) will lower blood pressure by their different actions. Lowering the blood pressure by 10 mmHg may decrease the risk of complications by as much as 25%.  The goal of treatment is effective blood pressure control. This will reduce your risk for complications. Your caregiver will help you determine the best treatment for you according to your lifestyle. What is excellent treatment for one person, may not be for you.  HOME CARE INSTRUCTIONS   · Do not smoke.  · Follow the lifestyle changes outlined in the \"Prevention\" section.  · If you are on medications, follow the directions carefully. Blood pressure medications must be taken as prescribed. Skipping doses reduces their benefit. It also puts you at risk for problems.  · Follow up with your caregiver, as directed.  · If you are asked to monitor your blood pressure at home, follow the guidelines in the \"Diagnosis\" section above.  SEEK MEDICAL CARE IF:   · You think you are having " medication side effects.  · You have recurrent headaches or lightheadedness.  · You have swelling in your ankles.  · You have trouble with your vision.  SEEK IMMEDIATE MEDICAL CARE IF:   · You have sudden onset of chest pain or pressure, difficulty breathing, or other symptoms of a heart attack.  · You have a severe headache.  · You have symptoms of a stroke (such as sudden weakness, difficulty speaking, difficulty walking).  MAKE SURE YOU:   · Understand these instructions.  · Will watch your condition.  · Will get help right away if you are not doing well or get worse.  Document Released: 12/18/2006 Document Revised: 03/11/2013 Document Reviewed: 07/17/2008  [employer]® Patient Information ©2014 [employer].            Patient Information     Patient Information    Following emergency treatment: all patient requiring follow-up care must return either to a private physician or a clinic if your condition worsens before you are able to obtain further medical attention, please return to the emergency room.     Billing Information    At [facility], we work to make the billing process streamlined for our patients.  Our Representatives are here to answer any questions you may have regarding your hospital bill.  If you have insurance coverage and have supplied your insurance information to us, we will submit a claim to your insurer on your behalf.  Should you have any questions regarding your bill, we can be reached online or by phone as follows:  Online: You are able pay your bills online or live chat with our representatives about any billing questions you may have. We are here to help Monday - Friday from 8:00am to 7:30pm and 9:00am - 12:00pm on Saturdays.  Please visit [email or web address]  for more information.   Phone:  [phone] or [phone]    Please note that your emergency physician, surgeon, pathologist, radiologist, anesthesiologist, and other specialists are not  employed by [facility] and will therefore bill separately for their services.  Please contact them directly for any questions concerning their bills at the numbers below:     Emergency Physician Services:  [phone]  [facility]:  [phone]  [facility]:  [phone]  [facility]:  [phone]    1. Your final bill may vary from the amount quoted upon discharge if all procedures are not complete at that time, or if your doctor has additional procedures of which we are not aware. You will receive an additional bill if you return to the Emergency Department at [facility] for suture removal regardless of the facility of which the sutures were placed.     2. Please arrange for settlement of this account at the emergency registration.    3. All self-pay accounts are due in full at the time of treatment.  If you are unable to meet this obligation then payment is expected within 4-5 days.     4. If you have had radiology studies (CT, X-ray, Ultrasound, MRI), you have received a preliminary result during your emergency department visit. Please contact the radiology department [phone] to receive a copy of your final result. Please discuss the Final result with your primary physician or with the follow up physician provided.     Crisis Hotline:  [place] Crisis Hotline:  2-653-VUZOLXD or 1-725.329.1219  [place] Crisis Hotline:    1-448.438.4110 or 044-960-1304         ED Discharge Follow Up Questions    1. In order to provide you with very good care, we would like to follow up with a phone call in the next few days.  May we have your permission to contact you?     YES /  NO    2. What is the best phone number to call you? (       )_____-__________    3. What is the best time to call you?      Morning  /  Afternoon  /  Evening                   Patient Signature:  ____________________________________________________________    Date:   ____________________________________________________________

## 2017-07-18 NOTE — ED NOTES
DC instructions given to pt/friend. Verbalized understanding. Pt dcd via w/c with friend to drive. 0 s/s distress noted.

## 2017-07-18 NOTE — DISCHARGE INSTRUCTIONS
Arterial Hypertension  Your blood pressure is somewhat high in the emergency department but not high enough for a dosage change. It is very important for you to get a home blood pressure machine take her blood pressures 3 times a day. Make sure the cuff is size right. Call these pressures into your primary care physician for any medication adjustment return if worsening symptoms  Arterial hypertension (high blood pressure) is a condition of elevated pressure in your blood vessels. Hypertension over a long period of time is a risk factor for strokes, heart attacks, and heart failure. It is also the leading cause of kidney (renal) failure.   CAUSES   · In Adults -- Over 90% of all hypertension has no known cause. This is called essential or primary hypertension. In the other 10% of people with hypertension, the increase in blood pressure is caused by another disorder. This is called secondary hypertension. Important causes of secondary hypertension are:  · Heavy alcohol use.  · Obstructive sleep apnea.  · Hyperaldosterosim (Conn's syndrome).  · Steroid use.  · Chronic kidney failure.  · Hyperparathyroidism.  · Medications.  · Renal artery stenosis.  · Pheochromocytoma.  · Cushing's disease.  · Coarctation of the aorta.  · Scleroderma renal crisis.  · Licorice (in excessive amounts).  · Drugs (cocaine, methamphetamine).  Your caregiver can explain any items above that apply to you.  · In Children -- Secondary hypertension is more common and should always be considered.  · Pregnancy -- Few women of childbearing age have high blood pressure. However, up to 10% of them develop hypertension of pregnancy. Generally, this will not harm the woman. It may be a sign of 3 complications of pregnancy: preeclampsia, HELLP syndrome, and eclampsia. Follow up and control with medication is necessary.  SYMPTOMS   · This condition normally does not produce any noticeable symptoms. It is usually found during a routine exam.  · Malignant  "hypertension is a late problem of high blood pressure. It may have the following symptoms:  · Headaches.  · Blurred vision.  · End-organ damage (this means your kidneys, heart, lungs, and other organs are being damaged).  · Stressful situations can increase the blood pressure. If a person with normal blood pressure has their blood pressure go up while being seen by their caregiver, this is often termed \"white coat hypertension.\" Its importance is not known. It may be related with eventually developing hypertension or complications of hypertension.  · Hypertension is often confused with mental tension, stress, and anxiety.  DIAGNOSIS   The diagnosis is made by 3 separate blood pressure measurements. They are taken at least 1 week apart from each other. If there is organ damage from hypertension, the diagnosis may be made without repeat measurements.  Hypertension is usually identified by having blood pressure readings:  · Above 140/90 mmHg measured in both arms, at 3 separate times, over a couple weeks.  · Over 130/80 mmHg should be considered a risk factor and may require treatment in patients with diabetes.  Blood pressure readings over 120/80 mmHg are called \"pre-hypertension\" even in non-diabetic patients.  To get a true blood pressure measurement, use the following guidelines. Be aware of the factors that can alter blood pressure readings.  · Take measurements at least 1 hour after caffeine.  · Take measurements 30 minutes after smoking and without any stress. This is another reason to quit smoking  it raises your blood pressure.  · Use a proper cuff size. Ask your caregiver if you are not sure about your cuff size.  · Most home blood pressure cuffs are automatic. They will measure systolic and diastolic pressures. The systolic pressure is the pressure reading at the start of sounds. Diastolic pressure is the pressure at which the sounds disappear. If you are elderly, measure pressures in multiple postures. Try " "sitting, lying or standing.  · Sit at rest for a minimum of 5 minutes before taking measurements.  · You should not be on any medications like decongestants. These are found in many cold medications.  · Record your blood pressure readings and review them with your caregiver.  If you have hypertension:  · Your caregiver may do tests to be sure you do not have secondary hypertension (see \"causes\" above).  · Your caregiver may also look for signs of metabolic syndrome. This is also called Syndrome X or Insulin Resistance Syndrome. You may have this syndrome if you have type 2 diabetes, abdominal obesity, and abnormal blood lipids in addition to hypertension.  · Your caregiver will take your medical and family history and perform a physical exam.  · Diagnostic tests may include blood tests (for glucose, cholesterol, potassium, and kidney function), a urinalysis, or an EKG. Other tests may also be necessary depending on your condition.  PREVENTION   There are important lifestyle issues that you can adopt to reduce your chance of developing hypertension:  · Maintain a normal weight.  · Limit the amount of salt (sodium) in your diet.  · Exercise often.  · Limit alcohol intake.  · Get enough potassium in your diet. Discuss specific advice with your caregiver.  · Follow a DASH diet (dietary approaches to stop hypertension). This diet is rich in fruits, vegetables, and low-fat dairy products, and avoids certain fats.  PROGNOSIS   Essential hypertension cannot be cured. Lifestyle changes and medical treatment can lower blood pressure and reduce complications. The prognosis of secondary hypertension depends on the underlying cause. Many people whose hypertension is controlled with medicine or lifestyle changes can live a normal, healthy life.   RISKS AND COMPLICATIONS   While high blood pressure alone is not an illness, it often requires treatment due to its short- and long-term effects on many organs. Hypertension increases " "your risk for:  · CVAs or strokes (cerebrovascular accident).  · Heart failure due to chronically high blood pressure (hypertensive cardiomyopathy).  · Heart attack (myocardial infarction).  · Damage to the retina (hypertensive retinopathy).  · Kidney failure (hypertensive nephropathy).  Your caregiver can explain list items above that apply to you. Treatment of hypertension can significantly reduce the risk of complications.  TREATMENT   · For overweight patients, weight loss and regular exercise are recommended. Physical fitness lowers blood pressure.  · Mild hypertension is usually treated with diet and exercise. A diet rich in fruits and vegetables, fat-free dairy products, and foods low in fat and salt (sodium) can help lower blood pressure. Decreasing salt intake decreases blood pressure in a 1/3 of people.  · Stop smoking if you are a smoker.  The steps above are highly effective in reducing blood pressure. While these actions are easy to suggest, they are difficult to achieve. Most patients with moderate or severe hypertension end up requiring medications to bring their blood pressure down to a normal level. There are several classes of medications for treatment. Blood pressure pills (antihypertensives) will lower blood pressure by their different actions. Lowering the blood pressure by 10 mmHg may decrease the risk of complications by as much as 25%.  The goal of treatment is effective blood pressure control. This will reduce your risk for complications. Your caregiver will help you determine the best treatment for you according to your lifestyle. What is excellent treatment for one person, may not be for you.  HOME CARE INSTRUCTIONS   · Do not smoke.  · Follow the lifestyle changes outlined in the \"Prevention\" section.  · If you are on medications, follow the directions carefully. Blood pressure medications must be taken as prescribed. Skipping doses reduces their benefit. It also puts you at risk for " "problems.  · Follow up with your caregiver, as directed.  · If you are asked to monitor your blood pressure at home, follow the guidelines in the \"Diagnosis\" section above.  SEEK MEDICAL CARE IF:   · You think you are having medication side effects.  · You have recurrent headaches or lightheadedness.  · You have swelling in your ankles.  · You have trouble with your vision.  SEEK IMMEDIATE MEDICAL CARE IF:   · You have sudden onset of chest pain or pressure, difficulty breathing, or other symptoms of a heart attack.  · You have a severe headache.  · You have symptoms of a stroke (such as sudden weakness, difficulty speaking, difficulty walking).  MAKE SURE YOU:   · Understand these instructions.  · Will watch your condition.  · Will get help right away if you are not doing well or get worse.  Document Released: 12/18/2006 Document Revised: 03/11/2013 Document Reviewed: 07/17/2008  [employer]® Patient Information ©2014 [employer].    "

## 2017-07-24 ENCOUNTER — HOSPITAL ENCOUNTER (OUTPATIENT)
Facility: MEDICAL CENTER | Age: 62
End: 2017-07-24
Attending: INTERNAL MEDICINE
Payer: MEDICARE

## 2017-07-24 LAB
ALBUMIN SERPL BCP-MCNC: 3.7 G/DL (ref 3.2–4.9)
ALP SERPL-CCNC: 61 U/L (ref 30–99)
ALT SERPL-CCNC: 8 U/L (ref 2–50)
AST SERPL-CCNC: 13 U/L (ref 12–45)
BASOPHILS # BLD AUTO: 0.5 % (ref 0–1.8)
BASOPHILS # BLD: 0.04 K/UL (ref 0–0.12)
BILIRUB CONJ SERPL-MCNC: 0.2 MG/DL (ref 0.1–0.5)
BILIRUB INDIRECT SERPL-MCNC: 0.4 MG/DL (ref 0–1)
BILIRUB SERPL-MCNC: 0.6 MG/DL (ref 0.1–1.5)
BUN SERPL-MCNC: 6 MG/DL (ref 8–22)
CALCIUM SERPL-MCNC: 9.1 MG/DL (ref 8.5–10.5)
CHLORIDE SERPL-SCNC: 106 MMOL/L (ref 96–112)
CO2 SERPL-SCNC: 23 MMOL/L (ref 20–33)
CREAT SERPL-MCNC: 0.69 MG/DL (ref 0.5–1.4)
EOSINOPHIL # BLD AUTO: 0.1 K/UL (ref 0–0.51)
EOSINOPHIL NFR BLD: 1.3 % (ref 0–6.9)
ERYTHROCYTE [DISTWIDTH] IN BLOOD BY AUTOMATED COUNT: 45.5 FL (ref 35.9–50)
GFR SERPL CREATININE-BSD FRML MDRD: >60 ML/MIN/1.73 M 2
GLUCOSE SERPL-MCNC: 107 MG/DL (ref 65–99)
HCT VFR BLD AUTO: 40.5 % (ref 37–47)
HGB BLD-MCNC: 13.2 G/DL (ref 12–16)
IMM GRANULOCYTES # BLD AUTO: 0.02 K/UL (ref 0–0.11)
IMM GRANULOCYTES NFR BLD AUTO: 0.3 % (ref 0–0.9)
LYMPHOCYTES # BLD AUTO: 2.28 K/UL (ref 1–4.8)
LYMPHOCYTES NFR BLD: 29.4 % (ref 22–41)
MCH RBC QN AUTO: 27.9 PG (ref 27–33)
MCHC RBC AUTO-ENTMCNC: 32.6 G/DL (ref 33.6–35)
MCV RBC AUTO: 85.6 FL (ref 81.4–97.8)
MONOCYTES # BLD AUTO: 0.59 K/UL (ref 0–0.85)
MONOCYTES NFR BLD AUTO: 7.6 % (ref 0–13.4)
NEUTROPHILS # BLD AUTO: 4.73 K/UL (ref 2–7.15)
NEUTROPHILS NFR BLD: 60.9 % (ref 44–72)
NRBC # BLD AUTO: 0 K/UL
NRBC BLD AUTO-RTO: 0 /100 WBC
PHOSPHATE SERPL-MCNC: 2.4 MG/DL (ref 2.5–4.5)
PLATELET # BLD AUTO: 376 K/UL (ref 164–446)
PMV BLD AUTO: 10.4 FL (ref 9–12.9)
POTASSIUM SERPL-SCNC: 3.4 MMOL/L (ref 3.6–5.5)
PROT SERPL-MCNC: 6.8 G/DL (ref 6–8.2)
RBC # BLD AUTO: 4.73 M/UL (ref 4.2–5.4)
SODIUM SERPL-SCNC: 138 MMOL/L (ref 135–145)
WBC # BLD AUTO: 7.8 K/UL (ref 4.8–10.8)

## 2017-07-24 PROCEDURE — 80069 RENAL FUNCTION PANEL: CPT

## 2017-07-24 PROCEDURE — 85025 COMPLETE CBC W/AUTO DIFF WBC: CPT

## 2017-07-24 PROCEDURE — 80076 HEPATIC FUNCTION PANEL: CPT

## 2017-07-27 ENCOUNTER — HOSPITAL ENCOUNTER (OUTPATIENT)
Facility: MEDICAL CENTER | Age: 62
End: 2017-07-27
Attending: NURSE PRACTITIONER
Payer: MEDICARE

## 2017-07-27 ENCOUNTER — OFFICE VISIT (OUTPATIENT)
Dept: INFECTIOUS DISEASES | Facility: MEDICAL CENTER | Age: 62
End: 2017-07-27
Payer: MEDICARE

## 2017-07-27 VITALS
TEMPERATURE: 98.8 F | SYSTOLIC BLOOD PRESSURE: 166 MMHG | OXYGEN SATURATION: 91 % | DIASTOLIC BLOOD PRESSURE: 88 MMHG | HEIGHT: 66 IN | HEART RATE: 89 BPM

## 2017-07-27 DIAGNOSIS — R19.7 DIARRHEA, UNSPECIFIED TYPE: ICD-10-CM

## 2017-07-27 DIAGNOSIS — A49.1 GROUP B STREPTOCOCCAL INFECTION: ICD-10-CM

## 2017-07-27 DIAGNOSIS — L97.519 FOOT ULCER, RIGHT, WITH UNSPECIFIED SEVERITY (HCC): ICD-10-CM

## 2017-07-27 PROCEDURE — 99214 OFFICE O/P EST MOD 30 MIN: CPT | Performed by: NURSE PRACTITIONER

## 2017-07-27 PROCEDURE — 87493 C DIFF AMPLIFIED PROBE: CPT

## 2017-07-27 NOTE — MR AVS SNAPSHOT
"        Joan Tinsley Marshall   2017 2:45 PM   Office Visit   MRN: [id number]    Department:  [facility]   Dept Phone:  [phone]    Description:  Female : 1955   Provider:  LUKE Oneal           Reason for Visit     Hospital Follow-up Abscess RT foot      Allergies as of 2017     Allergen Noted Reactions    Penicillins 2009   Rash, Swelling    Tolerated ceftriaxone on 17      You were diagnosed with     Diarrhea, unspecified type   [[id number]]         Vital Signs     Blood Pressure Pulse Temperature Height Oxygen Saturation       166/88 mmHg 89 37.1 °C (98.8 °F) 1.676 m (5' 5.98\") 91%     Last Menstrual Period                   1977           Basic Information     Date Of Birth Sex Race Ethnicity Preferred Language    1955 Female White Non- English      Problem List              ICD-10-CM Priority Class Noted - Resolved    Vaginal high risk HPV DNA test positive R87.811   2012 - Present    Syncope R55 High  2015 - Present    Fibromyalgia (Chronic) M79.7   11/10/2015 - Present    DVT (deep venous thrombosis) (CMS-HCC) (Chronic) I82.409 High  11/10/2015 - Present    Chronic pain syndrome (Chronic) G89.4   11/10/2015 - Present    Anxiety (Chronic) F41.9   11/10/2015 - Present    Monoparesis of lower extremity (CMS-HCC) G83.10   2017 - Present    Foot ulcer, right (CMS-HCC) L97.519 High  2017 - Present    Weakness of right leg R29.898   2017 - Present    Morbid obesity with BMI of 40.0-44.9, adult (CMS-HCC) E66.01, Z68.41   2017 - Present    Nephrolithiasis N20.0   2017 - Present    Insomnia disorder G47.00   2017 - Present    Physical debility R53.81   2017 - Present    Muscle spasms of lower extremity M62.838   2017 - Present    Wheeze R06.2   2017 - Present    Anxiety disorder F41.9 Medium  2017 - Present    Nausea R11.0 High  2017 - Present      Health Maintenance        Date Due " Completion Dates    IMM DTaP/Tdap/Td Vaccine (1 - Tdap) 4/26/1972 ---    MAMMOGRAM 4/26/1993 ---    COLONOSCOPY 4/26/2003 ---    IMM ZOSTER VACCINE 4/26/2013 ---    PAP SMEAR 2/17/2015 2/17/2012, 9/28/2010    IMM INFLUENZA (1) 9/1/2017 ---            Current Immunizations     No immunizations on file.      Below and/or attached are the medications your provider expects you to take. Review all of your home medications and newly ordered medications with your provider and/or pharmacist. Follow medication instructions as directed by your provider and/or pharmacist. Please keep your medication list with you and share with your provider. Update the information when medications are discontinued, doses are changed, or new medications (including over-the-counter products) are added; and carry medication information at all times in the event of emergency situations     Allergies:  PENICILLINS - Rash,Swelling               Medications  Valid as of: July 27, 2017 -  4:07 PM    Generic Name Brand Name Tablet Size Instructions for use    Albuterol Sulfate (Aero Soln) albuterol 108 (90 BASE) MCG/ACT Inhale 2 Puffs by mouth every four hours as needed.        ALPRAZolam (Tab) XANAX 0.25 MG Take 1 Tab by mouth 3 times a day as needed for Anxiety.        Carvedilol (Tab) COREG 6.25 MG Take 1 Tab by mouth 2 times a day, with meals.        Estradiol (Tab) ESTRACE 2 MG Take 2 mg by mouth every day.        Linezolid (Tab) ZYVOX 600 MG Take 1 Tab by mouth 2 times a day for 14 days.        OxyCODONE HCl (Tablet Extended Release 12 hour Abuse-Deterrent) OXYCONTIN 20 MG Take 1 Tab by mouth every 12 hours.        OxyCODONE HCl (Tab) ROXICODONE 5 MG Take 1 Tab by mouth every 3 hours as needed for Severe Pain.        Prochlorperazine Maleate (Tab) COMPAZINE 10 MG Take 1 Tab by mouth every 6 hours as needed for Nausea/Vomiting.        Telmisartan (Tab) MICARDIS 40 MG Take 1 Tab by mouth every day.        .                 Medicines prescribed  today were sent to:     [id number] - [place], [place] - [address] [facility] [address]  [address]    [address] [place] [address]    Phone: [phone] Fax: [phone]    Open 24 Hours?: No      Medication refill instructions:       If your prescription bottle indicates you have medication refills left, it is not necessary to call your provider’s office. Please contact your pharmacy and they will refill your medication.    If your prescription bottle indicates you do not have any refills left, you may request refills at any time through one of the following ways: The online [employer] system (except Urgent Care), by calling your provider’s office, or by asking your pharmacy to contact your provider’s office with a refill request. Medication refills are processed only during regular business hours and may not be available until the next business day. Your provider may request additional information or to have a follow-up visit with you prior to refilling your medication.   *Please Note: Medication refills are assigned a new Rx number when refilled electronically. Your pharmacy may indicate that no refills were authorized even though a new prescription for the same medication is available at the pharmacy. Please request the medicine by name with the pharmacy before contacting your provider for a refill.        Your To Do List     Future Labs/Procedures Complete By Expires    CDIFF BY PCR  As directed 7/28/2017         [employer] Access Code: [email or web address]  Expires: 8/24/2017  4:12 AM    [employer]  A secure, online tool to manage your health information     [facility] [employer]® is a secure, online tool that connects you to your personalized health information from the privacy of your home -- day or night - making it very easy for you to manage your healthcare. Once the activation process is completed, you can even access your medical information using the [employer] zakiya, which is  available for free in the [employer] V[employer]io store or Google Play store.     [employer] provides the following levels of access (as shown below):   My Chart Features   [facility] Doctor [facility]  Specialists [facility]  Urgent  Care Non-[facility]  Primary Care  Doctor   Email your healthcare team securely and privately 24/7 X X X    Manage appointments: schedule your next appointment; view details of past/upcoming appointments X      Request prescription refills. X      View recent personal medical records, including lab and immunizations X X X X   View health record, including health history, allergies, medications X X X X   Read reports about your outpatient visits, procedures, consult and ER notes X X X X   See your discharge summary, which is a recap of your hospital and/or ER visit that includes your diagnosis, lab results, and care plan. X X       How to register for [employer]:  1. Go to  [email or web address].  2. Click on the Sign Up Now box, which takes you to the New Member Sign Up page. You will need to provide the following information:  a. Enter your Term[employer] Access Code exactly as it appears at the top of this page. (You will not need to use this code after you’ve completed the sign-up process. If you do not sign up before the expiration date, you must request a new code.)   b. Enter your date of birth.   c. Enter your home email address.   d. Click Submit, and follow the next screen’s instructions.  3. Create a TermScout ID. This will be your TermScout login ID and cannot be changed, so think of one that is secure and easy to remember.  4. Create a Term[employer] password. You can change your password at any time.  5. Enter your Password Reset Question and Answer. This can be used at a later time if you forget your password.   6. Enter your e-mail address. This allows you to receive e-mail notifications when new information is available in [employer].  7. Click Sign Up. You can now view your health information.    For  assistance activating your [employer] account, call [phone]

## 2017-07-28 ENCOUNTER — TELEPHONE (OUTPATIENT)
Dept: INFECTIOUS DISEASES | Facility: MEDICAL CENTER | Age: 62
End: 2017-07-28

## 2017-07-28 DIAGNOSIS — R19.7 DIARRHEA, UNSPECIFIED TYPE: ICD-10-CM

## 2017-07-28 LAB
C DIFF DNA SPEC QL NAA+PROBE: NEGATIVE
C DIFF TOX GENS STL QL NAA+PROBE: NEGATIVE

## 2017-07-28 NOTE — TELEPHONE ENCOUNTER
Called and LM for pt that the appointment wound care was scheduled for 8/4/17 at 9 am. She is to call back if she has any further questions or concerns. ROBERT

## 2017-07-31 ENCOUNTER — HOSPITAL ENCOUNTER (OUTPATIENT)
Facility: MEDICAL CENTER | Age: 62
End: 2017-07-31
Attending: NURSE PRACTITIONER
Payer: MEDICARE

## 2017-07-31 LAB
ALBUMIN SERPL BCP-MCNC: 3.7 G/DL (ref 3.2–4.9)
ALP SERPL-CCNC: 64 U/L (ref 30–99)
ALT SERPL-CCNC: 10 U/L (ref 2–50)
AST SERPL-CCNC: 15 U/L (ref 12–45)
BASOPHILS # BLD AUTO: 0.7 % (ref 0–1.8)
BASOPHILS # BLD: 0.05 K/UL (ref 0–0.12)
BILIRUB CONJ SERPL-MCNC: <0.1 MG/DL (ref 0.1–0.5)
BILIRUB INDIRECT SERPL-MCNC: NORMAL MG/DL (ref 0–1)
BILIRUB SERPL-MCNC: 0.4 MG/DL (ref 0.1–1.5)
BUN SERPL-MCNC: 11 MG/DL (ref 8–22)
CALCIUM SERPL-MCNC: 9 MG/DL (ref 8.5–10.5)
CHLORIDE SERPL-SCNC: 107 MMOL/L (ref 96–112)
CO2 SERPL-SCNC: 22 MMOL/L (ref 20–33)
CREAT SERPL-MCNC: 0.82 MG/DL (ref 0.5–1.4)
EOSINOPHIL # BLD AUTO: 0.16 K/UL (ref 0–0.51)
EOSINOPHIL NFR BLD: 2.3 % (ref 0–6.9)
ERYTHROCYTE [DISTWIDTH] IN BLOOD BY AUTOMATED COUNT: 46.5 FL (ref 35.9–50)
GFR SERPL CREATININE-BSD FRML MDRD: >60 ML/MIN/1.73 M 2
GLUCOSE SERPL-MCNC: 109 MG/DL (ref 65–99)
HCT VFR BLD AUTO: 42 % (ref 37–47)
HGB BLD-MCNC: 13.4 G/DL (ref 12–16)
IMM GRANULOCYTES # BLD AUTO: 0.01 K/UL (ref 0–0.11)
IMM GRANULOCYTES NFR BLD AUTO: 0.1 % (ref 0–0.9)
LYMPHOCYTES # BLD AUTO: 1.83 K/UL (ref 1–4.8)
LYMPHOCYTES NFR BLD: 26.8 % (ref 22–41)
MCH RBC QN AUTO: 28 PG (ref 27–33)
MCHC RBC AUTO-ENTMCNC: 31.9 G/DL (ref 33.6–35)
MCV RBC AUTO: 87.9 FL (ref 81.4–97.8)
MONOCYTES # BLD AUTO: 0.5 K/UL (ref 0–0.85)
MONOCYTES NFR BLD AUTO: 7.3 % (ref 0–13.4)
NEUTROPHILS # BLD AUTO: 4.27 K/UL (ref 2–7.15)
NEUTROPHILS NFR BLD: 62.8 % (ref 44–72)
NRBC # BLD AUTO: 0 K/UL
NRBC BLD AUTO-RTO: 0 /100 WBC
PHOSPHATE SERPL-MCNC: 2.6 MG/DL (ref 2.5–4.5)
PLATELET # BLD AUTO: 354 K/UL (ref 164–446)
PMV BLD AUTO: 10.2 FL (ref 9–12.9)
POTASSIUM SERPL-SCNC: 3.5 MMOL/L (ref 3.6–5.5)
PROT SERPL-MCNC: 6.8 G/DL (ref 6–8.2)
RBC # BLD AUTO: 4.78 M/UL (ref 4.2–5.4)
SODIUM SERPL-SCNC: 140 MMOL/L (ref 135–145)
WBC # BLD AUTO: 6.8 K/UL (ref 4.8–10.8)

## 2017-07-31 PROCEDURE — 85025 COMPLETE CBC W/AUTO DIFF WBC: CPT

## 2017-07-31 PROCEDURE — 80069 RENAL FUNCTION PANEL: CPT

## 2017-07-31 PROCEDURE — 80076 HEPATIC FUNCTION PANEL: CPT | Mod: 59

## 2017-08-04 ENCOUNTER — OFFICE VISIT (OUTPATIENT)
Dept: WOUND CARE | Facility: MEDICAL CENTER | Age: 62
End: 2017-08-04
Attending: NURSE PRACTITIONER
Payer: MEDICARE

## 2017-08-04 DIAGNOSIS — Q66.00 EQUINOVARUS: ICD-10-CM

## 2017-08-04 DIAGNOSIS — G83.10: ICD-10-CM

## 2017-08-04 DIAGNOSIS — L97.519 FOOT ULCER, RIGHT, WITH UNSPECIFIED SEVERITY (HCC): Primary | ICD-10-CM

## 2017-08-04 PROCEDURE — 11042 DBRDMT SUBQ TIS 1ST 20SQCM/<: CPT | Performed by: NURSE PRACTITIONER

## 2017-08-04 PROCEDURE — 99212 OFFICE O/P EST SF 10 MIN: CPT | Performed by: NURSE PRACTITIONER

## 2017-08-04 PROCEDURE — 97597 DBRDMT OPN WND 1ST 20 CM/<: CPT

## 2017-08-04 NOTE — PROGRESS NOTES
LIMB PRESERVATION SERVICE ROUNDS    Patient seen in collaboration with interdisciplinary team during LPS rounds in wound clinic for evaluation and treatment of her right lateral foot ulcer, complicated by a fixed equinovarus deformity.  She was hospitalized from 6/20 to 7/1/17 for infection of her ulcer.  During that time Dr. Wu was consulted and recommended a neurological workup.  Dr. Harvey was contacted and recommended f/u as an outpatient.  She was contacted by the neurologist office but could not be scheduled until December    Patient states blood sugars are averaging - N/A    OBJECTIVE FINDINGS:     Labs/diagnostic reviewed: None    PROCEDURE Scalpel and forceps used to excise slough from wound bed and periwound callus.  Total area debrided, 4.0cm2.  Debridement carried into the subcutaneous tissue layer.   Wound care completed by Josie Morin RN    PLAN:   Wound Care: Aquacel Ag for management of exudate and bioburden  Imaging None  Offloading: NWB.  Pt using wheelchair for mobility  Antibiotics: Per ID  Surgery: Posible reconstructive surgery after neuro eval  Referral: to [facility] for EMG testing      LPS Follow up: TBD

## 2017-08-08 ENCOUNTER — OFFICE VISIT (OUTPATIENT)
Dept: INFECTIOUS DISEASES | Facility: MEDICAL CENTER | Age: 62
End: 2017-08-08
Payer: MEDICARE

## 2017-08-08 VITALS
OXYGEN SATURATION: 96 % | TEMPERATURE: 97.9 F | HEIGHT: 66 IN | DIASTOLIC BLOOD PRESSURE: 92 MMHG | HEART RATE: 87 BPM | SYSTOLIC BLOOD PRESSURE: 160 MMHG

## 2017-08-08 DIAGNOSIS — A49.1 GROUP B STREPTOCOCCAL INFECTION: ICD-10-CM

## 2017-08-08 DIAGNOSIS — R19.7 DIARRHEA, UNSPECIFIED TYPE: ICD-10-CM

## 2017-08-08 DIAGNOSIS — L97.519 FOOT ULCER, RIGHT, WITH UNSPECIFIED SEVERITY (HCC): ICD-10-CM

## 2017-08-08 PROBLEM — Q66.00 EQUINOVARUS: Status: ACTIVE | Noted: 2017-08-08

## 2017-08-08 PROCEDURE — 99214 OFFICE O/P EST MOD 30 MIN: CPT | Performed by: NURSE PRACTITIONER

## 2017-08-08 RX ORDER — CLONAZEPAM 0.5 MG/1
0.25 TABLET ORAL 2 TIMES DAILY
COMMUNITY
End: 2019-05-17

## 2017-08-08 RX ORDER — CEFDINIR 300 MG/1
300 CAPSULE ORAL 2 TIMES DAILY
Qty: 60 CAP | Refills: 0 | Status: SHIPPED | OUTPATIENT
Start: 2017-08-08 | End: 2017-09-07

## 2017-08-08 NOTE — PROGRESS NOTES
[facility]fectious Disease Clinic    Subjective:     Chief Complaint   Patient presents with   • Follow-Up     R foot infection, +GBS       Interval History: 62 y.o. Woman with a history of morbid obesity, HTN and a history of a traumatic injury two years ago resulting in paresthesias to her right lower extremity and right foot.  Hospitalized from 6/20- 7/1/17, admiited for worsening right foot ulcer. She developed a deformity of her right foot approximately one month prior to admission.  It is unclear when she developed the ulcer as she was unable to see it given its location due to her foot deformity.  Approximately 3-4 days prior to admission, her family noted blood on her sock and noted a large ulcer on the dorsum of her right foot.  Xray of the R foot on 6/20 revealed diffuse lateral soft tissue swelling of right foot with focal ulceration over the 5th metatarsal base and possible metallic foreign body within the plantar soft tissues of great toe.  Wound cx on 6/20 +GBS.  Pt was evaluated by Dr. Wu who recommended a nerve conduction test with consideration of tendon release to relieve fixed equinovarus deformity likely tendo-Achilles    lengthening, posterior tibial tendon lengthening, FHL and FDL lengthening versus release and possible flexor tenotomies of her lesser toes.  MRI on 6/20 showed possible OM.  The pt was discharged home on IV Ceftriaxone (PCN allergy) through [facility], end date 7/31/17.    7/27/2017: Seen by ANGY Tillman.  IV Ceftriaxone extended, end date 8/9/17.  Scheduled for LPS on 8/4 for debridement and wound care recommendations.  Cdiff ordered.    Today, 8/8/2017:  Pt is accompanied by her sister.  Pt continues to tolerate the IV Ceftriaxone without issue.  Denies feeling generally ill, fevers/chills, general malaise, headache, n/v, abdominal pain, chest pain or shortness of breath.  Cdiff negative on 7/27, pt continues to have loose stools.  The quantity of stool has decreased,  "but pt having to wear a brief d/t loose stool incontinence.  She does not feel that the imodium is helping.  Wound has improved since being seen at LPS rounds on 8/4.  The maceration has nearly resolved and the new recommendations for wound care treatment seems to be helping.  Pt denies excessive drainage, odor, redness, pain/tenderness or swelling to the wound.  Pt has not heard back from the [facility] regarding being scheduled for an EMG.  She is scheduled for LPS again on 8/18.    ROS  As documented above in my HPI.    Past Medical History   Diagnosis Date   • Heart burn    • Pain    • Unspecified disorder of thyroid    • Kidney stones    • Arthritis    • ASTHMA    • Back pain    • Bladder infection    • Bronchitis    • Hemorrhoids    • Migraine    • Pleurisy    • Pneumonia    • Ulcer (CMS-HCC)    • Scarlet fever    • Hypertension    • Paresthesia of right foot        Social History   Substance Use Topics   • Smoking status: Never Smoker    • Smokeless tobacco: None   • Alcohol Use: No       Allergies: Penicillins    Pt's medication and problem list reviewed.     Objective:     PE:  /92 mmHg  Pulse 87  Temp(Src) 36.6 °C (97.9 °F)  Ht 1.676 m (5' 5.98\")  Wt   SpO2 96%  LMP 04/09/1977    Vital signs reviewed    Constitutional: Appears well-nourished. No acute distress.  Obese.  Eyes: Conjunctivae normal and EOM are normal. Pupils are equal, round, and reactive to light.   Neck: Trachea midline. Normal range of motion. No JVD.  Cardiovascular: Normal rate, regular rhythm, normal heart sounds and faint distal pulses. No murmur, gallop, or friction rub. Trace BLE edema.  Respiratory: No respiratory distress, unlabored respiratory effort.  Lungs clear to auscultation bilaterally. No wheezes.   Abdomen: Soft, non tender, non-distended. BS + x 4. No masses.   Musculoskeletal: Wheelchair.  Limited range of motion to RLE.  R foot deformity.  R foot, lateral dorsum- 2.2 x 2.0 x 0 cm, trace surrounding maceration, " wound bed pink/ red with granulated tissue, trace surrounding erythema, mild serous drainage, no odor.  LUE PICC- non tender, no erythema.  Skin: Warm and dry. No visible rashes or lesions.  Neurological: No cranial nerve deficit. Coordination normal.  Decreased BLE sensation.  Psychiatric: Alert and oriented to person, place, and time. Normal mood, calm affect.      Labs:  WBC   Date/Time Value Ref Range Status   07/31/2017 01:15 PM 6.8 4.8 - 10.8 K/uL Final     RBC   Date/Time Value Ref Range Status   07/31/2017 01:15 PM 4.78 4.20 - 5.40 M/uL Final     HEMOGLOBIN   Date/Time Value Ref Range Status   07/31/2017 01:15 PM 13.4 12.0 - 16.0 g/dL Final     HEMATOCRIT   Date/Time Value Ref Range Status   07/31/2017 01:15 PM 42.0 37.0 - 47.0 % Final     MCV   Date/Time Value Ref Range Status   07/31/2017 01:15 PM 87.9 81.4 - 97.8 fL Final     MCH   Date/Time Value Ref Range Status   07/31/2017 01:15 PM 28.0 27.0 - 33.0 pg Final     MCHC   Date/Time Value Ref Range Status   07/31/2017 01:15 PM 31.9* 33.6 - 35.0 g/dL Final     MPV   Date/Time Value Ref Range Status   07/31/2017 01:15 PM 10.2 9.0 - 12.9 fL Final        SODIUM   Date/Time Value Ref Range Status   07/31/2017 01:15  135 - 145 mmol/L Final     POTASSIUM   Date/Time Value Ref Range Status   07/31/2017 01:15 PM 3.5* 3.6 - 5.5 mmol/L Final     CHLORIDE   Date/Time Value Ref Range Status   07/31/2017 01:15  96 - 112 mmol/L Final     CO2   Date/Time Value Ref Range Status   07/31/2017 01:15 PM 22 20 - 33 mmol/L Final     GLUCOSE   Date/Time Value Ref Range Status   07/31/2017 01:15 * 65 - 99 mg/dL Final     BUN   Date/Time Value Ref Range Status   07/31/2017 01:15 PM 11 8 - 22 mg/dL Final     CREATININE   Date/Time Value Ref Range Status   07/31/2017 01:15 PM 0.82 0.50 - 1.40 mg/dL Final       ALKALINE PHOSPHATASE   Date/Time Value Ref Range Status   07/31/2017 01:15 PM 64 30 - 99 U/L Final     AST(SGOT)   Date/Time Value Ref Range Status    07/31/2017 01:15 PM 15 12 - 45 U/L Final     ALT(SGPT)   Date/Time Value Ref Range Status   07/31/2017 01:15 PM 10 2 - 50 U/L Final     TOTAL BILIRUBIN   Date/Time Value Ref Range Status   07/31/2017 01:15 PM 0.4 0.1 - 1.5 mg/dL Final      Assessment and Plan:   The following treatment plan was discussed with patient at length:    1. Foot ulcer, right, with unspecified severity (CMS-HCC)  cefdinir (OMNICEF) 300 MG Cap    -Complete IV Ceftriaxone on 8/9/17.  D/C PICC after last infusion dose.  -On 8/10, start PO Cefdinir 300 mg BID x 1 month at least, possibly longer depending on rate of healing.  -LPS on 8/18.  -FU with [facility] for EMG study.   2. Group B streptococcal infection  cefdinir (OMNICEF) 300 MG Cap    As above.  Has tolerated IV Ceftriaxone, should tolerate PO Cefdinir with PCN allergy.   3. Diarrhea, unspecified type      Cdiff negative on 7/27.  No treatment needed.     Follow up: 3 weeks, RTC sooner if needed. FU with PCP for ongoing chronic medical conditions.     Marta Yusuf, ORLIN.[email or web address].R.N.       >25 min spent face to face with patient, >50% of time spent counseling, coordinating care, reviewing records, discussing POC, educating patient.

## 2017-08-08 NOTE — MR AVS SNAPSHOT
"Joan Tinsley Olivares   2017 2:30 PM   Office Visit   MRN: [id number]    Department:  [facility]   Dept Phone:  [phone]    Description:  Female : 1955   Provider:  LUKE Oneal           Allergies as of 2017     Allergen Noted Reactions    Penicillins 2009   Rash, Swelling    Tolerated ceftriaxone on 17      You were diagnosed with     Foot ulcer, right, with unspecified severity (CMS-HCC)   [6443052]       Group B streptococcal infection   [178172]       Diarrhea, unspecified type   [6078802]         Vital Signs     Blood Pressure Pulse Temperature Height Oxygen Saturation       160/92 mmHg 87 36.6 °C (97.9 °F) 1.676 m (5' 5.98\") 96%     Last Menstrual Period Smoking Status                1977 Never Smoker           Basic Information     Date Of Birth Sex Race Ethnicity Preferred Language    1955 Female White Non- English      Your appointments     Aug 18, 2017  9:00 AM   Wound 15 Minute Procedure with Emanuel Gutierrez M.D., Joyce Gambino R.N.   [facility] ([address] [place] [address])    [address] [place] [id number]   [phone]              Problem List              ICD-10-CM Priority Class Noted - Resolved    Vaginal high risk HPV DNA test positive R87.811   2012 - Present    Syncope R55 High  2015 - Present    Fibromyalgia (Chronic) M79.7   11/10/2015 - Present    DVT (deep venous thrombosis) (CMS-HCC) (Chronic) I82.409 High  11/10/2015 - Present    Chronic pain syndrome (Chronic) G89.4   11/10/2015 - Present    Anxiety (Chronic) F41.9   11/10/2015 - Present    Monoparesis of lower extremity (CMS-HCC) G83.10   2017 - Present    Foot ulcer, right (CMS-HCC) L97.519 High  2017 - Present    Weakness of right leg R29.898   2017 - Present    Morbid obesity with BMI of 40.0-44.9, adult (CMS-HCC) E66.01, Z68.41   2017 - Present    Nephrolithiasis N20.0   2017 - Present    Insomnia disorder G47.00   " 6/25/2017 - Present    Physical debility R53.81   6/25/2017 - Present    Muscle spasms of lower extremity M62.838   6/26/2017 - Present    Wheeze R06.2   6/27/2017 - Present    Anxiety disorder F41.9 Medium  6/28/2017 - Present    Nausea R11.0 High  6/30/2017 - Present      Health Maintenance        Date Due Completion Dates    IMM DTaP/Tdap/Td Vaccine (1 - Tdap) 4/26/1972 ---    MAMMOGRAM 4/26/1993 ---    COLONOSCOPY 4/26/2003 ---    IMM ZOSTER VACCINE 4/26/2013 ---    PAP SMEAR 2/17/2015 2/17/2012, 9/28/2010    IMM INFLUENZA (1) 9/1/2017 ---            Current Immunizations     No immunizations on file.      Below and/or attached are the medications your provider expects you to take. Review all of your home medications and newly ordered medications with your provider and/or pharmacist. Follow medication instructions as directed by your provider and/or pharmacist. Please keep your medication list with you and share with your provider. Update the information when medications are discontinued, doses are changed, or new medications (including over-the-counter products) are added; and carry medication information at all times in the event of emergency situations     Allergies:  PENICILLINS - Rash,Swelling               Medications  Valid as of: August 08, 2017 -  2:51 PM    Generic Name Brand Name Tablet Size Instructions for use    Albuterol Sulfate (Aero Soln) albuterol 108 (90 BASE) MCG/ACT Inhale 2 Puffs by mouth every four hours as needed.        ALPRAZolam (Tab) XANAX 0.25 MG Take 1 Tab by mouth 3 times a day as needed for Anxiety.        Carvedilol (Tab) COREG 6.25 MG Take 1 Tab by mouth 2 times a day, with meals.        Cefdinir (Cap) OMNICEF 300 MG Take 1 Cap by mouth 2 times a day for 30 days.        ClonazePAM (Tab) KLONOPIN 0.5 MG Take 0.25 mg by mouth 2 times a day.        Estradiol (Tab) ESTRACE 2 MG Take 2 mg by mouth every day.        OxyCODONE HCl (Tablet Extended Release 12 hour Abuse-Deterrent)  OXYCONTIN 20 MG Take 1 Tab by mouth every 12 hours.        OxyCODONE HCl (Tab) ROXICODONE 5 MG Take 1 Tab by mouth every 3 hours as needed for Severe Pain.        Prochlorperazine Maleate (Tab) COMPAZINE 10 MG Take 1 Tab by mouth every 6 hours as needed for Nausea/Vomiting.        Telmisartan (Tab) MICARDIS 40 MG Take 1 Tab by mouth every day.        .                 Medicines prescribed today were sent to:     [facility] [address] [place] [address], [place] - [address] [facility] [address] [facility] [address] [place]    [address] [facility] [address] [place] [address]    Phone: [phone] Fax: [phone]    Open 24 Hours?: No      Medication refill instructions:       If your prescription bottle indicates you have medication refills left, it is not necessary to call your provider’s office. Please contact your pharmacy and they will refill your medication.    If your prescription bottle indicates you do not have any refills left, you may request refills at any time through one of the following ways: The online TraderTools system (except Urgent Care), by calling your provider’s office, or by asking your pharmacy to contact your provider’s office with a refill request. Medication refills are processed only during regular business hours and may not be available until the next business day. Your provider may request additional information or to have a follow-up visit with you prior to refilling your medication.   *Please Note: Medication refills are assigned a new Rx number when refilled electronically. Your pharmacy may indicate that no refills were authorized even though a new prescription for the same medication is available at the pharmacy. Please request the medicine by name with the pharmacy before contacting your provider for a refill.           TraderTools Access Code: [email or web address]  Expires: 8/24/2017  4:12 AM    TraderTools  A secure, online tool to manage your health information     [employer]’s TraderTools® is a secure, online  tool that connects you to your personalized health information from the privacy of your home -- day or night - making it very easy for you to manage your healthcare. Once the activation process is completed, you can even access your medical information using the Sumerian vinicio, which is available for free in the Apple Vinicio store or Google Play store.     Sumerian provides the following levels of access (as shown below):   My Chart Features   [facility] Doctor [facility]  Specialists [facility]  Urgent  Care Non-[facility]  Primary Care  Doctor   Email your healthcare team securely and privately 24/7 X X X    Manage appointments: schedule your next appointment; view details of past/upcoming appointments X      Request prescription refills. X      View recent personal medical records, including lab and immunizations X X X X   View health record, including health history, allergies, medications X X X X   Read reports about your outpatient visits, procedures, consult and ER notes X X X X   See your discharge summary, which is a recap of your hospital and/or ER visit that includes your diagnosis, lab results, and care plan. X X       How to register for Sumerian:  1. Go to  [email or web address].  2. Click on the Sign Up Now box, which takes you to the New Member Sign Up page. You will need to provide the following information:  a. Enter your Sumerian Access Code exactly as it appears at the top of this page. (You will not need to use this code after you’ve completed the sign-up process. If you do not sign up before the expiration date, you must request a new code.)   b. Enter your date of birth.   c. Enter your home email address.   d. Click Submit, and follow the next screen’s instructions.  3. Create a Sumerian ID. This will be your Sumerian login ID and cannot be changed, so think of one that is secure and easy to remember.  4. Create a Sumerian password. You can change your password at any time.  5. Enter your Password  Reset Question and Answer. This can be used at a later time if you forget your password.   6. Enter your e-mail address. This allows you to receive e-mail notifications when new information is available in [employer].  7. Click Sign Up. You can now view your health information.    For assistance activating your [employer] account, call [phone]

## 2017-08-18 ENCOUNTER — OFFICE VISIT (OUTPATIENT)
Dept: WOUND CARE | Facility: MEDICAL CENTER | Age: 62
End: 2017-08-18
Attending: NURSE PRACTITIONER
Payer: MEDICARE

## 2017-08-18 DIAGNOSIS — L97.519 FOOT ULCER, RIGHT, WITH UNSPECIFIED SEVERITY (HCC): Primary | ICD-10-CM

## 2017-08-18 DIAGNOSIS — Q66.00 EQUINOVARUS: ICD-10-CM

## 2017-08-18 PROCEDURE — 99212 OFFICE O/P EST SF 10 MIN: CPT | Performed by: NURSE PRACTITIONER

## 2017-08-18 PROCEDURE — 97602 WOUND(S) CARE NON-SELECTIVE: CPT

## 2017-08-18 NOTE — PROGRESS NOTES
LIMB PRESERVATION SERVICE ROUNDS    Patient seen in collaboration with interdisciplinary team during LPS rounds in wound clinic for of her right lateral foot ulcer, complicated by a fixed equinovarus deformity.  She was hospitalized from 6/20 to 7/1/17 for infection of her ulcer.  During that time Dr. Wu was consulted and recommended a neurological workup.  Dr. Harvey was contacted and recommended f/u as an outpatient.  She was contacted by the neurologist office but could not be scheduled until December.  She was seen in LPS rounds on 8/4, at which time arrangements were made for her to undergo EMG testing at the [facility].  She had the studies done, however the results have not yet been transcribed.    Patient states blood sugars are averaging - N/A, pt is not diabetic    OBJECTIVE FINDINGS: Ulcer to right lateral midfoot, 2.0 x 2.0 x hypertrophic    Labs/diagnostic reviewed: -Dr. Wu to review EMG studies at [facility].     PROCEDURE   Wound care completed by Joyce Gambino RN    PLAN:   Wound Care: hydrofera blue covered with adhesive foam and Komprex horseshoe secured with hypafix tape.  Imaging - No further imaging at this time  Offloading: NWB, Wheelchair for mobility  Antibiotics: None  Surgery: [facility] to schedule surgery with Dr. Wu in the next week or two  Referral: To [facility] to obtain boot for post-op      LPS Follow up: post-op

## 2017-08-19 NOTE — WOUND TEAM
Pt seen during ortho rounds with Dr Wu and LPS team for Right lateral foot wound.  Measurements(cm): 2.0x2.0xhypergranulation. Following physician assessment, pt wound was non-selectively debrided with NS moisten gauze and cotton tip applicator.  Wound was evaluated and dressed with hydrofera blue covered with adhesive foam and Komprex horseshoe secured with hypafix tape.

## 2017-08-20 LAB — EKG IMPRESSION: NORMAL

## 2017-08-30 ENCOUNTER — HOSPITAL ENCOUNTER (OUTPATIENT)
Facility: MEDICAL CENTER | Age: 62
End: 2017-08-30
Attending: ORTHOPAEDIC SURGERY | Admitting: ORTHOPAEDIC SURGERY
Payer: MEDICARE

## 2018-05-25 ENCOUNTER — HOSPITAL ENCOUNTER (INPATIENT)
Dept: HOSPITAL 8 - ED | Age: 63
LOS: 3 days | Discharge: HOME HEALTH | DRG: 917 | End: 2018-05-28
Attending: INTERNAL MEDICINE | Admitting: HOSPITALIST
Payer: MEDICARE

## 2018-05-25 VITALS — BODY MASS INDEX: 30.93 KG/M2 | HEIGHT: 66 IN | WEIGHT: 192.46 LBS

## 2018-05-25 VITALS — DIASTOLIC BLOOD PRESSURE: 84 MMHG | SYSTOLIC BLOOD PRESSURE: 167 MMHG

## 2018-05-25 VITALS — SYSTOLIC BLOOD PRESSURE: 167 MMHG | DIASTOLIC BLOOD PRESSURE: 104 MMHG

## 2018-05-25 VITALS — SYSTOLIC BLOOD PRESSURE: 175 MMHG | DIASTOLIC BLOOD PRESSURE: 104 MMHG

## 2018-05-25 VITALS — SYSTOLIC BLOOD PRESSURE: 176 MMHG | DIASTOLIC BLOOD PRESSURE: 115 MMHG
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
## 2018-05-25 DIAGNOSIS — M48.02: ICD-10-CM

## 2018-05-25 DIAGNOSIS — M50.30: ICD-10-CM

## 2018-05-25 DIAGNOSIS — Z87.891: ICD-10-CM

## 2018-05-25 DIAGNOSIS — N39.0: ICD-10-CM

## 2018-05-25 DIAGNOSIS — G92: ICD-10-CM

## 2018-05-25 DIAGNOSIS — B96.20: ICD-10-CM

## 2018-05-25 DIAGNOSIS — I10: ICD-10-CM

## 2018-05-25 DIAGNOSIS — T42.6X1A: Primary | ICD-10-CM

## 2018-05-25 DIAGNOSIS — G25.3: ICD-10-CM

## 2018-05-25 DIAGNOSIS — E87.6: ICD-10-CM

## 2018-05-25 LAB
ALBUMIN SERPL-MCNC: 3.8 G/DL (ref 3.4–5)
ALP SERPL-CCNC: 72 U/L (ref 45–117)
ALT SERPL-CCNC: 13 U/L (ref 12–78)
ANION GAP SERPL CALC-SCNC: 6 MMOL/L (ref 5–15)
APAP SERPL-MCNC: < 2 MCG/ML (ref 10–30)
BASOPHILS # BLD AUTO: 0.03 X10^3/UL (ref 0–0.1)
BASOPHILS NFR BLD AUTO: 0 % (ref 0–1)
BILIRUB SERPL-MCNC: 0.3 MG/DL (ref 0.2–1)
CALCIUM SERPL-MCNC: 9.4 MG/DL (ref 8.5–10.1)
CHLORIDE SERPL-SCNC: 113 MMOL/L (ref 98–107)
CREAT SERPL-MCNC: 0.83 MG/DL (ref 0.55–1.02)
CULTURE INDICATED?: YES
EOSINOPHIL # BLD AUTO: 0.02 X10^3/UL (ref 0–0.4)
EOSINOPHIL NFR BLD AUTO: 0 % (ref 1–7)
ERYTHROCYTE [DISTWIDTH] IN BLOOD BY AUTOMATED COUNT: 14.6 % (ref 9.6–15.2)
INR PPP: 0.96 (ref 0.93–1.1)
LYMPHOCYTES # BLD AUTO: 1.59 X10^3/UL (ref 1–3.4)
LYMPHOCYTES NFR BLD AUTO: 15 % (ref 22–44)
MCH RBC QN AUTO: 30 PG (ref 27–34.8)
MCHC RBC AUTO-ENTMCNC: 33.3 G/DL (ref 32.4–35.8)
MCV RBC AUTO: 90.2 FL (ref 80–100)
MD: NO
MICROSCOPIC: (no result)
MONOCYTES # BLD AUTO: 0.23 X10^3/UL (ref 0.2–0.8)
MONOCYTES NFR BLD AUTO: 2 % (ref 2–9)
NEUTROPHILS # BLD AUTO: 8.89 X10^3/UL (ref 1.8–6.8)
NEUTROPHILS NFR BLD AUTO: 83 % (ref 42–75)
PLATELET # BLD AUTO: 330 X10^3/UL (ref 130–400)
PMV BLD AUTO: 7.8 FL (ref 7.4–10.4)
PROT SERPL-MCNC: 8 G/DL (ref 6.4–8.2)
PROTHROMBIN TIME: 10 SECONDS (ref 9.6–11.5)
RBC # BLD AUTO: 4.71 X10^6/UL (ref 3.82–5.3)
T4 FREE SERPL-MCNC: 0.73 NG/DL (ref 0.76–1.46)
TROPONIN I SERPL-MCNC: < 0.015 NG/ML (ref 0–0.04)
TSH SERPL-ACNC: 0.61 MIU/L (ref 0.36–3.74)
TSH SERPL-ACNC: 0.63 MIU/L (ref 0.36–3.74)
VANCOMYCIN TROUGH SERPL-MCNC: < 1.7 MG/DL (ref 2.8–20)

## 2018-05-25 PROCEDURE — 83735 ASSAY OF MAGNESIUM: CPT

## 2018-05-25 PROCEDURE — 0T9B70Z DRAINAGE OF BLADDER WITH DRAINAGE DEVICE, VIA NATURAL OR ARTIFICIAL OPENING: ICD-10-PCS | Performed by: INTERNAL MEDICINE

## 2018-05-25 PROCEDURE — 70450 CT HEAD/BRAIN W/O DYE: CPT

## 2018-05-25 PROCEDURE — G0480 DRUG TEST DEF 1-7 CLASSES: HCPCS

## 2018-05-25 PROCEDURE — 80307 DRUG TEST PRSMV CHEM ANLYZR: CPT

## 2018-05-25 PROCEDURE — 84439 ASSAY OF FREE THYROXINE: CPT

## 2018-05-25 PROCEDURE — 96374 THER/PROPH/DIAG INJ IV PUSH: CPT

## 2018-05-25 PROCEDURE — 83605 ASSAY OF LACTIC ACID: CPT

## 2018-05-25 PROCEDURE — 80329 ANALGESICS NON-OPIOID 1 OR 2: CPT

## 2018-05-25 PROCEDURE — 72156 MRI NECK SPINE W/O & W/DYE: CPT

## 2018-05-25 PROCEDURE — 87086 URINE CULTURE/COLONY COUNT: CPT

## 2018-05-25 PROCEDURE — 82140 ASSAY OF AMMONIA: CPT

## 2018-05-25 PROCEDURE — 87077 CULTURE AEROBIC IDENTIFY: CPT

## 2018-05-25 PROCEDURE — 84100 ASSAY OF PHOSPHORUS: CPT

## 2018-05-25 PROCEDURE — 70553 MRI BRAIN STEM W/O & W/DYE: CPT

## 2018-05-25 PROCEDURE — 81001 URINALYSIS AUTO W/SCOPE: CPT

## 2018-05-25 PROCEDURE — 80048 BASIC METABOLIC PNL TOTAL CA: CPT

## 2018-05-25 PROCEDURE — 80053 COMPREHEN METABOLIC PANEL: CPT

## 2018-05-25 PROCEDURE — 85610 PROTHROMBIN TIME: CPT

## 2018-05-25 PROCEDURE — 36415 COLL VENOUS BLD VENIPUNCTURE: CPT

## 2018-05-25 PROCEDURE — 85025 COMPLETE CBC W/AUTO DIFF WBC: CPT

## 2018-05-25 PROCEDURE — 87040 BLOOD CULTURE FOR BACTERIA: CPT

## 2018-05-25 PROCEDURE — A9585 GADOBUTROL INJECTION: HCPCS

## 2018-05-25 PROCEDURE — 87186 SC STD MICRODIL/AGAR DIL: CPT

## 2018-05-25 PROCEDURE — 84443 ASSAY THYROID STIM HORMONE: CPT

## 2018-05-25 PROCEDURE — 84484 ASSAY OF TROPONIN QUANT: CPT

## 2018-05-25 PROCEDURE — 93005 ELECTROCARDIOGRAM TRACING: CPT

## 2018-05-25 PROCEDURE — 71045 X-RAY EXAM CHEST 1 VIEW: CPT

## 2018-05-25 RX ADMIN — SODIUM CHLORIDE SCH MLS/HR: 0.9 INJECTION, SOLUTION INTRAVENOUS at 15:00

## 2018-05-25 RX ADMIN — AMLODIPINE BESYLATE SCH MG: 5 TABLET ORAL at 20:33

## 2018-05-25 RX ADMIN — NICOTINE SCH PATCH: 14 PATCH, EXTENDED RELEASE TRANSDERMAL at 18:35

## 2018-05-25 RX ADMIN — CARVEDILOL SCH MG: 6.25 TABLET, FILM COATED ORAL at 18:32

## 2018-05-25 RX ADMIN — HYDRALAZINE HYDROCHLORIDE PRN MG: 20 INJECTION INTRAMUSCULAR; INTRAVENOUS at 21:59

## 2018-05-25 RX ADMIN — NICOTINE SCH PATCH: 14 PATCH, EXTENDED RELEASE TRANSDERMAL at 18:32

## 2018-05-26 VITALS — SYSTOLIC BLOOD PRESSURE: 163 MMHG | DIASTOLIC BLOOD PRESSURE: 108 MMHG

## 2018-05-26 VITALS — DIASTOLIC BLOOD PRESSURE: 95 MMHG | SYSTOLIC BLOOD PRESSURE: 175 MMHG

## 2018-05-26 VITALS — DIASTOLIC BLOOD PRESSURE: 99 MMHG | SYSTOLIC BLOOD PRESSURE: 169 MMHG

## 2018-05-26 VITALS — DIASTOLIC BLOOD PRESSURE: 79 MMHG | SYSTOLIC BLOOD PRESSURE: 131 MMHG

## 2018-05-26 VITALS — DIASTOLIC BLOOD PRESSURE: 108 MMHG | SYSTOLIC BLOOD PRESSURE: 189 MMHG

## 2018-05-26 VITALS — DIASTOLIC BLOOD PRESSURE: 108 MMHG | SYSTOLIC BLOOD PRESSURE: 170 MMHG

## 2018-05-26 VITALS — DIASTOLIC BLOOD PRESSURE: 98 MMHG | SYSTOLIC BLOOD PRESSURE: 159 MMHG

## 2018-05-26 VITALS — SYSTOLIC BLOOD PRESSURE: 146 MMHG | DIASTOLIC BLOOD PRESSURE: 87 MMHG

## 2018-05-26 VITALS — SYSTOLIC BLOOD PRESSURE: 144 MMHG | DIASTOLIC BLOOD PRESSURE: 96 MMHG

## 2018-05-26 LAB
ALBUMIN SERPL-MCNC: 3.6 G/DL (ref 3.4–5)
ALP SERPL-CCNC: 72 U/L (ref 45–117)
ALT SERPL-CCNC: 11 U/L (ref 12–78)
ANION GAP SERPL CALC-SCNC: 10 MMOL/L (ref 5–15)
BASOPHILS # BLD AUTO: 0.02 X10^3/UL (ref 0–0.1)
BASOPHILS NFR BLD AUTO: 0 % (ref 0–1)
BILIRUB SERPL-MCNC: 0.7 MG/DL (ref 0.2–1)
CALCIUM SERPL-MCNC: 9.4 MG/DL (ref 8.5–10.1)
CHLORIDE SERPL-SCNC: 110 MMOL/L (ref 98–107)
CREAT SERPL-MCNC: 0.63 MG/DL (ref 0.55–1.02)
EOSINOPHIL # BLD AUTO: 0 X10^3/UL (ref 0–0.4)
EOSINOPHIL NFR BLD AUTO: 0 % (ref 1–7)
ERYTHROCYTE [DISTWIDTH] IN BLOOD BY AUTOMATED COUNT: 14.6 % (ref 9.6–15.2)
LYMPHOCYTES # BLD AUTO: 1.4 X10^3/UL (ref 1–3.4)
LYMPHOCYTES NFR BLD AUTO: 13 % (ref 22–44)
MCH RBC QN AUTO: 30.1 PG (ref 27–34.8)
MCHC RBC AUTO-ENTMCNC: 33.5 G/DL (ref 32.4–35.8)
MCV RBC AUTO: 89.6 FL (ref 80–100)
MD: NO
MONOCYTES # BLD AUTO: 0.27 X10^3/UL (ref 0.2–0.8)
MONOCYTES NFR BLD AUTO: 3 % (ref 2–9)
NEUTROPHILS # BLD AUTO: 9.44 X10^3/UL (ref 1.8–6.8)
NEUTROPHILS NFR BLD AUTO: 85 % (ref 42–75)
PLATELET # BLD AUTO: 363 X10^3/UL (ref 130–400)
PMV BLD AUTO: 7.7 FL (ref 7.4–10.4)
PROT SERPL-MCNC: 7.7 G/DL (ref 6.4–8.2)
RBC # BLD AUTO: 4.79 X10^6/UL (ref 3.82–5.3)

## 2018-05-26 RX ADMIN — ENALAPRILAT PRN MG: 1.25 INJECTION, SOLUTION INTRAVENOUS at 22:19

## 2018-05-26 RX ADMIN — POTASSIUM CHLORIDE SCH MEQ: 20 TABLET, EXTENDED RELEASE ORAL at 11:56

## 2018-05-26 RX ADMIN — ENALAPRILAT PRN MG: 1.25 INJECTION, SOLUTION INTRAVENOUS at 05:14

## 2018-05-26 RX ADMIN — ONDANSETRON PRN MG: 2 INJECTION, SOLUTION INTRAMUSCULAR; INTRAVENOUS at 23:41

## 2018-05-26 RX ADMIN — POTASSIUM CHLORIDE SCH MEQ: 20 TABLET, EXTENDED RELEASE ORAL at 10:10

## 2018-05-26 RX ADMIN — LABETALOL HYDROCHLORIDE PRN MG: 5 INJECTION INTRAVENOUS at 02:46

## 2018-05-26 RX ADMIN — NICOTINE SCH PATCH: 14 PATCH, EXTENDED RELEASE TRANSDERMAL at 16:28

## 2018-05-26 RX ADMIN — ACETAMINOPHEN PRN MG: 325 TABLET, FILM COATED ORAL at 22:19

## 2018-05-26 RX ADMIN — SODIUM CHLORIDE SCH MLS/HR: 0.9 INJECTION, SOLUTION INTRAVENOUS at 20:16

## 2018-05-26 RX ADMIN — LOSARTAN POTASSIUM SCH MG: 50 TABLET, FILM COATED ORAL at 09:58

## 2018-05-26 RX ADMIN — HYDRALAZINE HYDROCHLORIDE PRN MG: 20 INJECTION INTRAMUSCULAR; INTRAVENOUS at 01:53

## 2018-05-26 RX ADMIN — SODIUM CHLORIDE SCH MLS/HR: 0.9 INJECTION, SOLUTION INTRAVENOUS at 06:29

## 2018-05-26 RX ADMIN — ENALAPRILAT PRN MG: 1.25 INJECTION, SOLUTION INTRAVENOUS at 01:00

## 2018-05-26 RX ADMIN — POTASSIUM CHLORIDE SCH MEQ: 20 TABLET, EXTENDED RELEASE ORAL at 09:58

## 2018-05-26 RX ADMIN — AMLODIPINE BESYLATE SCH MG: 5 TABLET ORAL at 20:16

## 2018-05-26 RX ADMIN — CARVEDILOL SCH MG: 6.25 TABLET, FILM COATED ORAL at 16:46

## 2018-05-26 RX ADMIN — CARVEDILOL SCH MG: 6.25 TABLET, FILM COATED ORAL at 06:10

## 2018-05-26 RX ADMIN — CEFTRIAXONE SCH MLS/HR: 2 INJECTION, SOLUTION INTRAVENOUS at 11:56

## 2018-05-26 RX ADMIN — AMLODIPINE BESYLATE SCH MG: 5 TABLET ORAL at 09:57

## 2018-05-27 VITALS — SYSTOLIC BLOOD PRESSURE: 163 MMHG | DIASTOLIC BLOOD PRESSURE: 92 MMHG

## 2018-05-27 VITALS — SYSTOLIC BLOOD PRESSURE: 156 MMHG | DIASTOLIC BLOOD PRESSURE: 93 MMHG

## 2018-05-27 VITALS — SYSTOLIC BLOOD PRESSURE: 165 MMHG | DIASTOLIC BLOOD PRESSURE: 92 MMHG

## 2018-05-27 VITALS — DIASTOLIC BLOOD PRESSURE: 90 MMHG | SYSTOLIC BLOOD PRESSURE: 158 MMHG

## 2018-05-27 VITALS — DIASTOLIC BLOOD PRESSURE: 85 MMHG | SYSTOLIC BLOOD PRESSURE: 137 MMHG

## 2018-05-27 VITALS — SYSTOLIC BLOOD PRESSURE: 163 MMHG | DIASTOLIC BLOOD PRESSURE: 95 MMHG

## 2018-05-27 RX ADMIN — LABETALOL HYDROCHLORIDE PRN MG: 5 INJECTION INTRAVENOUS at 13:00

## 2018-05-27 RX ADMIN — CARVEDILOL SCH MG: 12.5 TABLET, FILM COATED ORAL at 17:48

## 2018-05-27 RX ADMIN — CARVEDILOL SCH MG: 6.25 TABLET, FILM COATED ORAL at 05:12

## 2018-05-27 RX ADMIN — AMLODIPINE BESYLATE SCH MG: 5 TABLET ORAL at 09:47

## 2018-05-27 RX ADMIN — SODIUM CHLORIDE SCH MLS/HR: 0.9 INJECTION, SOLUTION INTRAVENOUS at 15:43

## 2018-05-27 RX ADMIN — ENALAPRILAT PRN MG: 1.25 INJECTION, SOLUTION INTRAVENOUS at 22:04

## 2018-05-27 RX ADMIN — ONDANSETRON PRN MG: 2 INJECTION, SOLUTION INTRAMUSCULAR; INTRAVENOUS at 06:24

## 2018-05-27 RX ADMIN — LOSARTAN POTASSIUM SCH MG: 50 TABLET, FILM COATED ORAL at 09:47

## 2018-05-27 RX ADMIN — CEFTRIAXONE SCH MLS/HR: 2 INJECTION, SOLUTION INTRAVENOUS at 10:54

## 2018-05-27 RX ADMIN — ACETAMINOPHEN PRN MG: 325 TABLET, FILM COATED ORAL at 20:33

## 2018-05-27 RX ADMIN — ONDANSETRON PRN MG: 2 INJECTION, SOLUTION INTRAMUSCULAR; INTRAVENOUS at 18:30

## 2018-05-27 RX ADMIN — NICOTINE SCH PATCH: 14 PATCH, EXTENDED RELEASE TRANSDERMAL at 15:35

## 2018-05-27 RX ADMIN — SODIUM CHLORIDE SCH MLS/HR: 0.9 INJECTION, SOLUTION INTRAVENOUS at 05:12

## 2018-05-27 RX ADMIN — AMLODIPINE BESYLATE SCH MG: 5 TABLET ORAL at 20:33

## 2018-05-27 RX ADMIN — ONDANSETRON PRN MG: 2 INJECTION, SOLUTION INTRAMUSCULAR; INTRAVENOUS at 12:31

## 2018-05-28 VITALS — SYSTOLIC BLOOD PRESSURE: 167 MMHG | DIASTOLIC BLOOD PRESSURE: 68 MMHG

## 2018-05-28 VITALS — DIASTOLIC BLOOD PRESSURE: 90 MMHG | SYSTOLIC BLOOD PRESSURE: 163 MMHG

## 2018-05-28 VITALS — SYSTOLIC BLOOD PRESSURE: 173 MMHG | DIASTOLIC BLOOD PRESSURE: 98 MMHG

## 2018-05-28 VITALS — DIASTOLIC BLOOD PRESSURE: 90 MMHG | SYSTOLIC BLOOD PRESSURE: 149 MMHG

## 2018-05-28 VITALS — SYSTOLIC BLOOD PRESSURE: 142 MMHG | DIASTOLIC BLOOD PRESSURE: 81 MMHG

## 2018-05-28 VITALS — SYSTOLIC BLOOD PRESSURE: 143 MMHG | DIASTOLIC BLOOD PRESSURE: 85 MMHG

## 2018-05-28 LAB
ANION GAP SERPL CALC-SCNC: 8 MMOL/L (ref 5–15)
BASOPHILS # BLD AUTO: 0.06 X10^3/UL (ref 0–0.1)
BASOPHILS NFR BLD AUTO: 1 % (ref 0–1)
CALCIUM SERPL-MCNC: 9 MG/DL (ref 8.5–10.1)
CHLORIDE SERPL-SCNC: 113 MMOL/L (ref 98–107)
CREAT SERPL-MCNC: 0.72 MG/DL (ref 0.55–1.02)
EOSINOPHIL # BLD AUTO: 0.06 X10^3/UL (ref 0–0.4)
EOSINOPHIL NFR BLD AUTO: 1 % (ref 1–7)
ERYTHROCYTE [DISTWIDTH] IN BLOOD BY AUTOMATED COUNT: 14.6 % (ref 9.6–15.2)
LYMPHOCYTES # BLD AUTO: 2.59 X10^3/UL (ref 1–3.4)
LYMPHOCYTES NFR BLD AUTO: 21 % (ref 22–44)
MCH RBC QN AUTO: 29.8 PG (ref 27–34.8)
MCHC RBC AUTO-ENTMCNC: 33.5 G/DL (ref 32.4–35.8)
MCV RBC AUTO: 88.9 FL (ref 80–100)
MD: NO
MONOCYTES # BLD AUTO: 1 X10^3/UL (ref 0.2–0.8)
MONOCYTES NFR BLD AUTO: 8 % (ref 2–9)
NEUTROPHILS # BLD AUTO: 8.71 X10^3/UL (ref 1.8–6.8)
NEUTROPHILS NFR BLD AUTO: 70 % (ref 42–75)
PLATELET # BLD AUTO: 374 X10^3/UL (ref 130–400)
PMV BLD AUTO: 7.8 FL (ref 7.4–10.4)
RBC # BLD AUTO: 4.7 X10^6/UL (ref 3.82–5.3)

## 2018-05-28 RX ADMIN — ONDANSETRON PRN MG: 2 INJECTION, SOLUTION INTRAMUSCULAR; INTRAVENOUS at 01:03

## 2018-05-28 RX ADMIN — NICOTINE SCH PATCH: 14 PATCH, EXTENDED RELEASE TRANSDERMAL at 15:09

## 2018-05-28 RX ADMIN — POTASSIUM CHLORIDE SCH MEQ: 20 TABLET, EXTENDED RELEASE ORAL at 10:22

## 2018-05-28 RX ADMIN — AMLODIPINE BESYLATE SCH MG: 5 TABLET ORAL at 09:22

## 2018-05-28 RX ADMIN — LOSARTAN POTASSIUM SCH MG: 50 TABLET, FILM COATED ORAL at 09:22

## 2018-05-28 RX ADMIN — CARVEDILOL SCH MG: 12.5 TABLET, FILM COATED ORAL at 05:58

## 2018-05-28 RX ADMIN — POTASSIUM CHLORIDE SCH MEQ: 20 TABLET, EXTENDED RELEASE ORAL at 15:09

## 2018-05-28 RX ADMIN — ENALAPRILAT PRN MG: 1.25 INJECTION, SOLUTION INTRAVENOUS at 02:13

## 2018-05-28 RX ADMIN — SODIUM CHLORIDE SCH MLS/HR: 0.9 INJECTION, SOLUTION INTRAVENOUS at 01:03

## 2018-09-19 ENCOUNTER — HOSPITAL ENCOUNTER (INPATIENT)
Dept: HOSPITAL 8 - ED | Age: 63
LOS: 1 days | Discharge: HOME HEALTH | DRG: 917 | End: 2018-09-20
Attending: FAMILY MEDICINE | Admitting: FAMILY MEDICINE
Payer: MEDICARE

## 2018-09-19 VITALS — WEIGHT: 202.83 LBS | BODY MASS INDEX: 32.6 KG/M2 | HEIGHT: 66 IN

## 2018-09-19 VITALS — SYSTOLIC BLOOD PRESSURE: 129 MMHG | DIASTOLIC BLOOD PRESSURE: 78 MMHG

## 2018-09-19 VITALS — DIASTOLIC BLOOD PRESSURE: 78 MMHG | SYSTOLIC BLOOD PRESSURE: 129 MMHG

## 2018-09-19 VITALS — SYSTOLIC BLOOD PRESSURE: 128 MMHG | DIASTOLIC BLOOD PRESSURE: 84 MMHG

## 2018-09-19 DIAGNOSIS — F12.90: ICD-10-CM

## 2018-09-19 DIAGNOSIS — Z90.710: ICD-10-CM

## 2018-09-19 DIAGNOSIS — F32.9: ICD-10-CM

## 2018-09-19 DIAGNOSIS — I11.9: ICD-10-CM

## 2018-09-19 DIAGNOSIS — Z90.49: ICD-10-CM

## 2018-09-19 DIAGNOSIS — R00.0: ICD-10-CM

## 2018-09-19 DIAGNOSIS — M21.379: ICD-10-CM

## 2018-09-19 DIAGNOSIS — E44.1: ICD-10-CM

## 2018-09-19 DIAGNOSIS — Z96.651: ICD-10-CM

## 2018-09-19 DIAGNOSIS — G93.40: ICD-10-CM

## 2018-09-19 DIAGNOSIS — T42.6X1A: Primary | ICD-10-CM

## 2018-09-19 DIAGNOSIS — Z90.89: ICD-10-CM

## 2018-09-19 DIAGNOSIS — Z79.899: ICD-10-CM
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
## 2018-09-19 DIAGNOSIS — Z87.891: ICD-10-CM

## 2018-09-19 DIAGNOSIS — G83.11: ICD-10-CM

## 2018-09-19 LAB
ALBUMIN SERPL-MCNC: 3.3 G/DL (ref 3.4–5)
ALP SERPL-CCNC: 102 U/L (ref 45–117)
ALT SERPL-CCNC: 15 U/L (ref 12–78)
ANION GAP SERPL CALC-SCNC: 8 MMOL/L (ref 5–15)
APAP SERPL-MCNC: < 2 MCG/ML (ref 10–30)
APTT BLD: 31 SECONDS (ref 25–31)
BASOPHILS # BLD AUTO: 0.03 X10^3/UL (ref 0–0.1)
BASOPHILS NFR BLD AUTO: 1 % (ref 0–1)
BILIRUB SERPL-MCNC: 0.8 MG/DL (ref 0.2–1)
CALCIUM SERPL-MCNC: 9.3 MG/DL (ref 8.5–10.1)
CHLORIDE SERPL-SCNC: 109 MMOL/L (ref 98–107)
CREAT SERPL-MCNC: 1 MG/DL (ref 0.55–1.02)
CULTURE INDICATED?: NO
EOSINOPHIL # BLD AUTO: 0.19 X10^3/UL (ref 0–0.4)
EOSINOPHIL NFR BLD AUTO: 3 % (ref 1–7)
ERYTHROCYTE [DISTWIDTH] IN BLOOD BY AUTOMATED COUNT: 15.1 % (ref 9.6–15.2)
INR PPP: 1 (ref 0.93–1.1)
LYMPHOCYTES # BLD AUTO: 2.01 X10^3/UL (ref 1–3.4)
LYMPHOCYTES NFR BLD AUTO: 33 % (ref 22–44)
MCH RBC QN AUTO: 29.8 PG (ref 27–34.8)
MCHC RBC AUTO-ENTMCNC: 33.7 G/DL (ref 32.4–35.8)
MCV RBC AUTO: 88.4 FL (ref 80–100)
MD: NO
MICROSCOPIC: (no result)
MONOCYTES # BLD AUTO: 0.59 X10^3/UL (ref 0.2–0.8)
MONOCYTES NFR BLD AUTO: 10 % (ref 2–9)
NEUTROPHILS # BLD AUTO: 3.23 X10^3/UL (ref 1.8–6.8)
NEUTROPHILS NFR BLD AUTO: 53 % (ref 42–75)
PLATELET # BLD AUTO: 366 X10^3/UL (ref 130–400)
PMV BLD AUTO: 8 FL (ref 7.4–10.4)
PROT SERPL-MCNC: 7.4 G/DL (ref 6.4–8.2)
PROTHROMBIN TIME: 10.4 SECONDS (ref 9.6–11.5)
RBC # BLD AUTO: 4.54 X10^6/UL (ref 3.82–5.3)
TROPONIN I SERPL-MCNC: < 0.015 NG/ML (ref 0–0.04)
VANCOMYCIN TROUGH SERPL-MCNC: < 1.7 MG/DL (ref 2.8–20)

## 2018-09-19 PROCEDURE — 85730 THROMBOPLASTIN TIME PARTIAL: CPT

## 2018-09-19 PROCEDURE — 82140 ASSAY OF AMMONIA: CPT

## 2018-09-19 PROCEDURE — 85025 COMPLETE CBC W/AUTO DIFF WBC: CPT

## 2018-09-19 PROCEDURE — 81003 URINALYSIS AUTO W/O SCOPE: CPT

## 2018-09-19 PROCEDURE — 80307 DRUG TEST PRSMV CHEM ANLYZR: CPT

## 2018-09-19 PROCEDURE — G0480 DRUG TEST DEF 1-7 CLASSES: HCPCS

## 2018-09-19 PROCEDURE — 36415 COLL VENOUS BLD VENIPUNCTURE: CPT

## 2018-09-19 PROCEDURE — 99285 EMERGENCY DEPT VISIT HI MDM: CPT

## 2018-09-19 PROCEDURE — 70450 CT HEAD/BRAIN W/O DYE: CPT

## 2018-09-19 PROCEDURE — 80329 ANALGESICS NON-OPIOID 1 OR 2: CPT

## 2018-09-19 PROCEDURE — 84484 ASSAY OF TROPONIN QUANT: CPT

## 2018-09-19 PROCEDURE — 80053 COMPREHEN METABOLIC PANEL: CPT

## 2018-09-19 PROCEDURE — 71045 X-RAY EXAM CHEST 1 VIEW: CPT

## 2018-09-19 PROCEDURE — 85610 PROTHROMBIN TIME: CPT

## 2018-09-19 PROCEDURE — 93005 ELECTROCARDIOGRAM TRACING: CPT

## 2018-09-19 RX ADMIN — AMLODIPINE BESYLATE SCH MG: 5 TABLET ORAL at 22:46

## 2018-09-19 RX ADMIN — POTASSIUM CHLORIDE SCH MEQ: 750 TABLET, FILM COATED, EXTENDED RELEASE ORAL at 22:46

## 2018-09-19 RX ADMIN — HEPARIN SODIUM SCH UNITS: 5000 INJECTION, SOLUTION INTRAVENOUS; SUBCUTANEOUS at 22:45

## 2018-09-19 RX ADMIN — CARVEDILOL SCH MG: 12.5 TABLET, FILM COATED ORAL at 22:46

## 2018-09-19 RX ADMIN — SODIUM CHLORIDE SCH MLS/HR: 0.9 INJECTION, SOLUTION INTRAVENOUS at 22:47

## 2018-09-20 VITALS — DIASTOLIC BLOOD PRESSURE: 73 MMHG | SYSTOLIC BLOOD PRESSURE: 111 MMHG

## 2018-09-20 VITALS — DIASTOLIC BLOOD PRESSURE: 75 MMHG | SYSTOLIC BLOOD PRESSURE: 114 MMHG

## 2018-09-20 VITALS — DIASTOLIC BLOOD PRESSURE: 82 MMHG | SYSTOLIC BLOOD PRESSURE: 124 MMHG

## 2018-09-20 VITALS — SYSTOLIC BLOOD PRESSURE: 119 MMHG | DIASTOLIC BLOOD PRESSURE: 75 MMHG

## 2018-09-20 LAB
ALBUMIN SERPL-MCNC: 2.9 G/DL (ref 3.4–5)
ALP SERPL-CCNC: 88 U/L (ref 45–117)
ALT SERPL-CCNC: 13 U/L (ref 12–78)
ANION GAP SERPL CALC-SCNC: 7 MMOL/L (ref 5–15)
BASOPHILS # BLD AUTO: 0.03 X10^3/UL (ref 0–0.1)
BASOPHILS NFR BLD AUTO: 0 % (ref 0–1)
BILIRUB SERPL-MCNC: 1 MG/DL (ref 0.2–1)
CALCIUM SERPL-MCNC: 8.6 MG/DL (ref 8.5–10.1)
CHLORIDE SERPL-SCNC: 110 MMOL/L (ref 98–107)
CREAT SERPL-MCNC: 1.11 MG/DL (ref 0.55–1.02)
EOSINOPHIL # BLD AUTO: 0.28 X10^3/UL (ref 0–0.4)
EOSINOPHIL NFR BLD AUTO: 4 % (ref 1–7)
ERYTHROCYTE [DISTWIDTH] IN BLOOD BY AUTOMATED COUNT: 15.1 % (ref 9.6–15.2)
LYMPHOCYTES # BLD AUTO: 2.48 X10^3/UL (ref 1–3.4)
LYMPHOCYTES NFR BLD AUTO: 37 % (ref 22–44)
MCH RBC QN AUTO: 29.7 PG (ref 27–34.8)
MCHC RBC AUTO-ENTMCNC: 33.6 G/DL (ref 32.4–35.8)
MCV RBC AUTO: 88.1 FL (ref 80–100)
MD: NO
MONOCYTES # BLD AUTO: 0.54 X10^3/UL (ref 0.2–0.8)
MONOCYTES NFR BLD AUTO: 8 % (ref 2–9)
NEUTROPHILS # BLD AUTO: 3.41 X10^3/UL (ref 1.8–6.8)
NEUTROPHILS NFR BLD AUTO: 51 % (ref 42–75)
PLATELET # BLD AUTO: 335 X10^3/UL (ref 130–400)
PMV BLD AUTO: 8.2 FL (ref 7.4–10.4)
PROT SERPL-MCNC: 6.9 G/DL (ref 6.4–8.2)
RBC # BLD AUTO: 4.32 X10^6/UL (ref 3.82–5.3)

## 2018-09-20 RX ADMIN — AMLODIPINE BESYLATE SCH MG: 5 TABLET ORAL at 08:51

## 2018-09-20 RX ADMIN — HEPARIN SODIUM SCH UNITS: 5000 INJECTION, SOLUTION INTRAVENOUS; SUBCUTANEOUS at 06:07

## 2018-09-20 RX ADMIN — SODIUM CHLORIDE SCH MLS/HR: 0.9 INJECTION, SOLUTION INTRAVENOUS at 08:54

## 2018-09-20 RX ADMIN — POTASSIUM CHLORIDE SCH MEQ: 750 TABLET, FILM COATED, EXTENDED RELEASE ORAL at 08:51

## 2018-09-20 RX ADMIN — CARVEDILOL SCH MG: 12.5 TABLET, FILM COATED ORAL at 06:12

## 2018-10-01 ENCOUNTER — HOSPITAL ENCOUNTER (INPATIENT)
Dept: HOSPITAL 8 - ED | Age: 63
LOS: 7 days | Discharge: HOME HEALTH | DRG: 871 | End: 2018-10-08
Attending: HOSPITALIST | Admitting: HOSPITALIST
Payer: MEDICARE

## 2018-10-01 VITALS — BODY MASS INDEX: 31.18 KG/M2 | HEIGHT: 66 IN | WEIGHT: 194.01 LBS

## 2018-10-01 VITALS — DIASTOLIC BLOOD PRESSURE: 78 MMHG | SYSTOLIC BLOOD PRESSURE: 113 MMHG

## 2018-10-01 VITALS — SYSTOLIC BLOOD PRESSURE: 139 MMHG | DIASTOLIC BLOOD PRESSURE: 92 MMHG

## 2018-10-01 VITALS — DIASTOLIC BLOOD PRESSURE: 92 MMHG | SYSTOLIC BLOOD PRESSURE: 139 MMHG

## 2018-10-01 DIAGNOSIS — Z90.710: ICD-10-CM

## 2018-10-01 DIAGNOSIS — L03.115: ICD-10-CM

## 2018-10-01 DIAGNOSIS — F41.9: ICD-10-CM

## 2018-10-01 DIAGNOSIS — I10: ICD-10-CM

## 2018-10-01 DIAGNOSIS — G93.41: ICD-10-CM

## 2018-10-01 DIAGNOSIS — A41.9: Primary | ICD-10-CM

## 2018-10-01 DIAGNOSIS — M87.9: ICD-10-CM

## 2018-10-01 DIAGNOSIS — E87.1: ICD-10-CM

## 2018-10-01 DIAGNOSIS — E87.6: ICD-10-CM

## 2018-10-01 DIAGNOSIS — Z88.0: ICD-10-CM

## 2018-10-01 DIAGNOSIS — Z99.3: ICD-10-CM

## 2018-10-01 DIAGNOSIS — F32.9: ICD-10-CM

## 2018-10-01 DIAGNOSIS — M85.80: ICD-10-CM

## 2018-10-01 DIAGNOSIS — N17.9: ICD-10-CM

## 2018-10-01 DIAGNOSIS — N39.0: ICD-10-CM

## 2018-10-01 DIAGNOSIS — F17.200: ICD-10-CM

## 2018-10-01 DIAGNOSIS — M21.961: ICD-10-CM

## 2018-10-01 LAB
ACETONE, SERUM: (no result) MG/DL
ALBUMIN SERPL-MCNC: 3.4 G/DL (ref 3.4–5)
ALP SERPL-CCNC: 85 U/L (ref 45–117)
ALT SERPL-CCNC: 17 U/L (ref 12–78)
ANION GAP SERPL CALC-SCNC: 9 MMOL/L (ref 5–15)
BASOPHILS # BLD AUTO: 0.02 X10^3/UL (ref 0–0.1)
BASOPHILS NFR BLD AUTO: 0 % (ref 0–1)
BILIRUB SERPL-MCNC: 0.5 MG/DL (ref 0.2–1)
CALCIUM SERPL-MCNC: 9.1 MG/DL (ref 8.5–10.1)
CHLORIDE SERPL-SCNC: 115 MMOL/L (ref 98–107)
CREAT SERPL-MCNC: 1.03 MG/DL (ref 0.55–1.02)
CULTURE INDICATED?: YES
EOSINOPHIL # BLD AUTO: 0.02 X10^3/UL (ref 0–0.4)
EOSINOPHIL NFR BLD AUTO: 0 % (ref 1–7)
ERYTHROCYTE [DISTWIDTH] IN BLOOD BY AUTOMATED COUNT: 14.7 % (ref 9.6–15.2)
LYMPHOCYTES # BLD AUTO: 2.08 X10^3/UL (ref 1–3.4)
LYMPHOCYTES NFR BLD AUTO: 27 % (ref 22–44)
MCH RBC QN AUTO: 29.3 PG (ref 27–34.8)
MCHC RBC AUTO-ENTMCNC: 33 G/DL (ref 32.4–35.8)
MCV RBC AUTO: 89 FL (ref 80–100)
MD: NO
MICROSCOPIC: (no result)
MONOCYTES # BLD AUTO: 0.35 X10^3/UL (ref 0.2–0.8)
MONOCYTES NFR BLD AUTO: 5 % (ref 2–9)
NEUTROPHILS # BLD AUTO: 5.25 X10^3/UL (ref 1.8–6.8)
NEUTROPHILS NFR BLD AUTO: 68 % (ref 42–75)
PLATELET # BLD AUTO: 375 X10^3/UL (ref 130–400)
PMV BLD AUTO: 9.3 FL (ref 7.4–10.4)
PROT SERPL-MCNC: 7.8 G/DL (ref 6.4–8.2)
RBC # BLD AUTO: 5.3 X10^6/UL (ref 3.82–5.3)
TROPONIN I SERPL-MCNC: 0.02 NG/ML (ref 0–0.04)

## 2018-10-01 PROCEDURE — 86140 C-REACTIVE PROTEIN: CPT

## 2018-10-01 PROCEDURE — 80307 DRUG TEST PRSMV CHEM ANLYZR: CPT

## 2018-10-01 PROCEDURE — 87077 CULTURE AEROBIC IDENTIFY: CPT

## 2018-10-01 PROCEDURE — 85025 COMPLETE CBC W/AUTO DIFF WBC: CPT

## 2018-10-01 PROCEDURE — 82040 ASSAY OF SERUM ALBUMIN: CPT

## 2018-10-01 PROCEDURE — A9585 GADOBUTROL INJECTION: HCPCS

## 2018-10-01 PROCEDURE — 82140 ASSAY OF AMMONIA: CPT

## 2018-10-01 PROCEDURE — 85651 RBC SED RATE NONAUTOMATED: CPT

## 2018-10-01 PROCEDURE — 87186 SC STD MICRODIL/AGAR DIL: CPT

## 2018-10-01 PROCEDURE — 96374 THER/PROPH/DIAG INJ IV PUSH: CPT

## 2018-10-01 PROCEDURE — 0T9B70Z DRAINAGE OF BLADDER WITH DRAINAGE DEVICE, VIA NATURAL OR ARTIFICIAL OPENING: ICD-10-PCS | Performed by: HOSPITALIST

## 2018-10-01 PROCEDURE — 84100 ASSAY OF PHOSPHORUS: CPT

## 2018-10-01 PROCEDURE — 84484 ASSAY OF TROPONIN QUANT: CPT

## 2018-10-01 PROCEDURE — 84443 ASSAY THYROID STIM HORMONE: CPT

## 2018-10-01 PROCEDURE — 80053 COMPREHEN METABOLIC PANEL: CPT

## 2018-10-01 PROCEDURE — 93005 ELECTROCARDIOGRAM TRACING: CPT

## 2018-10-01 PROCEDURE — 87070 CULTURE OTHR SPECIMN AEROBIC: CPT

## 2018-10-01 PROCEDURE — 83605 ASSAY OF LACTIC ACID: CPT

## 2018-10-01 PROCEDURE — 36415 COLL VENOUS BLD VENIPUNCTURE: CPT

## 2018-10-01 PROCEDURE — 87205 SMEAR GRAM STAIN: CPT

## 2018-10-01 PROCEDURE — 82010 KETONE BODYS QUAN: CPT

## 2018-10-01 PROCEDURE — 83036 HEMOGLOBIN GLYCOSYLATED A1C: CPT

## 2018-10-01 PROCEDURE — 81001 URINALYSIS AUTO W/SCOPE: CPT

## 2018-10-01 PROCEDURE — 80048 BASIC METABOLIC PNL TOTAL CA: CPT

## 2018-10-01 PROCEDURE — 87040 BLOOD CULTURE FOR BACTERIA: CPT

## 2018-10-01 PROCEDURE — 71045 X-RAY EXAM CHEST 1 VIEW: CPT

## 2018-10-01 PROCEDURE — 87086 URINE CULTURE/COLONY COUNT: CPT

## 2018-10-01 PROCEDURE — 70450 CT HEAD/BRAIN W/O DYE: CPT

## 2018-10-01 PROCEDURE — 99285 EMERGENCY DEPT VISIT HI MDM: CPT

## 2018-10-01 RX ADMIN — SODIUM CHLORIDE AND POTASSIUM CHLORIDE SCH MLS/HR: .9; .15 SOLUTION INTRAVENOUS at 22:30

## 2018-10-01 RX ADMIN — SODIUM CHLORIDE AND POTASSIUM CHLORIDE SCH MLS/HR: .9; .15 SOLUTION INTRAVENOUS at 15:14

## 2018-10-01 RX ADMIN — ACETAMINOPHEN PRN MG: 325 TABLET, FILM COATED ORAL at 21:19

## 2018-10-01 RX ADMIN — ENOXAPARIN SODIUM SCH MG: 40 INJECTION SUBCUTANEOUS at 14:24

## 2018-10-01 RX ADMIN — CEFTRIAXONE SCH MLS/HR: 1 INJECTION, SOLUTION INTRAVENOUS at 14:24

## 2018-10-02 VITALS — DIASTOLIC BLOOD PRESSURE: 80 MMHG | SYSTOLIC BLOOD PRESSURE: 129 MMHG

## 2018-10-02 VITALS — DIASTOLIC BLOOD PRESSURE: 81 MMHG | SYSTOLIC BLOOD PRESSURE: 128 MMHG

## 2018-10-02 VITALS — DIASTOLIC BLOOD PRESSURE: 80 MMHG | SYSTOLIC BLOOD PRESSURE: 119 MMHG

## 2018-10-02 VITALS — SYSTOLIC BLOOD PRESSURE: 108 MMHG | DIASTOLIC BLOOD PRESSURE: 73 MMHG

## 2018-10-02 LAB
ANION GAP SERPL CALC-SCNC: 10 MMOL/L (ref 5–15)
BASOPHILS # BLD AUTO: 0.05 X10^3/UL (ref 0–0.1)
BASOPHILS NFR BLD AUTO: 1 % (ref 0–1)
CALCIUM SERPL-MCNC: 8.6 MG/DL (ref 8.5–10.1)
CHLORIDE SERPL-SCNC: 113 MMOL/L (ref 98–107)
CREAT SERPL-MCNC: 0.8 MG/DL (ref 0.55–1.02)
EOSINOPHIL # BLD AUTO: 0.09 X10^3/UL (ref 0–0.4)
EOSINOPHIL NFR BLD AUTO: 1 % (ref 1–7)
ERYTHROCYTE [DISTWIDTH] IN BLOOD BY AUTOMATED COUNT: 14.7 % (ref 9.6–15.2)
EST. AVERAGE GLUCOSE BLD GHB EST-MCNC: 105 MG/DL (ref 0–126)
HBA1C MFR BLD: 5.3 % (ref 4.2–6.3)
LYMPHOCYTES # BLD AUTO: 3.83 X10^3/UL (ref 1–3.4)
LYMPHOCYTES NFR BLD AUTO: 50 % (ref 22–44)
MCH RBC QN AUTO: 29.8 PG (ref 27–34.8)
MCHC RBC AUTO-ENTMCNC: 33.3 G/DL (ref 32.4–35.8)
MCV RBC AUTO: 89.5 FL (ref 80–100)
MD: NO
MONOCYTES # BLD AUTO: 0.59 X10^3/UL (ref 0.2–0.8)
MONOCYTES NFR BLD AUTO: 8 % (ref 2–9)
NEUTROPHILS # BLD AUTO: 3.18 X10^3/UL (ref 1.8–6.8)
NEUTROPHILS NFR BLD AUTO: 41 % (ref 42–75)
PLATELET # BLD AUTO: 290 X10^3/UL (ref 130–400)
PMV BLD AUTO: 9.2 FL (ref 7.4–10.4)
RBC # BLD AUTO: 4.44 X10^6/UL (ref 3.82–5.3)
TSH SERPL-ACNC: 3.08 MIU/L (ref 0.36–3.74)

## 2018-10-02 RX ADMIN — ACETAMINOPHEN PRN MG: 325 TABLET, FILM COATED ORAL at 14:55

## 2018-10-02 RX ADMIN — IBUPROFEN PRN MG: 200 TABLET, FILM COATED ORAL at 23:21

## 2018-10-02 RX ADMIN — CEFTRIAXONE SCH MLS/HR: 1 INJECTION, SOLUTION INTRAVENOUS at 12:58

## 2018-10-02 RX ADMIN — IBUPROFEN PRN MG: 200 TABLET, FILM COATED ORAL at 16:54

## 2018-10-02 RX ADMIN — ACETAMINOPHEN PRN MG: 325 TABLET, FILM COATED ORAL at 20:50

## 2018-10-02 RX ADMIN — IBUPROFEN PRN MG: 200 TABLET, FILM COATED ORAL at 00:55

## 2018-10-02 RX ADMIN — ENOXAPARIN SODIUM SCH MG: 40 INJECTION SUBCUTANEOUS at 12:58

## 2018-10-03 VITALS — DIASTOLIC BLOOD PRESSURE: 82 MMHG | SYSTOLIC BLOOD PRESSURE: 127 MMHG

## 2018-10-03 VITALS — DIASTOLIC BLOOD PRESSURE: 96 MMHG | SYSTOLIC BLOOD PRESSURE: 153 MMHG

## 2018-10-03 VITALS — DIASTOLIC BLOOD PRESSURE: 85 MMHG | SYSTOLIC BLOOD PRESSURE: 133 MMHG

## 2018-10-03 VITALS — DIASTOLIC BLOOD PRESSURE: 89 MMHG | SYSTOLIC BLOOD PRESSURE: 142 MMHG

## 2018-10-03 VITALS — DIASTOLIC BLOOD PRESSURE: 87 MMHG | SYSTOLIC BLOOD PRESSURE: 150 MMHG

## 2018-10-03 VITALS — SYSTOLIC BLOOD PRESSURE: 156 MMHG | DIASTOLIC BLOOD PRESSURE: 83 MMHG

## 2018-10-03 VITALS — SYSTOLIC BLOOD PRESSURE: 136 MMHG | DIASTOLIC BLOOD PRESSURE: 90 MMHG

## 2018-10-03 LAB
ALBUMIN SERPL-MCNC: 2.9 G/DL (ref 3.4–5)
ANION GAP SERPL CALC-SCNC: 8 MMOL/L (ref 5–15)
CALCIUM SERPL-MCNC: 8.3 MG/DL (ref 8.5–10.1)
CHLORIDE SERPL-SCNC: 112 MMOL/L (ref 98–107)
CREAT SERPL-MCNC: 0.73 MG/DL (ref 0.55–1.02)
ERYTHROCYTE [SEDIMENTATION RATE] IN BLOOD BY WESTERGREN METHOD: 24 MM/HR (ref 0–20)
HCT (SEDRATE): 44.3 % (ref 34.6–47.8)

## 2018-10-03 RX ADMIN — LACTOBACILLUS TAB SCH TAB: TAB at 18:18

## 2018-10-03 RX ADMIN — IBUPROFEN PRN MG: 200 TABLET, FILM COATED ORAL at 16:38

## 2018-10-03 RX ADMIN — ACETAMINOPHEN PRN MG: 325 TABLET, FILM COATED ORAL at 21:22

## 2018-10-03 RX ADMIN — ACETAMINOPHEN PRN MG: 325 TABLET, FILM COATED ORAL at 10:56

## 2018-10-03 RX ADMIN — ENOXAPARIN SODIUM SCH MG: 40 INJECTION SUBCUTANEOUS at 14:13

## 2018-10-03 RX ADMIN — CEFTRIAXONE SCH MLS/HR: 1 INJECTION, SOLUTION INTRAVENOUS at 14:13

## 2018-10-04 VITALS — SYSTOLIC BLOOD PRESSURE: 142 MMHG | DIASTOLIC BLOOD PRESSURE: 90 MMHG

## 2018-10-04 VITALS — SYSTOLIC BLOOD PRESSURE: 118 MMHG | DIASTOLIC BLOOD PRESSURE: 78 MMHG

## 2018-10-04 VITALS — SYSTOLIC BLOOD PRESSURE: 145 MMHG | DIASTOLIC BLOOD PRESSURE: 98 MMHG

## 2018-10-04 VITALS — DIASTOLIC BLOOD PRESSURE: 91 MMHG | SYSTOLIC BLOOD PRESSURE: 144 MMHG

## 2018-10-04 LAB
ALBUMIN SERPL-MCNC: 2.8 G/DL (ref 3.4–5)
ANION GAP SERPL CALC-SCNC: 9 MMOL/L (ref 5–15)
BASOPHILS # BLD AUTO: 0.03 X10^3/UL (ref 0–0.1)
BASOPHILS NFR BLD AUTO: 1 % (ref 0–1)
CALCIUM SERPL-MCNC: 8 MG/DL (ref 8.5–10.1)
CHLORIDE SERPL-SCNC: 111 MMOL/L (ref 98–107)
CREAT SERPL-MCNC: 0.65 MG/DL (ref 0.55–1.02)
EOSINOPHIL # BLD AUTO: 0.21 X10^3/UL (ref 0–0.4)
EOSINOPHIL NFR BLD AUTO: 4 % (ref 1–7)
ERYTHROCYTE [DISTWIDTH] IN BLOOD BY AUTOMATED COUNT: 14.9 % (ref 9.6–15.2)
LYMPHOCYTES # BLD AUTO: 2.53 X10^3/UL (ref 1–3.4)
LYMPHOCYTES NFR BLD AUTO: 45 % (ref 22–44)
MCH RBC QN AUTO: 29.4 PG (ref 27–34.8)
MCHC RBC AUTO-ENTMCNC: 33 G/DL (ref 32.4–35.8)
MCV RBC AUTO: 89 FL (ref 80–100)
MD: NO
MONOCYTES # BLD AUTO: 0.32 X10^3/UL (ref 0.2–0.8)
MONOCYTES NFR BLD AUTO: 6 % (ref 2–9)
NEUTROPHILS # BLD AUTO: 2.57 X10^3/UL (ref 1.8–6.8)
NEUTROPHILS NFR BLD AUTO: 46 % (ref 42–75)
PLATELET # BLD AUTO: 253 X10^3/UL (ref 130–400)
PMV BLD AUTO: 9.5 FL (ref 7.4–10.4)
RBC # BLD AUTO: 4.34 X10^6/UL (ref 3.82–5.3)

## 2018-10-04 RX ADMIN — LACTOBACILLUS TAB SCH TAB: TAB at 17:00

## 2018-10-04 RX ADMIN — LACTOBACILLUS TAB SCH TAB: TAB at 12:48

## 2018-10-04 RX ADMIN — LACTOBACILLUS TAB SCH TAB: TAB at 08:00

## 2018-10-04 RX ADMIN — IBUPROFEN PRN MG: 200 TABLET, FILM COATED ORAL at 12:53

## 2018-10-04 RX ADMIN — ACETAMINOPHEN PRN MG: 325 TABLET, FILM COATED ORAL at 23:47

## 2018-10-04 RX ADMIN — LINEZOLID SCH MLS/HR: 600 INJECTION, SOLUTION INTRAVENOUS at 18:30

## 2018-10-04 RX ADMIN — ENOXAPARIN SODIUM SCH MG: 40 INJECTION SUBCUTANEOUS at 18:30

## 2018-10-04 RX ADMIN — ACETAMINOPHEN PRN MG: 325 TABLET, FILM COATED ORAL at 18:40

## 2018-10-04 RX ADMIN — LINEZOLID SCH MLS/HR: 600 INJECTION, SOLUTION INTRAVENOUS at 06:45

## 2018-10-05 VITALS — DIASTOLIC BLOOD PRESSURE: 82 MMHG | SYSTOLIC BLOOD PRESSURE: 131 MMHG

## 2018-10-05 VITALS — DIASTOLIC BLOOD PRESSURE: 72 MMHG | SYSTOLIC BLOOD PRESSURE: 115 MMHG

## 2018-10-05 VITALS — SYSTOLIC BLOOD PRESSURE: 144 MMHG | DIASTOLIC BLOOD PRESSURE: 84 MMHG

## 2018-10-05 VITALS — SYSTOLIC BLOOD PRESSURE: 135 MMHG | DIASTOLIC BLOOD PRESSURE: 83 MMHG

## 2018-10-05 RX ADMIN — ENOXAPARIN SODIUM SCH MG: 40 INJECTION SUBCUTANEOUS at 19:14

## 2018-10-05 RX ADMIN — LACTOBACILLUS TAB SCH TAB: TAB at 10:36

## 2018-10-05 RX ADMIN — IBUPROFEN PRN MG: 200 TABLET, FILM COATED ORAL at 19:21

## 2018-10-05 RX ADMIN — LINEZOLID SCH MLS/HR: 600 INJECTION, SOLUTION INTRAVENOUS at 19:13

## 2018-10-05 RX ADMIN — LACTOBACILLUS TAB SCH TAB: TAB at 19:14

## 2018-10-05 RX ADMIN — LACTOBACILLUS TAB SCH TAB: TAB at 12:00

## 2018-10-05 RX ADMIN — LINEZOLID SCH MLS/HR: 600 INJECTION, SOLUTION INTRAVENOUS at 06:28

## 2018-10-05 RX ADMIN — ACETAMINOPHEN PRN MG: 325 TABLET, FILM COATED ORAL at 10:36

## 2018-10-05 RX ADMIN — CALCIUM CARBONATE (ANTACID) CHEW TAB 500 MG PRN MG: 500 CHEW TAB at 21:48

## 2018-10-06 VITALS — SYSTOLIC BLOOD PRESSURE: 130 MMHG | DIASTOLIC BLOOD PRESSURE: 85 MMHG

## 2018-10-06 VITALS — DIASTOLIC BLOOD PRESSURE: 88 MMHG | SYSTOLIC BLOOD PRESSURE: 144 MMHG

## 2018-10-06 VITALS — SYSTOLIC BLOOD PRESSURE: 158 MMHG | DIASTOLIC BLOOD PRESSURE: 93 MMHG

## 2018-10-06 VITALS — DIASTOLIC BLOOD PRESSURE: 89 MMHG | SYSTOLIC BLOOD PRESSURE: 147 MMHG

## 2018-10-06 LAB
BASOPHILS # BLD AUTO: 0.06 X10^3/UL (ref 0–0.1)
BASOPHILS NFR BLD AUTO: 1 % (ref 0–1)
EOSINOPHIL # BLD AUTO: 0.37 X10^3/UL (ref 0–0.4)
EOSINOPHIL NFR BLD AUTO: 5 % (ref 1–7)
ERYTHROCYTE [DISTWIDTH] IN BLOOD BY AUTOMATED COUNT: 14.7 % (ref 9.6–15.2)
LYMPHOCYTES # BLD AUTO: 3.2 X10^3/UL (ref 1–3.4)
LYMPHOCYTES NFR BLD AUTO: 44 % (ref 22–44)
MCH RBC QN AUTO: 29.2 PG (ref 27–34.8)
MCHC RBC AUTO-ENTMCNC: 32.8 G/DL (ref 32.4–35.8)
MCV RBC AUTO: 88.8 FL (ref 80–100)
MD: NO
MONOCYTES # BLD AUTO: 0.49 X10^3/UL (ref 0.2–0.8)
MONOCYTES NFR BLD AUTO: 7 % (ref 2–9)
NEUTROPHILS # BLD AUTO: 3.13 X10^3/UL (ref 1.8–6.8)
NEUTROPHILS NFR BLD AUTO: 43 % (ref 42–75)
PLATELET # BLD AUTO: 289 X10^3/UL (ref 130–400)
PMV BLD AUTO: 9.1 FL (ref 7.4–10.4)
RBC # BLD AUTO: 4.65 X10^6/UL (ref 3.82–5.3)

## 2018-10-06 RX ADMIN — LACTOBACILLUS TAB SCH TAB: TAB at 08:04

## 2018-10-06 RX ADMIN — LINEZOLID SCH MLS/HR: 600 INJECTION, SOLUTION INTRAVENOUS at 19:34

## 2018-10-06 RX ADMIN — LACTOBACILLUS TAB SCH TAB: TAB at 17:00

## 2018-10-06 RX ADMIN — IBUPROFEN PRN MG: 200 TABLET, FILM COATED ORAL at 19:35

## 2018-10-06 RX ADMIN — LACTOBACILLUS TAB SCH TAB: TAB at 13:02

## 2018-10-06 RX ADMIN — ACETAMINOPHEN PRN MG: 325 TABLET, FILM COATED ORAL at 22:00

## 2018-10-06 RX ADMIN — IBUPROFEN PRN MG: 200 TABLET, FILM COATED ORAL at 08:07

## 2018-10-06 RX ADMIN — ACETAMINOPHEN PRN MG: 325 TABLET, FILM COATED ORAL at 13:02

## 2018-10-06 RX ADMIN — LINEZOLID SCH MLS/HR: 600 INJECTION, SOLUTION INTRAVENOUS at 08:03

## 2018-10-06 RX ADMIN — ENOXAPARIN SODIUM SCH MG: 40 INJECTION SUBCUTANEOUS at 17:00

## 2018-10-07 VITALS — DIASTOLIC BLOOD PRESSURE: 93 MMHG | SYSTOLIC BLOOD PRESSURE: 149 MMHG

## 2018-10-07 VITALS — SYSTOLIC BLOOD PRESSURE: 145 MMHG | DIASTOLIC BLOOD PRESSURE: 85 MMHG

## 2018-10-07 VITALS — SYSTOLIC BLOOD PRESSURE: 146 MMHG | DIASTOLIC BLOOD PRESSURE: 87 MMHG

## 2018-10-07 VITALS — SYSTOLIC BLOOD PRESSURE: 128 MMHG | DIASTOLIC BLOOD PRESSURE: 78 MMHG

## 2018-10-07 LAB
ALBUMIN SERPL-MCNC: 2.9 G/DL (ref 3.4–5)
ANION GAP SERPL CALC-SCNC: 8 MMOL/L (ref 5–15)
CALCIUM SERPL-MCNC: 8.4 MG/DL (ref 8.5–10.1)
CHLORIDE SERPL-SCNC: 109 MMOL/L (ref 98–107)
CREAT SERPL-MCNC: 0.77 MG/DL (ref 0.55–1.02)

## 2018-10-07 RX ADMIN — LINEZOLID SCH MLS/HR: 600 INJECTION, SOLUTION INTRAVENOUS at 19:52

## 2018-10-07 RX ADMIN — LACTOBACILLUS TAB SCH TAB: TAB at 12:56

## 2018-10-07 RX ADMIN — LACTOBACILLUS TAB SCH TAB: TAB at 17:13

## 2018-10-07 RX ADMIN — LINEZOLID SCH MLS/HR: 600 INJECTION, SOLUTION INTRAVENOUS at 07:03

## 2018-10-07 RX ADMIN — ACETAMINOPHEN PRN MG: 325 TABLET, FILM COATED ORAL at 14:37

## 2018-10-07 RX ADMIN — CALCIUM CARBONATE (ANTACID) CHEW TAB 500 MG PRN MG: 500 CHEW TAB at 17:16

## 2018-10-07 RX ADMIN — ENOXAPARIN SODIUM SCH MG: 40 INJECTION SUBCUTANEOUS at 17:16

## 2018-10-07 RX ADMIN — LACTOBACILLUS TAB SCH TAB: TAB at 08:18

## 2018-10-07 RX ADMIN — IBUPROFEN PRN MG: 200 TABLET, FILM COATED ORAL at 08:18

## 2018-10-07 RX ADMIN — ACETAMINOPHEN PRN MG: 325 TABLET, FILM COATED ORAL at 19:59

## 2018-10-08 VITALS — DIASTOLIC BLOOD PRESSURE: 78 MMHG | SYSTOLIC BLOOD PRESSURE: 121 MMHG

## 2018-10-08 VITALS — DIASTOLIC BLOOD PRESSURE: 86 MMHG | SYSTOLIC BLOOD PRESSURE: 143 MMHG

## 2018-10-08 LAB
ALBUMIN SERPL-MCNC: 2.9 G/DL (ref 3.4–5)
ANION GAP SERPL CALC-SCNC: 7 MMOL/L (ref 5–15)
BASOPHILS # BLD AUTO: 0.03 X10^3/UL (ref 0–0.1)
BASOPHILS NFR BLD AUTO: 0 % (ref 0–1)
CALCIUM SERPL-MCNC: 8.3 MG/DL (ref 8.5–10.1)
CHLORIDE SERPL-SCNC: 110 MMOL/L (ref 98–107)
CREAT SERPL-MCNC: 0.74 MG/DL (ref 0.55–1.02)
EOSINOPHIL # BLD AUTO: 0.38 X10^3/UL (ref 0–0.4)
EOSINOPHIL NFR BLD AUTO: 6 % (ref 1–7)
ERYTHROCYTE [DISTWIDTH] IN BLOOD BY AUTOMATED COUNT: 14.4 % (ref 9.6–15.2)
LYMPHOCYTES # BLD AUTO: 2.89 X10^3/UL (ref 1–3.4)
LYMPHOCYTES NFR BLD AUTO: 43 % (ref 22–44)
MCH RBC QN AUTO: 29.6 PG (ref 27–34.8)
MCHC RBC AUTO-ENTMCNC: 33.3 G/DL (ref 32.4–35.8)
MCV RBC AUTO: 89.1 FL (ref 80–100)
MD: NO
MONOCYTES # BLD AUTO: 0.63 X10^3/UL (ref 0.2–0.8)
MONOCYTES NFR BLD AUTO: 9 % (ref 2–9)
NEUTROPHILS # BLD AUTO: 2.79 X10^3/UL (ref 1.8–6.8)
NEUTROPHILS NFR BLD AUTO: 42 % (ref 42–75)
PLATELET # BLD AUTO: 297 X10^3/UL (ref 130–400)
PMV BLD AUTO: 8.9 FL (ref 7.4–10.4)
RBC # BLD AUTO: 4.28 X10^6/UL (ref 3.82–5.3)

## 2018-10-08 RX ADMIN — LINEZOLID SCH MLS/HR: 600 INJECTION, SOLUTION INTRAVENOUS at 07:49

## 2018-10-08 RX ADMIN — LACTOBACILLUS TAB SCH TAB: TAB at 07:49

## 2018-10-08 RX ADMIN — LACTOBACILLUS TAB SCH TAB: TAB at 12:19

## 2018-10-14 ENCOUNTER — OFFICE VISIT (OUTPATIENT)
Dept: URGENT CARE | Facility: PHYSICIAN GROUP | Age: 63
End: 2018-10-14
Payer: MEDICARE

## 2018-10-14 VITALS
RESPIRATION RATE: 14 BRPM | BODY MASS INDEX: 32.14 KG/M2 | SYSTOLIC BLOOD PRESSURE: 160 MMHG | DIASTOLIC BLOOD PRESSURE: 90 MMHG | TEMPERATURE: 98.1 F | WEIGHT: 199 LBS | OXYGEN SATURATION: 100 % | HEART RATE: 76 BPM

## 2018-10-14 DIAGNOSIS — R10.13 EPIGASTRIC PAIN: ICD-10-CM

## 2018-10-14 DIAGNOSIS — R10.11 RUQ PAIN: ICD-10-CM

## 2018-10-14 DIAGNOSIS — R81 GLYCOSURIA: ICD-10-CM

## 2018-10-14 DIAGNOSIS — R11.2 NAUSEA AND VOMITING, INTRACTABILITY OF VOMITING NOT SPECIFIED, UNSPECIFIED VOMITING TYPE: ICD-10-CM

## 2018-10-14 DIAGNOSIS — R07.89 CHEST WALL PAIN: ICD-10-CM

## 2018-10-14 DIAGNOSIS — R31.9 HEMATURIA, UNSPECIFIED TYPE: ICD-10-CM

## 2018-10-14 DIAGNOSIS — R80.9 PROTEINURIA, UNSPECIFIED TYPE: ICD-10-CM

## 2018-10-14 LAB
APPEARANCE UR: CLEAR
BILIRUB UR STRIP-MCNC: NORMAL MG/DL
COLOR UR AUTO: YELLOW
GLUCOSE UR STRIP.AUTO-MCNC: NORMAL MG/DL
KETONES UR STRIP.AUTO-MCNC: NORMAL MG/DL
LEUKOCYTE ESTERASE UR QL STRIP.AUTO: NORMAL
NITRITE UR QL STRIP.AUTO: NORMAL
PH UR STRIP.AUTO: 7.5 [PH] (ref 5–8)
PROT UR QL STRIP: 300 MG/DL
RBC UR QL AUTO: NORMAL
SP GR UR STRIP.AUTO: 1.02
UROBILINOGEN UR STRIP-MCNC: 0.2 MG/DL

## 2018-10-14 PROCEDURE — 99214 OFFICE O/P EST MOD 30 MIN: CPT | Performed by: PHYSICIAN ASSISTANT

## 2018-10-14 PROCEDURE — 81002 URINALYSIS NONAUTO W/O SCOPE: CPT | Performed by: PHYSICIAN ASSISTANT

## 2018-10-14 RX ORDER — ONDANSETRON 2 MG/ML
4 INJECTION INTRAMUSCULAR; INTRAVENOUS ONCE
Status: COMPLETED | OUTPATIENT
Start: 2018-10-14 | End: 2018-10-14

## 2018-10-14 RX ORDER — KETOROLAC TROMETHAMINE 30 MG/ML
60 INJECTION, SOLUTION INTRAMUSCULAR; INTRAVENOUS ONCE
Status: COMPLETED | OUTPATIENT
Start: 2018-10-14 | End: 2018-10-14

## 2018-10-14 RX ADMIN — ONDANSETRON 4 MG: 2 INJECTION INTRAMUSCULAR; INTRAVENOUS at 09:49

## 2018-10-14 RX ADMIN — KETOROLAC TROMETHAMINE 60 MG: 30 INJECTION, SOLUTION INTRAMUSCULAR; INTRAVENOUS at 09:48

## 2018-10-14 ASSESSMENT — PAIN SCALES - GENERAL: PAINLEVEL: 9=SEVERE PAIN

## 2018-10-14 NOTE — PROGRESS NOTES
Chief Complaint   Patient presents with   • Abdominal Pain     with chest pain, R shoulder blade pain started last night       HISTORY OF PRESENT ILLNESS: Patient is a 63 y.o. female who presents today for the following:    Patient comes in for evaluation of abdominal pain that started at 2100 hrs. last night.  She complains of epigastric, right upper quadrant pain radiating into the right shoulder in the right upper back.  She complains of right flank discomfort and chest pain centrally.  Has been trying to drink some water but she has immediate vomiting.  She denies fever, night sweats, chills, body aches.  She does have a gallbladder.  She has no history of the same symptoms.   She has not taken any over-the-counter medication.    Patient Active Problem List    Diagnosis Date Noted   • Nausea 06/30/2017     Priority: High   • Foot ulcer, right ([facility]) 06/21/2017     Priority: High   • DVT (deep venous thrombosis) ([facility]) 11/10/2015     Priority: High   • Syncope 11/09/2015     Priority: High   • Anxiety disorder 06/28/2017     Priority: Medium   • Equinovarus 08/08/2017   • Wheeze 06/27/2017   • Muscle spasms of lower extremity 06/26/2017   • Morbid obesity with BMI of 40.0-44.9, adult ([facility]) 06/25/2017   • Nephrolithiasis 06/25/2017   • Insomnia disorder 06/25/2017   • Physical debility 06/25/2017   • Weakness of right leg 06/22/2017   • Monoparesis of lower extremity ([facility]) 06/21/2017   • Fibromyalgia 11/10/2015   • Chronic pain syndrome 11/10/2015   • Anxiety 11/10/2015   • Vaginal high risk HPV DNA test positive 02/17/2012       Allergies:Penicillins    Current Outpatient Prescriptions Ordered in [facility]   Medication Sig Dispense Refill   • Pregabalin (LYRICA PO) Take  by mouth.     • clonazepam (KLONOPIN) 0.5 MG Tab Take 0.25 mg by mouth 2 times a day.     • telmisartan (MICARDIS) 40 MG Tab Take 1 Tab by mouth every day. 30 Tab 0   • prochlorperazine (COMPAZINE) 10 MG Tab Take 1 Tab by mouth every 6 hours as needed  for Nausea/Vomiting. 20 Tab 0   • oxyCODONE CR (OXYCONTIN) 20 MG Tablet Extended Release 12 hour Abuse-Deterrent Take 1 Tab by mouth every 12 hours. 60 Each 0   • oxycodone immediate-release (ROXICODONE) 5 MG Tab Take 1 Tab by mouth every 3 hours as needed for Severe Pain. 30 Tab 0   • alprazolam (XANAX) 0.25 MG Tab Take 1 Tab by mouth 3 times a day as needed for Anxiety. 30 Tab 0   • albuterol 108 (90 BASE) MCG/ACT Aero Soln inhalation aerosol Inhale 2 Puffs by mouth every four hours as needed. 8.5 g 1   • carvedilol (COREG) 6.25 MG Tab Take 1 Tab by mouth 2 times a day, with meals. 60 Tab 0   • estradiol (ESTRACE) 2 MG TABS Take 2 mg by mouth every day.       Current Facility-Administered Medications Ordered in Epic   Medication Dose Route Frequency Provider Last Rate Last Dose   • ketorolac (TORADOL) injection 60 mg  60 mg Intramuscular Once Shea Sandy, P.A.-C.       • ondansetron (ZOFRAN) syringe/vial injection 4 mg  4 mg Intramuscular Once Shea Sandy, P.A.-C.           Past Medical History:   Diagnosis Date   • Arthritis    • ASTHMA    • Back pain    • Bladder infection    • Bronchitis    • Heart burn    • Hemorrhoids    • Hypertension    • Kidney stones    • Migraine    • Pain    • Paresthesia of right foot    • Pleurisy    • Pneumonia    • Scarlet fever    • Ulcer    • Unspecified disorder of thyroid        Social History   Substance Use Topics   • Smoking status: Never Smoker   • Smokeless tobacco: Never Used   • Alcohol use No       Family Status   Relation Status   • Mo    • Fa    • Sis (Not Specified)   • Bro (Not Specified)   • Son (Not Specified)     Family History   Problem Relation Age of Onset   • Lung Disease Mother    • Cancer Father    • Heart Disease Father    • Lung Disease Father    • Thyroid Sister    • Cancer Brother    • Psychiatry Son        Review of Systems:   Constitutional ROS: No unexpected change in weight, No weakness, No fatigue  Eye ROS: No recent  significant change in vision, No eye pain, redness, discharge  Ear ROS: No drainage, No tinnitus or vertigo, No recent change in hearing  Mouth/Throat ROS: No teeth or gum problems, No bleeding gums, No tongue complaints  Neck ROS: No swollen glands, No significant pain in neck  Pulmonary ROS: No chronic cough, sputum, or hemoptysis, No dyspnea on exertion, No wheezing  Cardiovascular ROS: No diaphoresis, No edema, No palpitations  Musculoskeletal/Extremities ROS: No peripheral edema, No pain, redness or swelling on the joints  Hematologic/Lymphatic ROS: No chills, No night sweats, No weight loss  Skin/Integumentary ROS: No edema, No evidence of rash, No itching      Exam:  Blood pressure 160/90, pulse 76, temperature 36.7 °C (98.1 °F), temperature source Temporal, resp. rate 14, weight 90.3 kg (199 lb), last menstrual period 04/09/1977, SpO2 100 %, not currently breastfeeding.  General: Well developed, well nourished.  In obvious pain.  Uses a wheelchair due to right leg paralysis.  Eye: PERRL/EOMI; conjunctivae clear, lids normal.  ENMT: Lips without lesions, MMM.    Neck: Trachea midline, no masses. No thyromegaly.  Pulmonary: Unlabored respiratory effort. Lungs clear to auscultation, no wheezes, no rhonchi.  Cardiovascular: Regular rate and rhythm without murmur.  Chest wall tenderness diffusely.  Abdomen: Soft,  nondistended. No hepatosplenomegaly.  Epigastric or upper quadrant tenderness without guarding or rebound but patient is doubled over in her wheelchair during most of the exam.  Neurologic: Grossly nonfocal. No facial asymmetry noted.  Extremities: No lower extremity edema noted.  Skin: Warm, dry, good turgor. No rashes in visible areas.   Psych: Normal mood. Alert and oriented x3. Judgment and insight is normal.    UA: 100 glucose, 40 ketones, moderate blood, >300 protein, otherwise negative    Toradol 60 mg IM    Zofran 4 mg IM    Glucose: 157    Assessment/Plan:  Patient will be transferred via  ambulance to [facility].  Discussed case with Priti charge nurse.  1. Epigastric pain  ketorolac (TORADOL) injection 60 mg   2. RUQ pain  ketorolac (TORADOL) injection 60 mg   3. Chest wall pain  ketorolac (TORADOL) injection 60 mg   4. Nausea and vomiting, intractability of vomiting not specified, unspecified vomiting type  ondansetron (ZOFRAN) syringe/vial injection 4 mg    POCT Urinalysis   5. Proteinuria, unspecified type     6. Hematuria, unspecified type     7. Glycosuria

## 2019-03-02 NOTE — MR AVS SNAPSHOT
"        Joan Tinsley Marshall   7/10/2017 12:55 PM   Office Visit   MRN: [id number]    Department:  [facility]   Dept Phone:  [phone]    Description:  Female : 1955   Provider:  WES Harrison.           Reason for Visit     Blood Pressure Problem was on Micardis 40mg, in hospital the started her on Coreg 6.25 Bid when she left they did not give her Micardis only Coreg her primary care would not refill without a appointment.  Has appoitment today at 4pm      Allergies as of 7/10/2017     Allergen Noted Reactions    Penicillins 2009   Rash, Swelling    Tolerated ceftriaxone on 17      You were diagnosed with     Essential hypertension   [[id number]]         Vital Signs     Blood Pressure Pulse Temperature Respirations Height Weight    150/100 mmHg 90 36.8 °C (98.2 °F) 16 1.676 m (5' 5.98\") 104.327 kg (230 lb)    Body Mass Index Oxygen Saturation Last Menstrual Period             37.14 kg/m2 98% 1977         Basic Information     Date Of Birth Sex Race Ethnicity Preferred Language    1955 Female White Non- English      Problem List              ICD-10-CM Priority Class Noted - Resolved    Vaginal high risk HPV DNA test positive R87.811   2012 - Present    Syncope R55 High  2015 - Present    Fibromyalgia (Chronic) M79.7   11/10/2015 - Present    DVT (deep venous thrombosis) (CMS-HCC) (Chronic) I82.409 High  11/10/2015 - Present    Chronic pain syndrome (Chronic) G89.4   11/10/2015 - Present    Anxiety (Chronic) F41.9   11/10/2015 - Present    Monoparesis of lower extremity (CMS-HCC) G83.10   2017 - Present    Foot ulcer, right (CMS-HCC) L97.519 High  2017 - Present    Weakness of right leg R29.898   2017 - Present    Morbid obesity with BMI of 40.0-44.9, adult (CMS-HCC) E66.01, Z68.41   2017 - Present    Nephrolithiasis N20.0   2017 - Present    Insomnia disorder G47.00   2017 - Present    Physical debility R53.81   2017 - " Up with SBA or independent in room..   Score of 0 on CIWA protocol.  Methadone given as ordered for taper.  Will continue to monitor.   Present    Muscle spasms of lower extremity M62.838   6/26/2017 - Present    Wheeze R06.2   6/27/2017 - Present    Anxiety disorder F41.9 Medium  6/28/2017 - Present    Nausea R11.0 High  6/30/2017 - Present      Health Maintenance        Date Due Completion Dates    IMM DTaP/Tdap/Td Vaccine (1 - Tdap) 4/26/1972 ---    MAMMOGRAM 4/26/1993 ---    COLONOSCOPY 4/26/2003 ---    IMM ZOSTER VACCINE 4/26/2013 ---    PAP SMEAR 2/17/2015 2/17/2012, 9/28/2010    IMM INFLUENZA (1) 9/1/2017 ---            Current Immunizations     No immunizations on file.      Below and/or attached are the medications your provider expects you to take. Review all of your home medications and newly ordered medications with your provider and/or pharmacist. Follow medication instructions as directed by your provider and/or pharmacist. Please keep your medication list with you and share with your provider. Update the information when medications are discontinued, doses are changed, or new medications (including over-the-counter products) are added; and carry medication information at all times in the event of emergency situations     Allergies:  PENICILLINS - Rash,Swelling               Medications  Valid as of: July 10, 2017 -  3:44 PM    Generic Name Brand Name Tablet Size Instructions for use    Albuterol Sulfate (Aero Soln) albuterol 108 (90 BASE) MCG/ACT Inhale 2 Puffs by mouth every four hours as needed.        ALPRAZolam (Tab) XANAX 0.25 MG Take 1 Tab by mouth 3 times a day as needed for Anxiety.        Carvedilol (Tab) COREG 6.25 MG Take 1 Tab by mouth 2 times a day, with meals.        Diphenhydramine-APAP (sleep)   Take 2-4 Tabs by mouth as needed (For pain and sleep).        Estradiol (Tab) ESTRACE 2 MG Take 2 mg by mouth every day.        Linezolid (Tab) ZYVOX 600 MG Take 1 Tab by mouth 2 times a day for 14 days.        Naproxen (Tab) NAPROSYN 250 MG Take 500-1,000 mg by mouth as needed. Indications: Mild to Moderate Pain        NS SOLN  100 mL with cefTRIAXone 2 GM SOLR 2 g   2 g by Intravenous route every 24 hours for 18 days.        Nystatin (Powder) MYCOSTATIN [id number] UNIT/GM apply to affected area bid        OxyCODONE HCl (Tablet Extended Release 12 hour Abuse-Deterrent) OXYCONTIN 20 MG Take 1 Tab by mouth every 12 hours.        OxyCODONE HCl (Tab) ROXICODONE 5 MG Take 1 Tab by mouth every 3 hours as needed for Severe Pain.        Pregabalin (Cap) LYRICA 75 MG Take 75 mg by mouth 3 times a day.        Prochlorperazine Maleate (Tab) COMPAZINE 10 MG Take 1 Tab by mouth every 6 hours as needed for Nausea/Vomiting.        Sennosides-Docusate Sodium (Tab) PERICOLACE or SENOKOT S 8.6-50 MG Take 2 Tabs by mouth 2 Times a Day.        Telmisartan (Tab) MICARDIS 40 MG Take 1 Tab by mouth every day.        .                 Medicines prescribed today were sent to:     [facility] [address] [place] [id number]    Phone: [phone] Fax: [phone]    Open 24 Hours?: No      Medication refill instructions:       If your prescription bottle indicates you have medication refills left, it is not necessary to call your provider’s office. Please contact your pharmacy and they will refill your medication.    If your prescription bottle indicates you do not have any refills left, you may request refills at any time through one of the following ways: The online Nanotion system (except Urgent Care), by calling your provider’s office, or by asking your pharmacy to contact your provider’s office with a refill request. Medication refills are processed only during regular business hours and may not be available until the next business day. Your provider may request additional information or to have a follow-up visit with you prior to refilling your medication.   *Please Note: Medication refills are assigned a new Rx number when refilled electronically. Your pharmacy may indicate that no refills were  authorized even though a new prescription for the same medication is available at the pharmacy. Please request the medicine by name with the pharmacy before contacting your provider for a refill.           O2 Secure Wireless Access Code: [email or web address]  Expires: 7/31/2017 12:23 PM    O[employer] Secure Wireless  A secure, online tool to manage your health information     [facility]’s [employer] Secure [employer]® is a secure, online tool that connects you to your personalized health information from the privacy of your home -- day or night - making it very easy for you to manage your healthcare. Once the activation process is completed, you can even access your medical information using the O[employer] Secure Wireless vinicio, which is available for free in the Apple Vinicio store or Google Play store.     [employer] Secure [employer] provides the following levels of access (as shown below):   My Chart Features   [facility] Doctor [facility]ists [facility]  Urgent  Care Non-[facility]  Primary Care  Doctor   Email your healthcare team securely and privately 24/7 X X X    Manage appointments: schedule your next appointment; view details of past/upcoming appointments X      Request prescription refills. X      View recent personal medical records, including lab and immunizations X X X X   View health record, including health history, allergies, medications X X X X   Read reports about your outpatient visits, procedures, consult and ER notes X X X X   See your discharge summary, which is a recap of your hospital and/or ER visit that includes your diagnosis, lab results, and care plan. X X       How to register for [employer] Secure [employer]:  1. Go to  [email or web address] [employer] [email or web address].  2. Click on the Sign Up Now box, which takes you to the New Member Sign Up page. You will need to provide the following information:  a. Enter your O[employer] Secure Wireless Access Code exactly as it appears at the top of this page. (You will not need to use this code after you’ve completed the sign-up process. If you do not sign up before the expiration date, you  must request a new code.)   b. Enter your date of birth.   c. Enter your home email address.   d. Click Submit, and follow the next screen’s instructions.  3. Create a [employer] Softwaret ID. This will be your StockUp login ID and cannot be changed, so think of one that is secure and easy to remember.  4. Create a [employer] password. You can change your password at any time.  5. Enter your Password Reset Question and Answer. This can be used at a later time if you forget your password.   6. Enter your e-mail address. This allows you to receive e-mail notifications when new information is available in StockUp.  7. Click Sign Up. You can now view your health information.    For assistance activating your StockUp account, call [phone]

## 2019-05-17 ENCOUNTER — HOSPITAL ENCOUNTER (INPATIENT)
Facility: MEDICAL CENTER | Age: 64
LOS: 11 days | DRG: 571 | End: 2019-05-28
Attending: EMERGENCY MEDICINE | Admitting: INTERNAL MEDICINE
Payer: MEDICARE

## 2019-05-17 ENCOUNTER — APPOINTMENT (OUTPATIENT)
Dept: RADIOLOGY | Facility: MEDICAL CENTER | Age: 64
DRG: 571 | End: 2019-05-17
Attending: EMERGENCY MEDICINE
Payer: MEDICARE

## 2019-05-17 DIAGNOSIS — L03.115 CELLULITIS OF RIGHT LOWER EXTREMITY: ICD-10-CM

## 2019-05-17 DIAGNOSIS — Z98.890 STATUS POST INCISION AND DRAINAGE: ICD-10-CM

## 2019-05-17 DIAGNOSIS — M79.7 FIBROMYALGIA: Chronic | ICD-10-CM

## 2019-05-17 DIAGNOSIS — Q66.00 EQUINOVARUS: ICD-10-CM

## 2019-05-17 DIAGNOSIS — I10 ESSENTIAL HYPERTENSION: ICD-10-CM

## 2019-05-17 DIAGNOSIS — S91.301A OPEN WOUND OF RIGHT FOOT, INITIAL ENCOUNTER: ICD-10-CM

## 2019-05-17 DIAGNOSIS — L97.512 SKIN ULCER OF RIGHT FOOT WITH FAT LAYER EXPOSED (HCC): ICD-10-CM

## 2019-05-17 LAB
ALBUMIN SERPL BCP-MCNC: 4.6 G/DL (ref 3.2–4.9)
ALBUMIN/GLOB SERPL: 1.3 G/DL
ALP SERPL-CCNC: 92 U/L (ref 30–99)
ALT SERPL-CCNC: 5 U/L (ref 2–50)
ANION GAP SERPL CALC-SCNC: 12 MMOL/L (ref 0–11.9)
AST SERPL-CCNC: 15 U/L (ref 12–45)
BASOPHILS # BLD AUTO: 0.8 % (ref 0–1.8)
BASOPHILS # BLD: 0.08 K/UL (ref 0–0.12)
BILIRUB SERPL-MCNC: 0.4 MG/DL (ref 0.1–1.5)
BUN SERPL-MCNC: 12 MG/DL (ref 8–22)
CALCIUM SERPL-MCNC: 9.7 MG/DL (ref 8.5–10.5)
CHLORIDE SERPL-SCNC: 106 MMOL/L (ref 96–112)
CO2 SERPL-SCNC: 17 MMOL/L (ref 20–33)
CREAT SERPL-MCNC: 0.75 MG/DL (ref 0.5–1.4)
CRP SERPL HS-MCNC: 0.3 MG/DL (ref 0–0.75)
EOSINOPHIL # BLD AUTO: 0.28 K/UL (ref 0–0.51)
EOSINOPHIL NFR BLD: 2.7 % (ref 0–6.9)
ERYTHROCYTE [DISTWIDTH] IN BLOOD BY AUTOMATED COUNT: 49.9 FL (ref 35.9–50)
GLOBULIN SER CALC-MCNC: 3.6 G/DL (ref 1.9–3.5)
GLUCOSE SERPL-MCNC: 80 MG/DL (ref 65–99)
HCT VFR BLD AUTO: 39.4 % (ref 37–47)
HGB BLD-MCNC: 11.8 G/DL (ref 12–16)
IMM GRANULOCYTES # BLD AUTO: 0.04 K/UL (ref 0–0.11)
IMM GRANULOCYTES NFR BLD AUTO: 0.4 % (ref 0–0.9)
LYMPHOCYTES # BLD AUTO: 3.1 K/UL (ref 1–4.8)
LYMPHOCYTES NFR BLD: 29.6 % (ref 22–41)
MCH RBC QN AUTO: 28.9 PG (ref 27–33)
MCHC RBC AUTO-ENTMCNC: 29.9 G/DL (ref 33.6–35)
MCV RBC AUTO: 96.6 FL (ref 81.4–97.8)
MONOCYTES # BLD AUTO: 0.46 K/UL (ref 0–0.85)
MONOCYTES NFR BLD AUTO: 4.4 % (ref 0–13.4)
NEUTROPHILS # BLD AUTO: 6.52 K/UL (ref 2–7.15)
NEUTROPHILS NFR BLD: 62.1 % (ref 44–72)
NRBC # BLD AUTO: 0 K/UL
NRBC BLD-RTO: 0 /100 WBC
PLATELET # BLD AUTO: 284 K/UL (ref 164–446)
PMV BLD AUTO: 9.3 FL (ref 9–12.9)
POTASSIUM SERPL-SCNC: 4.4 MMOL/L (ref 3.6–5.5)
PROT SERPL-MCNC: 8.2 G/DL (ref 6–8.2)
RBC # BLD AUTO: 4.08 M/UL (ref 4.2–5.4)
SODIUM SERPL-SCNC: 135 MMOL/L (ref 135–145)
WBC # BLD AUTO: 10.5 K/UL (ref 4.8–10.8)

## 2019-05-17 PROCEDURE — 85025 COMPLETE CBC W/AUTO DIFF WBC: CPT

## 2019-05-17 PROCEDURE — 87070 CULTURE OTHR SPECIMN AEROBIC: CPT

## 2019-05-17 PROCEDURE — 770006 HCHG ROOM/CARE - MED/SURG/GYN SEMI*

## 2019-05-17 PROCEDURE — 86140 C-REACTIVE PROTEIN: CPT

## 2019-05-17 PROCEDURE — 99223 1ST HOSP IP/OBS HIGH 75: CPT | Mod: AI | Performed by: INTERNAL MEDICINE

## 2019-05-17 PROCEDURE — 700102 HCHG RX REV CODE 250 W/ 637 OVERRIDE(OP): Performed by: INTERNAL MEDICINE

## 2019-05-17 PROCEDURE — 73630 X-RAY EXAM OF FOOT: CPT | Mod: RT

## 2019-05-17 PROCEDURE — A9270 NON-COVERED ITEM OR SERVICE: HCPCS | Performed by: HOSPITALIST

## 2019-05-17 PROCEDURE — 80053 COMPREHEN METABOLIC PANEL: CPT

## 2019-05-17 PROCEDURE — 700111 HCHG RX REV CODE 636 W/ 250 OVERRIDE (IP): Performed by: EMERGENCY MEDICINE

## 2019-05-17 PROCEDURE — 700105 HCHG RX REV CODE 258: Performed by: INTERNAL MEDICINE

## 2019-05-17 PROCEDURE — A9270 NON-COVERED ITEM OR SERVICE: HCPCS | Performed by: INTERNAL MEDICINE

## 2019-05-17 PROCEDURE — 700102 HCHG RX REV CODE 250 W/ 637 OVERRIDE(OP): Performed by: HOSPITALIST

## 2019-05-17 PROCEDURE — 99285 EMERGENCY DEPT VISIT HI MDM: CPT

## 2019-05-17 PROCEDURE — 700111 HCHG RX REV CODE 636 W/ 250 OVERRIDE (IP): Performed by: INTERNAL MEDICINE

## 2019-05-17 PROCEDURE — 87186 SC STD MICRODIL/AGAR DIL: CPT

## 2019-05-17 PROCEDURE — A9270 NON-COVERED ITEM OR SERVICE: HCPCS | Performed by: EMERGENCY MEDICINE

## 2019-05-17 PROCEDURE — 700102 HCHG RX REV CODE 250 W/ 637 OVERRIDE(OP): Performed by: EMERGENCY MEDICINE

## 2019-05-17 PROCEDURE — 87205 SMEAR GRAM STAIN: CPT

## 2019-05-17 PROCEDURE — 87077 CULTURE AEROBIC IDENTIFY: CPT

## 2019-05-17 RX ORDER — ENALAPRILAT 1.25 MG/ML
1.25 INJECTION INTRAVENOUS EVERY 6 HOURS PRN
Status: DISCONTINUED | OUTPATIENT
Start: 2019-05-17 | End: 2019-05-28 | Stop reason: HOSPADM

## 2019-05-17 RX ORDER — OXYCODONE HYDROCHLORIDE 5 MG/1
5 TABLET ORAL ONCE
Status: COMPLETED | OUTPATIENT
Start: 2019-05-17 | End: 2019-05-17

## 2019-05-17 RX ORDER — CARVEDILOL 12.5 MG/1
12.5 TABLET ORAL 2 TIMES DAILY WITH MEALS
COMMUNITY
End: 2022-06-03 | Stop reason: SDUPTHER

## 2019-05-17 RX ORDER — POLYETHYLENE GLYCOL 3350 17 G/17G
1 POWDER, FOR SOLUTION ORAL
Status: DISCONTINUED | OUTPATIENT
Start: 2019-05-17 | End: 2019-05-25

## 2019-05-17 RX ORDER — CARVEDILOL 6.25 MG/1
6.25 TABLET ORAL ONCE
Status: COMPLETED | OUTPATIENT
Start: 2019-05-17 | End: 2019-05-17

## 2019-05-17 RX ORDER — SULFAMETHOXAZOLE AND TRIMETHOPRIM 800; 160 MG/1; MG/1
1 TABLET ORAL ONCE
Status: DISCONTINUED | OUTPATIENT
Start: 2019-05-17 | End: 2019-05-18

## 2019-05-17 RX ORDER — ONDANSETRON 2 MG/ML
4 INJECTION INTRAMUSCULAR; INTRAVENOUS EVERY 4 HOURS PRN
Status: DISCONTINUED | OUTPATIENT
Start: 2019-05-17 | End: 2019-05-28 | Stop reason: HOSPADM

## 2019-05-17 RX ORDER — BISACODYL 10 MG
10 SUPPOSITORY, RECTAL RECTAL
Status: DISCONTINUED | OUTPATIENT
Start: 2019-05-17 | End: 2019-05-25

## 2019-05-17 RX ORDER — ONDANSETRON 4 MG/1
4 TABLET, ORALLY DISINTEGRATING ORAL ONCE
Status: COMPLETED | OUTPATIENT
Start: 2019-05-17 | End: 2019-05-17

## 2019-05-17 RX ORDER — AMOXICILLIN 250 MG
2 CAPSULE ORAL 2 TIMES DAILY
Status: DISCONTINUED | OUTPATIENT
Start: 2019-05-18 | End: 2019-05-25

## 2019-05-17 RX ORDER — ACETAMINOPHEN 325 MG/1
650 TABLET ORAL EVERY 6 HOURS PRN
Status: DISCONTINUED | OUTPATIENT
Start: 2019-05-17 | End: 2019-05-28 | Stop reason: HOSPADM

## 2019-05-17 RX ORDER — CARVEDILOL 12.5 MG/1
12.5 TABLET ORAL 2 TIMES DAILY WITH MEALS
Status: DISCONTINUED | OUTPATIENT
Start: 2019-05-17 | End: 2019-05-28 | Stop reason: HOSPADM

## 2019-05-17 RX ORDER — ONDANSETRON 4 MG/1
4 TABLET, ORALLY DISINTEGRATING ORAL EVERY 4 HOURS PRN
Status: DISCONTINUED | OUTPATIENT
Start: 2019-05-17 | End: 2019-05-28 | Stop reason: HOSPADM

## 2019-05-17 RX ORDER — SULFAMETHOXAZOLE AND TRIMETHOPRIM 800; 160 MG/1; MG/1
1 TABLET ORAL 2 TIMES DAILY
Qty: 14 TAB | Refills: 0 | Status: SHIPPED | OUTPATIENT
Start: 2019-05-17 | End: 2019-05-28

## 2019-05-17 RX ADMIN — CARVEDILOL 12.5 MG: 12.5 TABLET, FILM COATED ORAL at 21:43

## 2019-05-17 RX ADMIN — ONDANSETRON 4 MG: 4 TABLET, ORALLY DISINTEGRATING ORAL at 17:34

## 2019-05-17 RX ADMIN — OXYCODONE HYDROCHLORIDE 5 MG: 5 TABLET ORAL at 22:56

## 2019-05-17 RX ADMIN — CEFEPIME 2 G: 2 INJECTION, POWDER, FOR SOLUTION INTRAVENOUS at 21:43

## 2019-05-17 RX ADMIN — VANCOMYCIN HYDROCHLORIDE 2300 MG: 100 INJECTION, POWDER, LYOPHILIZED, FOR SOLUTION INTRAVENOUS at 22:56

## 2019-05-17 RX ADMIN — CARVEDILOL 6.25 MG: 6.25 TABLET, FILM COATED ORAL at 17:34

## 2019-05-17 ASSESSMENT — ENCOUNTER SYMPTOMS
ABDOMINAL PAIN: 0
HEADACHES: 0
TINGLING: 0
VOMITING: 0
DEPRESSION: 0
PALPITATIONS: 0
CHILLS: 0
SPUTUM PRODUCTION: 0
MYALGIAS: 0
NAUSEA: 1
CONSTIPATION: 0
COUGH: 0
DIARRHEA: 0
SHORTNESS OF BREATH: 0
FEVER: 0
FALLS: 0
FOCAL WEAKNESS: 1
WEAKNESS: 0
LOSS OF CONSCIOUSNESS: 0
STRIDOR: 0
DIZZINESS: 0

## 2019-05-17 ASSESSMENT — COGNITIVE AND FUNCTIONAL STATUS - GENERAL
MOVING FROM LYING ON BACK TO SITTING ON SIDE OF FLAT BED: A LITTLE
SUGGESTED CMS G CODE MODIFIER DAILY ACTIVITY: CJ
WALKING IN HOSPITAL ROOM: A LOT
HELP NEEDED FOR BATHING: A LITTLE
TOILETING: A LITTLE
MOVING TO AND FROM BED TO CHAIR: A LITTLE
TURNING FROM BACK TO SIDE WHILE IN FLAT BAD: A LITTLE
CLIMB 3 TO 5 STEPS WITH RAILING: A LOT
SUGGESTED CMS G CODE MODIFIER MOBILITY: CK
DAILY ACTIVITIY SCORE: 22
MOBILITY SCORE: 17

## 2019-05-17 ASSESSMENT — LIFESTYLE VARIABLES
EVER_SMOKED: NEVER
ALCOHOL_USE: NO
DO YOU DRINK ALCOHOL: NO

## 2019-05-17 ASSESSMENT — PATIENT HEALTH QUESTIONNAIRE - PHQ9
SUM OF ALL RESPONSES TO PHQ9 QUESTIONS 1 AND 2: 0
1. LITTLE INTEREST OR PLEASURE IN DOING THINGS: NOT AT ALL
2. FEELING DOWN, DEPRESSED, IRRITABLE, OR HOPELESS: NOT AT ALL

## 2019-05-17 NOTE — ED NOTES
Pt presents to ED after being sent over from doctor for a wound that is present on her right foot. 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 Pt states that the foot has become swollen and began to discharge recently. Pt has no feeling in her foot so is unable to report increase in pain. Pt right foot appears swollen, cap refill is >3 seconds and wound has small amount of discharge.

## 2019-05-17 NOTE — ED TRIAGE NOTES
".  Chief Complaint   Patient presents with   • Sent by MD     from [place] for IV abx     .BP (!) 187/103   Pulse 96   Temp 36.6 °C (97.9 °F) (Temporal)   Resp 17   Ht 1.676 m (5' 6\")   Wt 90.3 kg (199 lb)   LMP 04/09/1977   SpO2 98%   BMI 32.12 kg/m²     Patient to triage for above, states she has a foot infection that is \"travelling up her leg\", educated on triage process, placed in senior lounge with friend, wearing mask, charge RN aware of patient arrival.    "

## 2019-05-17 NOTE — ED PROVIDER NOTES
"ED Provider Note    Scribed for Kj Cho II, M.D. by Beto Dooley. 5/17/2019  4:59 PM    Means of Arrival: Wheel Chair  History obtained by: Patient  Limitations: None    CHIEF COMPLAINT  Chief Complaint   Patient presents with   • Sent by MD     from [facility] for IV abx       HPI  Joan Olivares is a 64 y.o. female with right lower extremity paresthesia and wheelchair bound who presents to the Emergency Department sent from [place] for evaluation of a right foot pressure ulcer that began to drain white, malodorous purulent material onset 2 days ago. She reports associated swelling around her ankle and slight discoloration of her toes which began 24 hours ago. Joan also reports she has developed nausea and right foot pain that radiates up right leg, but has a history of neuropathy. She has been elevating the foot with no alleviation. The pressure ulcer is noted to be an ongoing issue the past 3 years, but she is concerned as the drainage is typically a \"dried blood color\" and has never been purulent. Joan presented to an Urgent Care in [place] due to her concerns, but was referred to the [facility] ED as she was informed she would possibly need IV antibiotics. Joan reports a similar episode of symptoms, most recently in October 2018, and was admitted to [facility] ED at that time to receive IV fluids. She states she had MRI imaging of her right foot during that admit, and there were no signs of osteomyelitis. Joan reports she previously had a home health care nurse providing wound care 3 times a week, but recently has been taking care of the wound herself. She denies associated fever. Joan reports a history of hypertension, but states she forgot to take her medication today.     REVIEW OF SYSTEMS  Review of Systems   Constitutional: Negative for fever.   Gastrointestinal: Positive for nausea.   Musculoskeletal:        Positive for right foot pain and right ankle swelling.   Skin:        " "Positive for right foot pressure ulcer with purulent drainage.   Positive for slight discoloration of toes of right foot.    All other systems reviewed and are negative.    See HPI for further details.    PAST MEDICAL HISTORY   has a past medical history of Arthritis; ASTHMA; Back pain; Bladder infection; Bronchitis; Heart burn; Hemorrhoids; Hypertension; Kidney stones; Migraine; Pain; Paresthesia of right foot; Pleurisy; Pneumonia; Scarlet fever; Ulcer; and Unspecified disorder of thyroid.    SOCIAL HISTORY  Social History     Social History Main Topics   • Smoking status: Never Smoker   • Smokeless tobacco: Never Used   • Alcohol use No   • Drug use: No   • Sexual activity: No       SURGICAL HISTORY   has a past surgical history that includes tonsillectomy and adenoidectomy; other orthopedic surgery (2004); appendectomy; thyroid lobectomy (11/11/2009); and hysterectomy laparoscopy.    CURRENT MEDICATIONS  Home Medications     Reviewed by Denisse Noe R.N. (Registered Nurse) on 05/17/19 at 2258  Med List Status: Complete   Medication Last Dose Status   carvedilol (COREG) 12.5 MG Tab 5/16/2019 Active                ALLERGIES  Allergies   Allergen Reactions   • Penicillins Rash and Swelling     Tolerated ceftriaxone on 6/20/17       PHYSICAL EXAM  VITAL SIGNS: BP (!) 187/103   Pulse 88   Temp 36.6 °C (97.9 °F) (Temporal)   Resp 17   Ht 1.676 m (5' 6\")   Wt 90.3 kg (199 lb)   LMP 04/09/1977   SpO2 97%   BMI 32.12 kg/m²     Pulse ox interpretation: I interpret this pulse ox as normal.  Constitutional: Alert in no apparent distress.   HENT: No signs of trauma, Bilateral external ears normal, Nose normal.   Eyes: Pupils are equal, Conjunctiva normal, Non-icteric.   Neck: Normal range of motion, No tenderness, Supple, No stridor.   Cardiovascular: Regular rate and rhythm, no murmurs. Symmetric distal pulses. No cyanosis of extremities. No peripheral edema of extremities.  Thorax & Lungs: Normal breath " sounds, No respiratory distress, No wheezing, No chest tenderness.   Abdomen: Bowel sounds normal, Soft, No tenderness, No masses, No pulsatile masses. No peritoneal signs.  Skin: Warm, Dry, No erythema, No rash. 5 mm open wound over lateral right foot with slight redness to foot but no drainage.  Back: No midline bony tenderness, No CVA tenderness.   Musculoskeletal: Right foot deformity with ankle inversion, edema noted with no temperature difference between either foot. Good range of motion in all major joints.  Neurologic: Alert , Normal motor function, Normal sensory function, No focal deficits noted.   Psychiatric: Affect normal, Judgment normal, Mood normal.       DIAGNOSTIC STUDIES / PROCEDURES    LABS  Pertinent Labs & Imaging studies reviewed. (See chart for details)    RADIOLOGY  DX-FOOT-COMPLETE 3+ RIGHT   Final Result      Lateral skin ulceration with associated soft tissue gas compatible with cellulitis. Underlying abscess not excluded      No acute bony destruction is detected. If there is high clinical concern for osteomyelitis, MRI with contrast could be performed      Severe osteopenia      MR-FOOT-WITH & W/O RIGHT    (Results Pending)     Pertinent Labs & Imaging studies reviewed. (See chart for details)    COURSE & MEDICAL DECISION MAKING  Pertinent Labs & Imaging studies reviewed. (See chart for details)    4:59 PM This is a 64 y.o. female who presents with right foot pressure ulcer that has begun to drain purulent material. The differential diagnosis includes but is not limited to infection, occult fracture, chronic wound. Ordered for DX-foot right, CBC with differential, CMP, and CRP Quant to evaluate. Patient will be treated with Zofran 4 mg for nausea. She will additionally be given 6.25 mg Carvedilol as she reports to have forgotten to take her blood pressure medication today, hypertensive on arrival.     7:35 PM - Patient was reevaluated at bedside. Discussed lab and radiology results with  the patient. Findings not overwhelmingly consistent with infection since normal WBC, crp. Slightly low bicarb with mildly increased anion gap but differential for this isolated lab finding not always indicative to infection.  I spent over 30 minutes at bedside discussing results with her and her friend. I thought it would be reasonable for discharge with oral antibiotics, wound care follow up and information for Orthopedics clinic (to address foot deformity that is likely causing non-healing wound). She and her family were resistant to outpatient plan. Requested inpatient wound care treatment. Fearful of going home (they live about an hour away from hospital) given past history of long hospitalizations with foot wound. I reviewed past hospitalization records. Appears she was treated for presumed oseomyelitis but MRI was not consistent with that diagnosis. Ultimately I agreed to contact our hospitalist service requesting further evaluation for admission.     7:44 PM I discussed the patient's case and the above findings with Dr. Mayen (Hospitalist) who agrees to admit the patient for further monitoring and treatment.    7:45 PM - Joan was updated with plan for admission. She is understanding and agreeable to admit. Blood pressure has improved after receiving Carvedilol.      DISPOSITION:  Patient will be admitted to Dr. Kelley, Hospitalist, in guarded condition.    FINAL IMPRESSION  1. Open wound of right foot, initial encounter    2. Cellulitis of right lower extremity    3. Essential hypertension          Beto LAIRD (Jayjay), am scribing for, and in the presence of, Kj Cho II, M.D.    Electronically signed by: Beto Burgess), 5/17/2019    Kj LAIRD II, M.D. personally performed the services described in this documentation, as scribed by Beto Dooley in my presence, and it is both accurate and complete.    C.    The note accurately reflects work and decisions made by me.  Kj ALONSO  Sciascia II  5/18/2019  10:58 AM

## 2019-05-18 ENCOUNTER — APPOINTMENT (OUTPATIENT)
Dept: RADIOLOGY | Facility: MEDICAL CENTER | Age: 64
DRG: 571 | End: 2019-05-18
Attending: INTERNAL MEDICINE
Payer: MEDICARE

## 2019-05-18 PROBLEM — S91.339A PENETRATING FOOT WOUND: Status: ACTIVE | Noted: 2019-05-18

## 2019-05-18 PROBLEM — L97.512 SKIN ULCER OF RIGHT FOOT WITH FAT LAYER EXPOSED (HCC): Status: ACTIVE | Noted: 2019-05-18

## 2019-05-18 PROBLEM — D64.9 NORMOCYTIC ANEMIA: Status: ACTIVE | Noted: 2019-05-18

## 2019-05-18 PROBLEM — E87.29 HIGH ANION GAP METABOLIC ACIDOSIS: Status: ACTIVE | Noted: 2019-05-18

## 2019-05-18 LAB
ANION GAP SERPL CALC-SCNC: 8 MMOL/L (ref 0–11.9)
BASOPHILS # BLD AUTO: 0.4 % (ref 0–1.8)
BASOPHILS # BLD: 0.04 K/UL (ref 0–0.12)
BUN SERPL-MCNC: 12 MG/DL (ref 8–22)
CALCIUM SERPL-MCNC: 9 MG/DL (ref 8.5–10.5)
CHLORIDE SERPL-SCNC: 109 MMOL/L (ref 96–112)
CO2 SERPL-SCNC: 20 MMOL/L (ref 20–33)
CREAT SERPL-MCNC: 0.73 MG/DL (ref 0.5–1.4)
EOSINOPHIL # BLD AUTO: 0.27 K/UL (ref 0–0.51)
EOSINOPHIL NFR BLD: 2.6 % (ref 0–6.9)
ERYTHROCYTE [DISTWIDTH] IN BLOOD BY AUTOMATED COUNT: 44.7 FL (ref 35.9–50)
GLUCOSE SERPL-MCNC: 97 MG/DL (ref 65–99)
GRAM STN SPEC: NORMAL
HCT VFR BLD AUTO: 41.7 % (ref 37–47)
HGB BLD-MCNC: 13.5 G/DL (ref 12–16)
IMM GRANULOCYTES # BLD AUTO: 0.04 K/UL (ref 0–0.11)
IMM GRANULOCYTES NFR BLD AUTO: 0.4 % (ref 0–0.9)
LACTATE BLD-SCNC: 1.3 MMOL/L (ref 0.5–2)
LYMPHOCYTES # BLD AUTO: 3.01 K/UL (ref 1–4.8)
LYMPHOCYTES NFR BLD: 28.5 % (ref 22–41)
MCH RBC QN AUTO: 28.1 PG (ref 27–33)
MCHC RBC AUTO-ENTMCNC: 32.4 G/DL (ref 33.6–35)
MCV RBC AUTO: 86.7 FL (ref 81.4–97.8)
MONOCYTES # BLD AUTO: 0.54 K/UL (ref 0–0.85)
MONOCYTES NFR BLD AUTO: 5.1 % (ref 0–13.4)
NEUTROPHILS # BLD AUTO: 6.65 K/UL (ref 2–7.15)
NEUTROPHILS NFR BLD: 63 % (ref 44–72)
NRBC # BLD AUTO: 0 K/UL
NRBC BLD-RTO: 0 /100 WBC
PLATELET # BLD AUTO: 417 K/UL (ref 164–446)
PMV BLD AUTO: 9.4 FL (ref 9–12.9)
POTASSIUM SERPL-SCNC: 3.9 MMOL/L (ref 3.6–5.5)
RBC # BLD AUTO: 4.81 M/UL (ref 4.2–5.4)
SIGNIFICANT IND 70042: NORMAL
SITE SITE: NORMAL
SODIUM SERPL-SCNC: 137 MMOL/L (ref 135–145)
SOURCE SOURCE: NORMAL
WBC # BLD AUTO: 10.6 K/UL (ref 4.8–10.8)

## 2019-05-18 PROCEDURE — 73720 MRI LWR EXTREMITY W/O&W/DYE: CPT | Mod: RT

## 2019-05-18 PROCEDURE — 85025 COMPLETE CBC W/AUTO DIFF WBC: CPT

## 2019-05-18 PROCEDURE — 36415 COLL VENOUS BLD VENIPUNCTURE: CPT

## 2019-05-18 PROCEDURE — 80048 BASIC METABOLIC PNL TOTAL CA: CPT

## 2019-05-18 PROCEDURE — 700111 HCHG RX REV CODE 636 W/ 250 OVERRIDE (IP): Performed by: INTERNAL MEDICINE

## 2019-05-18 PROCEDURE — 700105 HCHG RX REV CODE 258: Performed by: INTERNAL MEDICINE

## 2019-05-18 PROCEDURE — A9270 NON-COVERED ITEM OR SERVICE: HCPCS | Performed by: FAMILY MEDICINE

## 2019-05-18 PROCEDURE — 700102 HCHG RX REV CODE 250 W/ 637 OVERRIDE(OP): Performed by: INTERNAL MEDICINE

## 2019-05-18 PROCEDURE — 99223 1ST HOSP IP/OBS HIGH 75: CPT | Performed by: INTERNAL MEDICINE

## 2019-05-18 PROCEDURE — 83605 ASSAY OF LACTIC ACID: CPT

## 2019-05-18 PROCEDURE — 770006 HCHG ROOM/CARE - MED/SURG/GYN SEMI*

## 2019-05-18 PROCEDURE — 99232 SBSQ HOSP IP/OBS MODERATE 35: CPT | Performed by: FAMILY MEDICINE

## 2019-05-18 PROCEDURE — A9585 GADOBUTROL INJECTION: HCPCS | Performed by: FAMILY MEDICINE

## 2019-05-18 PROCEDURE — A9270 NON-COVERED ITEM OR SERVICE: HCPCS | Performed by: INTERNAL MEDICINE

## 2019-05-18 PROCEDURE — 87040 BLOOD CULTURE FOR BACTERIA: CPT

## 2019-05-18 PROCEDURE — 700102 HCHG RX REV CODE 250 W/ 637 OVERRIDE(OP): Performed by: FAMILY MEDICINE

## 2019-05-18 PROCEDURE — 700117 HCHG RX CONTRAST REV CODE 255: Performed by: FAMILY MEDICINE

## 2019-05-18 RX ORDER — OXYCODONE HYDROCHLORIDE 5 MG/1
5-10 TABLET ORAL EVERY 4 HOURS PRN
Status: DISCONTINUED | OUTPATIENT
Start: 2019-05-18 | End: 2019-05-28 | Stop reason: HOSPADM

## 2019-05-18 RX ORDER — SODIUM HYPOCHLORITE 1.25 MG/ML
SOLUTION TOPICAL 2 TIMES DAILY PRN
Status: DISCONTINUED | OUTPATIENT
Start: 2019-05-18 | End: 2019-05-21

## 2019-05-18 RX ORDER — GADOBUTROL 604.72 MG/ML
10 INJECTION INTRAVENOUS ONCE
Status: COMPLETED | OUTPATIENT
Start: 2019-05-18 | End: 2019-05-18

## 2019-05-18 RX ORDER — METRONIDAZOLE 500 MG/1
500 TABLET ORAL EVERY 8 HOURS
Status: DISCONTINUED | OUTPATIENT
Start: 2019-05-18 | End: 2019-05-21

## 2019-05-18 RX ORDER — MORPHINE SULFATE 4 MG/ML
1-2 INJECTION, SOLUTION INTRAMUSCULAR; INTRAVENOUS EVERY 4 HOURS PRN
Status: DISCONTINUED | OUTPATIENT
Start: 2019-05-18 | End: 2019-05-20

## 2019-05-18 RX ADMIN — SENNOSIDES, DOCUSATE SODIUM 2 TABLET: 50; 8.6 TABLET, FILM COATED ORAL at 17:39

## 2019-05-18 RX ADMIN — CARVEDILOL 12.5 MG: 12.5 TABLET, FILM COATED ORAL at 17:39

## 2019-05-18 RX ADMIN — CEFEPIME 2 G: 2 INJECTION, POWDER, FOR SOLUTION INTRAVENOUS at 05:15

## 2019-05-18 RX ADMIN — CARVEDILOL 12.5 MG: 12.5 TABLET, FILM COATED ORAL at 09:44

## 2019-05-18 RX ADMIN — GADOBUTROL 10 ML: 604.72 INJECTION INTRAVENOUS at 09:36

## 2019-05-18 RX ADMIN — SENNOSIDES, DOCUSATE SODIUM 2 TABLET: 50; 8.6 TABLET, FILM COATED ORAL at 05:14

## 2019-05-18 RX ADMIN — OXYCODONE HYDROCHLORIDE 10 MG: 5 TABLET ORAL at 17:39

## 2019-05-18 RX ADMIN — METRONIDAZOLE 500 MG: 500 TABLET, FILM COATED ORAL at 16:31

## 2019-05-18 RX ADMIN — ENOXAPARIN SODIUM 40 MG: 100 INJECTION SUBCUTANEOUS at 05:14

## 2019-05-18 RX ADMIN — CEFEPIME 2 G: 2 INJECTION, POWDER, FOR SOLUTION INTRAVENOUS at 17:39

## 2019-05-18 RX ADMIN — OXYCODONE HYDROCHLORIDE 10 MG: 5 TABLET ORAL at 21:49

## 2019-05-18 RX ADMIN — OXYCODONE HYDROCHLORIDE 10 MG: 5 TABLET ORAL at 13:38

## 2019-05-18 RX ADMIN — METRONIDAZOLE 500 MG: 500 TABLET, FILM COATED ORAL at 22:00

## 2019-05-18 ASSESSMENT — ENCOUNTER SYMPTOMS
WEAKNESS: 0
HEARTBURN: 0
DIZZINESS: 0
CHILLS: 1
PALPITATIONS: 0
BLURRED VISION: 0
SHORTNESS OF BREATH: 0
NAUSEA: 0
BACK PAIN: 0
NECK PAIN: 0
WHEEZING: 0
NERVOUS/ANXIOUS: 1
CONSTIPATION: 0
DOUBLE VISION: 0
SORE THROAT: 0
DIARRHEA: 0
VOMITING: 0
CHILLS: 0
FEVER: 0
MYALGIAS: 0
HEADACHES: 1
COUGH: 0
ABDOMINAL PAIN: 0
SPUTUM PRODUCTION: 0
HEADACHES: 0

## 2019-05-18 NOTE — ASSESSMENT & PLAN NOTE
-s/p I&D on 5/20  -IV ABX with stop date 6/17  -Leg is currently casted with small area cut out for wound care.  -Wound culture positive staph aureus  -No leukocytosis  -Afebrile  -Plan to DC home with home health on tomorrow 5/28 with OP infusion

## 2019-05-18 NOTE — PROGRESS NOTES
LIMB PRESERVATION SERVICE ([facility])    Seen by lps pt education, dressing orders added, full note to follow after MRI results.     Rufus Galan R.N.

## 2019-05-18 NOTE — H&P
Hospital Medicine History & Physical Note    Date of Service  5/17/2019    Primary Care Physician  Dl Tsai M.D.    Consultants  None    Code Status  Full    Chief Complaint  Right foot wound    History of Presenting Illness  64 y.o. female who presented 5/17/2019 with right foot wound.  This is a chronic wound however she has acute worsening recently.  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  She states over the last 3 days she is noted worsening swelling at the wound itself.  Over the last 24 hours she is noted swelling around her ankle going up her leg as well as pus out of her wound.  Whenever she was having pus out of the wound she did note a horrible smell that is not currently present.  Patient did complain of nausea last night.  Patient does have a history of osteomyelitis.  She did have another bad infection on October, finished antibiotics then but has not required antibiotics since.  I did discuss the case including labs and imaging with the ER physician.    Review of Systems  Review of Systems   Constitutional: Negative for chills, fever and malaise/fatigue.   HENT: Negative for congestion.    Respiratory: Negative for cough, sputum production, shortness of breath and stridor.    Cardiovascular: Positive for leg swelling. Negative for chest pain and palpitations.   Gastrointestinal: Positive for nausea. Negative for abdominal pain, constipation, diarrhea and vomiting.   Genitourinary: Negative for dysuria and urgency.   Musculoskeletal: Positive for joint pain (right foot). Negative for falls and myalgias.   Neurological: Positive for focal weakness (right foot, chronic). Negative for dizziness, tingling, loss of consciousness, weakness and headaches.   Psychiatric/Behavioral: Negative for depression and suicidal ideas.   All other systems reviewed and are negative.      Past Medical History   has a past  medical history of Arthritis; ASTHMA; Back pain; Bladder infection; Bronchitis; Heart burn; Hemorrhoids; Hypertension; Kidney stones; Migraine; Pain; Paresthesia of right foot; Pleurisy; Pneumonia; Scarlet fever; Ulcer; and Unspecified disorder of thyroid.    Surgical History   has a past surgical history that includes tonsillectomy and adenoidectomy; other orthopedic surgery (2004); appendectomy; thyroid lobectomy (11/11/2009); and hysterectomy laparoscopy.     Family History  family history includes Cancer in her brother and father; Heart Disease in her father; Lung Disease in her father and mother; Psychiatry in her son; Thyroid in her sister.     Social History   reports that she has never smoked. She has never used smokeless tobacco. She reports that she does not drink alcohol or use drugs.    Allergies  Allergies   Allergen Reactions   • Penicillins Rash and Swelling     Tolerated ceftriaxone on 6/20/17       Medications  Prior to Admission Medications   Prescriptions Last Dose Informant Patient Reported? Taking?   carvedilol (COREG) 12.5 MG Tab 5/16/2019 at Unknown time  Yes Yes   Sig: Take 12.5 mg by mouth 2 times a day, with meals.      Facility-Administered Medications: None       Physical Exam  Temp:  [36.6 °C (97.9 °F)] 36.6 °C (97.9 °F)  Pulse:  [84-96] 84  Resp:  [17] 17  BP: (174-187)/() 174/96  SpO2:  [94 %-98 %] 96 %    Physical Exam   Constitutional: She is oriented to person, place, and time. She appears well-developed. She is cooperative. No distress.   HENT:   Head: Normocephalic and atraumatic. Not macrocephalic and not microcephalic. Head is without raccoon's eyes and without Casiano's sign.   Right Ear: External ear normal.   Left Ear: External ear normal.   Mouth/Throat: Oropharynx is clear and moist. No oropharyngeal exudate.   Eyes: Conjunctivae are normal. Right eye exhibits no discharge. Left eye exhibits no discharge. No scleral icterus.   Neck: Neck supple. No tracheal deviation  present.   Cardiovascular: Normal rate, regular rhythm and intact distal pulses.  Exam reveals no gallop, no distant heart sounds and no friction rub.    No murmur heard.  Pulmonary/Chest: Effort normal. No accessory muscle usage or stridor. No tachypnea and no bradypnea. No respiratory distress. She has no decreased breath sounds. She has no wheezes. She has no rhonchi. She has no rales. She exhibits no tenderness.   Abdominal: Soft. Bowel sounds are normal. She exhibits no distension. There is no hepatosplenomegaly, splenomegaly or hepatomegaly. There is no tenderness. There is no rebound and no guarding.   Musculoskeletal: She exhibits no edema.        Right foot: There is decreased range of motion, tenderness and deformity.   Large and deep open wound lateral aspect of right foot   Lymphadenopathy:     She has no cervical adenopathy.   Neurological: She is alert and oriented to person, place, and time. No cranial nerve deficit. She displays no seizure activity.   Skin: Skin is warm, dry and intact. No rash noted. She is not diaphoretic. There is erythema (assoicated with right foot wound). No pallor.   Psychiatric: She has a normal mood and affect. Her speech is normal and behavior is normal. Judgment and thought content normal. Cognition and memory are normal.   Nursing note and vitals reviewed.      Laboratory:  Recent Labs      05/17/19   1725   WBC  10.5   RBC  4.08*   HEMOGLOBIN  11.8*   HEMATOCRIT  39.4   MCV  96.6   MCH  28.9   MCHC  29.9*   RDW  49.9   PLATELETCT  284   MPV  9.3     Recent Labs      05/17/19   1725   SODIUM  135   POTASSIUM  4.4   CHLORIDE  106   CO2  17*   GLUCOSE  80   BUN  12   CREATININE  0.75   CALCIUM  9.7     Recent Labs      05/17/19   1725   ALTSGPT  5   ASTSGOT  15   ALKPHOSPHAT  92   TBILIRUBIN  0.4   GLUCOSE  80                 No results for input(s): TROPONINI in the last 72 hours.    Urinalysis:    No results found     Imaging:  DX-FOOT-COMPLETE 3+ RIGHT   Final Result       Lateral skin ulceration with associated soft tissue gas compatible with cellulitis. Underlying abscess not excluded      No acute bony destruction is detected. If there is high clinical concern for osteomyelitis, MRI with contrast could be performed      Severe osteopenia      MR-FOOT-WITH & W/O RIGHT    (Results Pending)         Assessment/Plan:  I anticipate this patient will require at least two midnights for appropriate medical management, necessitating inpatient admission.    Penetrating foot wound   Assessment & Plan    -Significant wound, I have reviewed the foot x-ray and noted significant soft tissue swelling  -Wound is deep, at this point in time I feel an MRI is needed to rule out osteomyelitis  -She does have some discharge at this point in time, obtain wound culture  -Obtain wound care  -Start IV vancomycin and cefepime  -Patient is at high risk for sepsis, I do think she would benefit from IV antibiotics  -May very well have osteomyelitis, this is the case we will need additional consultations but none needed at this time     High anion gap metabolic acidosis   Assessment & Plan    -Likely due to infection but at this point in time she is not septic  -Obtain lactic acid     Normocytic anemia   Assessment & Plan    -No sign of gross bleeding  -Repeat CBC in the morning     Physical debility- (present on admission)   Assessment & Plan    -Due to paralysis of her right foot/leg     HTN (hypertension)- (present on admission)   Assessment & Plan    -Continue home Coreg  -Start PRN enalapril  -Adjust as needed         VTE prophylaxis: Lovenox

## 2019-05-18 NOTE — ED NOTES
Pt was up to bathroom via wheelchair. Antibiotic ointment and Band-Aid dressing applied to left lateral foot wound.

## 2019-05-18 NOTE — PROGRESS NOTES
"Pharmacy Kinetics 64 y.o. female on vancomycin day # 1 2019    New start vancomycin    Indication for Treatment: SSTI    Pertinent history per medical record: Admitted on 2019 for cellulitis. 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 Wound has purulent drainage. She expresses fever and has a leukocytosis.    Other antibiotics: cefepime 2g IV q12h    Allergies: Penicillins     List concerns for renal functions: Obesity     Pertinent cultures to date:    Wound  In process    MRSA nares swab if pneumonia is a concern (ordered/positive/negative/n-a): n/a    Recent Labs      19   1725   WBC  10.5   NEUTSPOLYS  62.10     Recent Labs      19   1725   BUN  12   CREATININE  0.75   ALBUMIN  4.6     No results for input(s): VANCOTROUGH, VANCOPEAK, VANCORANDOM in the last 72 hours.No intake or output data in the 24 hours ending 19 0031   /78   Pulse 68   Temp 36.8 °C (98.3 °F) (Oral)   Resp 16   Ht 1.676 m (5' 6\")   Wt 98.1 kg (216 lb 4.3 oz)   SpO2 94%  Temp (24hrs), Av.8 °C (98.3 °F), Min:36.6 °C (97.9 °F), Max:37 °C (98.6 °F)      A/P   1. Vancomycin dose change: Start vancomycin 1200 mg IV q12h  2. Next vancomycin level: 2 days  3. Goal trough: 12-16 mcg/mL  4. Comments: No history of consistent vancomycin dosing to estrella from. Purulent wound with culture in process, should be able to de-escalate quickly based on growth. Limited concerns for accumulation and renal injury. Conservative dosing given obesity and low target trough.     Candelario Soriano, PharmD      "

## 2019-05-18 NOTE — PROGRESS NOTES
LIMB PRESERVATION SERVICE INITIAL CONSULT  - RN NOTE    REASON FOR LPS CONSULTATION: Right Lateral Neuropathic Ulcer - Full Thickness    Past medical history, medicines, allergies, family history, social history,    reviewed    History of Present Illness:     Joan Olivares is a 64 y.o. female, with a past medical history that includes Paresthesia and prior External Fixation with hardware removal of Right ORIF due to infection in 2006.  admitted 5/17/2019 for Cellulitis. 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  This wound is chronic since 2016 and has had multiple debridements and courses of IV antibiotics and outpatient wound care treamtent. The wound has failed to improve.   Pt has limited sensation and mobility of her right leg.    Seen With [facility]    Tobacco Abuse Hx:   reports that she has never smoked. She has never used smokeless tobacco.    Alcohol Abuse Hx:   reports that she does not drink alcohol.    Drug Abuse Hx:   reports that she does not use drugs.    PAST MEDICAL HISTORY:   Past Medical History:   Diagnosis Date   • Arthritis    • ASTHMA    • Back pain    • Bladder infection    • Bronchitis    • Heart burn    • Hemorrhoids    • Hypertension    • Kidney stones    • Migraine    • Pain    • Paresthesia of right foot    • Pleurisy    • Pneumonia    • Scarlet fever    • Ulcer    • Unspecified disorder of thyroid        MEDICATIONS:   Current Facility-Administered Medications   Medication Dose   • oxyCODONE immediate-release (ROXICODONE) tablet 5-10 mg  5-10 mg   • morphine (pf) 4 mg/ml injection 1-2 mg  1-2 mg   • metroNIDAZOLE (FLAGYL) tablet 500 mg  500 mg   • dakin's 0.25% (DAKIN'S (1/2 STRENGTH)) 0.25 % topical solution     • carvedilol (COREG) tablet 12.5 mg  12.5 mg   • senna-docusate (PERICOLACE or SENOKOT S) 8.6-50 MG per tablet 2 Tab  2 Tab    And   • polyethylene glycol/lytes (MIRALAX) PACKET 1 Packet  1 Packet    And   • magnesium  hydroxide (MILK OF MAGNESIA) suspension 30 mL  30 mL    And   • bisacodyl (DULCOLAX) suppository 10 mg  10 mg   • enoxaparin (LOVENOX) inj 40 mg  40 mg   • acetaminophen (TYLENOL) tablet 650 mg  650 mg   • enalaprilat (VASOTEC) injection 1.25 mg  1.25 mg   • ondansetron (ZOFRAN) syringe/vial injection 4 mg  4 mg   • ondansetron (ZOFRAN ODT) dispertab 4 mg  4 mg   • cefepime (MAXIPIME) 2 g in  mL IVPB  2 g       ALLERGIES:    Allergies   Allergen Reactions   • Penicillins Rash and Swelling     Tolerated ceftriaxone on 6/20/17        PAST SURGICAL HISTORY:   Past Surgical History:   Procedure Laterality Date   • THYROID LOBECTOMY  11/11/2009    Performed by PERLITA HERNANDEZ at SUR[facility]ERY [facility]   • OTHER ORTHOPEDIC SURGERY  2004    Right Leg ORIF   • APPENDECTOMY     • HYSTERECTOMY LAPAROSCOPY      bso/ 1977   • TONSILLECTOMY AND ADENOIDECTOMY         SOCIAL HISTORY:   Social History     Social History   • Marital status:      Spouse name: N/A   • Number of children: N/A   • Years of education: N/A     Social History Main Topics   • Smoking status: Never Smoker   • Smokeless tobacco: Never Used   • Alcohol use No   • Drug use: No   • Sexual activity: No     Other Topics Concern   • Not on file     Social History Narrative   • No narrative on file        FAMILY HISTORY:   Family History   Problem Relation Age of Onset   • Lung Disease Mother    • Cancer Father    • Heart Disease Father    • Lung Disease Father    • Thyroid Sister    • Cancer Brother    • Psychiatry Son        DIAGNOSTIC DATA:       Xray:    DX-FOOT-COMPLETE 3+ RIGHT   Final Result      Lateral skin ulceration with associated soft tissue gas compatible with cellulitis. Underlying abscess not excluded      No acute bony destruction is detected. If there is high clinical concern for osteomyelitis, MRI with contrast could be performed      Severe osteopenia      MR-FOOT-WITH & W/O RIGHT    (Results Pending)      MRI: PEND             CT:  "             Vascular:      Arterial studies:  PEND    Labs                                      ESR: NA                      CRP: 0.30           A1c:   Lab Results   Component Value Date/Time    HBA1C 5.5 07/11/2017 03:00 PM                 Lab Results   Component Value Date/Time    GLUCOSE 97 05/18/2019 03:18 AM          Microbiology:   WBC   Date/Time Value Ref Range Status   05/18/2019 03:18 AM 10.6 4.8 - 10.8 K/uL Final     Results     Procedure Component Value Units Date/Time    CULTURE WOUND W/ GRAM STAIN [[id number]] Collected:  05/17/19 2145    Order Status:  Completed Specimen:  Wound Updated:  05/18/19 1308     Significant Indicator NEG     Source WND     Site Right Foot     Culture Result Culture in progress.     Gram Stain Result Few WBCs.  Rare Gram positive rods.      GRAM STAIN [[id number]] Collected:  05/17/19 2145    Order Status:  Completed Specimen:  Wound Updated:  05/18/19 0855     Significant Indicator .     Source WND     Site Right Foot     Gram Stain Result Few WBCs.  Rare Gram positive rods.      BLOOD CULTURE [[id number]] Collected:  05/18/19 0318    Order Status:  Completed Specimen:  Blood from Peripheral Updated:  05/18/19 0510    Narrative:       Per Hospital Policy: Only change Specimen Src: to \"Line\" if  specified by physician order.    BLOOD CULTURE [[id number]] Collected:  05/18/19 0318    Order Status:  Completed Specimen:  Blood from Peripheral Updated:  05/18/19 0510    Narrative:       Per Hospital Policy: Only change Specimen Src: to \"Line\" if  specified by physician order.    CULTURE WOUND W/ GRAM STAIN [[id number]]     Order Status:  No result Specimen:  Wound from Right Foot              PHYSICAL EXAMINATION:     Vitals  /72   Pulse 88   Temp 36.9 °C (98.5 °F) (Temporal)   Resp 14   Ht 1.676 m (5' 6\")   Wt 98.1 kg (216 lb 4.3 oz)   LMP 04/09/1977   SpO2 94%   Breastfeeding? No   BMI 34.91 kg/m²      General Appearance:  Well developed, obese, in no acute " distress    Vascular Assessment:    Edema +1 Bilat    Pulses: RLE- DP pulse +2 palpable; PT pulse unpalpable               LLE- DP pulse +2 palpable; PT pulse +1palpable    Sensory Assessment  Monofilament testing  NA  Feet Insensate to light touch    RLE Insensate to light touch from Knee down    Wound Assessment:       Wound 05/17/19 Pressure Injury Foot (Active)   Site Assessment Pink;Red;Swelling 5/18/2019  9:44 AM   Barbara-wound Assessment Pink;Red 5/18/2019  9:44 AM   Drainage Amount Scant 5/18/2019  9:44 AM   Drainage Description Serosanguineous 5/18/2019  9:44 AM   Treatments Cleansed 5/17/2019 11:00 PM   Cleansing Normal Saline Irrigation 5/18/2019  9:44 AM   Dressing Options Plain Strip Packing;Nonadhesive Foam;Hypafix Tape 5/18/2019  4:00 PM   Dressing Cleansing/Solutions 1/4 Strength Dakin's Solution 5/18/2019  4:00 PM   Dressing Changed New 5/18/2019  4:00 PM   Dressing Status Clean;Dry;Intact 5/18/2019  4:00 PM   Dressing Change Frequency Every Shift 5/18/2019  4:00 PM   NEXT Dressing Change  05/18/19 5/18/2019  4:00 PM   NEXT Weekly Photo (Inpatient Only) 05/24/19 5/17/2019 11:00 PM   WOUND NURSE ONLY - Odor Foul;Mild 5/18/2019  4:00 PM   WOUND NURSE ONLY - Pulses 2+;DP;Right 5/18/2019  4:00 PM   WOUND NURSE ONLY - Tissue Type and Percentage 100% Pink 5/18/2019  4:00 PM   WOUND NURSE ONLY - Time Spent with Patient (mins) 90 5/18/2019  4:00 PM        Procedures:       Debridement :  CSWD by eddie   Cleansed with: NS                                                                          Periwound protected with: benzoin   Primary dressing: strip packing   Secondary Dressing: foam   Other:     Patient Education:    Implications of loss of protective sensation (LOPS) discussed with patient- including increased risk for amputation.  Advised to check foot at least daily, moisturize foot, and to always wear protective foot wear.      Plan:        1. Wound Care:   Limb preservation status guarded  Will  discuss with Dr Gutierrez on Monday to evaluate pt pending MRI results   Right lateral foot   Dressing Orders Updated.                Nursing to change every shift and  PRN for Saturation or Dislodgement    2. Offloading:  Wheelchair  Abilty involved with AFO in past, pt to wear shoes that do not rub on Wound sites  - AFO will be considered after Sx options    3. Labs/Diagnostics: NA    4. Imaging: MRI PENDING      5. Infection Management:    Infection: cultures pending   Microbiology pending   Antibiotics: Per ID recommendation     Continue cefepime         Vancomycin                               Flagyl 500 mg 3 times daily    7. Referrals:   Vascular [facility] PEND   Ortho Will wait for Dr Gutierrez to evaluate Monday post MRI resulting   Infectious diseases ID is following Dr Razo evaluated Pt   Diabetes Education NA   Ortho tech NA - Ability pending Sx decision   Other NA    Professional collaboration: D/W Bedside RN, Wound Team Reji ALDRICH will follow    Rufus Galan R.N.

## 2019-05-18 NOTE — WOUND TEAM
Wound consult received. Spoke w/ Rufus ALDRICH RN and per Rufus, LPS will evaluate for right lateral foot. Wound team will evaluate for patient's left pannus.

## 2019-05-18 NOTE — CARE PLAN
Problem: Safety  Goal: Will remain free from falls  Outcome: PROGRESSING AS EXPECTED  Pt. Has fall risk, refuses bed alarm despite education. Pt. Oriented and able to call appropriately for assistance.     Problem: Venous Thromboembolism (VTW)/Deep Vein Thrombosis (DVT) Prevention:  Goal: Patient will participate in Venous Thrombosis (VTE)/Deep Vein Thrombosis (DVT)Prevention Measures   05/17/19 2300   OTHER   Pharmacologic Prophylaxis Used LMWH: Enoxaparin(Lovenox)       Problem: Pain Management  Goal: Pain level will decrease to patient's comfort goal  Outcome: PROGRESSING AS EXPECTED   05/17/19 2250   OTHER   Pain Rating Scale (NPRS) 8   Comfort Goal Comfort at Rest;Comfort with Movement     One time dose of Oxycodone ordered by MD given to alleviate pt. Pain.

## 2019-05-18 NOTE — CONSULTS
Consults   INFECTIOUS DISEASES INPATIENT CONSULT NOTE     Date of Service: 5/18/2019    Consult Requested By: Carroll Mackey M.D.    Reason for Consultation: Foot ulcer     History of Present Illness:   Joan Olivares is a 64 y.o.  admitted 5/17/2019. Pt has a past medical history of trauma to right leg resulting in right leg paralysis.  She has a chronic wound on the right lateral foot.  She changes wound dressings herself and often has clear drainage.  However lately she noticed the drainage changed to pus, the foot had new swelling and she recently expressed a large amount of thick fluid with malodor.  She has had no new fevers or chills.  She is been on no antibiotics prior to admission.      Hospital Course:   Patient has been afebrile.  No leukocytosis.  X-ray on 5/17 with lateral skin ulceration associate with soft tissue gas compatible with cellulitis.  She was started on vancomycin and cefepime on 5/17.  Today she states that the swelling in the foot has decreased somewhat.  I expressed pus during exam.       Review Of Systems:  Review of Systems   Constitutional: Positive for chills and malaise/fatigue. Negative for fever.   HENT: Negative for hearing loss.    Eyes: Negative for blurred vision and double vision.   Respiratory: Negative for cough, sputum production and shortness of breath.    Cardiovascular: Negative for chest pain.   Gastrointestinal: Negative for abdominal pain, constipation, diarrhea, nausea and vomiting.   Genitourinary: Negative for dysuria.   Musculoskeletal: Negative for joint pain and myalgias.   Skin: Negative for rash.   Neurological: Positive for headaches.   Psychiatric/Behavioral: The patient is nervous/anxious.          PMH:   Past Medical History:   Diagnosis Date   • Arthritis    • ASTHMA    • Back pain    • Bladder infection    • Bronchitis    • Heart burn    • Hemorrhoids    • Hypertension    • Kidney stones    • Migraine    • Pain    • Paresthesia of right foot     • Pleurisy    • Pneumonia    • Scarlet fever    • Ulcer    • Unspecified disorder of thyroid        PSH:  Past Surgical History:   Procedure Laterality Date   • THYROID LOBECTOMY  11/11/2009    Performed by PERLITA HERNANDEZ at [facility]   • OTHER ORTHOPEDIC SURGERY  2004    Right Leg ORIF   • APPENDECTOMY     • HYSTERECTOMY LAPAROSCOPY      bso/ 1977   • TONSILLECTOMY AND ADENOIDECTOMY         FAMILY HX:  Family History   Problem Relation Age of Onset   • Lung Disease Mother    • Cancer Father    • Heart Disease Father    • Lung Disease Father    • Thyroid Sister    • Cancer Brother    • Psychiatry Son        SOCIAL HX:  Social History     Social History   • Marital status:      Spouse name: N/A   • Number of children: N/A   • Years of education: N/A     Occupational History   • Not on file.     Social History Main Topics   • Smoking status: Never Smoker   • Smokeless tobacco: Never Used   • Alcohol use No   • Drug use: No   • Sexual activity: No     Other Topics Concern   • Not on file     Social History Narrative   • No narrative on file     History   Smoking Status   • Never Smoker   Smokeless Tobacco   • Never Used     History   Alcohol Use No       Allergies/Intolerances:  Allergies   Allergen Reactions   • Penicillins Rash and Swelling     Tolerated ceftriaxone on 6/20/17       History reviewed with the patient     Other Current Medications:    Current Facility-Administered Medications:   •  oxyCODONE immediate-release (ROXICODONE) tablet 5-10 mg, 5-10 mg, Oral, Q4HRS PRN, Carroll Mackey M.D., 10 mg at 05/18/19 1338  •  morphine (pf) 4 mg/ml injection 1-2 mg, 1-2 mg, Intravenous, Q4HRS PRN, Carroll Mackey M.D.  •  carvedilol (COREG) tablet 12.5 mg, 12.5 mg, Oral, BID WITH MEALS, Montana Kelley D.O., 12.5 mg at 05/18/19 0978  •  senna-docusate (PERICOLACE or SENOKOT S) 8.6-50 MG per tablet 2 Tab, 2 Tab, Oral, BID, 2 Tab at 05/18/19 0514 **AND** polyethylene glycol/lytes (MIRALAX)  "PACKET 1 Packet, 1 Packet, Oral, QDAY PRN **AND** magnesium hydroxide (MILK OF MAGNESIA) suspension 30 mL, 30 mL, Oral, QDAY PRN **AND** bisacodyl (DULCOLAX) suppository 10 mg, 10 mg, Rectal, QDAY PRN, Montana Kelley D.O.  •  enoxaparin (LOVENOX) inj 40 mg, 40 mg, Subcutaneous, DAILY, Montana Kelley D.O., 40 mg at 19 0514  •  acetaminophen (TYLENOL) tablet 650 mg, 650 mg, Oral, Q6HRS PRN, YUN OsunaO.  •  enalaprilat (VASOTEC) injection 1.25 mg, 1.25 mg, Intravenous, Q6HRS PRN, Montana Kelley D.O.  •  ondansetron (ZOFRAN) syringe/vial injection 4 mg, 4 mg, Intravenous, Q4HRS PRN, Montana Kelley D.O.  •  ondansetron (ZOFRAN ODT) dispertab 4 mg, 4 mg, Oral, Q4HRS PRN, YUN OsunaO.  •  cefepime (MAXIPIME) 2 g in  mL IVPB, 2 g, Intravenous, Q12HRS, Montana Kelley D.O., Stopped at 19 0545  [unfilled]    Most Recent Vital Signs:  /83   Pulse (!) 107   Temp 36.3 °C (97.4 °F) (Temporal)   Resp 16   Ht 1.676 m (5' 6\")   Wt 98.1 kg (216 lb 4.3 oz)   LMP 1977   SpO2 94%   Breastfeeding? No   BMI 34.91 kg/m²   Temp  Av.8 °C (98.2 °F)  Min: 36.3 °C (97.4 °F)  Max: 37 °C (98.6 °F)    Physical Exam:  Physical Exam   Constitutional: She is oriented to person, place, and time and well-developed, well-nourished, and in no distress.   HENT:   Head: Normocephalic and atraumatic.   Mouth/Throat: Oropharynx is clear and moist.   Eyes: Pupils are equal, round, and reactive to light. Conjunctivae and EOM are normal.   Cardiovascular: Normal rate, regular rhythm and normal heart sounds.    Pulmonary/Chest: Effort normal and breath sounds normal.   Abdominal: Soft. Bowel sounds are normal. She exhibits no distension. There is no tenderness. There is no rebound and no guarding.   Musculoskeletal: She exhibits edema and deformity.   Right leg with paralysis, foot turned inward, ulceration noted, no swelling erythema, no warmth, pus expressed   Neurological: She is " "alert and oriented to person, place, and time.   Skin: Skin is warm and dry.   Psychiatric: Affect and judgment normal.         Pertinent Lab Results:  Recent Labs      05/17/19 1725 05/18/19 0318   WBC  10.5  10.6      Recent Labs      05/17/19 1725 05/18/19 0318   HEMOGLOBIN  11.8*  13.5   HEMATOCRIT  39.4  41.7   MCV  96.6  86.7   MCH  28.9  28.1   PLATELETCT  284  417         Recent Labs      05/17/19 1725 05/18/19 0318   SODIUM  135  137   POTASSIUM  4.4  3.9   CHLORIDE  106  109   CO2  17*  20   CREATININE  0.75  0.73        Recent Labs      05/17/19 1725   ALBUMIN  4.6        Pertinent Micro:  Results     Procedure Component Value Units Date/Time    CULTURE WOUND W/ GRAM STAIN [[id number]] Collected:  05/17/19 2145    Order Status:  Completed Specimen:  Wound Updated:  05/18/19 1308     Significant Indicator NEG     Source WND     Site Right Foot     Culture Result Culture in progress.     Gram Stain Result Few WBCs.  Rare Gram positive rods.      GRAM STAIN [[id number]] Collected:  05/17/19 2145    Order Status:  Completed Specimen:  Wound Updated:  05/18/19 0855     Significant Indicator .     Source WND     Site Right Foot     Gram Stain Result Few WBCs.  Rare Gram positive rods.      BLOOD CULTURE [[id number]] Collected:  05/18/19 0318    Order Status:  Completed Specimen:  Blood from Peripheral Updated:  05/18/19 0510    Narrative:       Per Hospital Policy: Only change Specimen Src: to \"Line\" if  specified by physician order.    BLOOD CULTURE [[id number]] Collected:  05/18/19 0318    Order Status:  Completed Specimen:  Blood from Peripheral Updated:  05/18/19 0510    Narrative:       Per Hospital Policy: Only change Specimen Src: to \"Line\" if  specified by physician order.    CULTURE WOUND W/ GRAM STAIN [[id number]]     Order Status:  No result Specimen:  Wound from Right Foot         Blood Culture   Date Value Ref Range Status   06/20/2017 No growth after 5 days of incubation.  Final    "     Studies:  Dx-foot-complete 3+ Right    Result Date: 5/17/2019 5/17/2019 5:09 PM HISTORY/REASON FOR EXAM: Nonhealing lateral wound for the years, recent significant worsening. Paralysis 3 years ago TECHNIQUE/EXAM DESCRIPTION AND NUMBER OF VIEWS: 3 nonweightbearing views of the RIGHT foot. COMPARISON:  None. FINDINGS: With severe lateral soft tissue swelling with skin ulceration. There is no underlying bony destruction confirmed. Severe osteopenia Metallic foreign body overlies the plantar medial great toe distally. There is solid periosteal reaction involving the fifth metatarsal shaft medially. No periostitis to suggest active inflammation. No acute displaced fracture. No subluxation. Mild first metatarsal-phalangeal joint space narrowing Cavus configuration of the midfoot     Lateral skin ulceration with associated soft tissue gas compatible with cellulitis. Underlying abscess not excluded No acute bony destruction is detected. If there is high clinical concern for osteomyelitis, MRI with contrast could be performed Severe osteopenia      ASSESSMENT/PLAN:     Joan Olivares is a 64 y.o.  admitted 5/17/2019. Pt has a past medical history of trauma to right leg resulting in right leg paralysis.  She has a chronic wound on the right lateral foot.  She changes wound dressings herself and often has clear drainage.  However lately she noticed the drainage changed to pus, the foot had new swelling and she recently expressed a large amount of thick fluid with malodor.  She has had no new fevers or chills.  No antibiotics prior to admission.      Hospital Course:   Patient has been afebrile.  No leukocytosis.  X-ray on 5/17 with lateral skin ulceration associate with soft tissue gas compatible with cellulitis.  She was started on vancomycin and cefepime on 5/17.  Today she states that the swelling in the foot has decreased somewhat.  I expressed pus during exam.     Right foot infection, chronic ulcer secondary to  right leg paralysis  -New swelling, malodor and purulent fluid  -Wound cultures taken, GPRs  -MRI foot on 5/18-pending     Mouth and tongue irritation, chronic    Right leg paralysis  - secondary to traumatic event several years ago    Antibiotic allergy, penicillin described as rash swelling  -Tolerate ceftriaxone and has tolerated cefepime    --- Continue cefepime  --- Continue vancomycin-monitor for possible red man syndrome, diffuse slowly  --- Add Flagyl 500 mg 3 times daily  --- Follow-up MRI  --- Follow-up wound culture      Plan of care discussed with ROSEANN Mackey M.D.. Will continue to follow    Allison Razo M.D.

## 2019-05-18 NOTE — PROGRESS NOTES
2 RN skin check complete with Key ASH.   Devices in place n/a.  Skin assessed under devices n/a.  Confirmed pressure ulcers found on R foot lateral  .  New potential pressure ulcers noted on R foot. Wound consult placed on admission 5/17/19.  The following interventions in place: wound on R foot Picture taken and uploaded, cultures obtained and sent to lab. Cleansed wound and dressed. L panus area has area of redness, kept area dry. Sacral area is pink and blanching, has a spot of discoloration. R leg has mild swelling. Pillows utilized for repositioning.

## 2019-05-18 NOTE — PROGRESS NOTES
AAOx4. C/o 0/10 pain, declining intervention at this time. -N/V. +N/T in RLE, chronic. Denies new onset of chest pain/SOB. 1x assist to bedside commode. Dressing in place to R foot, awaiting wound care. MRI of R foot completed this AM. POC discussed, denies further needs at this time. Call light within reach & hourly rounding in place.

## 2019-05-18 NOTE — PROGRESS NOTES
[facility] Daily Progress Note    Date of Service  5/18/2019    Chief Complaint  64 y.o. female admitted 5/17/2019 with Foot ulcer    Hospital Course  Admitted with chronic right foot ulcer with recent increased and foul-smelling wound drainage.    Interval Problem Update  Foot ulcer - culture and MRI pending  HTN - controlled     Consultants/Specialty  Consult LPS  Consult ID    Code Status  Full    Disposition  TBD    Review of Systems  Review of Systems   Constitutional: Negative for chills, fever and malaise/fatigue.   HENT: Negative for hearing loss and sore throat.    Eyes: Negative for blurred vision.   Respiratory: Negative for cough, shortness of breath and wheezing.    Cardiovascular: Positive for leg swelling. Negative for chest pain and palpitations.   Gastrointestinal: Negative for abdominal pain, diarrhea, heartburn, nausea and vomiting.   Genitourinary: Negative for dysuria.   Musculoskeletal: Negative for back pain and neck pain.   Skin: Negative for rash.   Neurological: Negative for dizziness, weakness and headaches.   Psychiatric/Behavioral: The patient is nervous/anxious.         Physical Exam  Temp:  [36.3 °C (97.4 °F)-37 °C (98.6 °F)] 36.3 °C (97.4 °F)  Pulse:  [] 107  Resp:  [16-18] 16  BP: (138-187)/() 146/83  SpO2:  [91 %-98 %] 94 %    Physical Exam   Constitutional: She is oriented to person, place, and time. She appears well-developed and well-nourished.   HENT:   Head: Normocephalic and atraumatic.   Eyes: Pupils are equal, round, and reactive to light. Conjunctivae are normal.   Neck: No tracheal deviation present. No thyromegaly present.   Cardiovascular: Normal rate and regular rhythm.    Pulmonary/Chest: Effort normal and breath sounds normal.   Abdominal: Soft. Bowel sounds are normal.   Musculoskeletal: She exhibits edema.   Right foot in plantar flexion and inversion   Lymphadenopathy:     She has no cervical adenopathy.   Neurological: She is alert and oriented  to person, place, and time.   Skin:   Ulcer at dorsolateral aspect of right foot    Nursing note and vitals reviewed.      Fluids  No intake or output data in the 24 hours ending 05/18/19 1145    Laboratory  Recent Labs      05/17/19   1725 05/18/19   0318   WBC  10.5  10.6   RBC  4.08*  4.81   HEMOGLOBIN  11.8*  13.5   HEMATOCRIT  39.4  41.7   MCV  96.6  86.7   MCH  28.9  28.1   MCHC  29.9*  32.4*   RDW  49.9  44.7   PLATELETCT  284  417   MPV  9.3  9.4     Recent Labs      05/17/19   1725 05/18/19   0318   SODIUM  135  137   POTASSIUM  4.4  3.9   CHLORIDE  106  109   CO2  17*  20   GLUCOSE  80  97   BUN  12  12   CREATININE  0.75  0.73   CALCIUM  9.7  9.0                   Imaging  DX-FOOT-COMPLETE 3+ RIGHT   Final Result      Lateral skin ulceration with associated soft tissue gas compatible with cellulitis. Underlying abscess not excluded      No acute bony destruction is detected. If there is high clinical concern for osteomyelitis, MRI with contrast could be performed      Severe osteopenia      MR-FOOT-WITH & W/O RIGHT    (Results Pending)        Assessment/Plan  * Skin ulcer of right foot with fat layer exposed (HCC)- (present on admission)   Assessment & Plan    IV Cefepime  Hold IV Vancomycin  Wound care  Follow culture and MRI results  Consult LPS  Consult ID       Monoparesis of lower extremity (HCC)- (present on admission)   Assessment & Plan    LPS consulted     HTN (hypertension)- (present on admission)   Assessment & Plan    Coreg          VTE prophylaxis: Lovenox

## 2019-05-19 ENCOUNTER — APPOINTMENT (OUTPATIENT)
Dept: RADIOLOGY | Facility: MEDICAL CENTER | Age: 64
DRG: 571 | End: 2019-05-19
Attending: FAMILY MEDICINE
Payer: MEDICARE

## 2019-05-19 PROCEDURE — A9270 NON-COVERED ITEM OR SERVICE: HCPCS | Performed by: INTERNAL MEDICINE

## 2019-05-19 PROCEDURE — 700102 HCHG RX REV CODE 250 W/ 637 OVERRIDE(OP): Performed by: INTERNAL MEDICINE

## 2019-05-19 PROCEDURE — 99232 SBSQ HOSP IP/OBS MODERATE 35: CPT | Performed by: FAMILY MEDICINE

## 2019-05-19 PROCEDURE — 700105 HCHG RX REV CODE 258: Performed by: INTERNAL MEDICINE

## 2019-05-19 PROCEDURE — 93922 UPR/L XTREMITY ART 2 LEVELS: CPT | Mod: 26 | Performed by: SURGERY

## 2019-05-19 PROCEDURE — 700102 HCHG RX REV CODE 250 W/ 637 OVERRIDE(OP): Performed by: FAMILY MEDICINE

## 2019-05-19 PROCEDURE — 700111 HCHG RX REV CODE 636 W/ 250 OVERRIDE (IP): Performed by: INTERNAL MEDICINE

## 2019-05-19 PROCEDURE — 93922 UPR/L XTREMITY ART 2 LEVELS: CPT

## 2019-05-19 PROCEDURE — A9270 NON-COVERED ITEM OR SERVICE: HCPCS | Performed by: FAMILY MEDICINE

## 2019-05-19 PROCEDURE — 770006 HCHG ROOM/CARE - MED/SURG/GYN SEMI*

## 2019-05-19 PROCEDURE — 700111 HCHG RX REV CODE 636 W/ 250 OVERRIDE (IP): Performed by: FAMILY MEDICINE

## 2019-05-19 PROCEDURE — 99232 SBSQ HOSP IP/OBS MODERATE 35: CPT | Performed by: INTERNAL MEDICINE

## 2019-05-19 RX ADMIN — SENNOSIDES, DOCUSATE SODIUM 2 TABLET: 50; 8.6 TABLET, FILM COATED ORAL at 04:48

## 2019-05-19 RX ADMIN — MORPHINE SULFATE 2 MG: 4 INJECTION INTRAVENOUS at 20:27

## 2019-05-19 RX ADMIN — NYSTATIN 500000 UNITS: 100000 SUSPENSION ORAL at 12:19

## 2019-05-19 RX ADMIN — OXYCODONE HYDROCHLORIDE 10 MG: 5 TABLET ORAL at 12:13

## 2019-05-19 RX ADMIN — SENNOSIDES, DOCUSATE SODIUM 2 TABLET: 50; 8.6 TABLET, FILM COATED ORAL at 18:05

## 2019-05-19 RX ADMIN — NYSTATIN 500000 UNITS: 100000 SUSPENSION ORAL at 20:22

## 2019-05-19 RX ADMIN — NYSTATIN 500000 UNITS: 100000 SUSPENSION ORAL at 18:05

## 2019-05-19 RX ADMIN — METRONIDAZOLE 500 MG: 500 TABLET, FILM COATED ORAL at 04:48

## 2019-05-19 RX ADMIN — OXYCODONE HYDROCHLORIDE 10 MG: 5 TABLET ORAL at 22:06

## 2019-05-19 RX ADMIN — CEFEPIME 2 G: 2 INJECTION, POWDER, FOR SOLUTION INTRAVENOUS at 04:48

## 2019-05-19 RX ADMIN — ENOXAPARIN SODIUM 40 MG: 100 INJECTION SUBCUTANEOUS at 04:48

## 2019-05-19 RX ADMIN — METRONIDAZOLE 500 MG: 500 TABLET, FILM COATED ORAL at 20:20

## 2019-05-19 RX ADMIN — ONDANSETRON 4 MG: 4 TABLET, ORALLY DISINTEGRATING ORAL at 08:38

## 2019-05-19 RX ADMIN — ACETAMINOPHEN 650 MG: 325 TABLET, FILM COATED ORAL at 15:35

## 2019-05-19 RX ADMIN — ONDANSETRON 4 MG: 2 INJECTION INTRAMUSCULAR; INTRAVENOUS at 22:04

## 2019-05-19 RX ADMIN — CARVEDILOL 12.5 MG: 12.5 TABLET, FILM COATED ORAL at 08:35

## 2019-05-19 RX ADMIN — OXYCODONE HYDROCHLORIDE 10 MG: 5 TABLET ORAL at 18:05

## 2019-05-19 RX ADMIN — METRONIDAZOLE 500 MG: 500 TABLET, FILM COATED ORAL at 13:30

## 2019-05-19 RX ADMIN — OXYCODONE HYDROCHLORIDE 10 MG: 5 TABLET ORAL at 04:47

## 2019-05-19 RX ADMIN — CEFTRIAXONE SODIUM 2 G: 2 INJECTION, POWDER, FOR SOLUTION INTRAMUSCULAR; INTRAVENOUS at 15:34

## 2019-05-19 RX ADMIN — POLYETHYLENE GLYCOL 3350 1 PACKET: 17 POWDER, FOR SOLUTION ORAL at 20:20

## 2019-05-19 RX ADMIN — CARVEDILOL 12.5 MG: 12.5 TABLET, FILM COATED ORAL at 18:05

## 2019-05-19 ASSESSMENT — ENCOUNTER SYMPTOMS
MYALGIAS: 0
PALPITATIONS: 0
ABDOMINAL PAIN: 0
WEAKNESS: 0
COUGH: 0
HEARTBURN: 0
NECK PAIN: 0
CONSTIPATION: 0
DIZZINESS: 0
VOMITING: 0
FEVER: 0
BLURRED VISION: 0
SORE THROAT: 0
BACK PAIN: 0
NERVOUS/ANXIOUS: 1
DIARRHEA: 0
SHORTNESS OF BREATH: 0
NAUSEA: 0
WHEEZING: 0
CHILLS: 0
HEADACHES: 0

## 2019-05-19 NOTE — PROGRESS NOTES
Infectious Disease Progress Note    Author: Allison Razo M.D. Date & Time of service: 2019  12:48 PM    Chief Complaint:  Right foot infection     Interval History:   Interval 24 hours of Vitals/Labs/Micro,and imaging results reviewed as available. See assessment.     Review of Systems:  Review of Systems   Constitutional: Negative for chills, fever and malaise/fatigue.   Gastrointestinal: Negative for abdominal pain, constipation, diarrhea, nausea and vomiting.   Musculoskeletal: Negative for myalgias.       Hemodynamics:  Temp (24hrs), Av °C (98.6 °F), Min:36.9 °C (98.5 °F), Max:37.1 °C (98.7 °F)  Temperature: 37 °C (98.6 °F)  Pulse  Av.9  Min: 68  Max: 107   Blood Pressure : 138/72       Physical Exam:  Physical Exam   Constitutional: She is oriented to person, place, and time. She appears well-developed and well-nourished.   HENT:   Head: Normocephalic and atraumatic.   Eyes: Pupils are equal, round, and reactive to light. Conjunctivae and EOM are normal.   Cardiovascular: Normal rate, regular rhythm and normal heart sounds.    Pulmonary/Chest: Effort normal and breath sounds normal.   Abdominal: Soft. Bowel sounds are normal.   Musculoskeletal: Normal range of motion. She exhibits edema and deformity.   R leg paralysis, right foot turned inward, ulcer bandaged    Neurological: She is alert and oriented to person, place, and time.   Skin: Skin is warm and dry.   Psychiatric: She has a normal mood and affect. Her behavior is normal.       Meds:    Current Facility-Administered Medications:   •  nystatin  •  oxyCODONE immediate-release  •  morphine injection  •  metroNIDAZOLE  •  dakins 0.125% (1/4 strength)  •  carvedilol  •  senna-docusate **AND** polyethylene glycol/lytes **AND** magnesium hydroxide **AND** bisacodyl  •  enoxaparin  •  acetaminophen  •  enalaprilat  •  ondansetron  •  ondansetron  •  cefepime    Labs:  Recent Labs      19   1725  19   0318   WBC  10.5  10.6    RBC  4.08*  4.81   HEMOGLOBIN  11.8*  13.5   HEMATOCRIT  39.4  41.7   MCV  96.6  86.7   MCH  28.9  28.1   RDW  49.9  44.7   PLATELETCT  284  417   MPV  9.3  9.4   NEUTSPOLYS  62.10  63.00   LYMPHOCYTES  29.60  28.50   MONOCYTES  4.40  5.10   EOSINOPHILS  2.70  2.60   BASOPHILS  0.80  0.40     Recent Labs      05/17/19   1725  05/18/19   0318   SODIUM  135  137   POTASSIUM  4.4  3.9   CHLORIDE  106  109   CO2  17*  20   GLUCOSE  80  97   BUN  12  12     Recent Labs      05/17/19   1725  05/18/19   0318   ALBUMIN  4.6   --    TBILIRUBIN  0.4   --    ALKPHOSPHAT  92   --    TOTPROTEIN  8.2   --    ALTSGPT  5   --    ASTSGOT  15   --    CREATININE  0.75  0.73       Imaging:  Dx-foot-complete 3+ Right    Result Date: 5/17/2019 5/17/2019 5:09 PM HISTORY/REASON FOR EXAM: Nonhealing lateral wound for the years, recent significant worsening. Paralysis 3 years ago TECHNIQUE/EXAM DESCRIPTION AND NUMBER OF VIEWS: 3 nonweightbearing views of the RIGHT foot. COMPARISON:  None. FINDINGS: With severe lateral soft tissue swelling with skin ulceration. There is no underlying bony destruction confirmed. Severe osteopenia Metallic foreign body overlies the plantar medial great toe distally. There is solid periosteal reaction involving the fifth metatarsal shaft medially. No periostitis to suggest active inflammation. No acute displaced fracture. No subluxation. Mild first metatarsal-phalangeal joint space narrowing Cavus configuration of the midfoot     Lateral skin ulceration with associated soft tissue gas compatible with cellulitis. Underlying abscess not excluded No acute bony destruction is detected. If there is high clinical concern for osteomyelitis, MRI with contrast could be performed Severe osteopenia    Mr-foot-with & W/o Right    Result Date: 5/19/2019 5/18/2019 7:54 AM HISTORY/REASON FOR EXAM:  Osteomyelitis suspected, foot swelling, diabetic. TECHNIQUE/EXAM DESCRIPTION: MRI of the RIGHT foot with and without contrast.  Using a [employer] 1.5 Mary MRI scanner, T1 axial, sagittal, and coronal, fast spin-echo T2 fat-suppressed axial and coronal, fast inversion recovery sagittal, and T1 fat suppressed axial and sagittal post intravenous contrast images were obtained. A total of 10 mL Gadavist given. COMPARISON:  6/20/2017 MRI, radiographs May 17. FINDINGS: There is lateral forefoot soft tissue swelling with ulceration overlying the fifth TMT articulation, worse than on comparison. The cutaneous ulceration does not extend all the way to the bony margins. There is underlying 42 x 16 x 34 mm T2 hyperintensity, T1 hypointensity with central absent enhancement and a draining sinus to a site of ulceration. This indicates draining abscess. There has been resolution of the fourth and fifth TMT joint effusion but there is still some spurring. No bony destruction. The remainder of the TMT articulations are normal There has been near resolution of subcutaneous fatty replacement overlying the fifth metatarsal head with low T1 and T2 signal. Resolved enhancement. The remainder the bony structures are notable for unchanged multiple bone infarcts, seen best in the calcaneus and talus. These have no associated articular surface collapse There is similar artifact involving the great toe compatible with metallic foreign body seen on x-ray No abnormal joint effusion is noted There is worsening severe fatty atrophy of the intrinsic foot musculature     Lateral forefoot cellulitis with worsening skin ulceration and a new 4 cm draining abscess which overlies the fifth TMT. No osteomyelitis or septic arthropathy. Resolution of fourth and fifth TMT centered marrow edema and joint effusion. 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  Rare Gram positive rods.      BLOOD CULTURE [[id number]] Collected:  05/18/19 0318    Order Status:  Completed Specimen:  Blood from Peripheral Updated:  05/19/19 0846     Significant Indicator NEG     Source BLD     Site PERIPHERAL     Culture Result No Growth  Note: Blood cultures are incubated for 5 days and  are monitored continuously.Positive blood cultures  are called to the RN and reported as soon as  they are identified.      Narrative:       Per Hospital Policy: Only change Specimen Src: to \"Line\" if  specified by physician order.  Left Hand    BLOOD CULTURE [[id number]] Collected:  05/18/19 0318    Order Status:  Completed Specimen:  Blood from Peripheral Updated:  05/19/19 0846     Significant Indicator NEG     Source BLD     Site PERIPHERAL     Culture Result No Growth  Note: Blood cultures are incubated for 5 days and  are monitored continuously.Positive blood cultures  are called to the RN and reported as soon as  they are identified.      Narrative:       Per Hospital Policy: Only change Specimen Src: to \"Line\" if  specified by physician order.  Right Hand    GRAM STAIN [[id number]] Collected:  05/17/19 2145    Order Status:  Completed Specimen:  Wound Updated:  05/18/19 0855     Significant Indicator .     Source WND     Site Right Foot     Gram Stain Result Few WBCs.  Rare Gram positive rods.      CULTURE WOUND W/ GRAM STAIN [[id number]]     Order Status:  No result Specimen:  Wound from Right Foot           Assessment:  Active Hospital Problems    Diagnosis   • *Skin ulcer of right foot with fat layer exposed ([facility]) [L97.512]   • Monoparesis of lower extremity " ([facility]) [G83.10]   • HTN (hypertension) [I10]        ASSESSMENT/PLAN:      Joan Olivares is a 64 y.o.  admitted 5/17/2019. Pt has a past medical history of trauma to right leg resulting in right leg paralysis.  She has a chronic wound on the right lateral foot.  She changes wound dressings herself and often has clear drainage.  However lately she noticed the drainage changed to pus, the foot had new swelling and she recently expressed a large amount of thick fluid with malodor.  She has had no new fevers or chills.  No antibiotics prior to admission.       Hospital Course:   Patient has been afebrile.  No leukocytosis.  X-ray on 5/17 with lateral skin ulceration associate with soft tissue gas compatible with cellulitis.  She was started on vancomycin and cefepime on 5/17.  Today she states that the swelling in the foot has decreased somewhat.  I expressed pus during exam.     Interval 24 hour assessment:    AF, O2 RA,    Labs reviewed- none new   Imaging personally reviewed both images and report.   Studies reviewed  Micro reviewed    Pt transitioned to ceftriaxone and continued on flagyl. Plan for ortho eval tomorrow.       Right foot infection, chronic ulcer secondary to right leg paralysis  -New swelling, malodor and purulent fluid  -Wound cultures taken, GPRs  -MRI foot on 5/18- lateral forefoot cellulitis and new 7 cm draining abscess which overlies the fifth TMT.  No osteomyelitis or septic arthropathy.  -LPS following, Dr. Gutierrez to evaluate on Monday post MRI     Mouth and tongue irritation, chronic     Right leg paralysis  - secondary to traumatic event several years ago     Antibiotic allergy, penicillin described as rash swelling  -Tolerate ceftriaxone and has tolerated cefepime     --- Stop cefepime- > ceftriaxone, continue flagyl, stop vancomycin   --- Agree with ortho eval and possible I&D- please obtain cultures   --- Follow-up wound culture        Plan of care discussed with ROSEANN Mackey,  M.D.. Will continue to follow     Allison Razo M.D.

## 2019-05-19 NOTE — PROGRESS NOTES
"Assumed care of pt. A&Ox4. Assessment completed, POC discussed. Pt states she has pain 6/10 in her right leg/foot and mouth. Medications given per MAR. Pt also stating that she has oral thrush from her abx and requesting mouth rinse. MIHIR Ruby followed up with MD regarding this. Pt states she occasionally gets \"sharp, shooting pains\" down her RLE from paralysis. Will continue to monitor. All other needs met at this time. Safety precautions/hourly rounding in place.   "

## 2019-05-19 NOTE — PROGRESS NOTES
Hospital Medicine Daily Progress Note    Date of Service  5/19/2019    Chief Complaint  64 y.o. female admitted 5/17/2019 with Foot ulcer    Hospital Course  Admitted with chronic right foot ulcer with recent increased and foul-smelling wound drainage.    Interval Problem Update  Foot ulcer - MRI showed abscess, cultures pending, discussed case with LPS and ID  HTN - controlled     Consultants/Specialty  LPS  ID    Code Status  Full    Disposition  TBD    Review of Systems  Review of Systems   Constitutional: Negative for chills, fever and malaise/fatigue.   HENT: Negative for hearing loss and sore throat.    Eyes: Negative for blurred vision.   Respiratory: Negative for cough, shortness of breath and wheezing.    Cardiovascular: Positive for leg swelling. Negative for chest pain and palpitations.   Gastrointestinal: Negative for abdominal pain, diarrhea, heartburn, nausea and vomiting.   Genitourinary: Negative for dysuria.   Musculoskeletal: Negative for back pain and neck pain.   Skin: Negative for rash.   Neurological: Negative for dizziness, weakness and headaches.   Psychiatric/Behavioral: The patient is nervous/anxious.         Physical Exam  Temp:  [36.9 °C (98.5 °F)-37.1 °C (98.7 °F)] 37 °C (98.6 °F)  Pulse:  [84-90] 84  Resp:  [14-18] 18  BP: (125-145)/(71-72) 138/72  SpO2:  [90 %-94 %] 93 %    Physical Exam   Constitutional: She is oriented to person, place, and time. She appears well-developed and well-nourished.   HENT:   Head: Normocephalic and atraumatic.   Eyes: Pupils are equal, round, and reactive to light. Conjunctivae are normal.   Neck: No tracheal deviation present. No thyromegaly present.   Cardiovascular: Normal rate and regular rhythm.    Pulmonary/Chest: Effort normal and breath sounds normal.   Abdominal: Soft. Bowel sounds are normal.   Musculoskeletal: She exhibits edema.   Right foot in plantar flexion and inversion   Lymphadenopathy:     She has no cervical adenopathy.   Neurological:  She is alert and oriented to person, place, and time.   Skin:   Ulcer at dorsolateral aspect of right foot    Nursing note and vitals reviewed.      Fluids    Intake/Output Summary (Last 24 hours) at 05/19/19 1158  Last data filed at 05/19/19 1011   Gross per 24 hour   Intake              880 ml   Output              500 ml   Net              380 ml       Laboratory  Recent Labs      05/17/19   1725  05/18/19   0318   WBC  10.5  10.6   RBC  4.08*  4.81   HEMOGLOBIN  11.8*  13.5   HEMATOCRIT  39.4  41.7   MCV  96.6  86.7   MCH  28.9  28.1   MCHC  29.9*  32.4*   RDW  49.9  44.7   PLATELETCT  284  417   MPV  9.3  9.4     Recent Labs      05/17/19   1725  05/18/19   0318   SODIUM  135  137   POTASSIUM  4.4  3.9   CHLORIDE  106  109   CO2  17*  20   GLUCOSE  80  97   BUN  12  12   CREATININE  0.75  0.73   CALCIUM  9.7  9.0                   Imaging  MR-FOOT-WITH & W/O RIGHT   Final Result      Lateral forefoot cellulitis with worsening skin ulceration and a new 4 cm draining abscess which overlies the fifth TMT. No osteomyelitis or septic arthropathy.      Resolution of fourth and fifth TMT centered marrow edema and joint effusion.      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 Underlying abscess not excluded      No acute bony destruction is detected. If there is high clinical concern for osteomyelitis, MRI with contrast could be performed      Severe osteopenia      US-EXTREMITY ARTERY LOWER BILAT W/JENNIFER (COMBO)    (Results Pending)        Assessment/Plan  * Skin ulcer of right foot with fat layer exposed (HCC)- (present on admission)   Assessment & Plan    IV Cefepime, Flagyl  Pain  control  Wound care  Follow cultures  NPO at [facility]       Monoparesis of lower extremity (HCC)- (present on admission)   Assessment & Plan    LPS following     HTN (hypertension)- (present on admission)   Assessment & Plan    Coreg          VTE prophylaxis: Lovenox

## 2019-05-19 NOTE — PROGRESS NOTES
" LIMB PRESERVATION SERVICE    HPI:         Joan Olivares is a 64 y.o. female, with a past medical history that includes Paresthesia and prior External Fixation with hardware removal of Right ORIF due to infection in 2006.  admitted 5/17/2019 for Cellulitis. 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  This wound is chronic since 2016 and has had multiple debridements and courses of IV antibiotics and outpatient wound care treamtent. The wound has failed to improve.   Pt has limited sensation and mobility of her right leg.     5/19/19 Patient denies fevers, chills, nausea, vomiting.  Pain well controlled. D/W Bedside RN, Dr Mackey and Dr Gutierrez. MRI still pending results.     ASSESSMENT:      /72   Pulse 84   Temp 37 °C (98.6 °F) (Temporal)   Resp 18   Ht 1.676 m (5' 6\")   Wt 98.1 kg (216 lb 4.3 oz)   LMP 04/09/1977   SpO2 93%   Breastfeeding? No   BMI 34.91 kg/m²          Wound 05/17/19 Pressure Injury Foot (Active)   Site Assessment Clean;Pink;Swelling 5/19/2019  5:00 AM   Barbara-wound Assessment Pink;Red 5/19/2019  5:00 AM   Drainage Amount Small 5/19/2019  5:00 AM   Drainage Description Serosanguineous 5/19/2019  5:00 AM   Treatments Cleansed 5/19/2019  5:00 AM   Cleansing Normal Saline Irrigation 5/19/2019  5:00 AM   Dressing Options Plain Strip Packing;Nonadhesive Foam;Hypafix Tape 5/19/2019  9:00 AM   Dressing Cleansing/Solutions 1/4 Strength Dakin's Solution 5/18/2019  4:00 PM   Dressing Changed Changed 5/19/2019  5:00 AM   Dressing Status Clean;Dry;Intact 5/19/2019  9:00 AM   Dressing Change Frequency Every Shift 5/19/2019  5:00 AM   NEXT Dressing Change  05/19/19 5/19/2019  5:00 AM   NEXT Weekly Photo (Inpatient Only) 05/24/19 5/17/2019 11:00 PM   WOUND NURSE ONLY - Odor Foul;Mild 5/19/2019  5:00 AM   WOUND NURSE ONLY - Pulses 2+;DP;Right 5/18/2019  4:00 PM   WOUND NURSE ONLY - Tissue Type and Percentage 100% Pink 5/18/2019  4:00 PM   WOUND NURSE " "ONLY - Time Spent with Patient (mins) 30 5/19/2019  9:00 AM            DIABETES MANAGEMENT:  Lab Results   Component Value Date/Time    GLUCOSE 97 05/18/2019 03:18 AM      Lab Results   Component Value Date/Time    HBA1C 5.5 07/11/2017 03:00 PM        Diabetes education NA    INFECTION MANAGEMENT:  WBC   Date/Time Value Ref Range Status   05/18/2019 03:18 AM 10.6 4.8 - 10.8 K/uL Final       Microbiology:   Results     Procedure Component Value Units Date/Time    BLOOD CULTURE [[id number]] Collected:  05/18/19 0318    Order Status:  Completed Specimen:  Blood from Peripheral Updated:  05/19/19 0846     Significant Indicator NEG     Source BLD     Site PERIPHERAL     Culture Result No Growth  Note: Blood cultures are incubated for 5 days and  are monitored continuously.Positive blood cultures  are called to the RN and reported as soon as  they are identified.      Narrative:       Per Hospital Policy: Only change Specimen Src: to \"Line\" if  specified by physician order.  Left Hand    BLOOD CULTURE [[id number]] Collected:  05/18/19 0318    Order Status:  Completed Specimen:  Blood from Peripheral Updated:  05/19/19 0846     Significant Indicator NEG     Source BLD     Site PERIPHERAL     Culture Result No Growth  Note: Blood cultures are incubated for 5 days and  are monitored continuously.Positive blood cultures  are called to the RN and reported as soon as  they are identified.      Narrative:       Per Hospital Policy: Only change Specimen Src: to \"Line\" if  specified by physician order.  Right Hand    CULTURE WOUND W/ GRAM STAIN [[id number]] Collected:  05/17/19 2145    Order Status:  Completed Specimen:  Wound Updated:  05/18/19 1308     Significant Indicator NEG     Source WND     Site Right Foot     Culture Result Culture in progress.     Gram Stain Result Few WBCs.  Rare Gram positive rods.      GRAM STAIN [[id number]] Collected:  05/17/19 2145    Order Status:  Completed Specimen:  Wound Updated:  05/18/19 0855    "  Significant Indicator .     Source WND     Site Right Foot     Gram Stain Result Few WBCs.  Rare Gram positive rods.      CULTURE WOUND W/ GRAM STAIN [[id number]]     Order Status:  No result Specimen:  Wound from Right Foot              PLAN:  Limb preservation status guarded  Will discuss with Dr Gutierrez on Monday to evaluate pt pending MRI results   Right lateral foot              Continue Dressing Orders - Dakins strip packing                Nursing to change every shift and  PRN for Saturation or Dislodgement     2. Offloading:  Wheelchair  Abilty involved with AFO in past, pt to wear shoes that do not rub on Wound sites  - AFO will be considered after Sx options     3. Labs/Diagnostics: NA     4. Imaging: MRI Results                            5. Infection Management:               Infection: cultures pending              Microbiology pending              Antibiotics: Per ID recommendation                           Continue cefepime                                          Vancomycin                                          Flagyl 500 mg 3 times daily     7. Referrals:              Vascular JENNIFER PEND              Ortho Will wait for Dr Gutierrez to evaluate Monday post MRI resulting              Infectious diseases ID is following Dr Razo evaluated Pt, consulted by Dr Mackey              Diabetes Education NA              Ortho tech NA - Ability pending Sx decision              Other NA     Professional collaboration: D/W Bedside RN, Wound Team Reji, Dr Gutierrez, Dr Mackey,     LPS will follow  Rufus Galan R.N.

## 2019-05-19 NOTE — WOUND TEAM
Pt being followed by LPS for lateral foot wound.  Wound team signing off.  Please consult if new skin issues arise.

## 2019-05-19 NOTE — PROGRESS NOTES
A&Ox4  Room Air  Patient reporting 7/10 pain, medicated per MAR.  BID dressing change to R foot: changed at 0500 per order instructions   Tolerating a regular diet.  Denies N&V.  + void  Patient able to transfer self to BSC, encouraged her to call, refuses bed alarm.  All needs meet, call light within reach.

## 2019-05-20 ENCOUNTER — ANESTHESIA EVENT (OUTPATIENT)
Dept: SURGERY | Facility: MEDICAL CENTER | Age: 64
DRG: 571 | End: 2019-05-20
Payer: MEDICARE

## 2019-05-20 ENCOUNTER — ANESTHESIA (OUTPATIENT)
Dept: SURGERY | Facility: MEDICAL CENTER | Age: 64
DRG: 571 | End: 2019-05-20
Payer: MEDICARE

## 2019-05-20 PROBLEM — R73.9 HYPERGLYCEMIA: Status: ACTIVE | Noted: 2019-05-20

## 2019-05-20 LAB
ANION GAP SERPL CALC-SCNC: 8 MMOL/L (ref 0–11.9)
BACTERIA WND AEROBE CULT: ABNORMAL
BACTERIA WND AEROBE CULT: ABNORMAL
BASOPHILS # BLD AUTO: 0.6 % (ref 0–1.8)
BASOPHILS # BLD: 0.04 K/UL (ref 0–0.12)
BUN SERPL-MCNC: 11 MG/DL (ref 8–22)
CALCIUM SERPL-MCNC: 9.1 MG/DL (ref 8.5–10.5)
CHLORIDE SERPL-SCNC: 106 MMOL/L (ref 96–112)
CO2 SERPL-SCNC: 24 MMOL/L (ref 20–33)
CREAT SERPL-MCNC: 0.77 MG/DL (ref 0.5–1.4)
EOSINOPHIL # BLD AUTO: 0.23 K/UL (ref 0–0.51)
EOSINOPHIL NFR BLD: 3.2 % (ref 0–6.9)
ERYTHROCYTE [DISTWIDTH] IN BLOOD BY AUTOMATED COUNT: 44 FL (ref 35.9–50)
GLUCOSE SERPL-MCNC: 110 MG/DL (ref 65–99)
GRAM STN SPEC: ABNORMAL
HCT VFR BLD AUTO: 39.2 % (ref 37–47)
HGB BLD-MCNC: 12.2 G/DL (ref 12–16)
IMM GRANULOCYTES # BLD AUTO: 0.02 K/UL (ref 0–0.11)
IMM GRANULOCYTES NFR BLD AUTO: 0.3 % (ref 0–0.9)
LYMPHOCYTES # BLD AUTO: 1.83 K/UL (ref 1–4.8)
LYMPHOCYTES NFR BLD: 25.4 % (ref 22–41)
MCH RBC QN AUTO: 27.2 PG (ref 27–33)
MCHC RBC AUTO-ENTMCNC: 31.1 G/DL (ref 33.6–35)
MCV RBC AUTO: 87.3 FL (ref 81.4–97.8)
MONOCYTES # BLD AUTO: 0.56 K/UL (ref 0–0.85)
MONOCYTES NFR BLD AUTO: 7.8 % (ref 0–13.4)
NEUTROPHILS # BLD AUTO: 4.52 K/UL (ref 2–7.15)
NEUTROPHILS NFR BLD: 62.7 % (ref 44–72)
NRBC # BLD AUTO: 0 K/UL
NRBC BLD-RTO: 0 /100 WBC
PATHOLOGY CONSULT NOTE: NORMAL
PLATELET # BLD AUTO: 305 K/UL (ref 164–446)
PMV BLD AUTO: 9.8 FL (ref 9–12.9)
POTASSIUM SERPL-SCNC: 4 MMOL/L (ref 3.6–5.5)
RBC # BLD AUTO: 4.49 M/UL (ref 4.2–5.4)
SIGNIFICANT IND 70042: ABNORMAL
SITE SITE: ABNORMAL
SODIUM SERPL-SCNC: 138 MMOL/L (ref 135–145)
SOURCE SOURCE: ABNORMAL
WBC # BLD AUTO: 7.2 K/UL (ref 4.8–10.8)

## 2019-05-20 PROCEDURE — 700111 HCHG RX REV CODE 636 W/ 250 OVERRIDE (IP): Performed by: ANESTHESIOLOGY

## 2019-05-20 PROCEDURE — 0JBQ0ZZ EXCISION OF RIGHT FOOT SUBCUTANEOUS TISSUE AND FASCIA, OPEN APPROACH: ICD-10-PCS | Performed by: ORTHOPAEDIC SURGERY

## 2019-05-20 PROCEDURE — 700102 HCHG RX REV CODE 250 W/ 637 OVERRIDE(OP): Performed by: INTERNAL MEDICINE

## 2019-05-20 PROCEDURE — 700105 HCHG RX REV CODE 258: Performed by: ANESTHESIOLOGY

## 2019-05-20 PROCEDURE — 0KNS0ZZ RELEASE RIGHT LOWER LEG MUSCLE, OPEN APPROACH: ICD-10-PCS | Performed by: ORTHOPAEDIC SURGERY

## 2019-05-20 PROCEDURE — 160009 HCHG ANES TIME/MIN: Performed by: ORTHOPAEDIC SURGERY

## 2019-05-20 PROCEDURE — 770006 HCHG ROOM/CARE - MED/SURG/GYN SEMI*

## 2019-05-20 PROCEDURE — 500423 HCHG DRESSING, ABD COMBINE: Performed by: ORTHOPAEDIC SURGERY

## 2019-05-20 PROCEDURE — 80048 BASIC METABOLIC PNL TOTAL CA: CPT

## 2019-05-20 PROCEDURE — A9270 NON-COVERED ITEM OR SERVICE: HCPCS | Performed by: ANESTHESIOLOGY

## 2019-05-20 PROCEDURE — A9270 NON-COVERED ITEM OR SERVICE: HCPCS | Performed by: INTERNAL MEDICINE

## 2019-05-20 PROCEDURE — 85025 COMPLETE CBC W/AUTO DIFF WBC: CPT

## 2019-05-20 PROCEDURE — 700111 HCHG RX REV CODE 636 W/ 250 OVERRIDE (IP): Performed by: FAMILY MEDICINE

## 2019-05-20 PROCEDURE — 160035 HCHG PACU - 1ST 60 MINS PHASE I: Performed by: ORTHOPAEDIC SURGERY

## 2019-05-20 PROCEDURE — 87015 SPECIMEN INFECT AGNT CONCNTJ: CPT

## 2019-05-20 PROCEDURE — 160027 HCHG SURGERY MINUTES - 1ST 30 MINS LEVEL 2: Performed by: ORTHOPAEDIC SURGERY

## 2019-05-20 PROCEDURE — 700102 HCHG RX REV CODE 250 W/ 637 OVERRIDE(OP): Performed by: FAMILY MEDICINE

## 2019-05-20 PROCEDURE — 501330 HCHG SET, CYSTO IRRIG TUBING: Performed by: ORTHOPAEDIC SURGERY

## 2019-05-20 PROCEDURE — 700111 HCHG RX REV CODE 636 W/ 250 OVERRIDE (IP): Performed by: INTERNAL MEDICINE

## 2019-05-20 PROCEDURE — 160048 HCHG OR STATISTICAL LEVEL 1-5: Performed by: ORTHOPAEDIC SURGERY

## 2019-05-20 PROCEDURE — 700102 HCHG RX REV CODE 250 W/ 637 OVERRIDE(OP): Performed by: ANESTHESIOLOGY

## 2019-05-20 PROCEDURE — 700101 HCHG RX REV CODE 250: Performed by: ANESTHESIOLOGY

## 2019-05-20 PROCEDURE — 160002 HCHG RECOVERY MINUTES (STAT): Performed by: ORTHOPAEDIC SURGERY

## 2019-05-20 PROCEDURE — 88304 TISSUE EXAM BY PATHOLOGIST: CPT

## 2019-05-20 PROCEDURE — 700105 HCHG RX REV CODE 258: Performed by: INTERNAL MEDICINE

## 2019-05-20 PROCEDURE — 160036 HCHG PACU - EA ADDL 30 MINS PHASE I: Performed by: ORTHOPAEDIC SURGERY

## 2019-05-20 PROCEDURE — A6223 GAUZE >16<=48 NO W/SAL W/O B: HCPCS | Performed by: ORTHOPAEDIC SURGERY

## 2019-05-20 PROCEDURE — 99232 SBSQ HOSP IP/OBS MODERATE 35: CPT | Performed by: INTERNAL MEDICINE

## 2019-05-20 PROCEDURE — 700111 HCHG RX REV CODE 636 W/ 250 OVERRIDE (IP): Performed by: HOSPITALIST

## 2019-05-20 PROCEDURE — 3E0T3BZ INTRODUCTION OF ANESTHETIC AGENT INTO PERIPHERAL NERVES AND PLEXI, PERCUTANEOUS APPROACH: ICD-10-PCS | Performed by: ANESTHESIOLOGY

## 2019-05-20 PROCEDURE — 0SNH0ZZ RELEASE RIGHT TARSAL JOINT, OPEN APPROACH: ICD-10-PCS | Performed by: ORTHOPAEDIC SURGERY

## 2019-05-20 PROCEDURE — 99232 SBSQ HOSP IP/OBS MODERATE 35: CPT | Performed by: FAMILY MEDICINE

## 2019-05-20 PROCEDURE — 0LNN0ZZ RELEASE RIGHT LOWER LEG TENDON, OPEN APPROACH: ICD-10-PCS | Performed by: ORTHOPAEDIC SURGERY

## 2019-05-20 PROCEDURE — 160038 HCHG SURGERY MINUTES - EA ADDL 1 MIN LEVEL 2: Performed by: ORTHOPAEDIC SURGERY

## 2019-05-20 PROCEDURE — 87070 CULTURE OTHR SPECIMN AEROBIC: CPT

## 2019-05-20 PROCEDURE — 500881 HCHG PACK, EXTREMITY: Performed by: ORTHOPAEDIC SURGERY

## 2019-05-20 PROCEDURE — 0L8S0ZZ DIVISION OF RIGHT ANKLE TENDON, OPEN APPROACH: ICD-10-PCS | Performed by: ORTHOPAEDIC SURGERY

## 2019-05-20 PROCEDURE — 87205 SMEAR GRAM STAIN: CPT

## 2019-05-20 PROCEDURE — 87075 CULTR BACTERIA EXCEPT BLOOD: CPT

## 2019-05-20 PROCEDURE — A9270 NON-COVERED ITEM OR SERVICE: HCPCS | Performed by: FAMILY MEDICINE

## 2019-05-20 RX ORDER — METOCLOPRAMIDE HYDROCHLORIDE 5 MG/ML
INJECTION INTRAMUSCULAR; INTRAVENOUS PRN
Status: DISCONTINUED | OUTPATIENT
Start: 2019-05-20 | End: 2019-05-20 | Stop reason: SURG

## 2019-05-20 RX ORDER — METOPROLOL TARTRATE 1 MG/ML
1 INJECTION, SOLUTION INTRAVENOUS
Status: DISCONTINUED | OUTPATIENT
Start: 2019-05-20 | End: 2019-05-20 | Stop reason: HOSPADM

## 2019-05-20 RX ORDER — OXYCODONE HCL 5 MG/5 ML
5 SOLUTION, ORAL ORAL
Status: COMPLETED | OUTPATIENT
Start: 2019-05-20 | End: 2019-05-20

## 2019-05-20 RX ORDER — KETOROLAC TROMETHAMINE 30 MG/ML
INJECTION, SOLUTION INTRAMUSCULAR; INTRAVENOUS PRN
Status: DISCONTINUED | OUTPATIENT
Start: 2019-05-20 | End: 2019-05-20 | Stop reason: SURG

## 2019-05-20 RX ORDER — OXYCODONE HCL 5 MG/5 ML
10 SOLUTION, ORAL ORAL
Status: COMPLETED | OUTPATIENT
Start: 2019-05-20 | End: 2019-05-20

## 2019-05-20 RX ORDER — HYDROMORPHONE HYDROCHLORIDE 1 MG/ML
0.4 INJECTION, SOLUTION INTRAMUSCULAR; INTRAVENOUS; SUBCUTANEOUS
Status: DISCONTINUED | OUTPATIENT
Start: 2019-05-20 | End: 2019-05-20 | Stop reason: HOSPADM

## 2019-05-20 RX ORDER — OXYCODONE HCL 5 MG/5 ML
5 SOLUTION, ORAL ORAL
Status: DISCONTINUED | OUTPATIENT
Start: 2019-05-20 | End: 2019-05-20 | Stop reason: HOSPADM

## 2019-05-20 RX ORDER — HYDROMORPHONE HYDROCHLORIDE 1 MG/ML
0.1 INJECTION, SOLUTION INTRAMUSCULAR; INTRAVENOUS; SUBCUTANEOUS
Status: DISCONTINUED | OUTPATIENT
Start: 2019-05-20 | End: 2019-05-20 | Stop reason: HOSPADM

## 2019-05-20 RX ORDER — DIPHENHYDRAMINE HYDROCHLORIDE 50 MG/ML
12.5 INJECTION INTRAMUSCULAR; INTRAVENOUS
Status: DISCONTINUED | OUTPATIENT
Start: 2019-05-20 | End: 2019-05-20 | Stop reason: HOSPADM

## 2019-05-20 RX ORDER — IPRATROPIUM BROMIDE AND ALBUTEROL SULFATE 2.5; .5 MG/3ML; MG/3ML
3 SOLUTION RESPIRATORY (INHALATION)
Status: DISCONTINUED | OUTPATIENT
Start: 2019-05-20 | End: 2019-05-20 | Stop reason: HOSPADM

## 2019-05-20 RX ORDER — MORPHINE SULFATE 4 MG/ML
1-2 INJECTION, SOLUTION INTRAMUSCULAR; INTRAVENOUS
Status: DISCONTINUED | OUTPATIENT
Start: 2019-05-20 | End: 2019-05-26

## 2019-05-20 RX ORDER — MEPERIDINE HYDROCHLORIDE 25 MG/ML
12.5 INJECTION INTRAMUSCULAR; INTRAVENOUS; SUBCUTANEOUS
Status: DISCONTINUED | OUTPATIENT
Start: 2019-05-20 | End: 2019-05-20 | Stop reason: HOSPADM

## 2019-05-20 RX ORDER — OXYCODONE HCL 5 MG/5 ML
10 SOLUTION, ORAL ORAL
Status: DISCONTINUED | OUTPATIENT
Start: 2019-05-20 | End: 2019-05-20 | Stop reason: HOSPADM

## 2019-05-20 RX ORDER — LABETALOL HYDROCHLORIDE 5 MG/ML
5 INJECTION, SOLUTION INTRAVENOUS
Status: DISCONTINUED | OUTPATIENT
Start: 2019-05-20 | End: 2019-05-20 | Stop reason: HOSPADM

## 2019-05-20 RX ORDER — HALOPERIDOL 5 MG/ML
1 INJECTION INTRAMUSCULAR
Status: DISCONTINUED | OUTPATIENT
Start: 2019-05-20 | End: 2019-05-20 | Stop reason: HOSPADM

## 2019-05-20 RX ORDER — PHENYLEPHRINE HYDROCHLORIDE 10 MG/ML
INJECTION, SOLUTION INTRAMUSCULAR; INTRAVENOUS; SUBCUTANEOUS PRN
Status: DISCONTINUED | OUTPATIENT
Start: 2019-05-20 | End: 2019-05-20 | Stop reason: SURG

## 2019-05-20 RX ORDER — TOBRAMYCIN 1.2 G/30ML
INJECTION, POWDER, LYOPHILIZED, FOR SOLUTION INTRAVENOUS
Status: DISCONTINUED | OUTPATIENT
Start: 2019-05-20 | End: 2019-05-20 | Stop reason: HOSPADM

## 2019-05-20 RX ORDER — HYDROMORPHONE HYDROCHLORIDE 1 MG/ML
0.2 INJECTION, SOLUTION INTRAMUSCULAR; INTRAVENOUS; SUBCUTANEOUS
Status: DISCONTINUED | OUTPATIENT
Start: 2019-05-20 | End: 2019-05-20 | Stop reason: HOSPADM

## 2019-05-20 RX ORDER — HYDRALAZINE HYDROCHLORIDE 20 MG/ML
5 INJECTION INTRAMUSCULAR; INTRAVENOUS
Status: DISCONTINUED | OUTPATIENT
Start: 2019-05-20 | End: 2019-05-20 | Stop reason: HOSPADM

## 2019-05-20 RX ORDER — CEFAZOLIN SODIUM 2 G/100ML
2 INJECTION, SOLUTION INTRAVENOUS EVERY 8 HOURS
Status: DISCONTINUED | OUTPATIENT
Start: 2019-05-20 | End: 2019-05-28 | Stop reason: HOSPADM

## 2019-05-20 RX ORDER — ONDANSETRON 2 MG/ML
4 INJECTION INTRAMUSCULAR; INTRAVENOUS
Status: DISCONTINUED | OUTPATIENT
Start: 2019-05-20 | End: 2019-05-20 | Stop reason: HOSPADM

## 2019-05-20 RX ORDER — SODIUM CHLORIDE, SODIUM LACTATE, POTASSIUM CHLORIDE, CALCIUM CHLORIDE 600; 310; 30; 20 MG/100ML; MG/100ML; MG/100ML; MG/100ML
INJECTION, SOLUTION INTRAVENOUS
Status: DISCONTINUED | OUTPATIENT
Start: 2019-05-20 | End: 2019-05-20 | Stop reason: SURG

## 2019-05-20 RX ORDER — SODIUM CHLORIDE, SODIUM LACTATE, POTASSIUM CHLORIDE, CALCIUM CHLORIDE 600; 310; 30; 20 MG/100ML; MG/100ML; MG/100ML; MG/100ML
INJECTION, SOLUTION INTRAVENOUS CONTINUOUS
Status: DISCONTINUED | OUTPATIENT
Start: 2019-05-20 | End: 2019-05-20 | Stop reason: HOSPADM

## 2019-05-20 RX ORDER — ONDANSETRON 2 MG/ML
INJECTION INTRAMUSCULAR; INTRAVENOUS PRN
Status: DISCONTINUED | OUTPATIENT
Start: 2019-05-20 | End: 2019-05-20 | Stop reason: SURG

## 2019-05-20 RX ORDER — VANCOMYCIN HYDROCHLORIDE 500 MG/10ML
INJECTION, POWDER, LYOPHILIZED, FOR SOLUTION INTRAVENOUS
Status: COMPLETED | OUTPATIENT
Start: 2019-05-20 | End: 2019-05-20

## 2019-05-20 RX ORDER — DEXAMETHASONE SODIUM PHOSPHATE 4 MG/ML
INJECTION, SOLUTION INTRA-ARTICULAR; INTRALESIONAL; INTRAMUSCULAR; INTRAVENOUS; SOFT TISSUE PRN
Status: DISCONTINUED | OUTPATIENT
Start: 2019-05-20 | End: 2019-05-20 | Stop reason: SURG

## 2019-05-20 RX ADMIN — PROPOFOL 200 MG: 10 INJECTION, EMULSION INTRAVENOUS at 15:15

## 2019-05-20 RX ADMIN — PHENYLEPHRINE HYDROCHLORIDE 200 MCG: 10 INJECTION INTRAVENOUS at 15:20

## 2019-05-20 RX ADMIN — SODIUM CHLORIDE, POTASSIUM CHLORIDE, SODIUM LACTATE AND CALCIUM CHLORIDE: 600; 310; 30; 20 INJECTION, SOLUTION INTRAVENOUS at 15:06

## 2019-05-20 RX ADMIN — MORPHINE SULFATE 2 MG: 4 INJECTION INTRAVENOUS at 09:09

## 2019-05-20 RX ADMIN — PHENYLEPHRINE HYDROCHLORIDE 200 MCG: 10 INJECTION INTRAVENOUS at 15:36

## 2019-05-20 RX ADMIN — CARVEDILOL 12.5 MG: 12.5 TABLET, FILM COATED ORAL at 18:06

## 2019-05-20 RX ADMIN — MORPHINE SULFATE 2 MG: 4 INJECTION INTRAVENOUS at 05:15

## 2019-05-20 RX ADMIN — SENNOSIDES, DOCUSATE SODIUM 2 TABLET: 50; 8.6 TABLET, FILM COATED ORAL at 18:06

## 2019-05-20 RX ADMIN — FENTANYL CITRATE 50 MCG: 50 INJECTION, SOLUTION INTRAMUSCULAR; INTRAVENOUS at 15:15

## 2019-05-20 RX ADMIN — DEXAMETHASONE SODIUM PHOSPHATE 4 MG: 4 INJECTION, SOLUTION INTRA-ARTICULAR; INTRALESIONAL; INTRAMUSCULAR; INTRAVENOUS; SOFT TISSUE at 15:15

## 2019-05-20 RX ADMIN — FENTANYL CITRATE 25 MCG: 50 INJECTION INTRAMUSCULAR; INTRAVENOUS at 16:45

## 2019-05-20 RX ADMIN — CEFTRIAXONE SODIUM 2 G: 2 INJECTION, POWDER, FOR SOLUTION INTRAMUSCULAR; INTRAVENOUS at 05:14

## 2019-05-20 RX ADMIN — ONDANSETRON 4 MG: 2 INJECTION INTRAMUSCULAR; INTRAVENOUS at 15:44

## 2019-05-20 RX ADMIN — ENOXAPARIN SODIUM 40 MG: 100 INJECTION SUBCUTANEOUS at 09:04

## 2019-05-20 RX ADMIN — OXYCODONE HYDROCHLORIDE 10 MG: 5 TABLET ORAL at 19:48

## 2019-05-20 RX ADMIN — METRONIDAZOLE 500 MG: 500 TABLET, FILM COATED ORAL at 08:03

## 2019-05-20 RX ADMIN — MORPHINE SULFATE 2 MG: 4 INJECTION INTRAVENOUS at 22:22

## 2019-05-20 RX ADMIN — MORPHINE SULFATE 2 MG: 4 INJECTION INTRAVENOUS at 00:29

## 2019-05-20 RX ADMIN — LIDOCAINE HYDROCHLORIDE 40 MG: 20 INJECTION, SOLUTION INTRAVENOUS at 15:15

## 2019-05-20 RX ADMIN — NYSTATIN 500000 UNITS: 100000 SUSPENSION ORAL at 12:50

## 2019-05-20 RX ADMIN — PHENYLEPHRINE HYDROCHLORIDE 200 MCG: 10 INJECTION INTRAVENOUS at 15:45

## 2019-05-20 RX ADMIN — CEFAZOLIN SODIUM 2 G: 2 INJECTION, SOLUTION INTRAVENOUS at 18:11

## 2019-05-20 RX ADMIN — NYSTATIN 500000 UNITS: 100000 SUSPENSION ORAL at 18:06

## 2019-05-20 RX ADMIN — CARVEDILOL 12.5 MG: 12.5 TABLET, FILM COATED ORAL at 08:03

## 2019-05-20 RX ADMIN — METOCLOPRAMIDE 10 MG: 5 INJECTION, SOLUTION INTRAMUSCULAR; INTRAVENOUS at 15:44

## 2019-05-20 RX ADMIN — KETOROLAC TROMETHAMINE 30 MG: 30 INJECTION, SOLUTION INTRAMUSCULAR at 15:44

## 2019-05-20 RX ADMIN — ACETAMINOPHEN 650 MG: 325 TABLET, FILM COATED ORAL at 22:06

## 2019-05-20 RX ADMIN — PHENYLEPHRINE HYDROCHLORIDE 200 MCG: 10 INJECTION INTRAVENOUS at 15:30

## 2019-05-20 RX ADMIN — PROCHLORPERAZINE EDISYLATE 10 MG: 5 INJECTION INTRAMUSCULAR; INTRAVENOUS at 00:03

## 2019-05-20 RX ADMIN — PHENYLEPHRINE HYDROCHLORIDE 100 MCG: 10 INJECTION INTRAVENOUS at 15:25

## 2019-05-20 RX ADMIN — OXYCODONE HYDROCHLORIDE 10 MG: 5 TABLET ORAL at 05:29

## 2019-05-20 RX ADMIN — OXYCODONE HYDROCHLORIDE 10 MG: 5 SOLUTION ORAL at 16:23

## 2019-05-20 RX ADMIN — NYSTATIN 500000 UNITS: 100000 SUSPENSION ORAL at 08:02

## 2019-05-20 RX ADMIN — ONDANSETRON 4 MG: 2 INJECTION INTRAMUSCULAR; INTRAVENOUS at 22:07

## 2019-05-20 RX ADMIN — METRONIDAZOLE 500 MG: 500 TABLET, FILM COATED ORAL at 18:06

## 2019-05-20 RX ADMIN — PHENYLEPHRINE HYDROCHLORIDE 100 MCG: 10 INJECTION INTRAVENOUS at 15:22

## 2019-05-20 RX ADMIN — NYSTATIN 500000 UNITS: 100000 SUSPENSION ORAL at 22:07

## 2019-05-20 RX ADMIN — FENTANYL CITRATE 25 MCG: 50 INJECTION INTRAMUSCULAR; INTRAVENOUS at 16:50

## 2019-05-20 RX ADMIN — MORPHINE SULFATE 2 MG: 4 INJECTION INTRAVENOUS at 12:49

## 2019-05-20 ASSESSMENT — ENCOUNTER SYMPTOMS
DIARRHEA: 0
PALPITATIONS: 0
MYALGIAS: 0
VOMITING: 0
NECK PAIN: 0
SHORTNESS OF BREATH: 0
BACK PAIN: 0
CONSTIPATION: 1
DIZZINESS: 0
CHILLS: 0
NAUSEA: 0
ABDOMINAL PAIN: 0
BLURRED VISION: 0
HEADACHES: 0
WHEEZING: 0
HEARTBURN: 0
WEAKNESS: 0
NERVOUS/ANXIOUS: 1
COUGH: 0
SORE THROAT: 0
FEVER: 0

## 2019-05-20 NOTE — PROGRESS NOTES
Full consult to be dictated    64F with a few years of right foot wounds. Previous neurological injury following trauma. She has a cavovarus right foot with a dorsal lateral wound/abscess. We will plan on I&D today with possible reconstruction in the future once her infection clears. Risks and benefits were discussed with the patient.    Peyman Harper MD  [facility]  Foot and Ankle Fellow  [phone]

## 2019-05-20 NOTE — PROGRESS NOTES
POST-OP NOTE FOR LIMB PRESERVATION SERVICE    SURGERY DATE: 5/20/2019    PROCEDURE: Procedure(s):  IRRIGATION AND DEBRIDEMENT, WOUND-FOOT  Achilles lengthening  Medial soft tissue release    WEIGHT BEARING STATUS: Transfer weight bearing    PT CONSULT: Yes    ANTIBIOTICS: Per ID recommendation    PLAN TO RETURN TO O.R.: No    WOUND CARE PLAN: Incisional dressing - Change POD #2 (Directions: Adaptic over incision, Gauze, Roll Gauze, Ace Wrap)    FOLLOW-UP: [facility]    DURABLE MEDICAL EQUIPMENT: PRAFO    OTHER: The patient did not have a abscess laterally. It appeared to be chronic inflammatory tissue. It was sent for cultures and pathology.      Peyman Harper M.D.

## 2019-05-20 NOTE — ANESTHESIA POSTPROCEDURE EVALUATION
Patient: Joan Olivares    Procedure Summary     Date:  05/20/19 Room / Location:  [place] [address] / SURGERY [facility]    Anesthesia Start:  1506 Anesthesia Stop:  1608    Procedure:  IRRIGATION AND DEBRIDEMENT RIGHT FOOT WOUND (Right Foot) Diagnosis:  (chronic right foot ulcer )    Surgeon:  Emanuel Gutierrez M.D. Responsible Provider:  Noam Rose M.D.    Anesthesia Type:  general ASA Status:  3          Final Anesthesia Type: general  Last vitals  BP   Blood Pressure : 159/88, NIBP: 122/73    Temp   37.4 °C (99.4 °F)    Pulse   Pulse: 89, Heart Rate (Monitored): 91   Resp   12    SpO2   99 %      Anesthesia Post Evaluation    Patient location during evaluation: PACU  Patient participation: complete - patient participated  Level of consciousness: awake and alert    Airway patency: patent  Anesthetic complications: no  Cardiovascular status: hemodynamically stable  Respiratory status: acceptable  Hydration status: euvolemic    PONV: none           Nurse Pain Score: 6 (NPRS)

## 2019-05-20 NOTE — PROGRESS NOTES
Per [facility] fall risk assessment, pt is at a moderate risk to fall. Pt has refused the use of a bed alarm despite education- charge MIHIR Feliciano notified.

## 2019-05-20 NOTE — ANESTHESIA PROCEDURE NOTES
Airway  Date/Time: 5/20/2019 3:18 PM  Performed by: JAVIER MENJIVAR  Authorized by: JAVIER MENJIVAR     Location:  OR  Urgency:  Elective  Indications for Airway Management:  Anesthesia  Spontaneous Ventilation: absent    Sedation Level:  Deep  Preoxygenated: Yes    Final Airway Type:  Supraglottic airway  Final Supraglottic Airway:  Standard LMA  SGA Size:  4  Number of Attempts at Approach:  1

## 2019-05-20 NOTE — PROGRESS NOTES
Hospital Medicine Daily Progress Note    Date of Service  5/20/2019    Chief Complaint  64 y.o. female admitted 5/17/2019 with infected Foot ulcer    Hospital Course  Admitted with chronic right foot ulcer with recent increased and foul-smelling wound drainage.    Interval Problem Update  Foot ulcer - MRI showed abscess, cultures pending, discussed case with LPS and ID  HTN - controlled     Consultants/Specialty  LPS  ID    Code Status  Full    Disposition  TBD    Review of Systems  Review of Systems   Constitutional: Negative for chills, fever and malaise/fatigue.   HENT: Negative for hearing loss and sore throat.    Eyes: Negative for blurred vision.   Respiratory: Negative for cough, shortness of breath and wheezing.    Cardiovascular: Positive for leg swelling. Negative for chest pain and palpitations.   Gastrointestinal: Negative for abdominal pain, diarrhea, heartburn, nausea and vomiting.   Genitourinary: Negative for dysuria.   Musculoskeletal: Negative for back pain and neck pain.   Skin: Negative for rash.   Neurological: Negative for dizziness, weakness and headaches.   Psychiatric/Behavioral: The patient is nervous/anxious.         Physical Exam  Temp:  [36.8 °C (98.2 °F)-36.8 °C (98.3 °F)] 36.8 °C (98.2 °F)  Pulse:  [62-82] 62  Resp:  [16-18] 16  BP: (122-147)/(66-81) 147/81  SpO2:  [91 %-97 %] 97 %    Physical Exam   Constitutional: She is oriented to person, place, and time. She appears well-developed and well-nourished.   HENT:   Head: Normocephalic and atraumatic.   Eyes: Pupils are equal, round, and reactive to light. Conjunctivae are normal.   Neck: No tracheal deviation present. No thyromegaly present.   Cardiovascular: Normal rate and regular rhythm.    Pulmonary/Chest: Effort normal and breath sounds normal.   Abdominal: Soft. Bowel sounds are normal.   Musculoskeletal: She exhibits edema.   Right foot in plantar flexion and inversion   Lymphadenopathy:     She has no cervical adenopathy.    Neurological: She is alert and oriented to person, place, and time.   Skin:   Ulcer at dorsolateral aspect of right foot    Nursing note and vitals reviewed.      Fluids    Intake/Output Summary (Last 24 hours) at 05/20/19 1324  Last data filed at 05/19/19 1353   Gross per 24 hour   Intake              480 ml   Output                0 ml   Net              480 ml       Laboratory  Recent Labs      05/17/19   1725  05/18/19   0318  05/20/19   1033   WBC  10.5  10.6  7.2   RBC  4.08*  4.81  4.49   HEMOGLOBIN  11.8*  13.5  12.2   HEMATOCRIT  39.4  41.7  39.2   MCV  96.6  86.7  87.3   MCH  28.9  28.1  27.2   MCHC  29.9*  32.4*  31.1*   RDW  49.9  44.7  44.0   PLATELETCT  284  417  305   MPV  9.3  9.4  9.8     Recent Labs      05/17/19   1725  05/18/19   0318  05/20/19   1033   SODIUM  135  137  138   POTASSIUM  4.4  3.9  4.0   CHLORIDE  106  109  106   CO2  17*  20  24   GLUCOSE  80  97  110*   BUN  12  12  11   CREATININE  0.75  0.73  0.77   CALCIUM  9.7  9.0  9.1                   Imaging  US-JENNIFER SINGLE LEVEL BILAT   Final Result      MR-FOOT-WITH & W/O RIGHT   Final Result      Lateral forefoot cellulitis with worsening skin ulceration and a new 4 cm draining abscess which overlies the fifth TMT. No osteomyelitis or septic arthropathy.      Resolution of fourth and fifth TMT centered marrow edema and joint effusion.      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 Underlying abscess not excluded      No acute bony destruction is detected. If there is high clinical concern for osteomyelitis, MRI with contrast could be performed      Severe osteopenia           Assessment/Plan  * Skin  ulcer of right foot with fat layer exposed (HCC)- (present on admission)   Assessment & Plan    Infected skin ulcer with foul-smelling discharge.  No leukocytosis.  Blood culture negative.  X-ray showed soft tissue gas compatible with cellulitis and no evidence for osteomyelitis noted  Was started on vancomycin, cefepime and Flagyl then shifted to ceftriaxone and Flagyl and cefazolin and Flagyl, vancomycin was discontinued.  ID following.  MRI showed letter for foot cellulitis with worsening skin ulceration and new 4 cm draining abscess which overlies the fifth TMT.  New osteomyelitis or septic arthropathy.  Incision drainage to be done today    Ankle-brachial index is normal     HTN (hypertension)- (present on admission)   Assessment & Plan    CARVEDILOL 12.5 twice daily  Stable     Weakness of right leg- (present on admission)   Assessment & Plan    Due to paralysis of the right leg.          VTE prophylaxis:   Lovenox

## 2019-05-20 NOTE — OP REPORT
DATE OF SERVICE:  05/20/2019    PREOPERATIVE DIAGNOSES:  1.  Right neurogenic cavovarus foot alignment.  2.  Right chronic lateral foot wound.    POSTOPERATIVE DIAGNOSES:  1.  Right neurogenic cavovarus foot alignment.  2.  Right chronic lateral foot wound.    PROCEDURES PERFORMED:  1.  Right percutaneous tendo-Achilles lengthening.  2.  Right ankle manipulation.  3.  Right open medial release including subtalar joint, talonavicular joint,   and releasing the posterior tibialis tendon, flexor digitorum longus, and   flexor hallucis longus.  4.  Right lateral foot irrigation and debridement, dorsal and lateral   compartments.    SURGEON:  Emanuel Gutierrez MD    FIRST ASSISTANT:  Peyman Harper MD    SECOND ASSISTANT:  Kiki Patino.    ANESTHESIA:  General endotracheal with popliteal block per my request for   postoperative pain management.    ESTIMATED BLOOD LOSS:  None.    COMPLICATIONS:  None.    POSTOPERATIVE PLAN:  1.  Placement of PRAFO.  Ultimately try to get her into a custom brace.  2.  Antibiotics per infectious disease.  3.  Continue wound care as needed.    INDICATIONS:  Please see my consult note.    PROCEDURE IN DETAIL:  The patient was brought into the operating room.  She   underwent general endotracheal anesthesia without complications.  Her right   lower extremity was prepped and draped in standard fashion in the supine   position with all appropriate padding.  Positive site verification confirmed   her right lower extremity as well as above procedure and confirmation that she   received preoperative antibiotics.  Esmarch was used to exsanguinate her foot   and ankle and leg tourniquet was inflated to 250 mmHg.  Using a 15 blade, 3   small stab incisions were made in the Achilles tendon in the distal, middle,   and proximal aspect of the main Achilles tendon.  The proximal and distal were   brought central to medial and the central one was brought central to lateral.    Achilles was  released, which gave some improvement in overall alignment.    She still appeared to have equinus contracture and so gentle, but firm ankle   manipulation was done.  We slowly stretched out her posterior capsule.  This   allowed her foot to go into neutral ____ equinovarus remaining.  Medial   incision was made starting over the tip of the medial malleolus extending   distally.  It was dissected down.  The posterior tibialis tendon was   identified and approximately 1 cm was released.  The FDL tendon sheath was   then found just posterior to this and same procedure was performed and then   further dissection was around through the sustentaculum, which then identified   the flexor hallucis longus.  We released another centimeter of flexor   hallucis longus.  It gave continued improvement in overall foot alignment ____   to have a lot of forefoot adduction.  A full capsular release was performed   on the talonavicular joint and this was then traced back to the middle and   posterior facet of the subtalar joint and combine this gave her a fairly   neutralized foot.  These wounds were then irrigated with copious irrigation   and closed in layered fashion with 3-0 Vicryl and 3-0 nylon.  Next, her   lateral wound, which was covered throughout the entire extremity, was then   uncovered.  A 15 blade was used to sharply excise the necrotic tissue down to   level of the deep fascia.  At that time, no true abscess was identified.  Any   nonviable tissue was sharply excised.  Biopsy and cultures were taken.    Thorough irrigation was performed.  All tissue appeared to be viable.  The   wound was able to be closed primarily.  She was placed into a soft dressing   followed by a PROFO.  She was transferred to the recovery room in good   condition.    Utilization of Dr. Harper was necessary for patient positioning, holding,   retracting, wound closure, and dressing placement.  He was present throughout   the entire procedure.        ____________________________________     MD MIREYA JOAQUIN    DD:  05/20/2019 16:06:03  DT:  05/20/2019 16:54:23    D#:  [id number]  Job#:  [id number]    cc: [facility]

## 2019-05-20 NOTE — CARE PLAN
Problem: Pain Management  Goal: Pain level will decrease to patient's comfort goal  Outcome: PROGRESSING AS EXPECTED  Pt is staying ahead of her pain and utilizing analgesics and non pharm pain reduction techniques;

## 2019-05-20 NOTE — ANESTHESIA TIME REPORT
Anesthesia Start and Stop Event Times     Date Time Event    5/20/2019 1506 Anesthesia Start     1608 Anesthesia Stop        Responsible Staff  05/20/19    Name Role Begin End    Noam Rose M.D. Anesth 1506 1608        Preop Diagnosis (Free Text):  Pre-op Diagnosis             Preop Diagnosis (Codes):  Diagnosis Information     Diagnosis Code(s):         Post op Diagnosis  Right foot infection      Premium Reason  A. 3PM - 7AM    Comments:

## 2019-05-20 NOTE — CARE PLAN
"Problem: Discharge Barriers/Planning  Goal: Patient's continuum of care needs will be met  Outcome: PROGRESSING AS EXPECTED  Pt will likely return home after receiving adequate treatment for her foot ulcer.     Problem: Pain Management  Goal: Pain level will decrease to patient's comfort goal  Outcome: PROGRESSING SLOWER THAN EXPECTED  Pt's pain has remained >6/10 throughout shift. Pt educated on available PRN's (tylenol, oxycodone and morphine) and frequency of said medications. Pt also offered heat packs and ice packs but decline (source of pain is nerve pain and a \"hole\" on the roof of her mouth).       "

## 2019-05-20 NOTE — ANESTHESIA QCDR
2019 [facility] Clinical Data Registry (for Quality Improvement)     Postoperative nausea/vomiting risk protocol (Adult = 18 yrs and Pediatric 3-17 yrs)- (430 and 463)  General inhalation anesthetic (NOT TIVA) with PONV risk factors: Yes  Provision of anti-emetic therapy with at least 2 different classes of agents: Yes   Patient DID NOT receive anti-emetic therapy and reason is documented in Medical Record:  N/A    Multimodal Pain Management- (AQI59)  Patient undergoing Elective Surgery (i.e. Outpatient, or ASC, or Prescheduled Surgery prior to Hospital Admission): No  Use of Multimodal Pain Management, two or more drugs and/or interventions, NOT including systemic opioids: N/A  Exception: Documented allergy to multiple classes of analgesics: N/A    PACU assessment of acute postoperative pain prior to Anesthesia Care End- Applies to Patients Age = 18- (ABG7)  Initial PACU pain score is which of the following: < 7/10  Patient unable to report pain score: N/A    Post-anesthetic transfer of care checklist/protocol to PACU/ICU- (426 and 427)  Upon conclusion of case, patient transferred to which of the following locations: PACU/Non-ICU  Use of transfer checklist/protocol: Yes  Exclusion: Service Performed in Patient Hospital Room (and thus did not require transfer): N/A    PACU Reintubation- (AQI31)  General anesthesia requiring endotracheal intubation (ETT) along with subsequent extubation in OR or PACU: No  Required reintubation in the PACU: N/A  Extubation was a planned trial documented in the medical record prior to removal of the original airway device: N/A    Unplanned admission to ICU related to anesthesia service up through end of PACU care- (MD51)  Unplanned admission to ICU (not initially anticipated at anesthesia start time): No

## 2019-05-20 NOTE — ANESTHESIA PREPROCEDURE EVALUATION
R foot infection  R Leg paralysis  Mild Asthma    Relevant Problems   (+) HTN (hypertension)   (+) Nephrolithiasis       Physical Exam    Airway   Mallampati: I  TM distance: >3 FB  Neck ROM: full       Cardiovascular - normal exam  Rhythm: regular  Rate: normal  (-) murmur     Dental - normal exam         Pulmonary - normal exam  Breath sounds clear to auscultation     Abdominal    Neurological - abnormal exam                 Anesthesia Plan    ASA 3   ASA physical status 3 criteria: hypertension - poorly controlled    Plan - general       Airway plan will be LMA        Induction: intravenous      Pertinent diagnostic labs and testing reviewed    Informed Consent:    Anesthetic plan and risks discussed with patient.

## 2019-05-20 NOTE — PROGRESS NOTES
"Assumed care of pt from Rodolfo Ruby. Pt has complaints of 6/10 right leg nerve pain. Discussed asking the MD for lyrica for nerve pain (home medication for \"years\"). Pt has also not had a BM in a \"few days\"; will bring pt miralax with PM medication.  Call light within reach, treaded slipper socks on, bed locked in lowest position. Mobility and fall risk assessed- proper communication signs are in place.   "

## 2019-05-20 NOTE — OR NURSING
1610-right foot dressing saturated and coming thru dressing, OR RN notified Dr, instructed to reinforce dressing for now, reinforced with ABD and elastic wrap, lower leg elevated on pillows, ice pack to site.

## 2019-05-20 NOTE — CARE PLAN
Problem: Safety  Goal: Will remain free from injury  Outcome: PROGRESSING AS EXPECTED  Pt is demonstrating safe transfer techniques for bedside commode;

## 2019-05-20 NOTE — PROGRESS NOTES
Infectious Disease Progress Note    Author: Allison Razo M.D. Date & Time of service: 2019  11:35 AM    Chief Complaint:  Right foot infection      Interval History:   Interval 24 hours of Vitals/Labs/Micro,and imaging results reviewed as available. See assessment.    Interval 24 hours of Vitals/Labs/Micro,and imaging results reviewed as available. See assessment.     Review of Systems:  Review of Systems   Constitutional: Negative for chills and fever.   Gastrointestinal: Positive for constipation. Negative for abdominal pain, diarrhea, nausea and vomiting.   Musculoskeletal: Negative for myalgias.       Hemodynamics:  Temp (24hrs), Av.8 °C (98.3 °F), Min:36.8 °C (98.2 °F), Max:36.8 °C (98.3 °F)  Temperature: 36.8 °C (98.2 °F)  Pulse  Av.8  Min: 62  Max: 107   Blood Pressure : 147/81       Physical Exam:  Physical Exam   Constitutional: She is oriented to person, place, and time. She appears well-developed and well-nourished.   HENT:   Head: Normocephalic and atraumatic.   Eyes: Pupils are equal, round, and reactive to light. Conjunctivae and EOM are normal.   Cardiovascular: Normal rate, regular rhythm and normal heart sounds.    Pulmonary/Chest: Effort normal and breath sounds normal.   Abdominal: Soft. Bowel sounds are normal. She exhibits no distension. There is no tenderness. There is no rebound and no guarding.   Musculoskeletal: She exhibits deformity.   Right foot inward torsion, ulcer on dorsum, bandage in place   Neurological: She is alert and oriented to person, place, and time.   Right leg paralysis   Skin: Skin is warm and dry.   Psychiatric: She has a normal mood and affect. Her behavior is normal.       Meds:    Current Facility-Administered Medications:   •  nystatin  •  cefTRIAXone (ROCEPHIN) IV  •  oxyCODONE immediate-release  •  morphine injection  •  metroNIDAZOLE  •  dakins 0.125% (1/4 strength)  •  carvedilol  •  senna-docusate **AND** polyethylene glycol/lytes  **AND** magnesium hydroxide **AND** bisacodyl  •  enoxaparin  •  acetaminophen  •  enalaprilat  •  ondansetron  •  ondansetron    Labs:  Recent Labs      05/17/19   1725  05/18/19   0318  05/20/19   1033   WBC  10.5  10.6  7.2   RBC  4.08*  4.81  4.49   HEMOGLOBIN  11.8*  13.5  12.2   HEMATOCRIT  39.4  41.7  39.2   MCV  96.6  86.7  87.3   MCH  28.9  28.1  27.2   RDW  49.9  44.7  44.0   PLATELETCT  284  417  305   MPV  9.3  9.4  9.8   NEUTSPOLYS  62.10  63.00  62.70   LYMPHOCYTES  29.60  28.50  25.40   MONOCYTES  4.40  5.10  7.80   EOSINOPHILS  2.70  2.60  3.20   BASOPHILS  0.80  0.40  0.60     Recent Labs      05/17/19   1725  05/18/19   0318   SODIUM  135  137   POTASSIUM  4.4  3.9   CHLORIDE  106  109   CO2  17*  20   GLUCOSE  80  97   BUN  12  12     Recent Labs      05/17/19   1725  05/18/19   0318   ALBUMIN  4.6   --    TBILIRUBIN  0.4   --    ALKPHOSPHAT  92   --    TOTPROTEIN  8.2   --    ALTSGPT  5   --    ASTSGOT  15   --    CREATININE  0.75  0.73       Imaging:  Dx-foot-complete 3+ Right    Result Date: 5/17/2019 5/17/2019 5:09 PM HISTORY/REASON FOR EXAM: Nonhealing lateral wound for the years, recent significant worsening. Paralysis 3 years ago TECHNIQUE/EXAM DESCRIPTION AND NUMBER OF VIEWS: 3 nonweightbearing views of the RIGHT foot. COMPARISON:  None. FINDINGS: With severe lateral soft tissue swelling with skin ulceration. There is no underlying bony destruction confirmed. Severe osteopenia Metallic foreign body overlies the plantar medial great toe distally. There is solid periosteal reaction involving the fifth metatarsal shaft medially. No periostitis to suggest active inflammation. No acute displaced fracture. No subluxation. Mild first metatarsal-phalangeal joint space narrowing Cavus configuration of the midfoot     Lateral skin ulceration with associated soft tissue gas compatible with cellulitis. Underlying abscess not excluded No acute bony destruction is detected. If there is high clinical  concern for osteomyelitis, MRI with contrast could be performed Severe osteopenia    Mr-foot-with & W/o Right    Result Date: 5/19/2019 5/18/2019 7:54 AM HISTORY/REASON FOR EXAM:  Osteomyelitis suspected, foot swelling, diabetic. TECHNIQUE/EXAM DESCRIPTION: MRI of the RIGHT foot with and without contrast. Using a [employer] Signa 1.5 Mary MRI scanner, T1 axial, sagittal, and coronal, fast spin-echo T2 fat-suppressed axial and coronal, fast inversion recovery sagittal, and T1 fat suppressed axial and sagittal post intravenous contrast images were obtained. A total of 10 mL Gadavist given. COMPARISON:  6/20/2017 MRI, radiographs May 17. FINDINGS: There is lateral forefoot soft tissue swelling with ulceration overlying the fifth TMT articulation, worse than on comparison. The cutaneous ulceration does not extend all the way to the bony margins. There is underlying 42 x 16 x 34 mm T2 hyperintensity, T1 hypointensity with central absent enhancement and a draining sinus to a site of ulceration. This indicates draining abscess. There has been resolution of the fourth and fifth TMT joint effusion but there is still some spurring. No bony destruction. The remainder of the TMT articulations are normal There has been near resolution of subcutaneous fatty replacement overlying the fifth metatarsal head with low T1 and T2 signal. Resolved enhancement. The remainder the bony structures are notable for unchanged multiple bone infarcts, seen best in the calcaneus and talus. These have no associated articular surface collapse There is similar artifact involving the great toe compatible with metallic foreign body seen on x-ray No abnormal joint effusion is noted There is worsening severe fatty atrophy of the intrinsic foot musculature     Lateral forefoot cellulitis with worsening skin ulceration and a new 4 cm draining abscess which overlies the fifth TMT. No osteomyelitis or septic arthropathy. Resolution of fourth and fifth TMT  centered marrow edema and joint effusion. 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  no pvd at rest in either leg  CESAR PLUMMER  Age:    64    Gender:     F  MRN:    [id number]  :    1955      BSA:  Exam Date:     2019 16:21  Room #:     Inpatient  Priority:     Routine  Ht (in):             Wt (lb):  Ordering Physician:        LANDON TOLBERT  Referring Physician:       LANDON TOLBERT  Sonographer:               Artemio Galan RVT  Study Type:                Complete Bilateral  Technical Quality:         Adequate  Indications:     Ulcer of lower extremity  CPT Codes:       88751  ICD Codes:       707.1  History:         Ulceration of lateral aspect of right foot X 3 years  Limitations:                 RIGHT  Waveform            Systolic BPs (mmHg)                             123           Brachial  Triphasic                                Common Femoral  Triphasic                  155           Posterior Tibial  Triphasic                  153           Dorsalis Pedis                                           Peroneal                             1.17          TOSHIA                                           TBI                       LEFT  Waveform        Systolic BPs (mmHg)                             132           Brachial  Triphasic                                Common Femoral  Triphasic                  152           Posterior Tibial  Triphasic                  145           Dorsalis Pedis                                           Peroneal                             1.15          TOSHIA                                           TBI  Findings  Bilateral.  Doppler  waveform of the common femoral artery is of high amplitude and  triphasic.  Doppler waveforms at the ankle are brisk and triphasic.  Ankle-brachial index is normal.  Additional testing was not performed in accordance with lower extremity  arterial evaluation protocol.  Matty Gallo MD  (Electronically Signed)  Final Date:      20 May 2019 05:50      Micro:  Results     Procedure Component Value Units Date/Time    CULTURE WOUND W/ GRAM STAIN [[id number]]  (Abnormal)  (Susceptibility) Collected:  05/17/19 2145    Order Status:  Completed Specimen:  Wound Updated:  05/20/19 1031     Significant Indicator POS (POS)     Source WND     Site Right Foot     Culture Result Moderate growth mixed skin shantel predominately Diphtheroids (A)     Gram Stain Result Few WBCs.  Rare Gram positive rods.       Culture Result Staphylococcus aureus  Light growth   (A)    Culture & Susceptibility     STAPHYLOCOCCUS AUREUS     Antibiotic Sensitivity Microscan Unit Status    Ampicillin/sulbactam Sensitive <=8/4 mcg/mL Final    Method: JAMARI    Clindamycin Sensitive <=0.5 mcg/mL Final    Method: JAMARI    Daptomycin Sensitive <=0.5 mcg/mL Final    Method: JAMARI    Erythromycin Sensitive <=0.5 mcg/mL Final    Method: JAMARI    Moxifloxacin Sensitive <=0.5 mcg/mL Final    Method: JAMARI    Oxacillin Sensitive <=0.25 mcg/mL Final    Method: JAMARI    Penicillin Resistant >8 mcg/mL Final    Method: JAMARI    Tetracycline Sensitive <=4 mcg/mL Final    Method: JAMARI    Trimeth/Sulfa Sensitive <=0.5/9.5 mcg/mL Final    Method: JAMARI    Vancomycin Sensitive 1 mcg/mL Final    Method: JAMARI                       BLOOD CULTURE [[id number]] Collected:  05/18/19 0318    Order Status:  Completed Specimen:  Blood from Peripheral Updated:  05/19/19 0846     Significant Indicator NEG     Source BLD     Site PERIPHERAL     Culture Result No Growth  Note: Blood cultures are incubated for 5 days and  are monitored continuously.Positive blood cultures  are called to the RN and reported  "as soon as  they are identified.      Narrative:       Per Hospital Policy: Only change Specimen Src: to \"Line\" if  specified by physician order.  Left Hand    BLOOD CULTURE [[id number]] Collected:  05/18/19 0318    Order Status:  Completed Specimen:  Blood from Peripheral Updated:  05/19/19 0846     Significant Indicator NEG     Source BLD     Site PERIPHERAL     Culture Result No Growth  Note: Blood cultures are incubated for 5 days and  are monitored continuously.Positive blood cultures  are called to the RN and reported as soon as  they are identified.      Narrative:       Per Hospital Policy: Only change Specimen Src: to \"Line\" if  specified by physician order.  Right Hand    GRAM STAIN [[id number]] Collected:  05/17/19 2145    Order Status:  Completed Specimen:  Wound Updated:  05/18/19 0855     Significant Indicator .     Source WND     Site Right Foot     Gram Stain Result Few WBCs.  Rare Gram positive rods.      CULTURE WOUND W/ GRAM STAIN [[id number]]     Order Status:  No result Specimen:  Wound from Right Foot           Assessment:  Active Hospital Problems    Diagnosis   • *Skin ulcer of right foot with fat layer exposed (HCC) [L97.512]   • Monoparesis of lower extremity (HCC) [G83.10]   • HTN (hypertension) [I10]     ASSESSMENT/PLAN:      Joan Olivares is a 64 y.o.  admitted 5/17/2019. Pt has a past medical history of trauma to right leg resulting in right leg paralysis.  She has a chronic wound on the right lateral foot.  She changes wound dressings herself and often has clear drainage.  However lately she noticed the drainage changed to pus, the foot had new swelling and she recently expressed a large amount of thick fluid with malodor.  She has had no new fevers or chills.  No antibiotics prior to admission.       Hospital Course:   Patient has been afebrile.  No leukocytosis.  X-ray on 5/17 with lateral skin ulceration associate with soft tissue gas compatible with cellulitis.  She was started " on vancomycin and cefepime on 5/17.  Today she states that the swelling in the foot has decreased somewhat.  I expressed pus during exam.      Interval 24 hour assessment:    AF, O2 RA,   Labs reviewed  Studies reviewed  Micro reviewed    Pt continued on cefazolin    she is doing well overall, still with ulcer draining purulent material.  She is anticipating OR today.      Right foot infection, chronic ulcer secondary to right leg paralysis  -New swelling, malodor and purulent fluid  -Wound cultures taken, GPRs  -MRI foot on 5/18- lateral forefoot cellulitis and new 7 cm draining abscess which overlies the fifth TMT.  No osteomyelitis or septic arthropathy.  - ABIs R 1.17, L 1.15- normal   -LPS following, Dr. Gutierrez to evaluate on Monday post MRI  -Wound culture results 5/17  now with MSSA     Mouth and tongue irritation, chronic     Right leg paralysis  - secondary to traumatic event several years ago     Antibiotic allergy, penicillin described as rash swelling  -Tolerate ceftriaxone and has tolerated cefepime     --- Stop ceftriaxone and will start cefazolin for MSSA, continue cefazolin while inpatient  --- Agree with OR today for I&D, please obtain cultures  --- Final antibiotic choice and duration to be determined based on surgical findings        Plan of care discussed with ROSEANN Mackey M.D.. Will continue to follow     Allison Razo M.D.

## 2019-05-20 NOTE — ASSESSMENT & PLAN NOTE
Infected skin ulcer with foul-smelling discharge.  No leukocytosis.  Blood culture negative.  X-ray showed soft tissue gas compatible with cellulitis and no evidence for osteomyelitis noted  Was started on vancomycin, cefepime and Flagyl then shifted to ceftriaxone and Flagyl and cefazolin and Flagyl, vancomycin was discontinued.  ID following.  MRI showed letter for foot cellulitis with worsening skin ulceration and new 4 cm draining abscess which overlies the fifth TMT.  New osteomyelitis or septic arthropathy.  Incision drainage to be done today    Ankle-brachial index is normal

## 2019-05-21 PROBLEM — E66.9 OBESITY: Status: ACTIVE | Noted: 2019-05-21

## 2019-05-21 LAB
ANION GAP SERPL CALC-SCNC: 9 MMOL/L (ref 0–11.9)
BASOPHILS # BLD AUTO: 0 % (ref 0–1.8)
BASOPHILS # BLD: 0 K/UL (ref 0–0.12)
BUN SERPL-MCNC: 12 MG/DL (ref 8–22)
CALCIUM SERPL-MCNC: 9.5 MG/DL (ref 8.5–10.5)
CHLORIDE SERPL-SCNC: 105 MMOL/L (ref 96–112)
CO2 SERPL-SCNC: 22 MMOL/L (ref 20–33)
CREAT SERPL-MCNC: 0.78 MG/DL (ref 0.5–1.4)
EOSINOPHIL # BLD AUTO: 0 K/UL (ref 0–0.51)
EOSINOPHIL NFR BLD: 0 % (ref 0–6.9)
ERYTHROCYTE [DISTWIDTH] IN BLOOD BY AUTOMATED COUNT: 42.6 FL (ref 35.9–50)
EST. AVERAGE GLUCOSE BLD GHB EST-MCNC: 117 MG/DL
GLUCOSE SERPL-MCNC: 145 MG/DL (ref 65–99)
GRAM STN SPEC: NORMAL
HBA1C MFR BLD: 5.7 % (ref 0–5.6)
HCT VFR BLD AUTO: 40.3 % (ref 37–47)
HGB BLD-MCNC: 12.9 G/DL (ref 12–16)
IMM GRANULOCYTES # BLD AUTO: 0.04 K/UL (ref 0–0.11)
IMM GRANULOCYTES NFR BLD AUTO: 0.4 % (ref 0–0.9)
LYMPHOCYTES # BLD AUTO: 1.13 K/UL (ref 1–4.8)
LYMPHOCYTES NFR BLD: 11.4 % (ref 22–41)
MCH RBC QN AUTO: 27.6 PG (ref 27–33)
MCHC RBC AUTO-ENTMCNC: 32 G/DL (ref 33.6–35)
MCV RBC AUTO: 86.1 FL (ref 81.4–97.8)
MONOCYTES # BLD AUTO: 0.3 K/UL (ref 0–0.85)
MONOCYTES NFR BLD AUTO: 3 % (ref 0–13.4)
NEUTROPHILS # BLD AUTO: 8.4 K/UL (ref 2–7.15)
NEUTROPHILS NFR BLD: 85.2 % (ref 44–72)
NRBC # BLD AUTO: 0 K/UL
NRBC BLD-RTO: 0 /100 WBC
PLATELET # BLD AUTO: 328 K/UL (ref 164–446)
PMV BLD AUTO: 9.7 FL (ref 9–12.9)
POTASSIUM SERPL-SCNC: 4 MMOL/L (ref 3.6–5.5)
RBC # BLD AUTO: 4.68 M/UL (ref 4.2–5.4)
SIGNIFICANT IND 70042: NORMAL
SITE SITE: NORMAL
SODIUM SERPL-SCNC: 136 MMOL/L (ref 135–145)
SOURCE SOURCE: NORMAL
WBC # BLD AUTO: 9.9 K/UL (ref 4.8–10.8)

## 2019-05-21 PROCEDURE — 36415 COLL VENOUS BLD VENIPUNCTURE: CPT

## 2019-05-21 PROCEDURE — 83036 HEMOGLOBIN GLYCOSYLATED A1C: CPT

## 2019-05-21 PROCEDURE — A9270 NON-COVERED ITEM OR SERVICE: HCPCS | Performed by: INTERNAL MEDICINE

## 2019-05-21 PROCEDURE — A9270 NON-COVERED ITEM OR SERVICE: HCPCS | Performed by: FAMILY MEDICINE

## 2019-05-21 PROCEDURE — 700111 HCHG RX REV CODE 636 W/ 250 OVERRIDE (IP): Performed by: INTERNAL MEDICINE

## 2019-05-21 PROCEDURE — 99232 SBSQ HOSP IP/OBS MODERATE 35: CPT | Performed by: HOSPITALIST

## 2019-05-21 PROCEDURE — 700111 HCHG RX REV CODE 636 W/ 250 OVERRIDE (IP): Performed by: HOSPITALIST

## 2019-05-21 PROCEDURE — 770006 HCHG ROOM/CARE - MED/SURG/GYN SEMI*

## 2019-05-21 PROCEDURE — 700102 HCHG RX REV CODE 250 W/ 637 OVERRIDE(OP): Performed by: INTERNAL MEDICINE

## 2019-05-21 PROCEDURE — 700102 HCHG RX REV CODE 250 W/ 637 OVERRIDE(OP): Performed by: FAMILY MEDICINE

## 2019-05-21 PROCEDURE — 99232 SBSQ HOSP IP/OBS MODERATE 35: CPT | Performed by: INTERNAL MEDICINE

## 2019-05-21 PROCEDURE — 85025 COMPLETE CBC W/AUTO DIFF WBC: CPT

## 2019-05-21 PROCEDURE — 700111 HCHG RX REV CODE 636 W/ 250 OVERRIDE (IP): Performed by: FAMILY MEDICINE

## 2019-05-21 PROCEDURE — 80048 BASIC METABOLIC PNL TOTAL CA: CPT

## 2019-05-21 PROCEDURE — L4398 FOOT DROP SPLINT PRE OTS: HCPCS

## 2019-05-21 RX ADMIN — PROCHLORPERAZINE EDISYLATE 10 MG: 5 INJECTION INTRAMUSCULAR; INTRAVENOUS at 01:01

## 2019-05-21 RX ADMIN — NYSTATIN 500000 UNITS: 100000 SUSPENSION ORAL at 17:26

## 2019-05-21 RX ADMIN — NYSTATIN 500000 UNITS: 100000 SUSPENSION ORAL at 10:40

## 2019-05-21 RX ADMIN — CEFAZOLIN SODIUM 2 G: 2 INJECTION, SOLUTION INTRAVENOUS at 01:54

## 2019-05-21 RX ADMIN — SENNOSIDES, DOCUSATE SODIUM 2 TABLET: 50; 8.6 TABLET, FILM COATED ORAL at 05:29

## 2019-05-21 RX ADMIN — OXYCODONE HYDROCHLORIDE 10 MG: 5 TABLET ORAL at 00:11

## 2019-05-21 RX ADMIN — OXYCODONE HYDROCHLORIDE 10 MG: 5 TABLET ORAL at 05:28

## 2019-05-21 RX ADMIN — CARVEDILOL 12.5 MG: 12.5 TABLET, FILM COATED ORAL at 08:01

## 2019-05-21 RX ADMIN — MORPHINE SULFATE 2 MG: 4 INJECTION INTRAVENOUS at 21:57

## 2019-05-21 RX ADMIN — CEFAZOLIN SODIUM 2 G: 2 INJECTION, SOLUTION INTRAVENOUS at 18:37

## 2019-05-21 RX ADMIN — ACETAMINOPHEN 650 MG: 325 TABLET, FILM COATED ORAL at 05:28

## 2019-05-21 RX ADMIN — MORPHINE SULFATE 2 MG: 4 INJECTION INTRAVENOUS at 08:07

## 2019-05-21 RX ADMIN — CARVEDILOL 12.5 MG: 12.5 TABLET, FILM COATED ORAL at 17:29

## 2019-05-21 RX ADMIN — CEFAZOLIN SODIUM 2 G: 2 INJECTION, SOLUTION INTRAVENOUS at 12:36

## 2019-05-21 RX ADMIN — METRONIDAZOLE 500 MG: 500 TABLET, FILM COATED ORAL at 01:53

## 2019-05-21 RX ADMIN — NYSTATIN 500000 UNITS: 100000 SUSPENSION ORAL at 14:46

## 2019-05-21 RX ADMIN — NYSTATIN 500000 UNITS: 100000 SUSPENSION ORAL at 21:58

## 2019-05-21 RX ADMIN — ENOXAPARIN SODIUM 40 MG: 100 INJECTION SUBCUTANEOUS at 05:30

## 2019-05-21 RX ADMIN — MAGNESIUM HYDROXIDE 30 ML: 400 SUSPENSION ORAL at 05:30

## 2019-05-21 ASSESSMENT — ENCOUNTER SYMPTOMS
NAUSEA: 0
CHILLS: 0
HEARTBURN: 0
FEVER: 0
MYALGIAS: 1
ABDOMINAL PAIN: 0
VOMITING: 0
DIAPHORESIS: 0
WEIGHT LOSS: 0
NAUSEA: 1
COUGH: 0
DIZZINESS: 0
DIARRHEA: 0
MYALGIAS: 0

## 2019-05-21 NOTE — PROGRESS NOTES
Pt complained nausea. PRN Zofran given. Pt continued to have headache after PRN medication. On call physician Dr Kenisha tineo. Updates of pt given to physician. One time dose compazine IV 10mg received and administered.

## 2019-05-21 NOTE — PROGRESS NOTES
[facility] Medicine Daily Progress Note    Date of Service  5/21/2019    Chief Complaint  64 y.o. female admitted 5/17/2019 with Foot ulcer    Hospital Course  Admitted with chronic right foot ulcer with recent increased and foul-smelling wound drainage.    Interval Problem Update  S/P I/D 5/20 w Ortho  Nausea reported; pain adequately controlled; reports hx of RLE paralysis knee down from remote injury  CBC and BMP reviewed and OK  HTN -- controlled  Cultures pending  Hyperglycemia --- A1C in process; denies hx    Consultants/Specialty  LPS  ID    Code Status  Full    Disposition  TBD    Review of Systems  Review of Systems   Constitutional: Negative for chills, diaphoresis, fever, malaise/fatigue and weight loss.   Respiratory: Negative for cough.    Cardiovascular: Negative for chest pain.   Gastrointestinal: Positive for nausea. Negative for abdominal pain, diarrhea, heartburn and vomiting.   Genitourinary: Negative for dysuria, frequency and urgency.   Musculoskeletal: Negative for myalgias.   Neurological: Negative for dizziness.   All other systems reviewed and are negative.       Physical Exam  Temp:  [36.2 °C (97.2 °F)-37.4 °C (99.4 °F)] 36.6 °C (97.9 °F)  Pulse:  [] 101  Resp:  [12-18] 18  BP: (130-159)/(70-88) 138/70  SpO2:  [90 %-100 %] 93 %    Physical Exam   Constitutional: She is oriented to person, place, and time. Vital signs are normal. She appears well-developed and well-nourished. No distress.   obese   HENT:   Head: Normocephalic and atraumatic.   Eyes: Pupils are equal, round, and reactive to light. EOM are normal.   Neck: Neck supple.   Cardiovascular: Normal rate, regular rhythm and normal heart sounds.    Pulmonary/Chest: Effort normal and breath sounds normal. No respiratory distress. She has no wheezes. She has no rales.   Abdominal: Soft. She exhibits no distension. There is no tenderness.   Musculoskeletal:   RLE CDI   Neurological: She is alert and oriented to person, place, and  time. No cranial nerve deficit.   Skin: Skin is warm and dry.   Nursing note and vitals reviewed.      Fluids    Intake/Output Summary (Last 24 hours) at 05/21/19 0754  Last data filed at 05/21/19 0610   Gross per 24 hour   Intake           2180.1 ml   Output               25 ml   Net           2155.1 ml       Laboratory  Recent Labs      05/20/19   1033  05/21/19   0213   WBC  7.2  9.9   RBC  4.49  4.68   HEMOGLOBIN  12.2  12.9   HEMATOCRIT  39.2  40.3   MCV  87.3  86.1   MCH  27.2  27.6   MCHC  31.1*  32.0*   RDW  44.0  42.6   PLATELETCT  305  328   MPV  9.8  9.7     Recent Labs      05/20/19   1033  05/21/19   0213   SODIUM  138  136   POTASSIUM  4.0  4.0   CHLORIDE  106  105   CO2  24  22   GLUCOSE  110*  145*   BUN  11  12   CREATININE  0.77  0.78   CALCIUM  9.1  9.5                   Imaging  US-JENNIFER SINGLE LEVEL BILAT   Final Result      MR-FOOT-WITH & W/O RIGHT   Final Result      Lateral forefoot cellulitis with worsening skin ulceration and a new 4 cm draining abscess which overlies the fifth TMT. No osteomyelitis or septic arthropathy.      Resolution of fourth and fifth TMT centered marrow edema and joint effusion.      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 Underlying abscess not excluded      No acute bony destruction is detected. If there is high clinical concern for osteomyelitis, MRI with contrast could be performed      Severe osteopenia           Assessment/Plan  * Skin ulcer of right foot with fat layer exposed (HCC)- (present on admission)   Assessment & Plan    IV Cefepime, Flagyl  ID following  Pain control  Wound care  Follow cultures         Monoparesis  of lower extremity (HCC)- (present on admission)   Assessment & Plan    LPS following     HTN (hypertension)- (present on admission)   Assessment & Plan    Coreg     Obesity   Assessment & Plan    Outpatient weight loss recommended          VTE prophylaxis: Lovenox    No changes to PE/ROS, AP except as noted

## 2019-05-21 NOTE — CONSULTS
DATE OF SERVICE:  2019    CONSULTING PHYSICIAN:   [facility], limb preservation service.    REASON FOR CONSULTATION:  Right foot infection.    HISTORY OF PRESENT ILLNESS:  The patient is a 64-year-old female.  She has had   problems with her right foot sometime.  She essentially has paralysis from   her previous neurologic injury.  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  She   saw Dr. Wu approximately 3 years ago who recommended surgical   intervention; due to life circumcisions, she was unable to proceed with this.    She has had chronic wound over the lateral aspect of her foot now for some   time.  It was gradually worse.  She was admitted to the hospital for this.    She denies any fevers or chills.  She denies any pain.  She has persistent   local wound care none of which has been effective.  She noted that things are   getting worse since, she presented to the emergency room on 2019.    Past medical history, medicines, allergies, family history, social history,   review of systems reviewed on Dr. Montana Mayen's admit note dated 2019.    PHYSICAL EXAMINATION:  VITAL SIGNS:  Afebrile, vital signs are stable.  GENERAL:  Well-appearing female in no acute distress.  PSYCHIATRIC:  Pleasant demeanor, normal affect.  EYES:  Pupils equal and round.  RESPIRATIONS:  Regular rate, unlabored breathing.  CARDIOVASCULAR:  Palpable dorsalis pedis pulses and posterior tibial pulse.    Regular rate and rhythm.  Brisk capillary refill.  NEUROLOGIC:  She has essentially no sensation throughout her foot.  She is   globally weak.  SKIN:  Shows a large lateral wound approximately 1 cm and in depth, both to   the lateral and dorsal aspect of the foot with some cellulitis and some mild   drainage.  MUSCULOSKELETAL:  She has no gross fluctuance.    IMAGIN.  X-rays were reviewed of right foot, demonstrated cavovarus foot alignment.    No obvious osteomyelitis.    2.  MRI was reviewed of her  right foot, demonstrates a deep abscess in the   dorsum of her foot.  ABIs are reviewed and triphasic blood flow.    IMPRESSION:  1.  Right cavovarus foot alignment.  2.  Right chronic lateral foot ulcer.  3.  Right neurologic injury with resulting cavovarus foot alignment.    PLAN:  I discussed with the patient about options including operative   resection with operative intervention.  At this point, I have recommended   right foot irrigation and debridement with correction of her cavovarus foot   alignment primarily with tendon release of her foot alignment maker have a   more braceable foot.  Procedure discussed in detail.  All questions were   answered.  Risks of surgery were explained which include, but not limited to   wound problems, infection, nerve injury, vascular injury, need for surgery.    She understands she could have persistent risk for infection.  She understands   and accepts these risks and agrees to proceed.  We will schedule this at next   available.       ____________________________________     MD MIREYA JOAQUIN / NTS    DD:  05/20/2019 16:02:00  DT:  05/20/2019 18:41:38    D#:  [id number]  Job#:  [id number]    cc: [facility]

## 2019-05-21 NOTE — PROGRESS NOTES
Infectious Disease Progress Note    Author: Allison Razo M.D. Date & Time of service: 2019  10:26 AM    Chief Complaint:  Right foot infection      Interval History:   Interval 24 hours of Vitals/Labs/Micro,and imaging results reviewed as available. See assessment.    Interval 24 hours of Vitals/Labs/Micro,and imaging results reviewed as available.    Interval 24 hours of Vitals/Labs/Micro,and imaging results reviewed as available. See assessment.     Review of Systems:  Review of Systems   Constitutional: Negative for chills, fever and malaise/fatigue.   Gastrointestinal: Negative for abdominal pain, diarrhea, nausea and vomiting.   Musculoskeletal: Positive for joint pain and myalgias.       Hemodynamics:  Temp (24hrs), Av.8 °C (98.2 °F), Min:36.2 °C (97.2 °F), Max:37.4 °C (99.4 °F)  Temperature: 36.6 °C (97.9 °F)  Pulse  Av.9  Min: 62  Max: 107Heart Rate (Monitored): 91  Blood Pressure : 138/70, NIBP: 154/76       Physical Exam:  Physical Exam   Constitutional: She is oriented to person, place, and time. She appears well-developed and well-nourished.   HENT:   Head: Normocephalic and atraumatic.   Eyes: Pupils are equal, round, and reactive to light. Conjunctivae and EOM are normal.   Cardiovascular: Normal rate, regular rhythm and normal heart sounds.    Pulmonary/Chest: Effort normal and breath sounds normal.   Abdominal: Soft. Bowel sounds are normal. She exhibits no distension. There is no tenderness. There is no rebound and no guarding.   Musculoskeletal: She exhibits edema, tenderness and deformity.   Right foot in surgical dressings and boot   Neurological: She is alert and oriented to person, place, and time.   Skin: Skin is warm and dry.   Psychiatric: She has a normal mood and affect. Her behavior is normal.       Meds:    Current Facility-Administered Medications:   •  ceFAZolin  •  morphine injection  •  nystatin  •  oxyCODONE immediate-release  •  metroNIDAZOLE  •   dakins 0.125% (1/4 strength)  •  carvedilol  •  senna-docusate **AND** polyethylene glycol/lytes **AND** magnesium hydroxide **AND** bisacodyl  •  enoxaparin  •  acetaminophen  •  enalaprilat  •  ondansetron  •  ondansetron    Labs:  Recent Labs      05/20/19   1033  05/21/19   0213   WBC  7.2  9.9   RBC  4.49  4.68   HEMOGLOBIN  12.2  12.9   HEMATOCRIT  39.2  40.3   MCV  87.3  86.1   MCH  27.2  27.6   RDW  44.0  42.6   PLATELETCT  305  328   MPV  9.8  9.7   NEUTSPOLYS  62.70  85.20*   LYMPHOCYTES  25.40  11.40*   MONOCYTES  7.80  3.00   EOSINOPHILS  3.20  0.00   BASOPHILS  0.60  0.00     Recent Labs      05/20/19   1033  05/21/19   0213   SODIUM  138  136   POTASSIUM  4.0  4.0   CHLORIDE  106  105   CO2  24  22   GLUCOSE  110*  145*   BUN  11  12     Recent Labs      05/20/19   1033  05/21/19   0213   CREATININE  0.77  0.78       Imaging:  Dx-foot-complete 3+ Right    Result Date: 5/17/2019 5/17/2019 5:09 PM HISTORY/REASON FOR EXAM: Nonhealing lateral wound for the years, recent significant worsening. Paralysis 3 years ago TECHNIQUE/EXAM DESCRIPTION AND NUMBER OF VIEWS: 3 nonweightbearing views of the RIGHT foot. COMPARISON:  None. FINDINGS: With severe lateral soft tissue swelling with skin ulceration. There is no underlying bony destruction confirmed. Severe osteopenia Metallic foreign body overlies the plantar medial great toe distally. There is solid periosteal reaction involving the fifth metatarsal shaft medially. No periostitis to suggest active inflammation. No acute displaced fracture. No subluxation. Mild first metatarsal-phalangeal joint space narrowing Cavus configuration of the midfoot     Lateral skin ulceration with associated soft tissue gas compatible with cellulitis. Underlying abscess not excluded No acute bony destruction is detected. If there is high clinical concern for osteomyelitis, MRI with contrast could be performed Severe osteopenia    Mr-foot-with & W/o Right    Result Date:  5/19/2019 5/18/2019 7:54 AM HISTORY/REASON FOR EXAM:  Osteomyelitis suspected, foot swelling, diabetic. TECHNIQUE/EXAM DESCRIPTION: MRI of the RIGHT foot with and without contrast. Using a [email or web address] 1.5 Mary MRI scanner, T1 axial, sagittal, and coronal, fast spin-echo T2 fat-suppressed axial and coronal, fast inversion recovery sagittal, and T1 fat suppressed axial and sagittal post intravenous contrast images were obtained. A total of 10 mL Gadavist given. COMPARISON:  6/20/2017 MRI, radiographs May 17. FINDINGS: There is lateral forefoot soft tissue swelling with ulceration overlying the fifth TMT articulation, worse than on comparison. The cutaneous ulceration does not extend all the way to the bony margins. There is underlying 42 x 16 x 34 mm T2 hyperintensity, T1 hypointensity with central absent enhancement and a draining sinus to a site of ulceration. This indicates draining abscess. There has been resolution of the fourth and fifth TMT joint effusion but there is still some spurring. No bony destruction. The remainder of the TMT articulations are normal There has been near resolution of subcutaneous fatty replacement overlying the fifth metatarsal head with low T1 and T2 signal. Resolved enhancement. The remainder the bony structures are notable for unchanged multiple bone infarcts, seen best in the calcaneus and talus. These have no associated articular surface collapse There is similar artifact involving the great toe compatible with metallic foreign body seen on x-ray No abnormal joint effusion is noted There is worsening severe fatty atrophy of the intrinsic foot musculature     Lateral forefoot cellulitis with worsening skin ulceration and a new 4 cm draining abscess which overlies the fifth TMT. No osteomyelitis or septic arthropathy. Resolution of fourth and fifth TMT centered marrow edema and joint effusion. 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  no pvd at rest in either leg  CESAR PLUMMER  Age:    64    Gender:     F  MRN:    [id number]  :    1955      BSA:  Exam Date:     2019 16:21  Room #:     Inpatient  Priority:     Routine  Ht (in):             Wt (lb):  Ordering Physician:        LANDON TOLBERT  Referring Physician:       LANDON TOLBERT  Sonographer:               Artemio Galan RVT  Study Type:                Complete Bilateral  Technical Quality:         Adequate  Indications:     Ulcer of lower extremity  CPT Codes:       78969  ICD Codes:       707.1  History:         Ulceration of lateral aspect of right foot X 3 years  Limitations:                 RIGHT  Waveform            Systolic BPs (mmHg)                             123           Brachial  Triphasic                                Common Femoral  Triphasic                  155           Posterior Tibial  Triphasic                  153           Dorsalis Pedis                                           Peroneal                             1.17          TOSHIA                                           TBI                       LEFT  Waveform        Systolic BPs (mmHg)                             132           Brachial  Triphasic                                Common Femoral  Triphasic                  152           Posterior Tibial  Triphasic                  145           Dorsalis Pedis                                           Peroneal                             1.15          TOSHIA                                           TBI  Findings  Bilateral.  Doppler waveform of the common femoral artery is of high amplitude and  triphasic.  Doppler waveforms at the ankle are brisk and triphasic.   Ankle-brachial index is normal.  Additional testing was not performed in accordance with lower extremity  arterial evaluation protocol.  Matty Gallo MD  (Electronically Signed)  Final Date:      20 May 2019 05:50      Micro:  Results     Procedure Component Value Units Date/Time    GRAM STAIN [[id number]] Collected:  05/20/19 1539    Order Status:  Completed Specimen:  Tissue Updated:  05/21/19 1011     Significant Indicator .     Source TISS     Site Right Lateral Foot Wound     Gram Stain Result Few WBCs.  Few Gram positive cocci.      Narrative:       Surgery Specimen    Anaerobic Culture [[id number]] Collected:  05/20/19 1539    Order Status:  Completed Specimen:  Other Updated:  05/20/19 2035    CULTURE TISSUE W/ GRM STAIN [[id number]] Collected:  05/20/19 1539    Order Status:  Completed Specimen:  Other Updated:  05/20/19 2035    CULTURE WOUND W/ GRAM STAIN [[id number]]  (Abnormal)  (Susceptibility) Collected:  05/17/19 2145    Order Status:  Completed Specimen:  Wound Updated:  05/20/19 1031     Significant Indicator POS (POS)     Source WND     Site Right Foot     Culture Result Moderate growth mixed skin shantel predominately Diphtheroids (A)     Gram Stain Result Few WBCs.  Rare Gram positive rods.       Culture Result Staphylococcus aureus  Light growth   (A)    Culture & Susceptibility     STAPHYLOCOCCUS AUREUS     Antibiotic Sensitivity Microscan Unit Status    Ampicillin/sulbactam Sensitive <=8/4 mcg/mL Final    Method: JAMARI    Clindamycin Sensitive <=0.5 mcg/mL Final    Method: JAMARI    Daptomycin Sensitive <=0.5 mcg/mL Final    Method: JAMARI    Erythromycin Sensitive <=0.5 mcg/mL Final    Method: JAMARI    Moxifloxacin Sensitive <=0.5 mcg/mL Final    Method: JAMARI    Oxacillin Sensitive <=0.25 mcg/mL Final    Method: JAMARI    Penicillin Resistant >8 mcg/mL Final    Method: JAMARI    Tetracycline Sensitive <=4 mcg/mL Final    Method: JAMARI    Trimeth/Sulfa Sensitive <=0.5/9.5 mcg/mL Final    Method: JAMARI    Vancomycin  "Sensitive 1 mcg/mL Final    Method: JAMARI                       BLOOD CULTURE [[id number]] Collected:  05/18/19 0318    Order Status:  Completed Specimen:  Blood from Peripheral Updated:  05/19/19 0846     Significant Indicator NEG     Source BLD     Site PERIPHERAL     Culture Result No Growth  Note: Blood cultures are incubated for 5 days and  are monitored continuously.Positive blood cultures  are called to the RN and reported as soon as  they are identified.      Narrative:       Per Hospital Policy: Only change Specimen Src: to \"Line\" if  specified by physician order.  Left Hand    BLOOD CULTURE [[id number]] Collected:  05/18/19 0318    Order Status:  Completed Specimen:  Blood from Peripheral Updated:  05/19/19 0846     Significant Indicator NEG     Source BLD     Site PERIPHERAL     Culture Result No Growth  Note: Blood cultures are incubated for 5 days and  are monitored continuously.Positive blood cultures  are called to the RN and reported as soon as  they are identified.      Narrative:       Per Hospital Policy: Only change Specimen Src: to \"Line\" if  specified by physician order.  Right Hand    GRAM STAIN [[id number]] Collected:  05/17/19 2145    Order Status:  Completed Specimen:  Wound Updated:  05/18/19 0855     Significant Indicator .     Source WND     Site Right Foot     Gram Stain Result Few WBCs.  Rare Gram positive rods.      CULTURE WOUND W/ GRAM STAIN [[id number]]     Order Status:  No result Specimen:  Wound from Right Foot           Assessment:  Active Hospital Problems    Diagnosis   • *Skin ulcer of right foot with fat layer exposed (HCC) [L97.512]   • Monoparesis of lower extremity (HCC) [G83.10]   • HTN (hypertension) [I10]   • Hyperglycemia [R73.9]   • Weakness of right leg [R29.898]     ASSESSMENT/PLAN:      Joan Olivares is a 64 y.o.  admitted 5/17/2019. Pt has a past medical history of trauma to right leg resulting in right leg paralysis.  She has a chronic wound on the right lateral " foot.  She changes wound dressings herself and often has clear drainage.  However lately she noticed the drainage changed to pus, the foot had new swelling and she recently expressed a large amount of thick fluid with malodor.  She has had no new fevers or chills.  No antibiotics prior to admission.       Hospital Course:   Patient has been afebrile.  No leukocytosis.  X-ray on 5/17 with lateral skin ulceration associate with soft tissue gas compatible with cellulitis.  She was started on vancomycin and cefepime on 5/17.  Today she states that the swelling in the foot has decreased somewhat.  I expressed pus during exam.      Interval 24 hour assessment:    Events, OR yesterday  AF, O2 RA,    Labs reviewed  Micro reviewed    Pt continued on cefazolin.  She is recuperating well from her surgery yesterday.       Right foot infection, chronic ulcer secondary to right leg paralysis  -New swelling, malodor and purulent fluid  -Wound cultures taken, GPRs  -MRI foot on 5/18- lateral forefoot cellulitis and new 7 cm draining abscess which overlies the fifth TMT.  No osteomyelitis or septic arthropathy.  - ABIs R 1.17, L 1.15- normal   -Wound culture results 5/17 +MSSA & diptheroids   -OR with Dr. Gutierrez on 5/20, right foot I&D, tendo Achilles lengthening, ankle manipulation joint and flexor release, per op note no true abscess was found, necrotic tissue was encountered and excised, biopsy and cultures taken  -OR cultures 5/20 -GPC's    --- Continue cefazolin for MSSA, will stop flagyl   --- Final antibiotic choice and duration to be determined based on surgical findings and cultures.         Mouth and tongue irritation, chronic  -Agree with nystatin swish and swallow to see if helps      Right leg paralysis  - secondary to traumatic event several years ago     Antibiotic allergy, penicillin described as rash swelling  -Tolerating cephalosporins        Plan of care discussed with nurse. Will continue to follow     Allison  AYDE Razo M.D.

## 2019-05-21 NOTE — CARE PLAN
Problem: Bowel/Gastric:  Goal: Normal bowel function is maintained or improved  Outcome: PROGRESSING AS EXPECTED  Lat BM was last Wednesday per pt. Miralax given the day before. No BM so far. Active bowel sound. Pt want to keep trying for a coule of more hours before milk and mag given.     Problem: Pain Management  Goal: Pain level will decrease to patient's comfort goal  Outcome: PROGRESSING AS EXPECTED  PRN oxycodone, tylenol, and morphine given rib pain and headache per MAR so far.

## 2019-05-22 ENCOUNTER — APPOINTMENT (OUTPATIENT)
Dept: RADIOLOGY | Facility: MEDICAL CENTER | Age: 64
DRG: 571 | End: 2019-05-22
Attending: HOSPITALIST
Payer: MEDICARE

## 2019-05-22 ENCOUNTER — APPOINTMENT (OUTPATIENT)
Dept: RADIOLOGY | Facility: MEDICAL CENTER | Age: 64
DRG: 571 | End: 2019-05-22
Attending: NURSE PRACTITIONER
Payer: MEDICARE

## 2019-05-22 PROCEDURE — 99232 SBSQ HOSP IP/OBS MODERATE 35: CPT | Performed by: HOSPITALIST

## 2019-05-22 PROCEDURE — 700111 HCHG RX REV CODE 636 W/ 250 OVERRIDE (IP): Performed by: FAMILY MEDICINE

## 2019-05-22 PROCEDURE — 99232 SBSQ HOSP IP/OBS MODERATE 35: CPT | Performed by: INTERNAL MEDICINE

## 2019-05-22 PROCEDURE — 770006 HCHG ROOM/CARE - MED/SURG/GYN SEMI*

## 2019-05-22 PROCEDURE — 700111 HCHG RX REV CODE 636 W/ 250 OVERRIDE (IP): Performed by: INTERNAL MEDICINE

## 2019-05-22 PROCEDURE — A9270 NON-COVERED ITEM OR SERVICE: HCPCS | Performed by: INTERNAL MEDICINE

## 2019-05-22 PROCEDURE — 700102 HCHG RX REV CODE 250 W/ 637 OVERRIDE(OP): Performed by: INTERNAL MEDICINE

## 2019-05-22 PROCEDURE — A9270 NON-COVERED ITEM OR SERVICE: HCPCS | Performed by: HOSPITALIST

## 2019-05-22 PROCEDURE — 05HY33Z INSERTION OF INFUSION DEVICE INTO UPPER VEIN, PERCUTANEOUS APPROACH: ICD-10-PCS | Performed by: HOSPITALIST

## 2019-05-22 PROCEDURE — 700102 HCHG RX REV CODE 250 W/ 637 OVERRIDE(OP): Performed by: HOSPITALIST

## 2019-05-22 PROCEDURE — 700102 HCHG RX REV CODE 250 W/ 637 OVERRIDE(OP): Performed by: FAMILY MEDICINE

## 2019-05-22 PROCEDURE — 97161 PT EVAL LOW COMPLEX 20 MIN: CPT

## 2019-05-22 PROCEDURE — A9270 NON-COVERED ITEM OR SERVICE: HCPCS | Performed by: FAMILY MEDICINE

## 2019-05-22 PROCEDURE — C1751 CATH, INF, PER/CENT/MIDLINE: HCPCS

## 2019-05-22 RX ORDER — TRAZODONE HYDROCHLORIDE 50 MG/1
50 TABLET ORAL
Status: DISCONTINUED | OUTPATIENT
Start: 2019-05-22 | End: 2019-05-26

## 2019-05-22 RX ADMIN — CEFAZOLIN SODIUM 2 G: 2 INJECTION, SOLUTION INTRAVENOUS at 01:35

## 2019-05-22 RX ADMIN — CARVEDILOL 12.5 MG: 12.5 TABLET, FILM COATED ORAL at 18:11

## 2019-05-22 RX ADMIN — MORPHINE SULFATE 2 MG: 4 INJECTION INTRAVENOUS at 18:11

## 2019-05-22 RX ADMIN — OXYCODONE HYDROCHLORIDE 10 MG: 5 TABLET ORAL at 00:58

## 2019-05-22 RX ADMIN — NYSTATIN 500000 UNITS: 100000 SUSPENSION ORAL at 10:49

## 2019-05-22 RX ADMIN — ACETAMINOPHEN 650 MG: 325 TABLET, FILM COATED ORAL at 00:58

## 2019-05-22 RX ADMIN — NYSTATIN 500000 UNITS: 100000 SUSPENSION ORAL at 15:10

## 2019-05-22 RX ADMIN — OXYCODONE HYDROCHLORIDE 10 MG: 5 TABLET ORAL at 08:19

## 2019-05-22 RX ADMIN — MORPHINE SULFATE 2 MG: 4 INJECTION INTRAVENOUS at 23:36

## 2019-05-22 RX ADMIN — NYSTATIN 500000 UNITS: 100000 SUSPENSION ORAL at 21:29

## 2019-05-22 RX ADMIN — CEFAZOLIN SODIUM 2 G: 2 INJECTION, SOLUTION INTRAVENOUS at 10:49

## 2019-05-22 RX ADMIN — NYSTATIN 500000 UNITS: 100000 SUSPENSION ORAL at 18:11

## 2019-05-22 RX ADMIN — ENOXAPARIN SODIUM 40 MG: 100 INJECTION SUBCUTANEOUS at 05:14

## 2019-05-22 RX ADMIN — MORPHINE SULFATE 2 MG: 4 INJECTION INTRAVENOUS at 10:49

## 2019-05-22 RX ADMIN — TRAZODONE HYDROCHLORIDE 50 MG: 50 TABLET ORAL at 00:59

## 2019-05-22 RX ADMIN — OXYCODONE HYDROCHLORIDE 10 MG: 5 TABLET ORAL at 19:43

## 2019-05-22 RX ADMIN — CEFAZOLIN SODIUM 2 G: 2 INJECTION, SOLUTION INTRAVENOUS at 18:10

## 2019-05-22 RX ADMIN — OXYCODONE HYDROCHLORIDE 10 MG: 5 TABLET ORAL at 15:01

## 2019-05-22 RX ADMIN — ACETAMINOPHEN 650 MG: 325 TABLET, FILM COATED ORAL at 21:28

## 2019-05-22 RX ADMIN — CARVEDILOL 12.5 MG: 12.5 TABLET, FILM COATED ORAL at 08:19

## 2019-05-22 RX ADMIN — MORPHINE SULFATE 2 MG: 4 INJECTION INTRAVENOUS at 05:14

## 2019-05-22 RX ADMIN — TRAZODONE HYDROCHLORIDE 50 MG: 50 TABLET ORAL at 21:29

## 2019-05-22 ASSESSMENT — ENCOUNTER SYMPTOMS
DIARRHEA: 0
ABDOMINAL PAIN: 0
COUGH: 0
WEIGHT LOSS: 0
VOMITING: 0
CHILLS: 0
NAUSEA: 0
DIAPHORESIS: 0
FEVER: 0
DIZZINESS: 0
HEARTBURN: 0
MYALGIAS: 1

## 2019-05-22 ASSESSMENT — COGNITIVE AND FUNCTIONAL STATUS - GENERAL
TURNING FROM BACK TO SIDE WHILE IN FLAT BAD: A LITTLE
MOVING TO AND FROM BED TO CHAIR: A LITTLE
WALKING IN HOSPITAL ROOM: A LITTLE
SUGGESTED CMS G CODE MODIFIER MOBILITY: CK
CLIMB 3 TO 5 STEPS WITH RAILING: TOTAL
MOBILITY SCORE: 17
MOVING FROM LYING ON BACK TO SITTING ON SIDE OF FLAT BED: A LITTLE

## 2019-05-22 ASSESSMENT — GAIT ASSESSMENTS: GAIT LEVEL OF ASSIST: UNABLE TO PARTICIPATE

## 2019-05-22 NOTE — PROGRESS NOTES
[facility] Medicine Daily Progress Note    Date of Service  5/22/2019    Chief Complaint  64 y.o. female admitted 5/17/2019 with Foot ulcer    Hospital Course  Admitted with chronic right foot ulcer with recent increased and foul-smelling wound drainage.    Interval Problem Update  S/P I/D 5/20 w Ortho  OR Cultures: GPC; prelim --- cont Ancef  Hyperglycemia --- A1C 5.7  Mild R knee pain from weight of boot  VSS and WNL  Nystatin ordered for chronic mouth irritation    Consultants/Specialty  LPS  ID    Code Status  Full    Disposition  LTAC referral    Review of Systems  Review of Systems   Constitutional: Negative for chills, diaphoresis, fever, malaise/fatigue and weight loss.   Respiratory: Negative for cough.    Cardiovascular: Negative for chest pain.   Gastrointestinal: Negative for abdominal pain, diarrhea, heartburn, nausea and vomiting.   Genitourinary: Negative for dysuria, frequency and urgency.   Musculoskeletal: Positive for joint pain and myalgias.   Skin: Negative for itching and rash.   Neurological: Negative for dizziness.   All other systems reviewed and are negative.       Physical Exam  Temp:  [36.3 °C (97.4 °F)-36.6 °C (97.8 °F)] 36.4 °C (97.5 °F)  Pulse:  [80-96] 80  Resp:  [17-18] 17  BP: (133-164)/(66-86) 135/72  SpO2:  [92 %-96 %] 92 %    Physical Exam   Constitutional: She is oriented to person, place, and time. Vital signs are normal. She appears well-developed and well-nourished. No distress.   obese   HENT:   Head: Normocephalic and atraumatic.   Eyes: Pupils are equal, round, and reactive to light. EOM are normal.   Neck: Neck supple.   Cardiovascular: Normal rate, regular rhythm and normal heart sounds.    Pulmonary/Chest: Effort normal and breath sounds normal. No respiratory distress. She has no wheezes. She has no rales.   Abdominal: Soft. She exhibits no distension. There is no tenderness.   Musculoskeletal:   RLE CDI w ACE/Boot   Neurological: She is alert and oriented to person,  place, and time. No cranial nerve deficit.   Skin: Skin is warm and dry.   Nursing note and vitals reviewed.      Fluids    Intake/Output Summary (Last 24 hours) at 05/22/19 0779  Last data filed at 05/22/19 0056   Gross per 24 hour   Intake              900 ml   Output                0 ml   Net              900 ml       Laboratory  Recent Labs      05/20/19   1033  05/21/19   0213   WBC  7.2  9.9   RBC  4.49  4.68   HEMOGLOBIN  12.2  12.9   HEMATOCRIT  39.2  40.3   MCV  87.3  86.1   MCH  27.2  27.6   MCHC  31.1*  32.0*   RDW  44.0  42.6   PLATELETCT  305  328   MPV  9.8  9.7     Recent Labs      05/20/19   1033  05/21/19   0213   SODIUM  138  136   POTASSIUM  4.0  4.0   CHLORIDE  106  105   CO2  24  22   GLUCOSE  110*  145*   BUN  11  12   CREATININE  0.77  0.78   CALCIUM  9.1  9.5                   Imaging  US-JENNIFER SINGLE LEVEL BILAT   Final Result      MR-FOOT-WITH & W/O RIGHT   Final Result      Lateral forefoot cellulitis with worsening skin ulceration and a new 4 cm draining abscess which overlies the fifth TMT. No osteomyelitis or septic arthropathy.      Resolution of fourth and fifth TMT centered marrow edema and joint effusion.      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 Underlying abscess not excluded      No acute bony destruction is detected. If there is high clinical concern for osteomyelitis, MRI with contrast could be performed      Severe osteopenia           Assessment/Plan  * Skin ulcer of right foot with fat layer exposed (HCC)- (present on admission)   Assessment & Plan    IV Ancef  ID following  Stop date 6/17 5/22 LTAC referral, Midline ordered  Pain  control  Wound care  Follow cultures         Monoparesis of lower extremity (HCC)- (present on admission)   Assessment & Plan    LPS following     HTN (hypertension)- (present on admission)   Assessment & Plan    Coreg     Obesity   Assessment & Plan    Outpatient weight loss recommended          VTE prophylaxis: Lovenox    No changes to PE/ROS, AP except as noted

## 2019-05-22 NOTE — PROGRESS NOTES
Assumed care of pt at shift change; No complaints at this time; PT is calm/cooperative and AOx4; Fall precautions in place and pt agrees to call before she gets up to the commode;

## 2019-05-22 NOTE — CARE PLAN
Problem: Bowel/Gastric:  Goal: Normal bowel function is maintained or improved  Outcome: PROGRESSING AS EXPECTED  Three BM during the day.     Problem: Pain Management  Goal: Pain level will decrease to patient's comfort goal  Outcome: PROGRESSING AS EXPECTED  PRN morphine given for pain 9/10 on the right foot, head, and knee.

## 2019-05-22 NOTE — CARE PLAN
Problem: Pain Management  Goal: Pain level will decrease to patient's comfort goal  Outcome: PROGRESSING AS EXPECTED   Educated pt about combination of oxycodone and morphine instead of morphine only. Pt verbalized understanding.

## 2019-05-22 NOTE — PROGRESS NOTES
Infectious Disease Progress Note    Author: Allison Razo M.D. Date & Time of service: 2019  9:45 AM       Chief Complaint:  Right foot infection      Interval History:   Interval 24 hours of Vitals/Labs/Micro,and imaging results reviewed as available. See assessment.    Interval 24 hours of Vitals/Labs/Micro,and imaging results reviewed as available.    Interval 24 hours of Vitals/Labs/Micro,and imaging results reviewed as available. See assessment.    Interval 24 hours of Vitals/Labs/Micro,and imaging results reviewed as available.     Review of Systems:  Review of Systems   Constitutional: Negative for chills and fever.   Gastrointestinal: Negative for abdominal pain, diarrhea, nausea and vomiting.   Musculoskeletal: Positive for joint pain and myalgias.       Hemodynamics:  Temp (24hrs), Av.4 °C (97.5 °F), Min:36.2 °C (97.2 °F), Max:36.6 °C (97.8 °F)  Temperature: 36.2 °C (97.2 °F)  Pulse  Av.2  Min: 62  Max: 107   Blood Pressure : 154/79       Physical Exam:  Physical Exam   Constitutional: She is oriented to person, place, and time. She appears well-developed and well-nourished.   HENT:   Head: Normocephalic and atraumatic.   Eyes: Pupils are equal, round, and reactive to light. Conjunctivae and EOM are normal.   Cardiovascular: Normal rate, regular rhythm and normal heart sounds.    Pulmonary/Chest: Effort normal and breath sounds normal.   Abdominal: Soft. Bowel sounds are normal.   Musculoskeletal: She exhibits tenderness and deformity.   R foot with edema, inward torsion, stitches in place, no erythema, bloody drainage on bandaging    Neurological: She is alert and oriented to person, place, and time.   Psychiatric: She has a normal mood and affect. Her behavior is normal.       Meds:    Current Facility-Administered Medications:   •  traZODone  •  ceFAZolin  •  morphine injection  •  nystatin  •  oxyCODONE immediate-release  •  carvedilol  •  senna-docusate **AND**  polyethylene glycol/lytes **AND** magnesium hydroxide **AND** bisacodyl  •  enoxaparin  •  acetaminophen  •  enalaprilat  •  ondansetron  •  ondansetron    Labs:  Recent Labs      05/20/19   1033  05/21/19   0213   WBC  7.2  9.9   RBC  4.49  4.68   HEMOGLOBIN  12.2  12.9   HEMATOCRIT  39.2  40.3   MCV  87.3  86.1   MCH  27.2  27.6   RDW  44.0  42.6   PLATELETCT  305  328   MPV  9.8  9.7   NEUTSPOLYS  62.70  85.20*   LYMPHOCYTES  25.40  11.40*   MONOCYTES  7.80  3.00   EOSINOPHILS  3.20  0.00   BASOPHILS  0.60  0.00     Recent Labs      05/20/19   1033  05/21/19   0213   SODIUM  138  136   POTASSIUM  4.0  4.0   CHLORIDE  106  105   CO2  24  22   GLUCOSE  110*  145*   BUN  11  12     Recent Labs      05/20/19   1033  05/21/19   0213   CREATININE  0.77  0.78       Imaging:  Dx-foot-complete 3+ Right    Result Date: 5/17/2019 5/17/2019 5:09 PM HISTORY/REASON FOR EXAM: Nonhealing lateral wound for the years, recent significant worsening. Paralysis 3 years ago TECHNIQUE/EXAM DESCRIPTION AND NUMBER OF VIEWS: 3 nonweightbearing views of the RIGHT foot. COMPARISON:  None. FINDINGS: With severe lateral soft tissue swelling with skin ulceration. There is no underlying bony destruction confirmed. Severe osteopenia Metallic foreign body overlies the plantar medial great toe distally. There is solid periosteal reaction involving the fifth metatarsal shaft medially. No periostitis to suggest active inflammation. No acute displaced fracture. No subluxation. Mild first metatarsal-phalangeal joint space narrowing Cavus configuration of the midfoot     Lateral skin ulceration with associated soft tissue gas compatible with cellulitis. Underlying abscess not excluded No acute bony destruction is detected. If there is high clinical concern for osteomyelitis, MRI with contrast could be performed Severe osteopenia    Mr-foot-with & W/o Right    Result Date: 5/19/2019 5/18/2019 7:54 AM HISTORY/REASON FOR EXAM:  Osteomyelitis suspected,  foot swelling, diabetic. TECHNIQUE/EXAM DESCRIPTION: MRI of the RIGHT foot with and without contrast. Using a MOMENTFACE SROa 1.5 Mary MRI scanner, T1 axial, sagittal, and coronal, fast spin-echo T2 fat-suppressed axial and coronal, fast inversion recovery sagittal, and T1 fat suppressed axial and sagittal post intravenous contrast images were obtained. A total of 10 mL Gadavist given. COMPARISON:  6/20/2017 MRI, radiographs May 17. FINDINGS: There is lateral forefoot soft tissue swelling with ulceration overlying the fifth TMT articulation, worse than on comparison. The cutaneous ulceration does not extend all the way to the bony margins. There is underlying 42 x 16 x 34 mm T2 hyperintensity, T1 hypointensity with central absent enhancement and a draining sinus to a site of ulceration. This indicates draining abscess. There has been resolution of the fourth and fifth TMT joint effusion but there is still some spurring. No bony destruction. The remainder of the TMT articulations are normal There has been near resolution of subcutaneous fatty replacement overlying the fifth metatarsal head with low T1 and T2 signal. Resolved enhancement. The remainder the bony structures are notable for unchanged multiple bone infarcts, seen best in the calcaneus and talus. These have no associated articular surface collapse There is similar artifact involving the great toe compatible with metallic foreign body seen on x-ray No abnormal joint effusion is noted There is worsening severe fatty atrophy of the intrinsic foot musculature     Lateral forefoot cellulitis with worsening skin ulceration and a new 4 cm draining abscess which overlies the fifth TMT. No osteomyelitis or septic arthropathy. Resolution of fourth and fifth TMT centered marrow edema and joint effusion. 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  no pvd at rest in either leg  CESAR PLUMMER  Age:    64    Gender:     F  MRN:    [id number]  :    1955      BSA:  Exam Date:     2019 16:21  Room #:     Inpatient  Priority:     Routine  Ht (in):             Wt (lb):  Ordering Physician:        LANDON TOLBERT  Referring Physician:       LANDON TOLBERT  Sonographer:               Artemio Galan RVT  Study Type:                Complete Bilateral  Technical Quality:         Adequate  Indications:     Ulcer of lower extremity  CPT Codes:       73022  ICD Codes:       707.1  History:         Ulceration of lateral aspect of right foot X 3 years  Limitations:                 RIGHT  Waveform            Systolic BPs (mmHg)                             123           Brachial  Triphasic                                Common Femoral  Triphasic                  155           Posterior Tibial  Triphasic                  153           Dorsalis Pedis                                           Peroneal                             1.17          TOSHIA                                           TBI                       LEFT  Waveform        Systolic BPs (mmHg)                             132           Brachial  Triphasic                                Common Femoral  Triphasic                  152           Posterior Tibial  Triphasic                  145           Dorsalis Pedis                                           Peroneal                             1.15          TOSHIA                                           TBI  Findings  Bilateral.  Doppler waveform of the common femoral artery is of high amplitude and  triphasic.  Doppler waveforms at the ankle are brisk and triphasic.  Ankle-brachial index is normal.  Additional testing was not performed in  accordance with lower extremity  arterial evaluation protocol.  Matty Gallo MD  (Electronically Signed)  Final Date:      20 May 2019 05:50      Micro:  Results     Procedure Component Value Units Date/Time    CULTURE TISSUE W/ GRM STAIN [[id number]]  (Abnormal) Collected:  05/20/19 1539    Order Status:  Completed Specimen:  Tissue Updated:  05/21/19 1357     Significant Indicator NEG     Source TISS     Site Right Lateral Foot Wound     Culture Result No growth at 24 hours.     Gram Stain Result Few WBCs.  Few Gram positive cocci.   (A)    Narrative:       Surgery Specimen    Anaerobic Culture [[id number]] Collected:  05/20/19 1539    Order Status:  Completed Specimen:  Tissue Updated:  05/21/19 1357     Significant Indicator NEG     Source TISS     Site Right Lateral Foot Wound     Culture Result Culture in progress.    Narrative:       Surgery Specimen    GRAM STAIN [[id number]] Collected:  05/20/19 1539    Order Status:  Completed Specimen:  Tissue Updated:  05/21/19 1011     Significant Indicator .     Source TISS     Site Right Lateral Foot Wound     Gram Stain Result Few WBCs.  Few Gram positive cocci.      Narrative:       Surgery Specimen    CULTURE WOUND W/ GRAM STAIN [[id number]]  (Abnormal)  (Susceptibility) Collected:  05/17/19 2145    Order Status:  Completed Specimen:  Wound Updated:  05/20/19 1031     Significant Indicator POS (POS)     Source WND     Site Right Foot     Culture Result Moderate growth mixed skin shantel predominately Diphtheroids (A)     Gram Stain Result Few WBCs.  Rare Gram positive rods.       Culture Result Staphylococcus aureus  Light growth   (A)    Culture & Susceptibility     STAPHYLOCOCCUS AUREUS     Antibiotic Sensitivity Microscan Unit Status    Ampicillin/sulbactam Sensitive <=8/4 mcg/mL Final    Method: JAMARI    Clindamycin Sensitive <=0.5 mcg/mL Final    Method: JAMARI    Daptomycin Sensitive <=0.5 mcg/mL Final    Method: JAMARI    Erythromycin Sensitive <=0.5 mcg/mL Final     "Method: JAMARI    Moxifloxacin Sensitive <=0.5 mcg/mL Final    Method: JAMARI    Oxacillin Sensitive <=0.25 mcg/mL Final    Method: JAMARI    Penicillin Resistant >8 mcg/mL Final    Method: JAMARI    Tetracycline Sensitive <=4 mcg/mL Final    Method: JAMARI    Trimeth/Sulfa Sensitive <=0.5/9.5 mcg/mL Final    Method: JAMARI    Vancomycin Sensitive 1 mcg/mL Final    Method: JAMARI                       BLOOD CULTURE [[id number]] Collected:  05/18/19 0318    Order Status:  Completed Specimen:  Blood from Peripheral Updated:  05/19/19 0846     Significant Indicator NEG     Source BLD     Site PERIPHERAL     Culture Result No Growth  Note: Blood cultures are incubated for 5 days and  are monitored continuously.Positive blood cultures  are called to the RN and reported as soon as  they are identified.      Narrative:       Per Hospital Policy: Only change Specimen Src: to \"Line\" if  specified by physician order.  Left Hand    BLOOD CULTURE [[id number]] Collected:  05/18/19 0318    Order Status:  Completed Specimen:  Blood from Peripheral Updated:  05/19/19 0846     Significant Indicator NEG     Source BLD     Site PERIPHERAL     Culture Result No Growth  Note: Blood cultures are incubated for 5 days and  are monitored continuously.Positive blood cultures  are called to the RN and reported as soon as  they are identified.      Narrative:       Per Hospital Policy: Only change Specimen Src: to \"Line\" if  specified by physician order.  Right Hand    GRAM STAIN [[id number]] Collected:  05/17/19 2145    Order Status:  Completed Specimen:  Wound Updated:  05/18/19 0855     Significant Indicator .     Source WND     Site Right Foot     Gram Stain Result Few WBCs.  Rare Gram positive rods.      CULTURE WOUND W/ GRAM STAIN [[id number]]     Order Status:  No result Specimen:  Wound from Right Foot           Assessment:  Active Hospital Problems    Diagnosis   • *Skin ulcer of right foot with fat layer exposed ([facility]) [L97.512]   • Monoparesis of lower " "extremity (HCC) [G83.10]   • HTN (hypertension) [I10]   • Obesity [E66.9]   • Hyperglycemia [R73.9]   • Weakness of right leg [R29.898]        ASSESSMENT/PLAN:      Joan Olivares is a 64 y.o.  admitted 5/17/2019. Pt has a past medical history of trauma to right leg resulting in right leg paralysis.  She has a chronic wound on the right lateral foot.  She changes wound dressings herself and often has clear drainage.  However lately she noticed the drainage changed to pus, the foot had new swelling and she recently expressed a large amount of thick fluid with malodor.  She has had no new fevers or chills.  No antibiotics prior to admission.       Hospital Course:   Patient has been afebrile.  No leukocytosis.  X-ray on 5/17 with lateral skin ulceration associate with soft tissue gas compatible with cellulitis.  She was started on vancomycin and cefepime on 5/17.  Today she states that the swelling in the foot has decreased somewhat.  I expressed pus during exam.      Interval 24 hour assessment:    AF, O2 RA,    Labs reviewed  Micro reviewed    Pt continued on cefazolin.  She is tolerating well.  She has some pain in foot, edema is improved.     Right foot infection, chronic ulcer secondary to right leg paralysis  -New swelling, malodor and purulent fluid  -Wound cultures taken, GPRs  -MRI foot on 5/18- lateral forefoot cellulitis and new 7 cm draining abscess which overlies the fifth TMT.  No osteomyelitis or septic arthropathy.  - ABIs R 1.17, L 1.15- normal   -Wound culture results 5/17 +MSSA & diptheroids   -OR with Dr. Gutierrez on 5/20, right foot I&D, tendo Achilles lengthening, ankle manipulation joint and flexor release, per op note no true abscess was found, necrotic tissue was encountered and excised, biopsy and cultures taken  - Pathology with \"Fragments of benign fibrous connective tissue with focal acute         inflammation, chronic inflammation and necrosis\"  -OR cultures 5/20 -GPC's     --- " Continue cefazolin for MSSA - will plan on 4 weeks of IV antibiotics from OR (end 6/17/19) and then an additional 2 weeks of oral antibiotics- cefazolin 6 grams per day continuous infusion   --- F/up OR cultures  --- OK to place Midline       Mouth and tongue irritation, chronic  -Agree with nystatin swish and swallow to see if helps      Right leg paralysis  - secondary to traumatic event several years ago     Antibiotic allergy, penicillin described as rash swelling  -Tolerating cephalosporins        Plan of care discussed Dr. Dailey. Will continue to follow     Allison Razo M.D.

## 2019-05-22 NOTE — PROGRESS NOTES
Assumed care of pt at shift change. A/Ox4, discussed plan of care. Pt on room air. PRAFO boot in place. Reajust and kept at 90 degrees each encounter. Denied nausea this shift. Oxycodone, tylenol and morphine has been given for headache and foot pain and knee pain so far per request. Pt can't sleep by 0030 this am. Paged On call physician Dr Wellington . New order received. 50mg trazodone every bed time received and administered.   All needs met at this time. Bed in lowest position, treaded socks on, personal belongings and call light within reach, instructed to call for any assistance, hourly rounding in place.

## 2019-05-22 NOTE — PROGRESS NOTES
" LIMB PRESERVATION SERVICE      HPI:  64 y.o. female, with a past medical history that includes Paresthesia, HTN. 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  This wound is chronic since 2016 and has had multiple debridements and courses of antibiotics. She was receiving home health for wound care regularly until Dec 2018 when pt reported the agency stopped coming. Ulcer worsened and became infected within last week. Went to ED for further care.     SURGERY DATE: 5/20/19    PROCEDURE:   1.  Right percutaneous tendo-Achilles lengthening.  2.  Right ankle manipulation.  3.  Right open medial release including subtalar joint, talonavicular joint,   and releasing the posterior tibialis tendon, flexor digitorum longus, and   flexor hallucis longus.  4.  Right lateral foot irrigation and debridement, dorsal and lateral   compartments.    5/21: Patient denies fevers, chills, nausea, vomiting.  Pain well controlled. Previous AFO from [facility]. Wearing PRAFO in bed.    /66   Pulse 96   Temp 36.6 °C (97.8 °F) (Temporal)   Resp 18   Ht 1.676 m (5' 6\")   Wt 98.1 kg (216 lb 4.3 oz)   LMP 04/09/1977   SpO2 93%   Breastfeeding? No   BMI 34.91 kg/m²     SURGICAL SITE ASSESSMENT:      Foot warm  Surgical dressing CDI   insensate below R knee  Cavovarus deformity    INFECTION MANAGEMENT:  WBC: 9.9  Wound culture results:   Results     Procedure Component Value Units Date/Time    CULTURE TISSUE W/ GRM STAIN [[id number]]  (Abnormal) Collected:  05/20/19 1539    Order Status:  Completed Specimen:  Tissue Updated:  05/21/19 1352     Significant Indicator NEG     Source TISS     Site Right Lateral Foot Wound     Culture Result No growth at 24 hours.     Gram Stain Result Few WBCs.  Few Gram positive cocci.   (A)    Narrative:       Surgery Specimen    Anaerobic Culture [[id number]] Collected:  05/20/19 1539    Order Status:  Completed Specimen:  Tissue Updated:  05/21/19 1357     " Significant Indicator NEG     Source TISS     Site Right Lateral Foot Wound     Culture Result Culture in progress.    Narrative:       Surgery Specimen    GRAM STAIN [[id number]] Collected:  05/20/19 1539    Order Status:  Completed Specimen:  Tissue Updated:  05/21/19 1011     Significant Indicator .     Source TISS     Site Right Lateral Foot Wound     Gram Stain Result Few WBCs.  Few Gram positive cocci.      Narrative:       Surgery Specimen    CULTURE WOUND W/ GRAM STAIN [[id number]]  (Abnormal)  (Susceptibility) Collected:  05/17/19 2145    Order Status:  Completed Specimen:  Wound Updated:  05/20/19 1031     Significant Indicator POS (POS)     Source WND     Site Right Foot     Culture Result Moderate growth mixed skin shanetl predominately Diphtheroids (A)     Gram Stain Result Few WBCs.  Rare Gram positive rods.       Culture Result Staphylococcus aureus  Light growth   (A)    Culture & Susceptibility     STAPHYLOCOCCUS AUREUS     Antibiotic Sensitivity Microscan Unit Status    Ampicillin/sulbactam Sensitive <=8/4 mcg/mL Final    Method: JAMARI    Clindamycin Sensitive <=0.5 mcg/mL Final    Method: JAMARI    Daptomycin Sensitive <=0.5 mcg/mL Final    Method: JAMARI    Erythromycin Sensitive <=0.5 mcg/mL Final    Method: JAMARI    Moxifloxacin Sensitive <=0.5 mcg/mL Final    Method: JAMARI    Oxacillin Sensitive <=0.25 mcg/mL Final    Method: JAMARI    Penicillin Resistant >8 mcg/mL Final    Method: JAMARI    Tetracycline Sensitive <=4 mcg/mL Final    Method: JAMARI    Trimeth/Sulfa Sensitive <=0.5/9.5 mcg/mL Final    Method: JAMARI    Vancomycin Sensitive 1 mcg/mL Final    Method: JAMARI                       BLOOD CULTURE [[id number]] Collected:  05/18/19 0318    Order Status:  Completed Specimen:  Blood from Peripheral Updated:  05/19/19 0846     Significant Indicator NEG     Source BLD     Site PERIPHERAL     Culture Result No Growth  Note: Blood cultures are incubated for 5 days and  are monitored continuously.Positive blood  "cultures  are called to the RN and reported as soon as  they are identified.      Narrative:       Per Hospital Policy: Only change Specimen Src: to \"Line\" if  specified by physician order.  Left Hand    BLOOD CULTURE [[id number]] Collected:  05/18/19 0318    Order Status:  Completed Specimen:  Blood from Peripheral Updated:  05/19/19 0846     Significant Indicator NEG     Source BLD     Site PERIPHERAL     Culture Result No Growth  Note: Blood cultures are incubated for 5 days and  are monitored continuously.Positive blood cultures  are called to the RN and reported as soon as  they are identified.      Narrative:       Per Hospital Policy: Only change Specimen Src: to \"Line\" if  specified by physician order.  Right Hand    GRAM STAIN [[id number]] Collected:  05/17/19 2145    Order Status:  Completed Specimen:  Wound Updated:  05/18/19 0855     Significant Indicator .     Source WND     Site Right Foot     Gram Stain Result Few WBCs.  Rare Gram positive rods.      CULTURE WOUND W/ GRAM STAIN [[id number]]     Order Status:  No result Specimen:  Wound from Right Foot                  PLAN:   POD # 1 s/p R JENNIFER, ankle manipulation, PT, FDL, FHL tendon releases, release of subtalar and talonavicular joints        Wound care: drsg change tomorrow POD # 2    Antibiotics: Per ID recommendation , pending OR cx and pathology    Weight Bearing Status: Transfer weight bearing    Offloading: PRAFO to be worn all of the time for 6 wks d/t tendon lengthening.  Pt previously recd AFO from [facility]. Contacted Janie Basurto  at [facility] regarding impressions for new AFO    PT Consult: Yes  Ordered        Plan to return to O.R.: No      DISCHARGE PLAN:    Disposition:  SNF vs  with OP wound care clinic with provider weekly. Pending OR cx, abx therapy    Follow-up: LPS rounds in Wound Clinic. sutures to be removed approximately 3 weeks post-op          Shyann Mayen, A.P.R.N.    If any questions or concerns, please call " [phone]

## 2019-05-22 NOTE — PROGRESS NOTES
" LIMB PRESERVATION SERVICE      HPI:  64 y.o. female, with a past medical history that includes Paresthesia, HTN. 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  This wound is chronic since 2016 and has had multiple debridements and courses of antibiotics. She was receiving home health for wound care regularly until Dec 2018 when pt reported the agency stopped coming. Ulcer worsened and became infected within last week. Went to ED for further care.     SURGERY DATE: 5/20/19    PROCEDURE:   1.  Right percutaneous tendo-Achilles lengthening.  2.  Right ankle manipulation.  3.  Right open medial release including subtalar joint, talonavicular joint,   and releasing the posterior tibialis tendon, flexor digitorum longus, and   flexor hallucis longus.  4.  Right lateral foot irrigation and debridement, dorsal and lateral   compartments.    5/21: Patient denies fevers, chills, nausea, vomiting.  Pain well controlled. Previous AFO from [facility]. Wearing PRAFO in bed.  5/22: Pain controlled.  Foot is rotating inward in PRAFO.  Medicare unfortunately will not cover a new AFO since it has been less than 5 years.  [facility] orthotist to discuss with patient her options.  /79   Pulse 88   Temp 36.2 °C (97.2 °F) (Temporal)   Resp 18   Ht 1.676 m (5' 6\")   Wt 98.1 kg (216 lb 4.3 oz)   LMP 04/09/1977   SpO2 93%   Breastfeeding? No   BMI 34.91 kg/m²     SURGICAL SITE ASSESSMENT:      Foot warm  +2 DP, PT to right foot  Edema +1 minimal    Incision to right medial ankle, 3 stab sites to Achilles,   Well approximated with sutures.  No erythema minimal edema, scant sanguinous drainage.  Mild ecchymosis to right medial ankle      Right lateral foot wound  Incision approximated with sutures  Scant sanguinous drainage  No erythema, moderate edema      insensate below R knee  Cavovarus deformity    See photos  Dressing care completed, cleaned incisions with NS and gauze, dried with gauze.  " Applied adhesive foam with Hypafix tape.  PRAFO boot applied.  RN to apply Tubigrip E for mild compression       INFECTION MANAGEMENT:  WBC: 9.9 on 5/21/2019  Pathology:  Right lateral foot wound:         Fragments of benign fibrous connective tissue with focal acute          inflammation, chronic inflammation and necrosis.    Wound culture results:   Results     Procedure Component Value Units Date/Time    CULTURE TISSUE W/ GRM STAIN [[id number]]  (Abnormal) Collected:  05/20/19 1539    Order Status:  Completed Specimen:  Tissue Updated:  05/22/19 1044     Significant Indicator POS (POS)     Source TISS     Site Right Lateral Foot Wound     Culture Result Growth noted after further incubation, see below for  organism identification.   (A)     Gram Stain Result Few WBCs.  Few Gram positive cocci.       Culture Result Diphtheroids  Rare growth   (A)    Narrative:       Surgery Specimen    Anaerobic Culture [[id number]] Collected:  05/20/19 1539    Order Status:  Completed Specimen:  Tissue Updated:  05/22/19 1044     Significant Indicator NEG     Source TISS     Site Right Lateral Foot Wound     Culture Result Culture in progress.    Narrative:       Surgery Specimen    GRAM STAIN [[id number]] Collected:  05/20/19 1539    Order Status:  Completed Specimen:  Tissue Updated:  05/21/19 1011     Significant Indicator .     Source TISS     Site Right Lateral Foot Wound     Gram Stain Result Few WBCs.  Few Gram positive cocci.      Narrative:       Surgery Specimen    CULTURE WOUND W/ GRAM STAIN [[id number]]  (Abnormal)  (Susceptibility) Collected:  05/17/19 2145    Order Status:  Completed Specimen:  Wound Updated:  05/20/19 1031     Significant Indicator POS (POS)     Source WND     Site Right Foot     Culture Result Moderate growth mixed skin shantel predominately Diphtheroids (A)     Gram Stain Result Few WBCs.  Rare Gram positive rods.       Culture Result Staphylococcus aureus  Light growth   (A)    Culture &  "Susceptibility     STAPHYLOCOCCUS AUREUS     Antibiotic Sensitivity Microscan Unit Status    Ampicillin/sulbactam Sensitive <=8/4 mcg/mL Final    Method: JAMARI    Clindamycin Sensitive <=0.5 mcg/mL Final    Method: JAMARI    Daptomycin Sensitive <=0.5 mcg/mL Final    Method: JAMARI    Erythromycin Sensitive <=0.5 mcg/mL Final    Method: JAMARI    Moxifloxacin Sensitive <=0.5 mcg/mL Final    Method: JAMARI    Oxacillin Sensitive <=0.25 mcg/mL Final    Method: JAMARI    Penicillin Resistant >8 mcg/mL Final    Method: JAMARI    Tetracycline Sensitive <=4 mcg/mL Final    Method: JAMARI    Trimeth/Sulfa Sensitive <=0.5/9.5 mcg/mL Final    Method: JAMARI    Vancomycin Sensitive 1 mcg/mL Final    Method: JAMARI                       BLOOD CULTURE [[id number]] Collected:  05/18/19 0318    Order Status:  Completed Specimen:  Blood from Peripheral Updated:  05/19/19 0846     Significant Indicator NEG     Source BLD     Site PERIPHERAL     Culture Result No Growth  Note: Blood cultures are incubated for 5 days and  are monitored continuously.Positive blood cultures  are called to the RN and reported as soon as  they are identified.      Narrative:       Per Hospital Policy: Only change Specimen Src: to \"Line\" if  specified by physician order.  Left Hand    BLOOD CULTURE [[id number]] Collected:  05/18/19 0318    Order Status:  Completed Specimen:  Blood from Peripheral Updated:  05/19/19 0846     Significant Indicator NEG     Source BLD     Site PERIPHERAL     Culture Result No Growth  Note: Blood cultures are incubated for 5 days and  are monitored continuously.Positive blood cultures  are called to the RN and reported as soon as  they are identified.      Narrative:       Per Hospital Policy: Only change Specimen Src: to \"Line\" if  specified by physician order.  Right Hand    GRAM STAIN [[id number]] Collected:  05/17/19 2145    Order Status:  Completed Specimen:  Wound Updated:  05/18/19 0855     Significant Indicator .     Source WND     Site Right Foot    "  Gram Stain Result Few WBCs.  Rare Gram positive rods.      CULTURE WOUND W/ GRAM STAIN [[id number]]     Order Status:  No result Specimen:  Wound from Right Foot                  PLAN:   POD # 2 s/p R JENNIFER, ankle manipulation, PT, FDL, FHL tendon releases, release of subtalar and talonavicular joints  Incisions approximated with sutures.      Wound care: Adhesive foam to incisions, Hypafix tape, Tubigrip E single-layer.  RN to change daily until drainage subsides.    Antibiotics: Per ID recommendation , final results of OR culture pending.  ID anticipating at least 4 weeks IV antibiotics.    Weight Bearing Status: Transfer weight bearing    Offloading: PRAFO to be worn all of the time for 6 wks d/t tendon lengthening.    Pt previously recd AFO from [facility]. Contacted Janie Basurto CPO at [facility] regarding impressions for new AFO.  Medicare will not cover the cost of a new AFO since it has been less than 5 years.  Patient would have to pay out-of-pocket.  Will wait until edema has decreased before impressions are taken.  Discussed with foot and ankle surgeons.  will plan for casting of foot within the next day or 2 to keep foot neutral.    PT Consult: Yes  Ordered        Plan to return to O.R.: No    D/W: Patient, RN, Dr. Gutierrez, Dr. Harper, Janie Basurto       DISCHARGE PLAN:    Disposition:  SNF vs HH, OR cx, abx therapy    Follow-up: LPS rounds in Wound Clinic in 3 weeks, scheduled for 6/14. sutures to be removed approximately 3 weeks post-op          Shyann Mayen, A.P.R.N.    If any questions or concerns, please call [phone]

## 2019-05-23 PROBLEM — R19.5 LOOSE STOOLS: Status: ACTIVE | Noted: 2019-05-23

## 2019-05-23 LAB
BACTERIA BLD CULT: NORMAL
BACTERIA BLD CULT: NORMAL
BACTERIA SPEC ANAEROBE CULT: NORMAL
BACTERIA TISS AEROBE CULT: ABNORMAL
BACTERIA TISS AEROBE CULT: ABNORMAL
GRAM STN SPEC: ABNORMAL
SIGNIFICANT IND 70042: ABNORMAL
SIGNIFICANT IND 70042: NORMAL
SITE SITE: ABNORMAL
SITE SITE: NORMAL
SOURCE SOURCE: ABNORMAL
SOURCE SOURCE: NORMAL

## 2019-05-23 PROCEDURE — A9270 NON-COVERED ITEM OR SERVICE: HCPCS | Performed by: HOSPITALIST

## 2019-05-23 PROCEDURE — 99233 SBSQ HOSP IP/OBS HIGH 50: CPT | Performed by: INTERNAL MEDICINE

## 2019-05-23 PROCEDURE — 700111 HCHG RX REV CODE 636 W/ 250 OVERRIDE (IP): Performed by: INTERNAL MEDICINE

## 2019-05-23 PROCEDURE — 700102 HCHG RX REV CODE 250 W/ 637 OVERRIDE(OP): Performed by: NURSE PRACTITIONER

## 2019-05-23 PROCEDURE — 99232 SBSQ HOSP IP/OBS MODERATE 35: CPT | Performed by: HOSPITALIST

## 2019-05-23 PROCEDURE — A9270 NON-COVERED ITEM OR SERVICE: HCPCS | Performed by: INTERNAL MEDICINE

## 2019-05-23 PROCEDURE — 700102 HCHG RX REV CODE 250 W/ 637 OVERRIDE(OP): Performed by: HOSPITALIST

## 2019-05-23 PROCEDURE — 700111 HCHG RX REV CODE 636 W/ 250 OVERRIDE (IP): Performed by: FAMILY MEDICINE

## 2019-05-23 PROCEDURE — 700102 HCHG RX REV CODE 250 W/ 637 OVERRIDE(OP): Performed by: FAMILY MEDICINE

## 2019-05-23 PROCEDURE — 770006 HCHG ROOM/CARE - MED/SURG/GYN SEMI*

## 2019-05-23 PROCEDURE — 700102 HCHG RX REV CODE 250 W/ 637 OVERRIDE(OP): Performed by: INTERNAL MEDICINE

## 2019-05-23 PROCEDURE — 29405 APPL SHORT LEG CAST: CPT

## 2019-05-23 PROCEDURE — A9270 NON-COVERED ITEM OR SERVICE: HCPCS | Performed by: FAMILY MEDICINE

## 2019-05-23 RX ORDER — LACTOBACILLUS RHAMNOSUS GG 10B CELL
1 CAPSULE ORAL
Status: DISCONTINUED | OUTPATIENT
Start: 2019-05-24 | End: 2019-05-28 | Stop reason: HOSPADM

## 2019-05-23 RX ORDER — PROCHLORPERAZINE MALEATE 5 MG/1
10 TABLET ORAL EVERY 6 HOURS PRN
Status: DISCONTINUED | OUTPATIENT
Start: 2019-05-23 | End: 2019-05-28 | Stop reason: HOSPADM

## 2019-05-23 RX ADMIN — OXYCODONE HYDROCHLORIDE 10 MG: 5 TABLET ORAL at 09:52

## 2019-05-23 RX ADMIN — OXYCODONE HYDROCHLORIDE 10 MG: 5 TABLET ORAL at 01:58

## 2019-05-23 RX ADMIN — NYSTATIN 500000 UNITS: 100000 SUSPENSION ORAL at 09:52

## 2019-05-23 RX ADMIN — NYSTATIN 500000 UNITS: 100000 SUSPENSION ORAL at 21:48

## 2019-05-23 RX ADMIN — ENOXAPARIN SODIUM 40 MG: 100 INJECTION SUBCUTANEOUS at 06:00

## 2019-05-23 RX ADMIN — NYSTATIN 500000 UNITS: 100000 SUSPENSION ORAL at 15:30

## 2019-05-23 RX ADMIN — MORPHINE SULFATE 2 MG: 4 INJECTION INTRAVENOUS at 05:19

## 2019-05-23 RX ADMIN — CARVEDILOL 12.5 MG: 12.5 TABLET, FILM COATED ORAL at 17:44

## 2019-05-23 RX ADMIN — NYSTATIN 500000 UNITS: 100000 SUSPENSION ORAL at 17:44

## 2019-05-23 RX ADMIN — MORPHINE SULFATE 2 MG: 4 INJECTION INTRAVENOUS at 12:47

## 2019-05-23 RX ADMIN — TRAZODONE HYDROCHLORIDE 50 MG: 50 TABLET ORAL at 21:48

## 2019-05-23 RX ADMIN — CEFAZOLIN SODIUM 2 G: 2 INJECTION, SOLUTION INTRAVENOUS at 01:58

## 2019-05-23 RX ADMIN — MORPHINE SULFATE 2 MG: 4 INJECTION INTRAVENOUS at 19:25

## 2019-05-23 RX ADMIN — OXYCODONE HYDROCHLORIDE 10 MG: 5 TABLET ORAL at 15:29

## 2019-05-23 RX ADMIN — ONDANSETRON 4 MG: 4 TABLET, ORALLY DISINTEGRATING ORAL at 11:30

## 2019-05-23 RX ADMIN — CEFAZOLIN SODIUM 2 G: 2 INJECTION, SOLUTION INTRAVENOUS at 17:44

## 2019-05-23 RX ADMIN — CARVEDILOL 12.5 MG: 12.5 TABLET, FILM COATED ORAL at 09:48

## 2019-05-23 RX ADMIN — PROCHLORPERAZINE MALEATE 10 MG: 5 TABLET, FILM COATED ORAL at 15:29

## 2019-05-23 RX ADMIN — CEFAZOLIN SODIUM 2 G: 2 INJECTION, SOLUTION INTRAVENOUS at 09:52

## 2019-05-23 RX ADMIN — OXYCODONE HYDROCHLORIDE 10 MG: 5 TABLET ORAL at 21:48

## 2019-05-23 ASSESSMENT — ENCOUNTER SYMPTOMS
MYALGIAS: 1
NAUSEA: 1
ABDOMINAL PAIN: 0
DIARRHEA: 0
DIARRHEA: 1
DIAPHORESIS: 0
FEVER: 0
DIZZINESS: 0
VOMITING: 0
HEARTBURN: 0
BACK PAIN: 0
NECK PAIN: 0
CHILLS: 0
WEIGHT LOSS: 0
COUGH: 0

## 2019-05-23 NOTE — DISCHARGE PLANNING
Received Choice form at [phone]  Agency/Facility Name: [facility]  Referral sent per Choice form at [phone]

## 2019-05-23 NOTE — THERAPY
"Physical Therapy Evaluation completed.   Bed Mobility:  Supine to Sit: Supervised  Transfers: Sit to Stand: Supervised  Gait: Level Of Assist: Unable to Participate; only txfs at this time per orders; baseline pt transfers to/from  for mobility  Plan of Care: Patient with no further skilled PT needs in the acute care setting at this time  Discharge Recommendations: Equipment: No Equipment Needed. Pt has appropriate ADs at home    After initial evaluation and pt education, pt has no furrther acute PT needs. She reports she has been essentially transferring via stand pivot for ~4 years 2' to RLE co-morbidities. She is able to demosntrate transfers to/from chair without assist via stand pivot during eval. She reports home is well setup and has appropriate ADs at home and social supports as needed (reports niece will be staying with pt to assist as needed). Anticipate no immediate PT needs after dc to home until precuations  are lifted for progression on ROM/activity as able. She likely would need home services for nsg/wound care though would defer to appropriate disciplines.     See \"Rehab Therapy-Acute\" Patient Summary Report for complete documentation.     "

## 2019-05-23 NOTE — PROGRESS NOTES
Infectious Disease Progress Note    Author: Allison Razo M.D. Date & Time of service: 2019  9:18 AM    Chief Complaint:  Right foot infection      Interval History:   Interval 24 hours of Vitals/Labs/Micro,and imaging results reviewed as available. See assessment.    Interval 24 hours of Vitals/Labs/Micro,and imaging results reviewed as available.    Interval 24 hours of Vitals/Labs/Micro,and imaging results reviewed as available. See assessment.    Interval 24 hours of Vitals/Labs/Micro,and imaging results reviewed as available.    Interval 24 hours of Vitals/Labs/Micro,and imaging results reviewed as available. See assessment.     Review of Systems:  Review of Systems   Constitutional: Positive for malaise/fatigue. Negative for chills and fever.   Gastrointestinal: Positive for diarrhea and nausea. Negative for abdominal pain and vomiting.   Musculoskeletal: Positive for joint pain and myalgias.       Hemodynamics:  Temp (24hrs), Av.4 °C (97.6 °F), Min:36.2 °C (97.2 °F), Max:36.6 °C (97.8 °F)  Temperature: 36.4 °C (97.6 °F)  Pulse  Av.2  Min: 62  Max: 107   Blood Pressure : 144/71       Physical Exam:  Physical Exam   Constitutional: She is oriented to person, place, and time. She appears well-developed and well-nourished.   HENT:   Head: Normocephalic and atraumatic.   Eyes: Pupils are equal, round, and reactive to light. Conjunctivae and EOM are normal.   Cardiovascular: Normal rate, regular rhythm and normal heart sounds.    Pulmonary/Chest: Effort normal and breath sounds normal.   Abdominal: Soft. Bowel sounds are normal. She exhibits no distension. There is no tenderness. There is no rebound and no guarding.   Musculoskeletal: She exhibits edema and deformity.   R foot in bandage and boot, bleeding noted    Neurological: She is alert and oriented to person, place, and time.   Skin: Skin is warm and dry.   Psychiatric: She has a normal mood and affect. Her behavior is  normal.       Meds:    Current Facility-Administered Medications:   •  traZODone  •  ceFAZolin  •  morphine injection  •  nystatin  •  oxyCODONE immediate-release  •  carvedilol  •  senna-docusate **AND** polyethylene glycol/lytes **AND** magnesium hydroxide **AND** bisacodyl  •  enoxaparin  •  acetaminophen  •  enalaprilat  •  ondansetron  •  ondansetron    Labs:  Recent Labs      05/20/19   1033  05/21/19   0213   WBC  7.2  9.9   RBC  4.49  4.68   HEMOGLOBIN  12.2  12.9   HEMATOCRIT  39.2  40.3   MCV  87.3  86.1   MCH  27.2  27.6   RDW  44.0  42.6   PLATELETCT  305  328   MPV  9.8  9.7   NEUTSPOLYS  62.70  85.20*   LYMPHOCYTES  25.40  11.40*   MONOCYTES  7.80  3.00   EOSINOPHILS  3.20  0.00   BASOPHILS  0.60  0.00     Recent Labs      05/20/19   1033  05/21/19   0213   SODIUM  138  136   POTASSIUM  4.0  4.0   CHLORIDE  106  105   CO2  24  22   GLUCOSE  110*  145*   BUN  11  12     Recent Labs      05/20/19   1033  05/21/19   0213   CREATININE  0.77  0.78       Imaging:  Dx-foot-complete 3+ Right    Result Date: 5/17/2019 5/17/2019 5:09 PM HISTORY/REASON FOR EXAM: Nonhealing lateral wound for the years, recent significant worsening. Paralysis 3 years ago TECHNIQUE/EXAM DESCRIPTION AND NUMBER OF VIEWS: 3 nonweightbearing views of the RIGHT foot. COMPARISON:  None. FINDINGS: With severe lateral soft tissue swelling with skin ulceration. There is no underlying bony destruction confirmed. Severe osteopenia Metallic foreign body overlies the plantar medial great toe distally. There is solid periosteal reaction involving the fifth metatarsal shaft medially. No periostitis to suggest active inflammation. No acute displaced fracture. No subluxation. Mild first metatarsal-phalangeal joint space narrowing Cavus configuration of the midfoot     Lateral skin ulceration with associated soft tissue gas compatible with cellulitis. Underlying abscess not excluded No acute bony destruction is detected. If there is high clinical  concern for osteomyelitis, MRI with contrast could be performed Severe osteopenia    Mr-foot-with & W/o Right    Result Date: 5/19/2019 5/18/2019 7:54 AM HISTORY/REASON FOR EXAM:  Osteomyelitis suspected, foot swelling, diabetic. TECHNIQUE/EXAM DESCRIPTION: MRI of the RIGHT foot with and without contrast. Using a TripletPlus Signa 1.5 Mary MRI scanner, T1 axial, sagittal, and coronal, fast spin-echo T2 fat-suppressed axial and coronal, fast inversion recovery sagittal, and T1 fat suppressed axial and sagittal post intravenous contrast images were obtained. A total of 10 mL Gadavist given. COMPARISON:  6/20/2017 MRI, radiographs May 17. FINDINGS: There is lateral forefoot soft tissue swelling with ulceration overlying the fifth TMT articulation, worse than on comparison. The cutaneous ulceration does not extend all the way to the bony margins. There is underlying 42 x 16 x 34 mm T2 hyperintensity, T1 hypointensity with central absent enhancement and a draining sinus to a site of ulceration. This indicates draining abscess. There has been resolution of the fourth and fifth TMT joint effusion but there is still some spurring. No bony destruction. The remainder of the TMT articulations are normal There has been near resolution of subcutaneous fatty replacement overlying the fifth metatarsal head with low T1 and T2 signal. Resolved enhancement. The remainder the bony structures are notable for unchanged multiple bone infarcts, seen best in the calcaneus and talus. These have no associated articular surface collapse There is similar artifact involving the great toe compatible with metallic foreign body seen on x-ray No abnormal joint effusion is noted There is worsening severe fatty atrophy of the intrinsic foot musculature     Lateral forefoot cellulitis with worsening skin ulceration and a new 4 cm draining abscess which overlies the fifth TMT. No osteomyelitis or septic arthropathy. Resolution of fourth and fifth TMT  centered marrow edema and joint effusion. 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  no pvd at rest in either leg  CESAR PLUMMER  Age:    64    Gender:     F  MRN:    [id number]  :    1955      BSA:  Exam Date:     2019 16:21  Room #:     Inpatient  Priority:     Routine  Ht (in):             Wt (lb):  Ordering Physician:        LANDON TOLBERT  Referring Physician:       LANDON TOLBERT  Sonographer:               Artemio Galan RVT  Study Type:                Complete Bilateral  Technical Quality:         Adequate  Indications:     Ulcer of lower extremity  CPT Codes:       74371  ICD Codes:       707.1  History:         Ulceration of lateral aspect of right foot X 3 years  Limitations:                 RIGHT  Waveform            Systolic BPs (mmHg)                             123           Brachial  Triphasic                                Common Femoral  Triphasic                  155           Posterior Tibial  Triphasic                  153           Dorsalis Pedis                                           Peroneal                             1.17          TOSHIA                                           TBI                       LEFT  Waveform        Systolic BPs (mmHg)                             132           Brachial  Triphasic                                Common Femoral  Triphasic                  152           Posterior Tibial  Triphasic                  145           Dorsalis Pedis                                           Peroneal                             1.15          TOSHIA                                           TBI  Findings  Bilateral.  Doppler  "waveform of the common femoral artery is of high amplitude and  triphasic.  Doppler waveforms at the ankle are brisk and triphasic.  Ankle-brachial index is normal.  Additional testing was not performed in accordance with lower extremity  arterial evaluation protocol.  Matty Gallo MD  (Electronically Signed)  Final Date:      20 May 2019 05:50    Ir-midline Catheter Insertion Wo Guidance > Age 3    Result Date: 5/22/2019  HISTORY/REASON FOR EXAM:  Midline Placement   TECHNIQUE/EXAM DESCRIPTION AND NUMBER OF VIEWS: Midline insertion with ultrasound guidance.  FINDINGS: Midline insertion with Ultrasound Guidance was performed by qualified nursing staff without the assistance of a Radiologist. Midline positioning as measured by RN or as appropriate length of catheter selected.              Ultrasound-guided midline placement performed by qualified nursing staff as above.       Micro:  Results     Procedure Component Value Units Date/Time    BLOOD CULTURE [[id number]] Collected:  05/18/19 0318    Order Status:  Completed Specimen:  Blood from Peripheral Updated:  05/23/19 0700     Significant Indicator NEG     Source BLD     Site PERIPHERAL     Culture Result No growth after 5 days of incubation.    Narrative:       Per Hospital Policy: Only change Specimen Src: to \"Line\" if  specified by physician order.  Right Hand    BLOOD CULTURE [[id number]] Collected:  05/18/19 0318    Order Status:  Completed Specimen:  Blood from Peripheral Updated:  05/23/19 0700     Significant Indicator NEG     Source BLD     Site PERIPHERAL     Culture Result No growth after 5 days of incubation.    Narrative:       Per Hospital Policy: Only change Specimen Src: to \"Line\" if  specified by physician order.  Left Hand    CULTURE TISSUE W/ GRM STAIN [[id number]]  (Abnormal) Collected:  05/20/19 1539    Order Status:  Completed Specimen:  Tissue Updated:  05/22/19 1044     Significant Indicator POS (POS)     Source TISS     Site Right Lateral " Foot Wound     Culture Result Growth noted after further incubation, see below for  organism identification.   (A)     Gram Stain Result Few WBCs.  Few Gram positive cocci.       Culture Result Diphtheroids  Rare growth   (A)    Narrative:       Surgery Specimen    Anaerobic Culture [[id number]] Collected:  05/20/19 1539    Order Status:  Completed Specimen:  Tissue Updated:  05/22/19 1044     Significant Indicator NEG     Source TISS     Site Right Lateral Foot Wound     Culture Result Culture in progress.    Narrative:       Surgery Specimen    GRAM STAIN [[id number]] Collected:  05/20/19 1539    Order Status:  Completed Specimen:  Tissue Updated:  05/21/19 1011     Significant Indicator .     Source TISS     Site Right Lateral Foot Wound     Gram Stain Result Few WBCs.  Few Gram positive cocci.      Narrative:       Surgery Specimen    CULTURE WOUND W/ GRAM STAIN [[id number]]  (Abnormal)  (Susceptibility) Collected:  05/17/19 2145    Order Status:  Completed Specimen:  Wound Updated:  05/20/19 1031     Significant Indicator POS (POS)     Source WND     Site Right Foot     Culture Result Moderate growth mixed skin shantel predominately Diphtheroids (A)     Gram Stain Result Few WBCs.  Rare Gram positive rods.       Culture Result Staphylococcus aureus  Light growth   (A)    Culture & Susceptibility     STAPHYLOCOCCUS AUREUS     Antibiotic Sensitivity Microscan Unit Status    Ampicillin/sulbactam Sensitive <=8/4 mcg/mL Final    Method: JAMARI    Clindamycin Sensitive <=0.5 mcg/mL Final    Method: JAMARI    Daptomycin Sensitive <=0.5 mcg/mL Final    Method: JAMARI    Erythromycin Sensitive <=0.5 mcg/mL Final    Method: JAMARI    Moxifloxacin Sensitive <=0.5 mcg/mL Final    Method: JAMARI    Oxacillin Sensitive <=0.25 mcg/mL Final    Method: JAMARI    Penicillin Resistant >8 mcg/mL Final    Method: JAMARI    Tetracycline Sensitive <=4 mcg/mL Final    Method: JAMARI    Trimeth/Sulfa Sensitive <=0.5/9.5 mcg/mL Final    Method: JAMARI    Vancomycin  Sensitive 1 mcg/mL Final    Method: JAMARI                       GRAM STAIN [[id number]] Collected:  05/17/19 2147    Order Status:  Completed Specimen:  Wound Updated:  05/18/19 0833     Significant Indicator .     Source WND     Site Right Foot     Gram Stain Result Few WBCs.  Rare Gram positive rods.      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  admitted 5/17/2019. Pt has a past medical history of trauma to right leg resulting in right leg paralysis.  She has a chronic wound on the right lateral foot.  She changes wound dressings herself and often has clear drainage.  However lately she noticed the drainage changed to pus, the foot had new swelling and she recently expressed a large amount of thick fluid with malodor.  She has had no new fevers or chills.  No antibiotics prior to admission.       Hospital Course:   Patient has been afebrile.  No leukocytosis.  X-ray on 5/17 with lateral skin ulceration associate with soft tissue gas compatible with cellulitis.  She was started on vancomycin and cefepime on 5/17.  Today she states that the swelling in the foot has decreased somewhat.  I expressed pus during exam.      Interval 24 hour assessment:    AF, O2 RA,    Labs reviewed  Micro reviewed    Pt continued on cefazolin.  She is c/o 5 loose stools so far today and has been refusing stool softeners for several days.  Also new nausea and myalgias.  Foot with some bleeding.     Right foot infection, chronic ulcer secondary to right leg paralysis  -New swelling, malodor and purulent fluid  -MRI foot on 5/18-  "lateral forefoot cellulitis and new 7 cm draining abscess which overlies the fifth TMT.  No osteomyelitis or septic arthropathy.  - ABIs R 1.17, L 1.15- normal   -Wound culture results 5/17 +MSSA & diptheroids   -OR with Dr. Gutierrez on 5/20, right foot I&D, tendo Achilles lengthening, ankle manipulation joint and flexor release, per op note no true abscess was found, necrotic tissue was encountered and excised, biopsy and cultures taken  - Pathology with \"Fragments of benign fibrous connective tissue with focal acute         inflammation, chronic inflammation and necrosis\"  -OR cultures 5/20 -GPC's- diptheroids      --- Continue cefazolin for MSSA - will plan on 4 weeks of IV antibiotics from OR (end 6/17/19) and then an additional 2 weeks of oral antibiotics- cefazolin 6 grams per day continuous infusion - orders scanned into chart   --- Weekly abs   --- F/up OR cultures  --- Removal or stitches and orthopedic boot per ortho  --- F/up in ID clinic in 4 weeks     Diarrhea, nausea- new     --- ordered C diff test on 5/23      Mouth and tongue irritation, chronic  -Agree with nystatin swish and swallow to see if helps      Right leg paralysis  - secondary to traumatic event several years ago     Antibiotic allergy, penicillin described as rash swelling  -Tolerating cephalosporins        Plan of care discussed IM. Will continue to follow     Allison Razo M.D.        "

## 2019-05-23 NOTE — CARE PLAN
Problem: Discharge Barriers/Planning  Goal: Patient's continuum of care needs will be met  Outcome: PROGRESSING AS EXPECTED  Discussed discharge plan with pt. Explained the reason for midline IV.     Problem: Pain Management  Goal: Pain level will decrease to patient's comfort goal  Outcome: PROGRESSING AS EXPECTED  PRN oxycodone given for pain on the right foot, head, and knee. Tylenol given per request.

## 2019-05-23 NOTE — FACE TO FACE
Face to Face Supporting Documentation - Home Health    The encounter with this patient was in whole or in part the primary reason for home health admission.    Date of encounter:   Patient:                    MRN:                       YOB: 2021  Joan Olivares  [id number]  1955     Home health to see patient for:  Skilled Nursing care for assessment, interventions & education, Wound Care, Home health aide, Physical Therapy evaluation and treatment and Occupational therapy evaluation and treatment    Skilled need for:  Surgical Aftercare R foot chronic wound repair    Skilled nursing interventions to include:  Venous access care, Home IV infusion therapy and Wound Care    Homebound status evidenced by:  Need the aid of supportive devices such as crutches, canes, wheelchairs or walkers or Needs the assistance of another person in order to leave the home. Leaving home requires a considerable and taxing effort. There is a normal inability to leave the home.    Community Physician to provide follow up care: Dl Tsai M.D.     Optional Interventions? No      I certify the face to face encounter for this home health care referral meets the CMS requirements and the encounter/clinical assessment with the patient was, in whole, or in part, for the medical condition(s) listed above, which is the primary reason for home health care. Based on my clinical findings: the service(s) are medically necessary, support the need for home health care, and the homebound criteria are met.  I certify that this patient has had a face to face encounter by myself.  WES Carrizales. - NPI: [id number]

## 2019-05-23 NOTE — PROGRESS NOTES
Assumed care of pt at shift change. A/Ox4, discussed plan of care. Pt on room air. PRAFO boot in place. Reajust and kept at 90 degrees each encounter. PRN oycodone and tylenol given for pain. Dressing is C/D/I so far this shift.   All needs met at this time. Bed in lowest position, treaded socks on, personal belongings and call light within reach, instructed to call for any assistance, hourly rounding in place.

## 2019-05-23 NOTE — PROGRESS NOTES
Patient AAOx4, pleasant, refusing strip alarm, moderate HD fall risk, yellow band on wrist, encouraged to use call light before stand and pivot to bedside commode but does NOT call every time.  Complaints of pain ranging from 6-8 of 10 throughout shift, provided with both oral and IV PRNs as available.  Pt wounds saturated through dressing multiple times on shift, patient moves her foot in boot and doesn't know when it is causing the excess bleeding,

## 2019-05-23 NOTE — PROGRESS NOTES
Paged Talha APRN regarding saturation of wound dressings.  Verbal ok to use non adhesive foam padding with hypafix tape and if necessary ABD pad over dressing.  Will pass along in change of shift report.

## 2019-05-23 NOTE — DISCHARGE PLANNING
Anticipated Discharge Disposition: Home with home health when medically cleared.    Action: The patient has an order for home health.  This RN CM spoke with the patient and she said that the last home health agency she used was [id number] and she would like to use them again.  She signed the choice form and I faxed it to the [facility] and she will send the referral.    Barriers to Discharge: Medical clearance and will need to be accepted by [facility].    Plan: Await confirmation of acceptance from [facility].  Also, the patient will need to be advised of her discharge date so that she can call someone from [facility] to pick her up and take her home.    Update: Infectious disease is ordering continuous IV Ancef (6 grams per day) at home through 6/17/19. Also, [facility] will not be able to see her until Tuesday, May 28, so she may need to stay in the hospital until Ezio, May 26.

## 2019-05-23 NOTE — DISCHARGE PLANNING
Agency/Facility Name: [facility]  Spoke To: Sherri  Outcome: Patient accepted, Per Sherri: We will see her on Tuesday.    MIHIR Carson notified.

## 2019-05-23 NOTE — DISCHARGE PLANNING
Received Choice form at 1500  Agency/Facility Name: [facility]  Referral sent per Choice form at 1510

## 2019-05-23 NOTE — PROCEDURES
Vascular Access Team    Date of Insertion: 5/22/19  Arm Circumference: 41  Internal length: 13  External Length: 0  Vein Occupancy %: 45  Reason for Midline: IV abx  Labs: WBC 9.9, , BUN 12, Cr 0.78, GFR >60, INR n/a    Orders confirmed, vessel patency confirmed with ultrasound. Risks and benefits of procedure explained to patient and education regarding line associated bloodstream infections provided. Questions answered.     Power Midline placed in LUE per MD order with ultrasound guidance, initial arm circumference 41cm. 4 Fr, 1 lumen Power Midline placed in basilic vein after 1 attempt(s). 2 cc's of 1% lidocaine injected intradermally, 21 gauge microintroducer needle and modified Seldinger technique used. 13 cm catheter inserted with good blood return. Secured at 0 cm marker. Each lumen flushed without resistance with 10 mL 0.9% normal saline. Midline secured with Biopatch and Tegaderm.     Midline placement is confirmed by nurse using ultrasound and ability to flush and draw blood. Midline is appropriate for use at this time.  No X-ray is needed for placement confirmation. Pt tolerated procedure well.  Patient condition relayed to unit RN or ordering physician via this post procedure note in the EMR.     Ultrasound images uploaded to PACS and viewable in the EMR - yes  Ultrasound imaged printed and placed in paper chart - no     BARD Power Midline ref # [id number], Lot # [id number]

## 2019-05-23 NOTE — PROGRESS NOTES
Pt seen and examined. She was not being held in a neutral position in the PRAFO brace. Her wounds had some serosanguinous drainage. We put her into a cast with windows for wound care. Continue wound care per LPS.    Peyman Harper MD  [facility]  Foot and Ankle Fellow  [phone]

## 2019-05-23 NOTE — PROGRESS NOTES
Hospital Medicine Daily Progress Note    Date of Service  5/23/2019    Chief Complaint  64 y.o. female admitted 5/17/2019 with Foot ulcer    Hospital Course  Admitted with chronic right foot ulcer with recent increased and foul-smelling wound drainage.    Interval Problem Update  S/P I/D 5/20 w Ortho  OR Cultures: GPC; diptheroids; prelim --- Ancef w stop date 6/17  ML placed  Multiple BMs reported; continued nausea; no abd pain  ID checking for C-diff; labs  Ortho casting as PRAFO brace sub-optimal    Consultants/Specialty  LPS/Ortho  ID    Code Status  Full    Disposition  Not yet medically cleared    Review of Systems  Review of Systems   Constitutional: Negative for chills, diaphoresis, fever, malaise/fatigue and weight loss.   Respiratory: Negative for cough.    Cardiovascular: Negative for chest pain.   Gastrointestinal: Positive for nausea. Negative for abdominal pain, diarrhea, heartburn and vomiting.   Genitourinary: Negative for dysuria, frequency and urgency.   Musculoskeletal: Positive for joint pain and myalgias. Negative for back pain and neck pain.   Skin: Negative for itching and rash.   Neurological: Negative for dizziness.   All other systems reviewed and are negative.       Physical Exam  Temp:  [36.2 °C (97.2 °F)-36.6 °C (97.8 °F)] 36.2 °C (97.2 °F)  Pulse:  [81-99] 86  Resp:  [16-18] 16  BP: (126-154)/(73-85) 136/80  SpO2:  [92 %-94 %] 93 %    Physical Exam   Constitutional: She is oriented to person, place, and time. Vital signs are normal. She appears well-developed and well-nourished. No distress.   obese   HENT:   Head: Normocephalic and atraumatic.   Eyes: Pupils are equal, round, and reactive to light. EOM are normal.   Neck: Neck supple.   Cardiovascular: Normal rate, regular rhythm and normal heart sounds.    Pulmonary/Chest: Effort normal and breath sounds normal. No respiratory distress. She has no wheezes. She has no rales.   Abdominal: Soft. She exhibits no distension. There is no  tenderness.   Musculoskeletal:   RLE CDI w ACE/Boot   Neurological: She is alert and oriented to person, place, and time. No cranial nerve deficit.   Skin: Skin is warm and dry.   Nursing note and vitals reviewed.      Fluids    Intake/Output Summary (Last 24 hours) at 05/23/19 0738  Last data filed at 05/23/19 0400   Gross per 24 hour   Intake              900 ml   Output                0 ml   Net              900 ml       Laboratory  Recent Labs      05/20/19   1033  05/21/19   0213   WBC  7.2  9.9   RBC  4.49  4.68   HEMOGLOBIN  12.2  12.9   HEMATOCRIT  39.2  40.3   MCV  87.3  86.1   MCH  27.2  27.6   MCHC  31.1*  32.0*   RDW  44.0  42.6   PLATELETCT  305  328   MPV  9.8  9.7     Recent Labs      05/20/19   1033  05/21/19   0213   SODIUM  138  136   POTASSIUM  4.0  4.0   CHLORIDE  106  105   CO2  24  22   GLUCOSE  110*  145*   BUN  11  12   CREATININE  0.77  0.78   CALCIUM  9.1  9.5                   Imaging  IR-MIDLINE CATHETER INSERTION WO GUIDANCE > AGE 3   Final Result                  Ultrasound-guided midline placement performed by qualified nursing staff    as above.          US-JENNIFER SINGLE LEVEL BILAT   Final Result      MR-FOOT-WITH & W/O RIGHT   Final Result      Lateral forefoot cellulitis with worsening skin ulceration and a new 4 cm draining abscess which overlies the fifth TMT. No osteomyelitis or septic arthropathy.      Resolution of fourth and fifth TMT centered marrow edema and joint effusion.      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 Underlying abscess not excluded      No acute bony destruction is detected. If there is high clinical concern for  osteomyelitis, MRI with contrast could be performed      Severe osteopenia           Assessment/Plan  * Skin ulcer of right foot with fat layer exposed (HCC)- (present on admission)   Assessment & Plan    IV Ancef  ID following  Stop date 6/17  Midline ordered  Pain control  Wound care  Follow cultures         Nausea- (present on admission)   Assessment & Plan    Compazine     Monoparesis of lower extremity (HCC)- (present on admission)   Assessment & Plan    LPS following     HTN (hypertension)- (present on admission)   Assessment & Plan    Coreg     Loose stools   Assessment & Plan    C-diff ordered  Iso  CBC, BMP  Probiotic     Obesity   Assessment & Plan    Outpatient weight loss recommended          VTE prophylaxis: Lovenox    No changes to PE/ROS, AP except as noted

## 2019-05-23 NOTE — DISCHARGE PLANNING
Anticipated Discharge Disposition: Home with home health and with [facility] for home IV antibiotic.    Action: This RN CM spoke with the patient about the need for her to get an IV antibiotic (IV Ancef) at home through 6/17/19.  She chose to go through [facility] and signed a choice form.  I faxed it to ELINA [facility], and she will send the referral.  I spoke with Matilda at [facility] and she said that the patient will need to be trained on Monday, 5/27.  Later I realized that Monday is Memorial Day and that they probably won't be able to do it until Tuesday, 5/28.  I faxed a face sheet and the Infectious Disease progress notes to Matilda and I added a note questioning the training on Memorial Day.  Will wait to hear back from her.    Barriers to Discharge: The IV antibiotic for home will need to be set up and the patient will need to be trained on it before she can discharge home.  We also have to make sure that [facility] has accepted her.    Plan: The patient should be able to discharge on Tuesday, 5/28, after she has been trained on the home IV antibiotic.  This RN CM advised Dr. Dailey of this today.

## 2019-05-24 LAB
ANION GAP SERPL CALC-SCNC: 8 MMOL/L (ref 0–11.9)
BASOPHILS # BLD AUTO: 0.7 % (ref 0–1.8)
BASOPHILS # BLD: 0.06 K/UL (ref 0–0.12)
BUN SERPL-MCNC: 12 MG/DL (ref 8–22)
C DIFF DNA SPEC QL NAA+PROBE: NEGATIVE
C DIFF TOX A+B STL QL IA: POSITIVE
C DIFF TOX GENS STL QL NAA+PROBE: NORMAL
CALCIUM SERPL-MCNC: 9 MG/DL (ref 8.5–10.5)
CHLORIDE SERPL-SCNC: 106 MMOL/L (ref 96–112)
CO2 SERPL-SCNC: 25 MMOL/L (ref 20–33)
CREAT SERPL-MCNC: 0.81 MG/DL (ref 0.5–1.4)
EOSINOPHIL # BLD AUTO: 0.27 K/UL (ref 0–0.51)
EOSINOPHIL NFR BLD: 3.3 % (ref 0–6.9)
ERYTHROCYTE [DISTWIDTH] IN BLOOD BY AUTOMATED COUNT: 44.9 FL (ref 35.9–50)
GLUCOSE SERPL-MCNC: 121 MG/DL (ref 65–99)
HCT VFR BLD AUTO: 37.4 % (ref 37–47)
HGB BLD-MCNC: 12 G/DL (ref 12–16)
IMM GRANULOCYTES # BLD AUTO: 0.02 K/UL (ref 0–0.11)
IMM GRANULOCYTES NFR BLD AUTO: 0.2 % (ref 0–0.9)
LYMPHOCYTES # BLD AUTO: 3.25 K/UL (ref 1–4.8)
LYMPHOCYTES NFR BLD: 39.6 % (ref 22–41)
MCH RBC QN AUTO: 28 PG (ref 27–33)
MCHC RBC AUTO-ENTMCNC: 32.1 G/DL (ref 33.6–35)
MCV RBC AUTO: 87.4 FL (ref 81.4–97.8)
MONOCYTES # BLD AUTO: 0.61 K/UL (ref 0–0.85)
MONOCYTES NFR BLD AUTO: 7.4 % (ref 0–13.4)
NEUTROPHILS # BLD AUTO: 3.99 K/UL (ref 2–7.15)
NEUTROPHILS NFR BLD: 48.8 % (ref 44–72)
NRBC # BLD AUTO: 0 K/UL
NRBC BLD-RTO: 0 /100 WBC
PLATELET # BLD AUTO: 329 K/UL (ref 164–446)
PMV BLD AUTO: 9.5 FL (ref 9–12.9)
POTASSIUM SERPL-SCNC: 3.9 MMOL/L (ref 3.6–5.5)
RBC # BLD AUTO: 4.28 M/UL (ref 4.2–5.4)
SODIUM SERPL-SCNC: 139 MMOL/L (ref 135–145)
WBC # BLD AUTO: 8.2 K/UL (ref 4.8–10.8)

## 2019-05-24 PROCEDURE — 99232 SBSQ HOSP IP/OBS MODERATE 35: CPT | Performed by: INTERNAL MEDICINE

## 2019-05-24 PROCEDURE — A9270 NON-COVERED ITEM OR SERVICE: HCPCS | Performed by: HOSPITALIST

## 2019-05-24 PROCEDURE — A9270 NON-COVERED ITEM OR SERVICE: HCPCS | Performed by: INTERNAL MEDICINE

## 2019-05-24 PROCEDURE — 700111 HCHG RX REV CODE 636 W/ 250 OVERRIDE (IP): Performed by: FAMILY MEDICINE

## 2019-05-24 PROCEDURE — 87493 C DIFF AMPLIFIED PROBE: CPT

## 2019-05-24 PROCEDURE — 80048 BASIC METABOLIC PNL TOTAL CA: CPT

## 2019-05-24 PROCEDURE — 87324 CLOSTRIDIUM AG IA: CPT

## 2019-05-24 PROCEDURE — 700102 HCHG RX REV CODE 250 W/ 637 OVERRIDE(OP): Performed by: NURSE PRACTITIONER

## 2019-05-24 PROCEDURE — 700102 HCHG RX REV CODE 250 W/ 637 OVERRIDE(OP): Performed by: FAMILY MEDICINE

## 2019-05-24 PROCEDURE — 85025 COMPLETE CBC W/AUTO DIFF WBC: CPT

## 2019-05-24 PROCEDURE — 99232 SBSQ HOSP IP/OBS MODERATE 35: CPT | Performed by: HOSPITALIST

## 2019-05-24 PROCEDURE — 700102 HCHG RX REV CODE 250 W/ 637 OVERRIDE(OP): Performed by: INTERNAL MEDICINE

## 2019-05-24 PROCEDURE — 700111 HCHG RX REV CODE 636 W/ 250 OVERRIDE (IP): Performed by: INTERNAL MEDICINE

## 2019-05-24 PROCEDURE — 700102 HCHG RX REV CODE 250 W/ 637 OVERRIDE(OP): Performed by: HOSPITALIST

## 2019-05-24 PROCEDURE — 770021 HCHG ROOM/CARE - ISO PRIVATE

## 2019-05-24 PROCEDURE — A9270 NON-COVERED ITEM OR SERVICE: HCPCS | Performed by: NURSE PRACTITIONER

## 2019-05-24 PROCEDURE — 36415 COLL VENOUS BLD VENIPUNCTURE: CPT

## 2019-05-24 PROCEDURE — 302043 TAPE, HYPAFIX: Performed by: HOSPITALIST

## 2019-05-24 PROCEDURE — A9270 NON-COVERED ITEM OR SERVICE: HCPCS | Performed by: FAMILY MEDICINE

## 2019-05-24 RX ADMIN — OXYCODONE HYDROCHLORIDE 10 MG: 5 TABLET ORAL at 14:19

## 2019-05-24 RX ADMIN — NYSTATIN 500000 UNITS: 100000 SUSPENSION ORAL at 21:45

## 2019-05-24 RX ADMIN — MORPHINE SULFATE 2 MG: 4 INJECTION INTRAVENOUS at 16:19

## 2019-05-24 RX ADMIN — CARVEDILOL 12.5 MG: 12.5 TABLET, FILM COATED ORAL at 16:20

## 2019-05-24 RX ADMIN — NYSTATIN 500000 UNITS: 100000 SUSPENSION ORAL at 10:30

## 2019-05-24 RX ADMIN — CARVEDILOL 12.5 MG: 12.5 TABLET, FILM COATED ORAL at 08:43

## 2019-05-24 RX ADMIN — Medication 1 CAPSULE: at 08:44

## 2019-05-24 RX ADMIN — PROCHLORPERAZINE MALEATE 10 MG: 5 TABLET, FILM COATED ORAL at 17:44

## 2019-05-24 RX ADMIN — OXYCODONE HYDROCHLORIDE 10 MG: 5 TABLET ORAL at 05:37

## 2019-05-24 RX ADMIN — CEFAZOLIN SODIUM 2 G: 2 INJECTION, SOLUTION INTRAVENOUS at 17:43

## 2019-05-24 RX ADMIN — VANCOMYCIN HYDROCHLORIDE 125 MG: 10 INJECTION, POWDER, LYOPHILIZED, FOR SOLUTION INTRAVENOUS at 23:02

## 2019-05-24 RX ADMIN — OXYCODONE HYDROCHLORIDE 10 MG: 5 TABLET ORAL at 10:30

## 2019-05-24 RX ADMIN — NYSTATIN 500000 UNITS: 100000 SUSPENSION ORAL at 17:43

## 2019-05-24 RX ADMIN — OXYCODONE HYDROCHLORIDE 10 MG: 5 TABLET ORAL at 18:07

## 2019-05-24 RX ADMIN — MORPHINE SULFATE 2 MG: 4 INJECTION INTRAVENOUS at 01:40

## 2019-05-24 RX ADMIN — NYSTATIN 500000 UNITS: 100000 SUSPENSION ORAL at 14:18

## 2019-05-24 RX ADMIN — CEFAZOLIN SODIUM 2 G: 2 INJECTION, SOLUTION INTRAVENOUS at 01:40

## 2019-05-24 RX ADMIN — MORPHINE SULFATE 2 MG: 4 INJECTION INTRAVENOUS at 21:45

## 2019-05-24 RX ADMIN — ENOXAPARIN SODIUM 40 MG: 100 INJECTION SUBCUTANEOUS at 05:37

## 2019-05-24 RX ADMIN — MORPHINE SULFATE 2 MG: 4 INJECTION INTRAVENOUS at 08:43

## 2019-05-24 RX ADMIN — CEFAZOLIN SODIUM 2 G: 2 INJECTION, SOLUTION INTRAVENOUS at 10:30

## 2019-05-24 RX ADMIN — PROCHLORPERAZINE MALEATE 10 MG: 5 TABLET, FILM COATED ORAL at 01:52

## 2019-05-24 RX ADMIN — TRAZODONE HYDROCHLORIDE 50 MG: 50 TABLET ORAL at 21:45

## 2019-05-24 ASSESSMENT — ENCOUNTER SYMPTOMS
DIARRHEA: 0
DIAPHORESIS: 0
COUGH: 0
VOMITING: 0
CHILLS: 0
MYALGIAS: 1
DIZZINESS: 0
ABDOMINAL PAIN: 0
NAUSEA: 1
NECK PAIN: 0
BACK PAIN: 0
FEVER: 0
HEARTBURN: 0
ROS GI COMMENTS: LOOSE STOOL
WEIGHT LOSS: 0

## 2019-05-24 NOTE — PROGRESS NOTES
Patient refusing bed alarm despite education on fall risk. Patient with commode at bedside and prefers to use commode without assistance.

## 2019-05-24 NOTE — PROGRESS NOTES
Patient AAOx4. Pleasant mood and affect appropriate.  Uses call light when need arises, refusing use of strip alarm despite education.  Moderate fall risk due to IV fluids and cast on RLE.  Complaints of pain and nausea covered by PRNs, patient weary of upcoming discharge and she feels unable to care after herself while at home.  Provided reassurance, informed pt she would not be discharged if the care team did not feel confident about her safety.  New orders for Rule out C-diff with contact precautions in place, appropriate signage on door, educated on precautions and expectations for gaining stool sample and rules for family friends to follow moving forward.

## 2019-05-24 NOTE — PROGRESS NOTES
" LIMB PRESERVATION SERVICE      HPI:  64 y.o. female, with a past medical history that includes Paresthesia, HTN. 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  This wound is chronic since 2016 and has had multiple debridements and courses of antibiotics. She was receiving home health for wound care regularly until Dec 2018 when pt reported the agency stopped coming. Ulcer worsened and became infected within last week. Went to ED for further care.     SURGERY DATE: 5/20/19    PROCEDURE:   1.  Right percutaneous tendo-Achilles lengthening.  2.  Right ankle manipulation.  3.  Right open medial release including subtalar joint, talonavicular joint,   and releasing the posterior tibialis tendon, flexor digitorum longus, and   flexor hallucis longus.  4.  Right lateral foot irrigation and debridement, dorsal and lateral   compartments.    5/21: Patient denies fevers, chills, nausea, vomiting.  Pain well controlled. Previous AFO from [facility]. Wearing PRAFO in bed.  5/22: Pain controlled.  Foot is rotating inward in PRAFO.  Medicare unfortunately will not cover a new AFO since it has been less than 5 years.  [facility] orthotist to discuss with patient her options.  5/23: Pain controlled.  Patient placed in cast today by Dr. Harper to maintain neutral foot position.  RN changing dressings about twice a day.  Reviewed plan of care with patient, casting, follow-ups.  Verbalized understanding.        /84   Pulse 88   Temp 36.4 °C (97.6 °F) (Temporal)   Resp 16   Ht 1.676 m (5' 6\")   Wt 98.1 kg (216 lb 4.3 oz)   LMP 04/09/1977   SpO2 94%   Breastfeeding? No   BMI 34.91 kg/m²     SURGICAL SITE ASSESSMENT:            Cast in place with window cut out for medial and lateral foot incisions.      Incision to right medial ankle,   Foam dressing saturated with serosanguineous drainage.  Changed early this a.m.  Dressing changed again to nonadhesive foam, Hypafix tape.  Incision " "approximated with sutures.    No erythema minimal edema,         Right lateral foot wound  Dressing in place minimal amount of old drainage noted on dressing.              INFECTION MANAGEMENT:  WBC: 9.9 on 5/21/2019  Pathology:  Right lateral foot wound:         Fragments of benign fibrous connective tissue with focal acute          inflammation, chronic inflammation and necrosis.    Wound culture results:   Results     Procedure Component Value Units Date/Time    CULTURE TISSUE W/ GRM STAIN [[id number]]  (Abnormal) Collected:  05/20/19 1539    Order Status:  Completed Specimen:  Tissue Updated:  05/23/19 1619     Significant Indicator POS (POS)     Source TISS     Site Right Lateral Foot Wound     Culture Result Growth noted after further incubation, see below for  organism identification.   (A)     Gram Stain Result Few WBCs.  Few Gram positive cocci.   (A)     Culture Result Diphtheroids  Rare growth  not J-K   (A)    Narrative:       Surgery Specimen    Anaerobic Culture [[id number]] Collected:  05/20/19 1539    Order Status:  Completed Specimen:  Tissue Updated:  05/23/19 1619     Significant Indicator NEG     Source TISS     Site Right Lateral Foot Wound     Culture Result No Anaerobes isolated.    Narrative:       Surgery Specimen    C Diff by PCR rflx Toxin [[id number]]     Order Status:  No result Specimen:  Stool from Stool     BLOOD CULTURE [[id number]] Collected:  05/18/19 0318    Order Status:  Completed Specimen:  Blood from Peripheral Updated:  05/23/19 0700     Significant Indicator NEG     Source BLD     Site PERIPHERAL     Culture Result No growth after 5 days of incubation.    Narrative:       Per Hospital Policy: Only change Specimen Src: to \"Line\" if  specified by physician order.  Right Hand    BLOOD CULTURE [[id number]] Collected:  05/18/19 0318    Order Status:  Completed Specimen:  Blood from Peripheral Updated:  05/23/19 0700     Significant Indicator NEG     Source BLD     Site PERIPHERAL     " "Culture Result No growth after 5 days of incubation.    Narrative:       Per Hospital Policy: Only change Specimen Src: to \"Line\" if  specified by physician order.  Left Hand    GRAM STAIN [[id number]] Collected:  05/20/19 1539    Order Status:  Completed Specimen:  Tissue Updated:  05/21/19 1011     Significant Indicator .     Source TISS     Site Right Lateral Foot Wound     Gram Stain Result Few WBCs.  Few Gram positive cocci.      Narrative:       Surgery Specimen    CULTURE WOUND W/ GRAM STAIN [[id number]]  (Abnormal)  (Susceptibility) Collected:  05/17/19 2145    Order Status:  Completed Specimen:  Wound Updated:  05/20/19 1031     Significant Indicator POS (POS)     Source WND     Site Right Foot     Culture Result Moderate growth mixed skin shantel predominately Diphtheroids (A)     Gram Stain Result Few WBCs.  Rare Gram positive rods.       Culture Result Staphylococcus aureus  Light growth   (A)    Culture & Susceptibility     STAPHYLOCOCCUS AUREUS     Antibiotic Sensitivity Microscan Unit Status    Ampicillin/sulbactam Sensitive <=8/4 mcg/mL Final    Method: JAMARI    Clindamycin Sensitive <=0.5 mcg/mL Final    Method: JAMARI    Daptomycin Sensitive <=0.5 mcg/mL Final    Method: JAMARI    Erythromycin Sensitive <=0.5 mcg/mL Final    Method: JAMARI    Moxifloxacin Sensitive <=0.5 mcg/mL Final    Method: JAMARI    Oxacillin Sensitive <=0.25 mcg/mL Final    Method: JAMARI    Penicillin Resistant >8 mcg/mL Final    Method: JAMARI    Tetracycline Sensitive <=4 mcg/mL Final    Method: JAMARI    Trimeth/Sulfa Sensitive <=0.5/9.5 mcg/mL Final    Method: JAMARI    Vancomycin Sensitive 1 mcg/mL Final    Method: JAMARI                       GRAM STAIN [[id number]] Collected:  05/17/19 2145    Order Status:  Completed Specimen:  Wound Updated:  05/18/19 0855     Significant Indicator .     Source WND     Site Right Foot     Gram Stain Result Few WBCs.  Rare Gram positive rods.      CULTURE WOUND W/ GRAM STAIN [[id number]]     Order Status:  No result " Specimen:  Wound from Right Foot                  PLAN:   POD # 3 s/p R JENNIFER, ankle manipulation, PT, FDL, FHL tendon releases, release of subtalar and talonavicular joints  Incisions approximated with sutures.      Wound care: Non adhesive foam to incisions, Hypafix tape,  RN to change daily and as needed for drainage     Antibiotics: Per ID recommendation , culture 5/17+ MSSA, OR culture 5/20+ diptheroids.  ID anticipating at least 4 weeks IV antibiotics.    Weight Bearing Status: Transfer weight bearing    Offloading: Cast to be changed weekly.  Patient to follow-up with [facility] once discharged for weekly cast change.  Patient to follow-up with [facility] for AFO once edema decreases, sutures are removed    PT Consult: Yes involved        Plan to return to O.R.: No    D/W: Patient, RN, Dr. Harper,       DISCHARGE PLAN:    Disposition: Home with Home health for wound care and IV antibiotics    Follow-up: LPS rounds in Wound Clinic in 3 weeks, scheduled for 6/14. sutures to be removed at this appointment  Follow-up at [facility] for cast changes weekly    Okay to discharge from LPS/Ortho standpoint.          ORLIN Beyer.P.R.N.    If any questions or concerns, please call [phone]

## 2019-05-24 NOTE — CARE PLAN
Problem: Infection  Goal: Will remain free from infection  Outcome: PROGRESSING AS EXPECTED  IV antibiotics in place. Dressing of the leg was changed today by nurse practitioner.     Problem: Pain Management  Goal: Pain level will decrease to patient's comfort goal  Outcome: PROGRESSING AS EXPECTED  PRN oxycodone and morphine has been given for pain.

## 2019-05-24 NOTE — PROGRESS NOTES
Patient dressing changes done and per order there is a wound to patient achilles however there is no hole in this part of the cast so achilles wound unable to be changed. Per patient the MD stated there did not need to be a hole there while placing cast. Per LPS note there is no mention of achilles wound. Unsure if this is supposed to be open or not per notes. Paged wound care who is to page oumar from LPS to determine if the achilles wound is to be changed or left under cast and not touched. RN phone number given to wound for call back and oumar from LPS in clinic so may be a few hours.

## 2019-05-24 NOTE — CARE PLAN
Problem: Venous Thromboembolism (VTW)/Deep Vein Thrombosis (DVT) Prevention:  Goal: Patient will participate in Venous Thrombosis (VTE)/Deep Vein Thrombosis (DVT)Prevention Measures    Intervention: Assess and monitor for anticoagulation complications  Patient on lovenox for VTE prevention. Patient with cast to R leg and unable to wear SCD on R leg.       Problem: Bowel/Gastric:  Goal: Normal bowel function is maintained or improved  Outcome: PROGRESSING AS EXPECTED  Patient stool sample sent for c.diff this morning- stool is not liquid or diarrhea however sample was ordered 5/23. Patient with only one BM so far.

## 2019-05-24 NOTE — PROGRESS NOTES
Hospital Medicine Daily Progress Note    Date of Service  5/24/2019    Chief Complaint  64 y.o. female admitted 5/17/2019 with Foot ulcer    Hospital Course  Admitted with chronic right foot ulcer with recent increased and foul-smelling wound drainage.    Interval Problem Update  S/P I/D 5/20 w Ortho  OR Cultures: GPC; diptheroids; prelim --- Ancef w stop date 6/17  No BM since 5/23 at 10 a.m documented; but patient says she has been having frequent small stools with voiding; C. diff test sample not yet received; nursing aware; low suspicion; sample finally collected and pending  CBC w diff, BMP reviewed and benign  Compazine helps w nausea    Consultants/Specialty  LPS/Ortho  ID    Code Status  Full    Disposition  H w HH and home infusion; await education/acceptance on Tuesday, w DC that afternoon    Review of Systems  Review of Systems   Constitutional: Negative for chills, diaphoresis, fever, malaise/fatigue and weight loss.   Respiratory: Negative for cough.    Cardiovascular: Negative for chest pain.   Gastrointestinal: Positive for nausea. Negative for abdominal pain, diarrhea, heartburn and vomiting.        Loose stool   Genitourinary: Negative for dysuria, frequency and urgency.   Musculoskeletal: Positive for joint pain and myalgias. Negative for back pain and neck pain.   Skin: Negative for itching and rash.   Neurological: Negative for dizziness.   All other systems reviewed and are negative.       Physical Exam  Temp:  [36.4 °C (97.6 °F)-37 °C (98.6 °F)] 36.9 °C (98.5 °F)  Pulse:  [] 94  Resp:  [12-18] 12  BP: (143-153)/(71-95) 146/82  SpO2:  [93 %-94 %] 93 %    Physical Exam   Constitutional: She is oriented to person, place, and time. Vital signs are normal. She appears well-developed and well-nourished. No distress.   obese   HENT:   Head: Normocephalic and atraumatic.   Eyes: Pupils are equal, round, and reactive to light. EOM are normal.   Neck: Neck supple.   Cardiovascular: Normal rate,  regular rhythm and normal heart sounds.    Pulmonary/Chest: Effort normal and breath sounds normal. No respiratory distress. She has no wheezes. She has no rales.   Abdominal: Soft. She exhibits no distension. There is no tenderness.   Musculoskeletal:   RLE CDI w ACE/Boot   Neurological: She is alert and oriented to person, place, and time. No cranial nerve deficit.   Skin: Skin is warm and dry.   Nursing note and vitals reviewed.      Fluids    Intake/Output Summary (Last 24 hours) at 05/24/19 0720  Last data filed at 05/24/19 0636   Gross per 24 hour   Intake              480 ml   Output                0 ml   Net              480 ml       Laboratory  Recent Labs      05/24/19 0221   WBC  8.2   RBC  4.28   HEMOGLOBIN  12.0   HEMATOCRIT  37.4   MCV  87.4   MCH  28.0   MCHC  32.1*   RDW  44.9   PLATELETCT  329   MPV  9.5     Recent Labs      05/24/19 0221   SODIUM  139   POTASSIUM  3.9   CHLORIDE  106   CO2  25   GLUCOSE  121*   BUN  12   CREATININE  0.81   CALCIUM  9.0                   Imaging  IR-MIDLINE CATHETER INSERTION WO GUIDANCE > AGE 3   Final Result                  Ultrasound-guided midline placement performed by qualified nursing staff    as above.          US-JENNIFER SINGLE LEVEL BILAT   Final Result      MR-FOOT-WITH & W/O RIGHT   Final Result      Lateral forefoot cellulitis with worsening skin ulceration and a new 4 cm draining abscess which overlies the fifth TMT. No osteomyelitis or septic arthropathy.      Resolution of fourth and fifth TMT centered marrow edema and joint effusion.      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 Underlying abscess not excluded       No acute bony destruction is detected. If there is high clinical concern for osteomyelitis, MRI with contrast could be performed      Severe osteopenia           Assessment/Plan  * Skin ulcer of right foot with fat layer exposed (HCC)- (present on admission)   Assessment & Plan    IV Ancef  ID following  Stop date 6/17  Midline ordered  Pain control  Wound care  Follow cultures         Nausea- (present on admission)   Assessment & Plan    Compazine     Monoparesis of lower extremity (HCC)- (present on admission)   Assessment & Plan    LPS following     HTN (hypertension)- (present on admission)   Assessment & Plan    Coreg     Loose stools   Assessment & Plan    C-diff ordered  Iso  CBC, BMP  Probiotic     Obesity   Assessment & Plan    Outpatient weight loss recommended          VTE prophylaxis: Lovenox    No changes to PE/ROS, AP except as noted 5/24

## 2019-05-24 NOTE — PROGRESS NOTES
Assumed care of pt at shift change. A/Ox4, discussed plan of care. Pt on room air. Cast in place. PRN oxycodone and morphine given for pain. Dressing of RLE is C/D/I so far this shift.   All needs met at this time. Bed in lowest position, treaded socks on, personal belongings and call light within reach, instructed to call for any assistance, hourly rounding in place.

## 2019-05-24 NOTE — PROGRESS NOTES
Patient alert and oriented, vss, complains of pain to R knee and leg. Patient states relieved with PRN pain medication. Educated patient on trying to manage pain with PO oxycodone instead of alternating with both morphine and oxycodone. Patient continues IV antibiotics for R foot infection. Midline assessed and no pain per patient this morning and flushing without difficulty. See bed alarm refusal note for fall prevention. Patient plan to go home Tuesday once teaching done for home IV antibiotics by home health. Will monitor.

## 2019-05-24 NOTE — PROGRESS NOTES
Infectious Disease Progress Note    Author: Guerline Reed M.D. Date & Time of service: 2019  4:41 PM    Chief Complaint:  Right foot abscess     Interval History:  64 y.o. female  admitted 2019. Pt has a past medical history of trauma to right leg resulting in right leg paralysis.  She has a chronic wound on the right lateral foot +abscess    - Interval 24 hours of Vitals/Labs/Micro,and imaging results reviewed as available. See assessment.    AF WBC 8.2 c/o nausea for weeks. Pain in foot controlled-leg casted  Review of Systems:  Review of Systems   Constitutional: Positive for malaise/fatigue. Negative for chills and fever.   Gastrointestinal: Positive for nausea. Negative for abdominal pain and vomiting.   Musculoskeletal: Positive for joint pain and myalgias.   All other systems reviewed and are negative.      Hemodynamics:  Temp (24hrs), Av.9 °C (98.5 °F), Min:36.8 °C (98.2 °F), Max:37.1 °C (98.8 °F)  Temperature: 37.1 °C (98.8 °F)  Pulse  Av.3  Min: 62  Max: 107   Blood Pressure : 160/89       Physical Exam:  Physical Exam   Constitutional: She is oriented to person, place, and time. She appears well-developed. No distress.   HENT:   Head: Normocephalic and atraumatic.   Eyes: Pupils are equal, round, and reactive to light. Conjunctivae and EOM are normal.   Neck: Neck supple. No JVD present.   Cardiovascular: Normal rate, regular rhythm and normal heart sounds.    Pulmonary/Chest: Effort normal. Stridor present. No respiratory distress.   Abdominal: Soft. Bowel sounds are normal. She exhibits no distension. There is no tenderness.   Musculoskeletal: She exhibits edema and deformity.   R foot in bandage and casted, bleeding noted on bandage   Neurological: She is alert and oriented to person, place, and time.   Skin: Skin is warm and dry. She is not diaphoretic.   Psychiatric: She has a normal mood and affect. Her behavior is normal.       Meds:    Current  Facility-Administered Medications:   •  prochlorperazine  •  lactobacillus rhamnosus  •  traZODone  •  ceFAZolin  •  morphine injection  •  nystatin  •  oxyCODONE immediate-release  •  carvedilol  •  senna-docusate **AND** polyethylene glycol/lytes **AND** magnesium hydroxide **AND** bisacodyl  •  enoxaparin  •  acetaminophen  •  enalaprilat  •  ondansetron  •  ondansetron    Labs:  Recent Labs      05/24/19 0221   WBC  8.2   RBC  4.28   HEMOGLOBIN  12.0   HEMATOCRIT  37.4   MCV  87.4   MCH  28.0   RDW  44.9   PLATELETCT  329   MPV  9.5   NEUTSPOLYS  48.80   LYMPHOCYTES  39.60   MONOCYTES  7.40   EOSINOPHILS  3.30   BASOPHILS  0.70     Recent Labs      05/24/19 0221   SODIUM  139   POTASSIUM  3.9   CHLORIDE  106   CO2  25   GLUCOSE  121*   BUN  12     Recent Labs      05/24/19 0221   CREATININE  0.81       Imaging:  Dx-foot-complete 3+ Right    Result Date: 5/17/2019 5/17/2019 5:09 PM HISTORY/REASON FOR EXAM: Nonhealing lateral wound for the years, recent significant worsening. Paralysis 3 years ago TECHNIQUE/EXAM DESCRIPTION AND NUMBER OF VIEWS: 3 nonweightbearing views of the RIGHT foot. COMPARISON:  None. FINDINGS: With severe lateral soft tissue swelling with skin ulceration. There is no underlying bony destruction confirmed. Severe osteopenia Metallic foreign body overlies the plantar medial great toe distally. There is solid periosteal reaction involving the fifth metatarsal shaft medially. No periostitis to suggest active inflammation. No acute displaced fracture. No subluxation. Mild first metatarsal-phalangeal joint space narrowing Cavus configuration of the midfoot     Lateral skin ulceration with associated soft tissue gas compatible with cellulitis. Underlying abscess not excluded No acute bony destruction is detected. If there is high clinical concern for osteomyelitis, MRI with contrast could be performed Severe osteopenia    Mr-foot-with & W/o Right    Result Date: 5/19/2019 5/18/2019 7:54 AM  HISTORY/REASON FOR EXAM:  Osteomyelitis suspected, foot swelling, diabetic. TECHNIQUE/EXAM DESCRIPTION: MRI of the RIGHT foot with and without contrast. Using a [employer] Signa 1.5 Mary MRI scanner, T1 axial, sagittal, and coronal, fast spin-echo T2 fat-suppressed axial and coronal, fast inversion recovery sagittal, and T1 fat suppressed axial and sagittal post intravenous contrast images were obtained. A total of 10 mL Gadavist given. COMPARISON:  6/20/2017 MRI, radiographs May 17. FINDINGS: There is lateral forefoot soft tissue swelling with ulceration overlying the fifth TMT articulation, worse than on comparison. The cutaneous ulceration does not extend all the way to the bony margins. There is underlying 42 x 16 x 34 mm T2 hyperintensity, T1 hypointensity with central absent enhancement and a draining sinus to a site of ulceration. This indicates draining abscess. There has been resolution of the fourth and fifth TMT joint effusion but there is still some spurring. No bony destruction. The remainder of the TMT articulations are normal There has been near resolution of subcutaneous fatty replacement overlying the fifth metatarsal head with low T1 and T2 signal. Resolved enhancement. The remainder the bony structures are notable for unchanged multiple bone infarcts, seen best in the calcaneus and talus. These have no associated articular surface collapse There is similar artifact involving the great toe compatible with metallic foreign body seen on x-ray No abnormal joint effusion is noted There is worsening severe fatty atrophy of the intrinsic foot musculature     Lateral forefoot cellulitis with worsening skin ulceration and a new 4 cm draining abscess which overlies the fifth TMT. No osteomyelitis or septic arthropathy. Resolution of fourth and fifth TMT centered marrow edema and joint effusion. 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  no pvd at rest in either leg  CESAR PLUMMER  Age:    64    Gender:     F  MRN:    [id number]  :    1955      BSA:  Exam Date:     2019 16:21  Room #:     Inpatient  Priority:     Routine  Ht (in):             Wt (lb):  Ordering Physician:        LANDON TOLBERT  Referring Physician:       LANDON TOLBERT  Sonographer:               Artemio Galan RVT  Study Type:                Complete Bilateral  Technical Quality:         Adequate  Indications:     Ulcer of lower extremity  CPT Codes:       40114  ICD Codes:       707.1  History:         Ulceration of lateral aspect of right foot X 3 years  Limitations:                 RIGHT  Waveform            Systolic BPs (mmHg)                             123           Brachial  Triphasic                                Common Femoral  Triphasic                  155           Posterior Tibial  Triphasic                  153           Dorsalis Pedis                                           Peroneal                             1.17          TOSHIA                                           TBI                       LEFT  Waveform        Systolic BPs (mmHg)                             132           Brachial  Triphasic                                Common Femoral  Triphasic                  152           Posterior Tibial  Triphasic                  145           Dorsalis Pedis                                           Peroneal                             1.15          TOSHIA                                           TBI  Findings  Bilateral.  Doppler waveform of the common femoral artery is of high amplitude and  triphasic.  Doppler waveforms at the ankle are brisk and triphasic.  Ankle-brachial index is normal.   Additional testing was not performed in accordance with lower extremity  arterial evaluation protocol.  Matty Gallo MD  (Electronically Signed)  Final Date:      20 May 2019 05:50    Ir-midline Catheter Insertion Wo Guidance > Age 3    Result Date: 5/22/2019  HISTORY/REASON FOR EXAM:  Midline Placement   TECHNIQUE/EXAM DESCRIPTION AND NUMBER OF VIEWS: Midline insertion with ultrasound guidance.  FINDINGS: Midline insertion with Ultrasound Guidance was performed by qualified nursing staff without the assistance of a Radiologist. Midline positioning as measured by RN or as appropriate length of catheter selected.              Ultrasound-guided midline placement performed by qualified nursing staff as above.       Micro:  Results     Procedure Component Value Units Date/Time    C Diff by PCR rflx Toxin [[id number]] Collected:  05/24/19 0930    Order Status:  Completed Specimen:  Stool from Stool Updated:  05/24/19 1038    Narrative:       Special Contact [id number] MARCIE HOOVER  Does this patient have risk factors for C-diff?->Yes    CULTURE TISSUE W/ GRM STAIN [[id number]]  (Abnormal) Collected:  05/20/19 1539    Order Status:  Completed Specimen:  Tissue Updated:  05/23/19 1619     Significant Indicator POS (POS)     Source TISS     Site Right Lateral Foot Wound     Culture Result Growth noted after further incubation, see below for  organism identification.   (A)     Gram Stain Result Few WBCs.  Few Gram positive cocci.   (A)     Culture Result Diphtheroids  Rare growth  not J-K   (A)    Narrative:       Surgery Specimen    Anaerobic Culture [[id number]] Collected:  05/20/19 1539    Order Status:  Completed Specimen:  Tissue Updated:  05/23/19 1619     Significant Indicator NEG     Source TISS     Site Right Lateral Foot Wound     Culture Result No Anaerobes isolated.    Narrative:       Surgery Specimen    BLOOD CULTURE [[id number]] Collected:  05/18/19 0318    Order Status:  Completed Specimen:  Blood  "from Peripheral Updated:  05/23/19 0700     Significant Indicator NEG     Source BLD     Site PERIPHERAL     Culture Result No growth after 5 days of incubation.    Narrative:       Per Hospital Policy: Only change Specimen Src: to \"Line\" if  specified by physician order.  Right Hand    BLOOD CULTURE [[id number]] Collected:  05/18/19 0318    Order Status:  Completed Specimen:  Blood from Peripheral Updated:  05/23/19 0700     Significant Indicator NEG     Source BLD     Site PERIPHERAL     Culture Result No growth after 5 days of incubation.    Narrative:       Per Hospital Policy: Only change Specimen Src: to \"Line\" if  specified by physician order.  Left Hand    GRAM STAIN [[id number]] Collected:  05/20/19 1539    Order Status:  Completed Specimen:  Tissue Updated:  05/21/19 1011     Significant Indicator .     Source TISS     Site Right Lateral Foot Wound     Gram Stain Result Few WBCs.  Few Gram positive cocci.      Narrative:       Surgery Specimen    CULTURE WOUND W/ GRAM STAIN [[id number]]  (Abnormal)  (Susceptibility) Collected:  05/17/19 2145    Order Status:  Completed Specimen:  Wound Updated:  05/20/19 1031     Significant Indicator POS (POS)     Source WND     Site Right Foot     Culture Result Moderate growth mixed skin shantel predominately Diphtheroids (A)     Gram Stain Result Few WBCs.  Rare Gram positive rods.       Culture Result Staphylococcus aureus  Light growth   (A)    Culture & Susceptibility     STAPHYLOCOCCUS AUREUS     Antibiotic Sensitivity Microscan Unit Status    Ampicillin/sulbactam Sensitive <=8/4 mcg/mL Final    Method: JAMARI    Clindamycin Sensitive <=0.5 mcg/mL Final    Method: JAMARI    Daptomycin Sensitive <=0.5 mcg/mL Final    Method: JAMARI    Erythromycin Sensitive <=0.5 mcg/mL Final    Method: JAMARI    Moxifloxacin Sensitive <=0.5 mcg/mL Final    Method: JAMARI    Oxacillin Sensitive <=0.25 mcg/mL Final    Method: JAMARI    Penicillin Resistant >8 mcg/mL Final    Method: JAMARI    Tetracycline " Sensitive <=4 mcg/mL Final    Method: JAMARI    Trimeth/Sulfa Sensitive <=0.5/9.5 mcg/mL Final    Method: JAMARI    Vancomycin Sensitive 1 mcg/mL Final    Method: JAMARI                       GRAM STAIN [[id number]] Collected:  05/17/19 2145    Order Status:  Completed Specimen:  Wound Updated:  05/18/19 0869     Significant Indicator .     Source WND     Site Right Foot     Gram Stain Result Few WBCs.  Rare Gram positive rods.      CULTURE WOUND W/ GRAM STAIN [[id number]]     Order Status:  No result Specimen:  Wound from Right Foot           Assessment:  Active Hospital Problems    Diagnosis   • *Skin ulcer of right foot with fat layer exposed (HCC) [L97.512]   • Monoparesis of lower extremity (HCC) [G83.10]   • HTN (hypertension) [I10]   • Obesity [E66.9]   • Hyperglycemia [R73.9]   • Weakness of right leg [R29.898]        ASSESSMENT/PLAN:    Right foot infection  Chronic ulcer secondary to right leg paralysis  -MRI foot on 5/18- lateral forefoot cellulitis and new 7 cm draining abscess which overlies the fifth TMT.  No osteomyelitis or septic arthropathy.  Wound culture results 5/17 +MSSA & diptheroids   S/p I and D with Dr. Gutierrez on 5/20, right foot I&D, tendo Achilles lengthening, ankle manipulation joint and flexor release, per op note no purulence found, necrotic tissue seen and excised, biopsy and cultures taken  Continue cefazolin for MSSA   Anticipate 4 weeks of IV antibiotics from OR (end 6/17/19) and then an additional 2 weeks of oral antibiotics  Orders done by Dr Razo    Diarrhea, nausea  C diff test ordered-stools are not liquid  Hold stool softeners     FU ID clinic 1-2 weeks after discharge

## 2019-05-25 PROBLEM — E66.811 CLASS 1 OBESITY IN ADULT: Status: ACTIVE | Noted: 2019-05-21

## 2019-05-25 PROCEDURE — 700111 HCHG RX REV CODE 636 W/ 250 OVERRIDE (IP): Performed by: INTERNAL MEDICINE

## 2019-05-25 PROCEDURE — A9270 NON-COVERED ITEM OR SERVICE: HCPCS | Performed by: NURSE PRACTITIONER

## 2019-05-25 PROCEDURE — 700102 HCHG RX REV CODE 250 W/ 637 OVERRIDE(OP): Performed by: INTERNAL MEDICINE

## 2019-05-25 PROCEDURE — 700102 HCHG RX REV CODE 250 W/ 637 OVERRIDE(OP): Performed by: HOSPITALIST

## 2019-05-25 PROCEDURE — 770021 HCHG ROOM/CARE - ISO PRIVATE

## 2019-05-25 PROCEDURE — A9270 NON-COVERED ITEM OR SERVICE: HCPCS | Performed by: HOSPITALIST

## 2019-05-25 PROCEDURE — A9270 NON-COVERED ITEM OR SERVICE: HCPCS | Performed by: FAMILY MEDICINE

## 2019-05-25 PROCEDURE — 700102 HCHG RX REV CODE 250 W/ 637 OVERRIDE(OP): Performed by: FAMILY MEDICINE

## 2019-05-25 PROCEDURE — 99232 SBSQ HOSP IP/OBS MODERATE 35: CPT | Performed by: HOSPITALIST

## 2019-05-25 PROCEDURE — 99233 SBSQ HOSP IP/OBS HIGH 50: CPT | Performed by: INTERNAL MEDICINE

## 2019-05-25 PROCEDURE — A9270 NON-COVERED ITEM OR SERVICE: HCPCS | Performed by: INTERNAL MEDICINE

## 2019-05-25 PROCEDURE — 700102 HCHG RX REV CODE 250 W/ 637 OVERRIDE(OP): Performed by: NURSE PRACTITIONER

## 2019-05-25 RX ORDER — PREGABALIN 75 MG/1
75 CAPSULE ORAL 3 TIMES DAILY
Status: DISCONTINUED | OUTPATIENT
Start: 2019-05-25 | End: 2019-05-28 | Stop reason: HOSPADM

## 2019-05-25 RX ADMIN — Medication 1 CAPSULE: at 07:21

## 2019-05-25 RX ADMIN — OXYCODONE HYDROCHLORIDE 10 MG: 5 TABLET ORAL at 20:43

## 2019-05-25 RX ADMIN — NYSTATIN 500000 UNITS: 100000 SUSPENSION ORAL at 14:03

## 2019-05-25 RX ADMIN — ENOXAPARIN SODIUM 40 MG: 100 INJECTION SUBCUTANEOUS at 07:21

## 2019-05-25 RX ADMIN — VANCOMYCIN HYDROCHLORIDE 125 MG: 10 INJECTION, POWDER, LYOPHILIZED, FOR SOLUTION INTRAVENOUS at 11:23

## 2019-05-25 RX ADMIN — VANCOMYCIN HYDROCHLORIDE 125 MG: 10 INJECTION, POWDER, LYOPHILIZED, FOR SOLUTION INTRAVENOUS at 23:04

## 2019-05-25 RX ADMIN — PREGABALIN 75 MG: 75 CAPSULE ORAL at 14:03

## 2019-05-25 RX ADMIN — ONDANSETRON 4 MG: 2 INJECTION INTRAMUSCULAR; INTRAVENOUS at 00:24

## 2019-05-25 RX ADMIN — OXYCODONE HYDROCHLORIDE 10 MG: 5 TABLET ORAL at 00:25

## 2019-05-25 RX ADMIN — PREGABALIN 75 MG: 75 CAPSULE ORAL at 17:17

## 2019-05-25 RX ADMIN — ONDANSETRON 4 MG: 2 INJECTION INTRAMUSCULAR; INTRAVENOUS at 11:26

## 2019-05-25 RX ADMIN — VANCOMYCIN HYDROCHLORIDE 125 MG: 10 INJECTION, POWDER, LYOPHILIZED, FOR SOLUTION INTRAVENOUS at 07:21

## 2019-05-25 RX ADMIN — OXYCODONE HYDROCHLORIDE 10 MG: 5 TABLET ORAL at 15:48

## 2019-05-25 RX ADMIN — CARVEDILOL 12.5 MG: 12.5 TABLET, FILM COATED ORAL at 17:17

## 2019-05-25 RX ADMIN — CEFAZOLIN SODIUM 2 G: 2 INJECTION, SOLUTION INTRAVENOUS at 02:33

## 2019-05-25 RX ADMIN — CARVEDILOL 12.5 MG: 12.5 TABLET, FILM COATED ORAL at 07:21

## 2019-05-25 RX ADMIN — NYSTATIN 500000 UNITS: 100000 SUSPENSION ORAL at 20:43

## 2019-05-25 RX ADMIN — NYSTATIN 500000 UNITS: 100000 SUSPENSION ORAL at 09:30

## 2019-05-25 RX ADMIN — OXYCODONE HYDROCHLORIDE 10 MG: 5 TABLET ORAL at 07:21

## 2019-05-25 RX ADMIN — OXYCODONE HYDROCHLORIDE 5 MG: 5 TABLET ORAL at 11:24

## 2019-05-25 RX ADMIN — ONDANSETRON 4 MG: 2 INJECTION INTRAMUSCULAR; INTRAVENOUS at 20:50

## 2019-05-25 RX ADMIN — CEFAZOLIN SODIUM 2 G: 2 INJECTION, SOLUTION INTRAVENOUS at 09:55

## 2019-05-25 RX ADMIN — NYSTATIN 500000 UNITS: 100000 SUSPENSION ORAL at 17:17

## 2019-05-25 RX ADMIN — PREGABALIN 75 MG: 75 CAPSULE ORAL at 09:30

## 2019-05-25 RX ADMIN — CEFAZOLIN SODIUM 2 G: 2 INJECTION, SOLUTION INTRAVENOUS at 17:17

## 2019-05-25 RX ADMIN — TRAZODONE HYDROCHLORIDE 50 MG: 50 TABLET ORAL at 23:04

## 2019-05-25 RX ADMIN — VANCOMYCIN HYDROCHLORIDE 125 MG: 10 INJECTION, POWDER, LYOPHILIZED, FOR SOLUTION INTRAVENOUS at 17:17

## 2019-05-25 ASSESSMENT — ENCOUNTER SYMPTOMS
BACK PAIN: 0
HEARTBURN: 0
CHILLS: 0
ROS GI COMMENTS: LOOSE STOOL
ROS GI COMMENTS: SOFT STOOLS
MYALGIAS: 1
FEVER: 0
NAUSEA: 0
DIARRHEA: 1
VOMITING: 0
DIZZINESS: 0
COUGH: 0
ABDOMINAL PAIN: 0
PSYCHIATRIC NEGATIVE: 1
DIAPHORESIS: 0
WEIGHT LOSS: 0
NAUSEA: 1
NECK PAIN: 0

## 2019-05-25 NOTE — PROGRESS NOTES
Patient refused bed alarm, educated regarding importance of intervention. Pt kept refusing. Charge RN notified

## 2019-05-25 NOTE — CARE PLAN
Problem: Venous Thromboembolism (VTW)/Deep Vein Thrombosis (DVT) Prevention:  Goal: Patient will participate in Venous Thrombosis (VTE)/Deep Vein Thrombosis (DVT)Prevention Measures  Outcome: PROGRESSING AS EXPECTED  No s/s of complication, ctm    Problem: Bowel/Gastric:  Goal: Normal bowel function is maintained or improved  Outcome: PROGRESSING SLOWER THAN EXPECTED  Loose BMx2

## 2019-05-25 NOTE — PROGRESS NOTES
Pt. Refuses Bed Alarm on, she is coherent with A&Ox4. Educated on the importance of using the equipment to prevent unnecessary falls. Understood the teaching but still refuses BA. Instructed on the use of call light button, and make sure to call for assistance when attempting to get out of bed. Demonstrated proper use of call light button, and to call phone numbers on comm board for assistance.    Pt. Also wanted the bedside commode close to her bed so when she needs to use it, educated on risks for fall. Pt. Verbalized understanding and still prefers to have it at bedside. Charge RN notified.

## 2019-05-25 NOTE — CARE PLAN
Problem: Venous Thromboembolism (VTW)/Deep Vein Thrombosis (DVT) Prevention:  Goal: Patient will participate in Venous Thrombosis (VTE)/Deep Vein Thrombosis (DVT)Prevention Measures   05/24/19 2115   OTHER   Risk Assessment Score 1   VTE RISK Moderate   Pharmacologic Prophylaxis Used LMWH: Enoxaparin(Lovenox)       Problem: Knowledge Deficit  Goal: Knowledge of disease process/condition, treatment plan, diagnostic tests, and medications will improve  Outcome: PROGRESSING AS EXPECTED  Pt. Educated about latest cdiff results, informed of plan with use of Vancomycin. Verbalized understanding.     Problem: Pain Management  Goal: Pain level will decrease to patient's comfort goal  Outcome: PROGRESSING AS EXPECTED   05/24/19 2140   OTHER   Pain Rating Scale (NPRS) 8   Comfort Goal Comfort at Rest;Comfort with Movement     Pt. Complaining of pain on R leg, PRN pain med given to alleviate pt. Pain.

## 2019-05-25 NOTE — PROGRESS NOTES
"Received pt. In bed awake, alert and orientedx4.  Complaints of mod shooting pain \"feels like coming from inside\" on her R leg and knee, medicated per MAR. Midline noted on SIOBHAN, flushed but no blood return noted. Denies any pain on midline site. IV still  Intact on R wrist, informed pt. That it will be ok to dc the line as she has a midline now. Pt. Refuses to do so for now and will wait, educated on infection risks. POC discussed with the pt. Due meds given. Needs attended.   "
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
## 2019-05-25 NOTE — PROGRESS NOTES
"Infectious Disease Progress Note    Author: Guerline Reed M.D. Date & Time of service: 2019  12:43 PM    Chief Complaint:  Right foot abscess     Interval History:  64 y.o. female  admitted 2019. h/o trauma to right leg resulting in right leg paralysis.  She has a chronic wound on the right lateral foot +abscess, no OM    - Interval 24 hours of Vitals/Labs/Micro,and imaging results reviewed as available. See assessment.    AF WBC 8.2 c/o nausea for weeks. Pain in foot controlled-leg casted   AF continues nausea, now associated with constant epigastric pain \"like my ulcer\"-getting meds. Poor appetite. Stool + Cdiff, soft not watery stools whenever she urinates  Review of Systems:  Review of Systems   Constitutional: Positive for malaise/fatigue. Negative for chills and fever.   Respiratory: Negative for cough.    Gastrointestinal: Positive for nausea. Negative for abdominal pain and vomiting.        Soft stools   Musculoskeletal: Positive for joint pain and myalgias.   All other systems reviewed and are negative.      Hemodynamics:  Temp (24hrs), Av.9 °C (98.5 °F), Min:36.8 °C (98.2 °F), Max:37.1 °C (98.8 °F)  Temperature: 37 °C (98.6 °F)  Pulse  Av.8  Min: 62  Max: 107   Blood Pressure : 145/86       Physical Exam:  Physical Exam   Constitutional: She is oriented to person, place, and time. She appears well-developed. No distress.   HENT:   Head: Normocephalic and atraumatic.   Eyes: Pupils are equal, round, and reactive to light. Conjunctivae and EOM are normal.   Neck: Neck supple. No JVD present.   Cardiovascular: Normal rate, regular rhythm and normal heart sounds.    Pulmonary/Chest: Effort normal. No stridor. No respiratory distress. She has no wheezes.   Abdominal: Soft. Bowel sounds are normal. She exhibits no distension. There is no tenderness. There is no rebound and no guarding.   Musculoskeletal: She exhibits edema and deformity.   R foot in bandage and casted, " bleeding noted on bandage   Neurological: She is alert and oriented to person, place, and time. No cranial nerve deficit.   Skin: Skin is warm and dry. She is not diaphoretic.   Psychiatric: She has a normal mood and affect. Her behavior is normal.       Meds:    Current Facility-Administered Medications:   •  pregabalin  •  vancomycin 50 mg/mL  •  prochlorperazine  •  lactobacillus rhamnosus  •  traZODone  •  ceFAZolin  •  morphine injection  •  nystatin  •  oxyCODONE immediate-release  •  carvedilol  •  enoxaparin  •  acetaminophen  •  enalaprilat  •  ondansetron  •  ondansetron    Labs:  Recent Labs      05/24/19 0221   WBC  8.2   RBC  4.28   HEMOGLOBIN  12.0   HEMATOCRIT  37.4   MCV  87.4   MCH  28.0   RDW  44.9   PLATELETCT  329   MPV  9.5   NEUTSPOLYS  48.80   LYMPHOCYTES  39.60   MONOCYTES  7.40   EOSINOPHILS  3.30   BASOPHILS  0.70     Recent Labs      05/24/19 0221   SODIUM  139   POTASSIUM  3.9   CHLORIDE  106   CO2  25   GLUCOSE  121*   BUN  12     Recent Labs      05/24/19 0221   CREATININE  0.81       Imaging:  Dx-foot-complete 3+ Right    Result Date: 5/17/2019 5/17/2019 5:09 PM HISTORY/REASON FOR EXAM: Nonhealing lateral wound for the years, recent significant worsening. Paralysis 3 years ago TECHNIQUE/EXAM DESCRIPTION AND NUMBER OF VIEWS: 3 nonweightbearing views of the RIGHT foot. COMPARISON:  None. FINDINGS: With severe lateral soft tissue swelling with skin ulceration. There is no underlying bony destruction confirmed. Severe osteopenia Metallic foreign body overlies the plantar medial great toe distally. There is solid periosteal reaction involving the fifth metatarsal shaft medially. No periostitis to suggest active inflammation. No acute displaced fracture. No subluxation. Mild first metatarsal-phalangeal joint space narrowing Cavus configuration of the midfoot     Lateral skin ulceration with associated soft tissue gas compatible with cellulitis. Underlying abscess not excluded No acute  bony destruction is detected. If there is high clinical concern for osteomyelitis, MRI with contrast could be performed Severe osteopenia    Mr-foot-with & W/o Right    Result Date: 5/19/2019 5/18/2019 7:54 AM HISTORY/REASON FOR EXAM:  Osteomyelitis suspected, foot swelling, diabetic. TECHNIQUE/EXAM DESCRIPTION: MRI of the RIGHT foot with and without contrast. Using a [employer] Signa 1.5 Mary MRI scanner, T1 axial, sagittal, and coronal, fast spin-echo T2 fat-suppressed axial and coronal, fast inversion recovery sagittal, and T1 fat suppressed axial and sagittal post intravenous contrast images were obtained. A total of 10 mL Gadavist given. COMPARISON:  6/20/2017 MRI, radiographs May 17. FINDINGS: There is lateral forefoot soft tissue swelling with ulceration overlying the fifth TMT articulation, worse than on comparison. The cutaneous ulceration does not extend all the way to the bony margins. There is underlying 42 x 16 x 34 mm T2 hyperintensity, T1 hypointensity with central absent enhancement and a draining sinus to a site of ulceration. This indicates draining abscess. There has been resolution of the fourth and fifth TMT joint effusion but there is still some spurring. No bony destruction. The remainder of the TMT articulations are normal There has been near resolution of subcutaneous fatty replacement overlying the fifth metatarsal head with low T1 and T2 signal. Resolved enhancement. The remainder the bony structures are notable for unchanged multiple bone infarcts, seen best in the calcaneus and talus. These have no associated articular surface collapse There is similar artifact involving the great toe compatible with metallic foreign body seen on x-ray No abnormal joint effusion is noted There is worsening severe fatty atrophy of the intrinsic foot musculature     Lateral forefoot cellulitis with worsening skin ulceration and a new 4 cm draining abscess which overlies the fifth TMT. No osteomyelitis or  septic arthropathy. Resolution of fourth and fifth TMT centered marrow edema and joint effusion. 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  no pvd at rest in either leg  CESAR PLUMMER  Age:    64    Gender:     F  MRN:    [id number]  :    1955      BSA:  Exam Date:     2019 16:21  Room #:     Inpatient  Priority:     Routine  Ht (in):             Wt (lb):  Ordering Physician:        LANDON TOLBERT  Referring Physician:       LANDON TOLBERT  Sonographer:               Artemio Galan RVT  Study Type:                Complete Bilateral  Technical Quality:         Adequate  Indications:     Ulcer of lower extremity  CPT Codes:       88065  ICD Codes:       707.1  History:         Ulceration of lateral aspect of right foot X 3 years  Limitations:                 RIGHT  Waveform            Systolic BPs (mmHg)                             123           Brachial  Triphasic                                Common Femoral  Triphasic                  155           Posterior Tibial  Triphasic                  153           Dorsalis Pedis                                           Peroneal                             1.17          TOSHIA                                           TBI                       LEFT  Waveform        Systolic BPs (mmHg)                             132           Brachial  Triphasic                                Common Femoral  Triphasic                  152           Posterior Tibial  Triphasic                  145           Dorsalis Pedis                                           Peroneal                             1.15          TOSHIA                                            TBI  Findings  Bilateral.  Doppler waveform of the common femoral artery is of high amplitude and  triphasic.  Doppler waveforms at the ankle are brisk and triphasic.  Ankle-brachial index is normal.  Additional testing was not performed in accordance with lower extremity  arterial evaluation protocol.  Matty Gallo MD  (Electronically Signed)  Final Date:      20 May 2019 05:50    Ir-midline Catheter Insertion Wo Guidance > Age 3    Result Date: 5/22/2019  HISTORY/REASON FOR EXAM:  Midline Placement   TECHNIQUE/EXAM DESCRIPTION AND NUMBER OF VIEWS: Midline insertion with ultrasound guidance.  FINDINGS: Midline insertion with Ultrasound Guidance was performed by qualified nursing staff without the assistance of a Radiologist. Midline positioning as measured by RN or as appropriate length of catheter selected.              Ultrasound-guided midline placement performed by qualified nursing staff as above.       Micro:  Results     Procedure Component Value Units Date/Time    C Diff Toxin [[id number]]  (Abnormal) Collected:  05/24/19 0930    Order Status:  Completed Updated:  05/24/19 2036     C.Diff Toxin A&B Positive (A)     Comment: TOXIN POSITIVE  Toxin detected by EIA; C. difficile detected by PCR.  If clinically correlated, treatment indicated per guidelines.  Test of cure is not recommended.         Narrative:       59 tel. [phone] 05/24/2019, 20:35, RB PERF. RESULTS CALLED TO:[id number].  Special Contact [id number] MARCIE HOOVER  Does this patient have risk factors for C-diff?->Yes    C Diff by PCR rflx Toxin [[id number]] Collected:  05/24/19 0930    Order Status:  Completed Specimen:  Stool from Stool Updated:  05/24/19 2032     C Diff by PCR See Toxin     027-NAP1-BI Presumptive Negative     Comment: Presumptive 027/NAP1/BI target DNA sequences are NOT DETECTED.       Narrative:       Special Contact [id number] MARCIE HOOVER  Does this patient have risk factors for C-diff?->Yes  "   CULTURE TISSUE W/ GRM STAIN [[id number]]  (Abnormal) Collected:  05/20/19 1539    Order Status:  Completed Specimen:  Tissue Updated:  05/23/19 1619     Significant Indicator POS (POS)     Source TISS     Site Right Lateral Foot Wound     Culture Result Growth noted after further incubation, see below for  organism identification.   (A)     Gram Stain Result Few WBCs.  Few Gram positive cocci.   (A)     Culture Result Diphtheroids  Rare growth  not J-K   (A)    Narrative:       Surgery Specimen    Anaerobic Culture [[id number]] Collected:  05/20/19 1539    Order Status:  Completed Specimen:  Tissue Updated:  05/23/19 1619     Significant Indicator NEG     Source TISS     Site Right Lateral Foot Wound     Culture Result No Anaerobes isolated.    Narrative:       Surgery Specimen    BLOOD CULTURE [[id number]] Collected:  05/18/19 0318    Order Status:  Completed Specimen:  Blood from Peripheral Updated:  05/23/19 0700     Significant Indicator NEG     Source BLD     Site PERIPHERAL     Culture Result No growth after 5 days of incubation.    Narrative:       Per Hospital Policy: Only change Specimen Src: to \"Line\" if  specified by physician order.  Right Hand    BLOOD CULTURE [[id number]] Collected:  05/18/19 0318    Order Status:  Completed Specimen:  Blood from Peripheral Updated:  05/23/19 0700     Significant Indicator NEG     Source BLD     Site PERIPHERAL     Culture Result No growth after 5 days of incubation.    Narrative:       Per Hospital Policy: Only change Specimen Src: to \"Line\" if  specified by physician order.  Left Hand    GRAM STAIN [[id number]] Collected:  05/20/19 1539    Order Status:  Completed Specimen:  Tissue Updated:  05/21/19 1011     Significant Indicator .     Source TISS     Site Right Lateral Foot Wound     Gram Stain Result Few WBCs.  Few Gram positive cocci.      Narrative:       Surgery Specimen    CULTURE WOUND W/ GRAM STAIN [[id number]]  (Abnormal)  (Susceptibility) Collected:  05/17/19 " 2145    Order Status:  Completed Specimen:  Wound Updated:  05/20/19 1031     Significant Indicator POS (POS)     Source WND     Site Right Foot     Culture Result Moderate growth mixed skin shantel predominately Diphtheroids (A)     Gram Stain Result Few WBCs.  Rare Gram positive rods.       Culture Result Staphylococcus aureus  Light growth   (A)    Culture & Susceptibility     STAPHYLOCOCCUS AUREUS     Antibiotic Sensitivity Microscan Unit Status    Ampicillin/sulbactam Sensitive <=8/4 mcg/mL Final    Method: JAMARI    Clindamycin Sensitive <=0.5 mcg/mL Final    Method: JAMARI    Daptomycin Sensitive <=0.5 mcg/mL Final    Method: JAMARI    Erythromycin Sensitive <=0.5 mcg/mL Final    Method: JAMARI    Moxifloxacin Sensitive <=0.5 mcg/mL Final    Method: JAMARI    Oxacillin Sensitive <=0.25 mcg/mL Final    Method: JAMARI    Penicillin Resistant >8 mcg/mL Final    Method: JAMARI    Tetracycline Sensitive <=4 mcg/mL Final    Method: JAMARI    Trimeth/Sulfa Sensitive <=0.5/9.5 mcg/mL Final    Method: JAMARI    Vancomycin Sensitive 1 mcg/mL Final    Method: JAMARI                             Assessment:  Active Hospital Problems    Diagnosis   • *Skin ulcer of right foot with fat layer exposed (HCC) [L97.512]   • Monoparesis of lower extremity (HCC) [G83.10]   • HTN (hypertension) [I10]   • Obesity [E66.9]   • Hyperglycemia [R73.9]   • Weakness of right leg [R29.898]        ASSESSMENT/PLAN:   Right foot abscess  Chronic ulcer secondary to right leg paralysis  Afebrile  MRI foot on 5/18- lateral forefoot cellulitis and new 7 cm draining abscess which overlies the fifth TMT.  No osteomyelitis or septic arthropathy.  Wound culture results 5/17 +MSSA & diptheroids   S/p I and D with Dr. Gutierrez on 5/20, right foot I&D, tendo Achilles lengthening, ankle manipulation joint and flexor release, per op note no pus found, necrotic tissue seen and excised, biopsy and cultures taken  Continue cefazolin for MSSA   Anticipate 4 weeks of IV antibiotics from OR  (end 6/17/19) and then an additional 2 weeks of oral antibiotics  Orders done by Dr Razo    Cdiff +, new  C diff test done-stools are not liquid but toxin + and on abx  No leukocytosis; creat stable  Agree with vanco PO for 14 days then prophylactic dosing     I have performed a physical exam and reviewed and updated ROS as of today.  In review of yesterday's note dated  5/24/2019, there are no changes except as documented above.          FU ID clinic 1-2 weeks after discharge

## 2019-05-26 PROBLEM — A04.72 C. DIFFICILE DIARRHEA: Status: ACTIVE | Noted: 2019-05-23

## 2019-05-26 PROCEDURE — 700111 HCHG RX REV CODE 636 W/ 250 OVERRIDE (IP): Performed by: INTERNAL MEDICINE

## 2019-05-26 PROCEDURE — 700102 HCHG RX REV CODE 250 W/ 637 OVERRIDE(OP): Performed by: INTERNAL MEDICINE

## 2019-05-26 PROCEDURE — 99232 SBSQ HOSP IP/OBS MODERATE 35: CPT | Performed by: INTERNAL MEDICINE

## 2019-05-26 PROCEDURE — 700102 HCHG RX REV CODE 250 W/ 637 OVERRIDE(OP): Performed by: FAMILY MEDICINE

## 2019-05-26 PROCEDURE — 99232 SBSQ HOSP IP/OBS MODERATE 35: CPT | Performed by: HOSPITALIST

## 2019-05-26 PROCEDURE — A9270 NON-COVERED ITEM OR SERVICE: HCPCS | Performed by: NURSE PRACTITIONER

## 2019-05-26 PROCEDURE — 700102 HCHG RX REV CODE 250 W/ 637 OVERRIDE(OP): Performed by: HOSPITALIST

## 2019-05-26 PROCEDURE — A9270 NON-COVERED ITEM OR SERVICE: HCPCS | Performed by: FAMILY MEDICINE

## 2019-05-26 PROCEDURE — A9270 NON-COVERED ITEM OR SERVICE: HCPCS | Performed by: HOSPITALIST

## 2019-05-26 PROCEDURE — 770021 HCHG ROOM/CARE - ISO PRIVATE

## 2019-05-26 PROCEDURE — 700102 HCHG RX REV CODE 250 W/ 637 OVERRIDE(OP): Performed by: NURSE PRACTITIONER

## 2019-05-26 PROCEDURE — A9270 NON-COVERED ITEM OR SERVICE: HCPCS | Performed by: INTERNAL MEDICINE

## 2019-05-26 RX ORDER — TRAZODONE HYDROCHLORIDE 150 MG/1
75 TABLET ORAL
Status: DISCONTINUED | OUTPATIENT
Start: 2019-05-26 | End: 2019-05-28 | Stop reason: HOSPADM

## 2019-05-26 RX ADMIN — OXYCODONE HYDROCHLORIDE 10 MG: 5 TABLET ORAL at 10:18

## 2019-05-26 RX ADMIN — OXYCODONE HYDROCHLORIDE 10 MG: 5 TABLET ORAL at 14:27

## 2019-05-26 RX ADMIN — ONDANSETRON 4 MG: 2 INJECTION INTRAMUSCULAR; INTRAVENOUS at 12:00

## 2019-05-26 RX ADMIN — OXYCODONE HYDROCHLORIDE 10 MG: 5 TABLET ORAL at 22:39

## 2019-05-26 RX ADMIN — TRAZODONE HYDROCHLORIDE 75 MG: 150 TABLET ORAL at 22:38

## 2019-05-26 RX ADMIN — PREGABALIN 75 MG: 75 CAPSULE ORAL at 11:57

## 2019-05-26 RX ADMIN — CEFAZOLIN SODIUM 2 G: 2 INJECTION, SOLUTION INTRAVENOUS at 00:59

## 2019-05-26 RX ADMIN — VANCOMYCIN HYDROCHLORIDE 125 MG: 10 INJECTION, POWDER, LYOPHILIZED, FOR SOLUTION INTRAVENOUS at 11:57

## 2019-05-26 RX ADMIN — OXYCODONE HYDROCHLORIDE 10 MG: 5 TABLET ORAL at 05:10

## 2019-05-26 RX ADMIN — OXYCODONE HYDROCHLORIDE 10 MG: 5 TABLET ORAL at 00:59

## 2019-05-26 RX ADMIN — CEFAZOLIN SODIUM 2 G: 2 INJECTION, SOLUTION INTRAVENOUS at 10:18

## 2019-05-26 RX ADMIN — Medication 1 CAPSULE: at 08:53

## 2019-05-26 RX ADMIN — OXYCODONE HYDROCHLORIDE 10 MG: 5 TABLET ORAL at 18:34

## 2019-05-26 RX ADMIN — CARVEDILOL 12.5 MG: 12.5 TABLET, FILM COATED ORAL at 16:58

## 2019-05-26 RX ADMIN — VANCOMYCIN HYDROCHLORIDE 125 MG: 10 INJECTION, POWDER, LYOPHILIZED, FOR SOLUTION INTRAVENOUS at 05:10

## 2019-05-26 RX ADMIN — NYSTATIN 500000 UNITS: 100000 SUSPENSION ORAL at 10:18

## 2019-05-26 RX ADMIN — PREGABALIN 75 MG: 75 CAPSULE ORAL at 16:58

## 2019-05-26 RX ADMIN — VANCOMYCIN HYDROCHLORIDE 125 MG: 10 INJECTION, POWDER, LYOPHILIZED, FOR SOLUTION INTRAVENOUS at 17:10

## 2019-05-26 RX ADMIN — VANCOMYCIN HYDROCHLORIDE 125 MG: 10 INJECTION, POWDER, LYOPHILIZED, FOR SOLUTION INTRAVENOUS at 22:38

## 2019-05-26 RX ADMIN — PREGABALIN 75 MG: 75 CAPSULE ORAL at 05:10

## 2019-05-26 RX ADMIN — CARVEDILOL 12.5 MG: 12.5 TABLET, FILM COATED ORAL at 08:53

## 2019-05-26 RX ADMIN — ENOXAPARIN SODIUM 40 MG: 100 INJECTION SUBCUTANEOUS at 05:10

## 2019-05-26 RX ADMIN — ONDANSETRON 4 MG: 2 INJECTION INTRAMUSCULAR; INTRAVENOUS at 16:58

## 2019-05-26 RX ADMIN — CEFAZOLIN SODIUM 2 G: 2 INJECTION, SOLUTION INTRAVENOUS at 17:09

## 2019-05-26 ASSESSMENT — ENCOUNTER SYMPTOMS
EYES NEGATIVE: 1
COUGH: 0
PSYCHIATRIC NEGATIVE: 1
DIARRHEA: 1
WEIGHT LOSS: 0
HEARTBURN: 0
FEVER: 0
VOMITING: 0
MYALGIAS: 1
TINGLING: 1
ROS GI COMMENTS: SOFT STOOLS
NECK PAIN: 0
ROS GI COMMENTS: LOOSE STOOL
DIAPHORESIS: 0
NAUSEA: 0
NAUSEA: 1
FOCAL WEAKNESS: 1
CHILLS: 0
BACK PAIN: 0
DIZZINESS: 0
ABDOMINAL PAIN: 0

## 2019-05-26 NOTE — CARE PLAN
Problem: Bowel/Gastric:  Goal: Normal bowel function is maintained or improved  Outcome: PROGRESSING SLOWER THAN EXPECTED  BMx3 today, soft, medium    Problem: Pain Management  Goal: Pain level will decrease to patient's comfort goal  Outcome: PROGRESSING AS EXPECTED  Pain assessed, pain med given per MAR, pt resting comfortably in bed, ctm

## 2019-05-26 NOTE — PROGRESS NOTES
" LIMB PRESERVATION SERVICE    HPI:      Joan Olivares is a 64 y.o. female, with a past medical history that includes Paresthesia and prior External Fixation with hardware removal of Right ORIF due to infection in 2006.  admitted 5/17/2019 for Cellulitis. 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  This wound is chronic since 2016 and has had multiple debridements and courses of IV antibiotics and outpatient wound care treamtent. The wound has failed to improve.   Pt has limited sensation and mobility of her right leg.    5/26/19 Patient denies fevers, chills, nausea, vomiting.  Pain well controlled.  Patient in cast with neutral foot position and RN dressing changes .   Incision well approximated, sutures intact.  Erythema, Edema improving .Drainage scant.  Toes warm, motor intact.    ASSESSMENT: Procedure(s):  Dr Gutierrez 5/20/19   1.  Right percutaneous tendo-Achilles lengthening.  2.  Right ankle manipulation.  3.  Right open medial release including subtalar joint, talonavicular joint,   and releasing the posterior tibialis tendon, flexor digitorum longus, and   flexor hallucis longus.  4.  Right lateral foot irrigation and debridement, dorsal and lateral   compartments.    /69   Pulse 66   Temp 36.9 °C (98.4 °F) (Temporal)   Resp 16   Ht 1.676 m (5' 6\")   Wt 98.1 kg (216 lb 4.3 oz)   LMP 04/09/1977   SpO2 94%   Breastfeeding? No   BMI 34.91 kg/m²          Incision 05/20/19 (Active)   Site Assessment Red 5/26/2019  4:00 PM   Barbara-wound Assessment Blanchable erythema;Edema 5/26/2019  4:00 PM   Margins Attached edges 5/26/2019  4:00 PM   Closure Approximated;Sutures 5/26/2019  4:00 PM   Drainage Amount Scant 5/26/2019  4:00 PM   Drainage Description Serosanguineous 5/26/2019  4:00 PM   Treatments Cleansed 5/24/2019  1:29 PM   Dressing Options Nonadhesive Foam 5/26/2019  4:00 PM   Dressing Changed Changed 5/24/2019  1:29 PM   Dressing Status " Clean;Dry;Intact 5/26/2019  8:58 AM   Dressing Change Frequency Daily 5/26/2019  4:00 PM   NEXT Dressing Change  05/26/19 5/26/2019  8:58 AM   WOUND NURSE ONLY - Time Spent with Patient (mins) 30 5/26/2019  4:00 PM        DIABETES MANAGEMENT:  Lab Results   Component Value Date/Time    GLUCOSE 121 (H) 05/24/2019 02:21 AM      Lab Results   Component Value Date/Time    HBA1C 5.7 (H) 05/21/2019 02:13 AM        Diabetes education NA    INFECTION MANAGEMENT:  WBC   Date/Time Value Ref Range Status   05/24/2019 02:21 AM 8.2 4.8 - 10.8 K/uL Final       Microbiology:   Results     Procedure Component Value Units Date/Time    C Diff Toxin [[id number]]  (Abnormal) Collected:  05/24/19 0930    Order Status:  Completed Updated:  05/24/19 2036     C.Diff Toxin A&B Positive (A)     Comment: TOXIN POSITIVE  Toxin detected by EIA; C. difficile detected by PCR.  If clinically correlated, treatment indicated per guidelines.  Test of cure is not recommended.         Narrative:       59 tel. [phone] 05/24/2019, 20:35, RB PERF. RESULTS CALLED TO:[id number].  Special Contact [id number] MARCIE HOOVER  Does this patient have risk factors for C-diff?->Yes    C Diff by PCR rflx Toxin [[id number]] Collected:  05/24/19 0930    Order Status:  Completed Specimen:  Stool from Stool Updated:  05/24/19 2032     C Diff by PCR See Toxin     027-NAP1-BI Presumptive Negative     Comment: Presumptive 027/NAP1/BI target DNA sequences are NOT DETECTED.               PLAN:  POD# 6R JENNIFER, ankle manipulation, PT, FDL, FHL tendon releases, release of subtalar and talonavicular joints  Incisions approximated with sutures.    Wound care: Resume previous Wound Care orders.  Non adhesive foam to incisions, Hypafix tape  Nursing to change daily and PRN for Saturation or Dislodgement  Wound Care by Nursing, LPS to Follow.    Offloading: Cast to be changed weekly.  Patient to follow-up with [facility] once discharged for weekly cast change.  Patient to follow-up  with ability for AFO once edema decreases, sutures are removed    Weight Bearing Status: Transfer weight bearing      Antibiotics: Per ID recommendation , culture 5/17+ MSSA, OR culture 5/20+ diptheroids.  ID anticipating at least 4 weeks IV antibiotics.    Surgery: NA    DISCHARGE PLAN:     Patient requires skilled therapeutic intervention for debridement, product selection and application, education, wound bed preparation and assessment. OK to discharge from Ortho/LPS standpoint    Disposition: Home with Home health for wound care and IV antibiotics     Follow-up: LPS rounds in Wound Clinic in 3 weeks, scheduled for 6/14. sutures to be removed at this appointment  Follow-up at [facility] for cast changes weekly     Rufus Galan R.N.
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
## 2019-05-26 NOTE — CARE PLAN
Problem: Safety  Goal: Will remain free from injury  Outcome: PROGRESSING AS EXPECTED  Fall precautions in place. Treaded socks on pt. Appropriate signs on doorway. IV pole on same side as bathroom. Bedrails up. Bed in lowest position and locked.  Call light and phone within reach. Patient educated on importance of calling nurses before getting out of bed, verbalizes understanding.    Problem: Pain Management  Goal: Pain level will decrease to patient's comfort goal  Outcome: PROGRESSING AS EXPECTED  Educated patient about 0-10 pain scale, regular pain assessments, and available pharmaciological and non-pharmacological pain relief.  Medicated per MAR and utilized oxycodone 10mg for 6/10 R foot pain. Patient expressed understanding and had no further questions at this time.

## 2019-05-26 NOTE — PROGRESS NOTES
Report received by dayshift RN. Assumed care of pt. Assessment complete. Pt A&Ox4, VSS. Pt on special contact for c.diff. Pt reports nausea and R foot pain, medicated per MAR. L upper Midline dressing changed per protocol. R medial ankle and R lateral dressing changed per orders. Plan of care discussed. Call light within reach, bed in lowest position, and pt has no further questions at this time. Hourly rounding in place.

## 2019-05-26 NOTE — PROGRESS NOTES
"Infectious Disease Progress Note    Author: Guerline Reed M.D. Date & Time of service: 2019  10:16 AM    Chief Complaint:  Right foot abscess     Interval History:  64 y.o. female  admitted 2019. h/o trauma to right leg resulting in right leg paralysis.  She has a chronic wound on the right lateral foot +abscess, no OM    - Interval 24 hours of Vitals/Labs/Micro,and imaging results reviewed as available. See assessment.    AF WBC 8.2 c/o nausea for weeks. Pain in foot controlled-leg casted   AF continues nausea, now associated with constant epigastric pain \"like my ulcer\"-getting meds. Poor appetite. Stool + Cdiff, soft not watery stools whenever she urinates   AF no clinical change from   Review of Systems:  Review of Systems   Constitutional: Positive for malaise/fatigue. Negative for chills and fever.   Respiratory: Negative for cough.    Gastrointestinal: Positive for nausea. Negative for abdominal pain and vomiting.        Soft stools   Musculoskeletal: Positive for joint pain and myalgias.   All other systems reviewed and are negative.      Hemodynamics:  Temp (24hrs), Av.8 °C (98.2 °F), Min:36.7 °C (98.1 °F), Max:36.9 °C (98.4 °F)  Temperature: 36.9 °C (98.4 °F)  Pulse  Av  Min: 62  Max: 107   Blood Pressure : 121/69       Physical Exam:  Physical Exam   Constitutional: She is oriented to person, place, and time. She appears well-developed. No distress.   HENT:   Head: Normocephalic and atraumatic.   Eyes: Pupils are equal, round, and reactive to light. Conjunctivae and EOM are normal.   Neck: Neck supple. No JVD present.   Cardiovascular: Normal rate, regular rhythm and normal heart sounds.    Pulmonary/Chest: Effort normal. No stridor. No respiratory distress. She has no wheezes.   Abdominal: Soft. Bowel sounds are normal. She exhibits no distension. There is no tenderness. There is no rebound and no guarding.   Musculoskeletal: She exhibits edema and " deformity.   R foot in bandage and casted, bleeding noted on bandage   Neurological: She is alert and oriented to person, place, and time. No cranial nerve deficit.   Skin: Skin is warm and dry. She is not diaphoretic.   Psychiatric: She has a normal mood and affect. Her behavior is normal.       Meds:    Current Facility-Administered Medications:   •  pregabalin  •  vancomycin 50 mg/mL  •  prochlorperazine  •  lactobacillus rhamnosus  •  traZODone  •  ceFAZolin  •  nystatin  •  oxyCODONE immediate-release  •  carvedilol  •  enoxaparin  •  acetaminophen  •  enalaprilat  •  ondansetron  •  ondansetron    Labs:  Recent Labs      05/24/19 0221   WBC  8.2   RBC  4.28   HEMOGLOBIN  12.0   HEMATOCRIT  37.4   MCV  87.4   MCH  28.0   RDW  44.9   PLATELETCT  329   MPV  9.5   NEUTSPOLYS  48.80   LYMPHOCYTES  39.60   MONOCYTES  7.40   EOSINOPHILS  3.30   BASOPHILS  0.70     Recent Labs      05/24/19 0221   SODIUM  139   POTASSIUM  3.9   CHLORIDE  106   CO2  25   GLUCOSE  121*   BUN  12     Recent Labs      05/24/19 0221   CREATININE  0.81       Imaging:  Dx-foot-complete 3+ Right    Result Date: 5/17/2019 5/17/2019 5:09 PM HISTORY/REASON FOR EXAM: Nonhealing lateral wound for the years, recent significant worsening. Paralysis 3 years ago TECHNIQUE/EXAM DESCRIPTION AND NUMBER OF VIEWS: 3 nonweightbearing views of the RIGHT foot. COMPARISON:  None. FINDINGS: With severe lateral soft tissue swelling with skin ulceration. There is no underlying bony destruction confirmed. Severe osteopenia Metallic foreign body overlies the plantar medial great toe distally. There is solid periosteal reaction involving the fifth metatarsal shaft medially. No periostitis to suggest active inflammation. No acute displaced fracture. No subluxation. Mild first metatarsal-phalangeal joint space narrowing Cavus configuration of the midfoot     Lateral skin ulceration with associated soft tissue gas compatible with cellulitis. Underlying abscess  not excluded No acute bony destruction is detected. If there is high clinical concern for osteomyelitis, MRI with contrast could be performed Severe osteopenia    Mr-foot-with & W/o Right    Result Date: 5/19/2019 5/18/2019 7:54 AM HISTORY/REASON FOR EXAM:  Osteomyelitis suspected, foot swelling, diabetic. TECHNIQUE/EXAM DESCRIPTION: MRI of the RIGHT foot with and without contrast. Using a [employer] Signa 1.5 Mary MRI scanner, T1 axial, sagittal, and coronal, fast spin-echo T2 fat-suppressed axial and coronal, fast inversion recovery sagittal, and T1 fat suppressed axial and sagittal post intravenous contrast images were obtained. A total of 10 mL Gadavist given. COMPARISON:  6/20/2017 MRI, radiographs May 17. FINDINGS: There is lateral forefoot soft tissue swelling with ulceration overlying the fifth TMT articulation, worse than on comparison. The cutaneous ulceration does not extend all the way to the bony margins. There is underlying 42 x 16 x 34 mm T2 hyperintensity, T1 hypointensity with central absent enhancement and a draining sinus to a site of ulceration. This indicates draining abscess. There has been resolution of the fourth and fifth TMT joint effusion but there is still some spurring. No bony destruction. The remainder of the TMT articulations are normal There has been near resolution of subcutaneous fatty replacement overlying the fifth metatarsal head with low T1 and T2 signal. Resolved enhancement. The remainder the bony structures are notable for unchanged multiple bone infarcts, seen best in the calcaneus and talus. These have no associated articular surface collapse There is similar artifact involving the great toe compatible with metallic foreign body seen on x-ray No abnormal joint effusion is noted There is worsening severe fatty atrophy of the intrinsic foot musculature     Lateral forefoot cellulitis with worsening skin ulceration and a new 4 cm draining abscess which overlies the fifth TMT. No  osteomyelitis or septic arthropathy. Resolution of fourth and fifth TMT centered marrow edema and joint effusion. 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  no pvd at rest in either leg  CESAR PLUMMER  Age:    64    Gender:     F  MRN:    [id number]  :    1955      BSA:  Exam Date:     2019 16:21  Room #:     Inpatient  Priority:     Routine  Ht (in):             Wt (lb):  Ordering Physician:        LANDON TOLBERT  Referring Physician:       LANDON TOLBERT  Sonographer:               Artemio Galan RVT  Study Type:                Complete Bilateral  Technical Quality:         Adequate  Indications:     Ulcer of lower extremity  CPT Codes:       35772  ICD Codes:       707.1  History:         Ulceration of lateral aspect of right foot X 3 years  Limitations:                 RIGHT  Waveform            Systolic BPs (mmHg)                             123           Brachial  Triphasic                                Common Femoral  Triphasic                  155           Posterior Tibial  Triphasic                  153           Dorsalis Pedis                                           Peroneal                             1.17          TOSHIA                                           TBI                       LEFT  Waveform        Systolic BPs (mmHg)                             132           Brachial  Triphasic                                Common Femoral  Triphasic                  152           Posterior Tibial  Triphasic                  145           Dorsalis Pedis                                           Peroneal                             1.15          TOSHIA                                            TBI  Findings  Bilateral.  Doppler waveform of the common femoral artery is of high amplitude and  triphasic.  Doppler waveforms at the ankle are brisk and triphasic.  Ankle-brachial index is normal.  Additional testing was not performed in accordance with lower extremity  arterial evaluation protocol.  Matty Gallo MD  (Electronically Signed)  Final Date:      20 May 2019 05:50    Ir-midline Catheter Insertion Wo Guidance > Age 3    Result Date: 5/22/2019  HISTORY/REASON FOR EXAM:  Midline Placement   TECHNIQUE/EXAM DESCRIPTION AND NUMBER OF VIEWS: Midline insertion with ultrasound guidance.  FINDINGS: Midline insertion with Ultrasound Guidance was performed by qualified nursing staff without the assistance of a Radiologist. Midline positioning as measured by RN or as appropriate length of catheter selected.              Ultrasound-guided midline placement performed by qualified nursing staff as above.       Micro:  Results     Procedure Component Value Units Date/Time    C Diff Toxin [[id number]]  (Abnormal) Collected:  05/24/19 0930    Order Status:  Completed Updated:  05/24/19 2036     C.Diff Toxin A&B Positive (A)     Comment: TOXIN POSITIVE  Toxin detected by EIA; C. difficile detected by PCR.  If clinically correlated, treatment indicated per guidelines.  Test of cure is not recommended.         Narrative:       59 tel. [phone] 05/24/2019, 20:35, RB PERF. RESULTS CALLED TO:[id number].  Special Contact [id number] MARCIE HOOVER  Does this patient have risk factors for C-diff?->Yes    C Diff by PCR rflx Toxin [[id number]] Collected:  05/24/19 0930    Order Status:  Completed Specimen:  Stool from Stool Updated:  05/24/19 2032     C Diff by PCR See Toxin     027-NAP1-BI Presumptive Negative     Comment: Presumptive 027/NAP1/BI target DNA sequences are NOT DETECTED.       Narrative:       Special Contact [id number] MARCIE HOOVER  Does this patient have risk factors  "for C-diff?->Yes    CULTURE TISSUE W/ GRM STAIN [[id number]]  (Abnormal) Collected:  05/20/19 1539    Order Status:  Completed Specimen:  Tissue Updated:  05/23/19 1619     Significant Indicator POS (POS)     Source TISS     Site Right Lateral Foot Wound     Culture Result Growth noted after further incubation, see below for  organism identification.   (A)     Gram Stain Result Few WBCs.  Few Gram positive cocci.   (A)     Culture Result Diphtheroids  Rare growth  not J-K   (A)    Narrative:       Surgery Specimen    Anaerobic Culture [[id number]] Collected:  05/20/19 1539    Order Status:  Completed Specimen:  Tissue Updated:  05/23/19 1619     Significant Indicator NEG     Source TISS     Site Right Lateral Foot Wound     Culture Result No Anaerobes isolated.    Narrative:       Surgery Specimen    BLOOD CULTURE [[id number]] Collected:  05/18/19 0318    Order Status:  Completed Specimen:  Blood from Peripheral Updated:  05/23/19 0700     Significant Indicator NEG     Source BLD     Site PERIPHERAL     Culture Result No growth after 5 days of incubation.    Narrative:       Per Hospital Policy: Only change Specimen Src: to \"Line\" if  specified by physician order.  Right Hand    BLOOD CULTURE [[id number]] Collected:  05/18/19 0318    Order Status:  Completed Specimen:  Blood from Peripheral Updated:  05/23/19 0700     Significant Indicator NEG     Source BLD     Site PERIPHERAL     Culture Result No growth after 5 days of incubation.    Narrative:       Per Hospital Policy: Only change Specimen Src: to \"Line\" if  specified by physician order.  Left Hand    GRAM STAIN [[id number]] Collected:  05/20/19 1539    Order Status:  Completed Specimen:  Tissue Updated:  05/21/19 1011     Significant Indicator .     Source TISS     Site Right Lateral Foot Wound     Gram Stain Result Few WBCs.  Few Gram positive cocci.      Narrative:       Surgery Specimen    CULTURE WOUND W/ GRAM STAIN [[id number]]  (Abnormal)  (Susceptibility) " Collected:  05/17/19 2172    Order Status:  Completed Specimen:  Wound Updated:  05/20/19 1031     Significant Indicator POS (POS)     Source WND     Site Right Foot     Culture Result Moderate growth mixed skin shantel predominately Diphtheroids (A)     Gram Stain Result Few WBCs.  Rare Gram positive rods.       Culture Result Staphylococcus aureus  Light growth   (A)    Culture & Susceptibility     STAPHYLOCOCCUS AUREUS     Antibiotic Sensitivity Microscan Unit Status    Ampicillin/sulbactam Sensitive <=8/4 mcg/mL Final    Method: JAMARI    Clindamycin Sensitive <=0.5 mcg/mL Final    Method: JAMARI    Daptomycin Sensitive <=0.5 mcg/mL Final    Method: JAMARI    Erythromycin Sensitive <=0.5 mcg/mL Final    Method: JAMARI    Moxifloxacin Sensitive <=0.5 mcg/mL Final    Method: JAMARI    Oxacillin Sensitive <=0.25 mcg/mL Final    Method: JAMARI    Penicillin Resistant >8 mcg/mL Final    Method: JAMARI    Tetracycline Sensitive <=4 mcg/mL Final    Method: JAMARI    Trimeth/Sulfa Sensitive <=0.5/9.5 mcg/mL Final    Method: JAMARI    Vancomycin Sensitive 1 mcg/mL Final    Method: JAMARI                             Assessment:  Active Hospital Problems    Diagnosis   • *Skin ulcer of right foot with fat layer exposed (HCC) [L97.512]   • Monoparesis of lower extremity (HCC) [G83.10]   • HTN (hypertension) [I10]   • Obesity [E66.9]   • Hyperglycemia [R73.9]   • Weakness of right leg [R29.898]        ASSESSMENT/PLAN:   Right foot abscess  Chronic ulcer secondary to right leg paralysis  Afebrile  MRI foot on 5/18- lateral forefoot cellulitis and new 7 cm draining abscess which overlies the fifth TMT.  No osteomyelitis or septic arthropathy.  Wound culture results 5/17 +MSSA & diptheroids   S/p I and D with Dr. Gutierrez on 5/20, right foot I&D, tendo Achilles lengthening, ankle manipulation joint and flexor release, per op note no pus, but necrotic tissue seen   Continue cefazolin for MSSA   Anticipate 4 weeks of IV antibiotics from OR (end 6/17/19) and then  an additional 2 weeks of oral antibiotics  Orders done by Dr Razo    Cdiff +, new  C diff test done-stools are not liquid but toxin + and on abx  No leukocytosis; creat stable  Agree with vanco PO for 14 days then prophylactic dosing     I have performed a physical exam and reviewed and updated ROS as of today.  In review of yesterday's note dated 5/25/2019 , there are no changes except as documented above.        FU ID clinic 1-2 weeks after discharge

## 2019-05-27 ENCOUNTER — APPOINTMENT (OUTPATIENT)
Dept: RADIOLOGY | Facility: MEDICAL CENTER | Age: 64
DRG: 571 | End: 2019-05-27
Attending: NURSE PRACTITIONER
Payer: MEDICARE

## 2019-05-27 LAB
ANION GAP SERPL CALC-SCNC: 6 MMOL/L (ref 0–11.9)
BUN SERPL-MCNC: 12 MG/DL (ref 8–22)
CALCIUM SERPL-MCNC: 9.1 MG/DL (ref 8.5–10.5)
CHLORIDE SERPL-SCNC: 103 MMOL/L (ref 96–112)
CO2 SERPL-SCNC: 29 MMOL/L (ref 20–33)
CREAT SERPL-MCNC: 0.75 MG/DL (ref 0.5–1.4)
GLUCOSE SERPL-MCNC: 117 MG/DL (ref 65–99)
POTASSIUM SERPL-SCNC: 4 MMOL/L (ref 3.6–5.5)
SODIUM SERPL-SCNC: 138 MMOL/L (ref 135–145)

## 2019-05-27 PROCEDURE — 700102 HCHG RX REV CODE 250 W/ 637 OVERRIDE(OP): Performed by: HOSPITALIST

## 2019-05-27 PROCEDURE — 99232 SBSQ HOSP IP/OBS MODERATE 35: CPT | Performed by: HOSPITALIST

## 2019-05-27 PROCEDURE — 700102 HCHG RX REV CODE 250 W/ 637 OVERRIDE(OP): Performed by: NURSE PRACTITIONER

## 2019-05-27 PROCEDURE — 700102 HCHG RX REV CODE 250 W/ 637 OVERRIDE(OP): Performed by: INTERNAL MEDICINE

## 2019-05-27 PROCEDURE — 36415 COLL VENOUS BLD VENIPUNCTURE: CPT

## 2019-05-27 PROCEDURE — A9270 NON-COVERED ITEM OR SERVICE: HCPCS | Performed by: INTERNAL MEDICINE

## 2019-05-27 PROCEDURE — 770021 HCHG ROOM/CARE - ISO PRIVATE

## 2019-05-27 PROCEDURE — 700111 HCHG RX REV CODE 636 W/ 250 OVERRIDE (IP): Performed by: INTERNAL MEDICINE

## 2019-05-27 PROCEDURE — A9270 NON-COVERED ITEM OR SERVICE: HCPCS | Performed by: HOSPITALIST

## 2019-05-27 PROCEDURE — 99232 SBSQ HOSP IP/OBS MODERATE 35: CPT | Performed by: INTERNAL MEDICINE

## 2019-05-27 PROCEDURE — 74019 RADEX ABDOMEN 2 VIEWS: CPT

## 2019-05-27 PROCEDURE — 80048 BASIC METABOLIC PNL TOTAL CA: CPT

## 2019-05-27 PROCEDURE — A9270 NON-COVERED ITEM OR SERVICE: HCPCS | Performed by: NURSE PRACTITIONER

## 2019-05-27 PROCEDURE — 700102 HCHG RX REV CODE 250 W/ 637 OVERRIDE(OP): Performed by: FAMILY MEDICINE

## 2019-05-27 PROCEDURE — A9270 NON-COVERED ITEM OR SERVICE: HCPCS | Performed by: FAMILY MEDICINE

## 2019-05-27 RX ORDER — SUCRALFATE ORAL 1 G/10ML
1 SUSPENSION ORAL EVERY 6 HOURS
Status: DISCONTINUED | OUTPATIENT
Start: 2019-05-27 | End: 2019-05-28 | Stop reason: HOSPADM

## 2019-05-27 RX ADMIN — PREGABALIN 75 MG: 75 CAPSULE ORAL at 17:14

## 2019-05-27 RX ADMIN — Medication 1 CAPSULE: at 07:12

## 2019-05-27 RX ADMIN — TRAZODONE HYDROCHLORIDE 75 MG: 150 TABLET ORAL at 22:09

## 2019-05-27 RX ADMIN — CEFAZOLIN SODIUM 2 G: 2 INJECTION, SOLUTION INTRAVENOUS at 09:29

## 2019-05-27 RX ADMIN — VANCOMYCIN HYDROCHLORIDE 125 MG: 10 INJECTION, POWDER, LYOPHILIZED, FOR SOLUTION INTRAVENOUS at 11:34

## 2019-05-27 RX ADMIN — SUCRALFATE 1 G: 1 SUSPENSION ORAL at 09:29

## 2019-05-27 RX ADMIN — ENOXAPARIN SODIUM 40 MG: 100 INJECTION SUBCUTANEOUS at 05:27

## 2019-05-27 RX ADMIN — OXYCODONE HYDROCHLORIDE 10 MG: 5 TABLET ORAL at 07:12

## 2019-05-27 RX ADMIN — VANCOMYCIN HYDROCHLORIDE 125 MG: 10 INJECTION, POWDER, LYOPHILIZED, FOR SOLUTION INTRAVENOUS at 22:09

## 2019-05-27 RX ADMIN — SUCRALFATE 1 G: 1 SUSPENSION ORAL at 17:14

## 2019-05-27 RX ADMIN — OXYCODONE HYDROCHLORIDE 10 MG: 5 TABLET ORAL at 16:24

## 2019-05-27 RX ADMIN — CEFAZOLIN SODIUM 2 G: 2 INJECTION, SOLUTION INTRAVENOUS at 01:53

## 2019-05-27 RX ADMIN — CARVEDILOL 12.5 MG: 12.5 TABLET, FILM COATED ORAL at 07:12

## 2019-05-27 RX ADMIN — OXYCODONE HYDROCHLORIDE 10 MG: 5 TABLET ORAL at 03:09

## 2019-05-27 RX ADMIN — CEFAZOLIN SODIUM 2 G: 2 INJECTION, SOLUTION INTRAVENOUS at 17:14

## 2019-05-27 RX ADMIN — OXYCODONE HYDROCHLORIDE 10 MG: 5 TABLET ORAL at 20:46

## 2019-05-27 RX ADMIN — PREGABALIN 75 MG: 75 CAPSULE ORAL at 11:34

## 2019-05-27 RX ADMIN — PREGABALIN 75 MG: 75 CAPSULE ORAL at 05:27

## 2019-05-27 RX ADMIN — OXYCODONE HYDROCHLORIDE 10 MG: 5 TABLET ORAL at 11:34

## 2019-05-27 RX ADMIN — CARVEDILOL 12.5 MG: 12.5 TABLET, FILM COATED ORAL at 17:14

## 2019-05-27 RX ADMIN — ONDANSETRON 4 MG: 2 INJECTION INTRAMUSCULAR; INTRAVENOUS at 06:47

## 2019-05-27 RX ADMIN — VANCOMYCIN HYDROCHLORIDE 125 MG: 10 INJECTION, POWDER, LYOPHILIZED, FOR SOLUTION INTRAVENOUS at 05:27

## 2019-05-27 RX ADMIN — VANCOMYCIN HYDROCHLORIDE 125 MG: 10 INJECTION, POWDER, LYOPHILIZED, FOR SOLUTION INTRAVENOUS at 17:14

## 2019-05-27 RX ADMIN — SUCRALFATE 1 G: 1 SUSPENSION ORAL at 14:24

## 2019-05-27 RX ADMIN — SUCRALFATE 1 G: 1 SUSPENSION ORAL at 22:09

## 2019-05-27 ASSESSMENT — ENCOUNTER SYMPTOMS
DIZZINESS: 0
BACK PAIN: 0
DIAPHORESIS: 0
COUGH: 0
EYES NEGATIVE: 1
PSYCHIATRIC NEGATIVE: 1
CHILLS: 0
DIARRHEA: 1
HEARTBURN: 0
MYALGIAS: 1
NECK PAIN: 0
FOCAL WEAKNESS: 1
WEIGHT LOSS: 0
TINGLING: 1
VOMITING: 0
ABDOMINAL PAIN: 1
FEVER: 0
ROS GI COMMENTS: LOOSE STOOL
NAUSEA: 1
ABDOMINAL PAIN: 0

## 2019-05-27 NOTE — CARE PLAN
Problem: Infection  Goal: Will remain free from infection  Outcome: PROGRESSING AS EXPECTED  IV abx given per MAR, wound dressing changed, ctm    Problem: Pain Management  Goal: Pain level will decrease to patient's comfort goal  Outcome: PROGRESSING AS EXPECTED  Pain assessed, pain med given per MAR, pt resting comfortably in bed, ctm

## 2019-05-27 NOTE — PROGRESS NOTES
"Infectious Disease Progress Note    Author: Guerline Reed M.D. Date & Time of service: 2019  2:17 PM    Chief Complaint:  Right foot abscess     Interval History:  64 y.o. female  admitted 2019. h/o trauma to right leg resulting in right leg paralysis.  She has a chronic wound on the right lateral foot +abscess, no OM    - Interval 24 hours of Vitals/Labs/Micro,and imaging results reviewed as available. See assessment.    AF WBC 8.2 c/o nausea for weeks. Pain in foot controlled-leg casted   AF continues nausea, now associated with constant epigastric pain \"like my ulcer\"-getting meds. Poor appetite. Stool + Cdiff, soft not watery stools whenever she urinates   AF no clinical change from  AF WBC not done still has same c/o abd discomfort, soft stool whenever she urinates, nausea, decreased oral intake  Review of Systems:  Review of Systems   Constitutional: Positive for malaise/fatigue. Negative for chills and fever.   Respiratory: Negative for cough.    Gastrointestinal: Positive for nausea. Negative for abdominal pain and vomiting.        Soft stools whenever she urinates   Musculoskeletal: Positive for joint pain and myalgias.   All other systems reviewed and are negative.      Hemodynamics:  Temp (24hrs), Av.6 °C (97.8 °F), Min:36.1 °C (97 °F), Max:37.1 °C (98.8 °F)  Temperature: 37.1 °C (98.8 °F)  Pulse  Av.6  Min: 62  Max: 107   Blood Pressure : 128/75       Physical Exam:  Physical Exam   Constitutional: She is oriented to person, place, and time. She appears well-developed. No distress.   HENT:   Head: Normocephalic and atraumatic.   Eyes: Pupils are equal, round, and reactive to light. Conjunctivae and EOM are normal.   Neck: Neck supple. No JVD present.   Cardiovascular: Normal rate, regular rhythm and normal heart sounds.    Pulmonary/Chest: Effort normal. No stridor. No respiratory distress. She has no wheezes. She has no rales.   Abdominal: Soft. " Bowel sounds are normal. She exhibits no distension. There is no tenderness. There is no rebound and no guarding.   Musculoskeletal: She exhibits edema and deformity.   R foot in bandage and casted   Neurological: She is alert and oriented to person, place, and time. No cranial nerve deficit.   Skin: Skin is warm and dry. No rash noted. She is not diaphoretic.   Psychiatric: She has a normal mood and affect. Her behavior is normal.       Meds:    Current Facility-Administered Medications:   •  vancomycin 50 mg/mL  •  sucralfate  •  traZODone  •  pregabalin  •  prochlorperazine  •  lactobacillus rhamnosus  •  ceFAZolin  •  oxyCODONE immediate-release  •  carvedilol  •  enoxaparin  •  acetaminophen  •  enalaprilat  •  ondansetron  •  ondansetron    Labs:  No results for input(s): WBC, RBC, HEMOGLOBIN, HEMATOCRIT, MCV, MCH, RDW, PLATELETCT, MPV, NEUTSPOLYS, LYMPHOCYTES, MONOCYTES, EOSINOPHILS, BASOPHILS, RBCMORPHOLO in the last 72 hours.  Recent Labs      05/27/19   0905   SODIUM  138   POTASSIUM  4.0   CHLORIDE  103   CO2  29   GLUCOSE  117*   BUN  12     Recent Labs      05/27/19   0905   CREATININE  0.75       Imaging:  Dx-foot-complete 3+ Right    Result Date: 5/17/2019 5/17/2019 5:09 PM HISTORY/REASON FOR EXAM: Nonhealing lateral wound for the years, recent significant worsening. Paralysis 3 years ago TECHNIQUE/EXAM DESCRIPTION AND NUMBER OF VIEWS: 3 nonweightbearing views of the RIGHT foot. COMPARISON:  None. FINDINGS: With severe lateral soft tissue swelling with skin ulceration. There is no underlying bony destruction confirmed. Severe osteopenia Metallic foreign body overlies the plantar medial great toe distally. There is solid periosteal reaction involving the fifth metatarsal shaft medially. No periostitis to suggest active inflammation. No acute displaced fracture. No subluxation. Mild first metatarsal-phalangeal joint space narrowing Cavus configuration of the midfoot     Lateral skin ulceration with  associated soft tissue gas compatible with cellulitis. Underlying abscess not excluded No acute bony destruction is detected. If there is high clinical concern for osteomyelitis, MRI with contrast could be performed Severe osteopenia    Mr-foot-with & W/o Right    Result Date: 5/19/2019 5/18/2019 7:54 AM HISTORY/REASON FOR EXAM:  Osteomyelitis suspected, foot swelling, diabetic. TECHNIQUE/EXAM DESCRIPTION: MRI of the RIGHT foot with and without contrast. Using a [employer] Signa 1.5 Mary MRI scanner, T1 axial, sagittal, and coronal, fast spin-echo T2 fat-suppressed axial and coronal, fast inversion recovery sagittal, and T1 fat suppressed axial and sagittal post intravenous contrast images were obtained. A total of 10 mL Gadavist given. COMPARISON:  6/20/2017 MRI, radiographs May 17. FINDINGS: There is lateral forefoot soft tissue swelling with ulceration overlying the fifth TMT articulation, worse than on comparison. The cutaneous ulceration does not extend all the way to the bony margins. There is underlying 42 x 16 x 34 mm T2 hyperintensity, T1 hypointensity with central absent enhancement and a draining sinus to a site of ulceration. This indicates draining abscess. There has been resolution of the fourth and fifth TMT joint effusion but there is still some spurring. No bony destruction. The remainder of the TMT articulations are normal There has been near resolution of subcutaneous fatty replacement overlying the fifth metatarsal head with low T1 and T2 signal. Resolved enhancement. The remainder the bony structures are notable for unchanged multiple bone infarcts, seen best in the calcaneus and talus. These have no associated articular surface collapse There is similar artifact involving the great toe compatible with metallic foreign body seen on x-ray No abnormal joint effusion is noted There is worsening severe fatty atrophy of the intrinsic foot musculature     Lateral forefoot cellulitis with worsening skin  ulceration and a new 4 cm draining abscess which overlies the fifth TMT. No osteomyelitis or septic arthropathy. Resolution of fourth and fifth TMT centered marrow edema and joint effusion. 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  no pvd at rest in either leg  CESAR PLUMMER  Age:    64    Gender:     F  MRN:    [id number]  :    1955      BSA:  Exam Date:     2019 16:21  Room #:     Inpatient  Priority:     Routine  Ht (in):             Wt (lb):  Ordering Physician:        LANDON TOLBERT  Referring Physician:       LANDON TOLBERT  Sonographer:               Artemio Galan RVT  Study Type:                Complete Bilateral  Technical Quality:         Adequate  Indications:     Ulcer of lower extremity  CPT Codes:       02762  ICD Codes:       707.1  History:         Ulceration of lateral aspect of right foot X 3 years  Limitations:                 RIGHT  Waveform            Systolic BPs (mmHg)                             123           Brachial  Triphasic                                Common Femoral  Triphasic                  155           Posterior Tibial  Triphasic                  153           Dorsalis Pedis                                           Peroneal                             1.17          TOSHIA                                           TBI                       LEFT  Waveform        Systolic BPs (mmHg)                             132           Brachial  Triphasic                                Common Femoral  Triphasic                  152           Posterior Tibial  Triphasic                  145           Dorsalis Pedis                                            Peroneal                             1.15          JENNIFER                                           TBI  Findings  Bilateral.  Doppler waveform of the common femoral artery is of high amplitude and  triphasic.  Doppler waveforms at the ankle are brisk and triphasic.  Ankle-brachial index is normal.  Additional testing was not performed in accordance with lower extremity  arterial evaluation protocol.  Matty Gallo MD  (Electronically Signed)  Final Date:      20 May 2019 05:50    Ir-midline Catheter Insertion Wo Guidance > Age 3    Result Date: 5/22/2019  HISTORY/REASON FOR EXAM:  Midline Placement   TECHNIQUE/EXAM DESCRIPTION AND NUMBER OF VIEWS: Midline insertion with ultrasound guidance.  FINDINGS: Midline insertion with Ultrasound Guidance was performed by qualified nursing staff without the assistance of a Radiologist. Midline positioning as measured by RN or as appropriate length of catheter selected.              Ultrasound-guided midline placement performed by qualified nursing staff as above.       Micro:  Results     Procedure Component Value Units Date/Time    C Diff Toxin [[id number]]  (Abnormal) Collected:  05/24/19 0930    Order Status:  Completed Updated:  05/24/19 2036     C.Diff Toxin A&B Positive (A)     Comment: TOXIN POSITIVE  Toxin detected by EIA; C. difficile detected by PCR.  If clinically correlated, treatment indicated per guidelines.  Test of cure is not recommended.         Narrative:       59 tel. [phone] 05/24/2019, 20:35, RB PERF. RESULTS CALLED TO:[id number].  Special Contact [id number] MARCIE HOOVER  Does this patient have risk factors for C-diff?->Yes    C Diff by PCR rflx Toxin [[id number]] Collected:  05/24/19 0930    Order Status:  Completed Specimen:  Stool from Stool Updated:  05/24/19 2032     C Diff by PCR See Toxin     027-NAP1-BI Presumptive Negative     Comment: Presumptive 027/NAP1/BI target DNA sequences are NOT DETECTED.       Narrative:       Special  "Contact [id number] MARCIE HOOVER  Does this patient have risk factors for C-diff?->Yes    CULTURE TISSUE W/ GRM STAIN [[id number]]  (Abnormal) Collected:  05/20/19 1539    Order Status:  Completed Specimen:  Tissue Updated:  05/23/19 1619     Significant Indicator POS (POS)     Source TISS     Site Right Lateral Foot Wound     Culture Result Growth noted after further incubation, see below for  organism identification.   (A)     Gram Stain Result Few WBCs.  Few Gram positive cocci.   (A)     Culture Result Diphtheroids  Rare growth  not J-K   (A)    Narrative:       Surgery Specimen    Anaerobic Culture [[id number]] Collected:  05/20/19 1539    Order Status:  Completed Specimen:  Tissue Updated:  05/23/19 1619     Significant Indicator NEG     Source TISS     Site Right Lateral Foot Wound     Culture Result No Anaerobes isolated.    Narrative:       Surgery Specimen    BLOOD CULTURE [[id number]] Collected:  05/18/19 0318    Order Status:  Completed Specimen:  Blood from Peripheral Updated:  05/23/19 0700     Significant Indicator NEG     Source BLD     Site PERIPHERAL     Culture Result No growth after 5 days of incubation.    Narrative:       Per Hospital Policy: Only change Specimen Src: to \"Line\" if  specified by physician order.  Right Hand    BLOOD CULTURE [[id number]] Collected:  05/18/19 0318    Order Status:  Completed Specimen:  Blood from Peripheral Updated:  05/23/19 0700     Significant Indicator NEG     Source BLD     Site PERIPHERAL     Culture Result No growth after 5 days of incubation.    Narrative:       Per Hospital Policy: Only change Specimen Src: to \"Line\" if  specified by physician order.  Left Hand    GRAM STAIN [[id number]] Collected:  05/20/19 1539    Order Status:  Completed Specimen:  Tissue Updated:  05/21/19 1011     Significant Indicator .     Source TISS     Site Right Lateral Foot Wound     Gram Stain Result Few WBCs.  Few Gram positive cocci.      Narrative:       Surgery " Specimen          Assessment:  Active Hospital Problems    Diagnosis   • *Skin ulcer of right foot with fat layer exposed (HCC) [L97.512]   • Monoparesis of lower extremity (HCC) [G83.10]   • HTN (hypertension) [I10]   • Obesity [E66.9]   • Hyperglycemia [R73.9]   • Weakness of right leg [R29.898]        ASSESSMENT/PLAN:   Right foot abscess  Chronic ulcer secondary to right leg paralysis  Afebrile  MRI foot on 5/18- lateral forefoot cellulitis and new 7 cm draining abscess which overlies the fifth TMT.  No osteomyelitis or septic arthropathy.  Wound culture results 5/17 +MSSA & diptheroids   S/p I and D with Dr. Gutierrez on 5/20, right foot I&D, tendo Achilles lengthening, ankle manipulation joint and flexor release, per op note no prurulence found, but necrotic tissue seen   Continue cefazolin for MSSA   Anticipate 4 weeks of IV antibiotics from OR  Stop date 6/17/19 and then an additional 2 weeks of oral antibiotics  Check CBC in am    Cdiff +, new  C diff test done-stools are not liquid but toxin + and on abx  No leukocytosis; creat stable  Agree with vanco PO for 14 days then prophylactic dosing  Plan for abd films today due to persistent sxs     I have performed a physical exam and reviewed and updated ROS as of today.  In review of yesterday's note dated 5/26/2019 , there are no changes except as documented above.    FU ID clinic 1-2 weeks after discharge

## 2019-05-27 NOTE — CARE PLAN
Problem: Safety  Goal: Will remain free from injury  Outcome: PROGRESSING AS EXPECTED  Fall precautions in place. Treaded socks on pt. Appropriate signs on doorway. IV pole on same side as bathroom. Bedrails up. Bed in lowest position and locked.  Call light and phone within reach. Patient educated on importance of calling nurses before getting out of bed, verbalizes understanding. Pt continues to refuse bed alarm.     Problem: Venous Thromboembolism (VTW)/Deep Vein Thrombosis (DVT) Prevention:  Goal: Patient will participate in Venous Thrombosis (VTE)/Deep Vein Thrombosis (DVT)Prevention Measures  Outcome: PROGRESSING AS EXPECTED   05/26/19 2000   OTHER   Risk Assessment Score 1   VTE RISK Moderate   Pharmacologic Prophylaxis Used LMWH: Enoxaparin(Lovenox)   Mechanical/VTE Prophylaxis   Mechanical Prophylaxis  SCDs, Sequential Compression Device   SCDs, Sequential Compression Device Refused

## 2019-05-27 NOTE — PROGRESS NOTES
Bedside report received, pt care assumed. Pt reports R foot and leg pain rated 8/10, medicated per MAR. Dressing changed per orders. POC discussed. Call light within reach, bed locked and low. Hourly rounding in place.

## 2019-05-27 NOTE — PROGRESS NOTES
Patient refused using a bed alarm, educated regarding importance.  Education reinforced by this RN.  Patient continues to refuse. Charge RN notified

## 2019-05-27 NOTE — DIETARY
Nutrition Services: Weekly screen. Pt noted with wound. Pt being followed by [facility] wound care for neuropathic full thickness wound to right foot, no pressure ulcers noted in wound care notes. Per ADL, pt taking >50% of last six documented meals. Intake appears adequate, no intervention needed at this time. No new wt to assess since 5/17. Rec: Provide weekly wts. RD available PRN.
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
## 2019-05-28 ENCOUNTER — PATIENT OUTREACH (OUTPATIENT)
Dept: HEALTH INFORMATION MANAGEMENT | Facility: OTHER | Age: 64
End: 2019-05-28

## 2019-05-28 VITALS
TEMPERATURE: 98.2 F | BODY MASS INDEX: 34.76 KG/M2 | SYSTOLIC BLOOD PRESSURE: 126 MMHG | OXYGEN SATURATION: 92 % | RESPIRATION RATE: 18 BRPM | WEIGHT: 216.27 LBS | HEART RATE: 66 BPM | HEIGHT: 66 IN | DIASTOLIC BLOOD PRESSURE: 67 MMHG

## 2019-05-28 PROBLEM — Z98.890 STATUS POST INCISION AND DRAINAGE: Status: ACTIVE | Noted: 2019-05-28

## 2019-05-28 PROBLEM — R11.0 NAUSEA: Status: RESOLVED | Noted: 2017-06-30 | Resolved: 2019-05-28

## 2019-05-28 LAB
ANION GAP SERPL CALC-SCNC: 4 MMOL/L (ref 0–11.9)
BUN SERPL-MCNC: 14 MG/DL (ref 8–22)
CALCIUM SERPL-MCNC: 8.9 MG/DL (ref 8.5–10.5)
CHLORIDE SERPL-SCNC: 104 MMOL/L (ref 96–112)
CO2 SERPL-SCNC: 28 MMOL/L (ref 20–33)
CREAT SERPL-MCNC: 0.81 MG/DL (ref 0.5–1.4)
GLUCOSE SERPL-MCNC: 117 MG/DL (ref 65–99)
POTASSIUM SERPL-SCNC: 4.1 MMOL/L (ref 3.6–5.5)
SODIUM SERPL-SCNC: 136 MMOL/L (ref 135–145)

## 2019-05-28 PROCEDURE — 99232 SBSQ HOSP IP/OBS MODERATE 35: CPT | Performed by: INTERNAL MEDICINE

## 2019-05-28 PROCEDURE — A9270 NON-COVERED ITEM OR SERVICE: HCPCS | Performed by: NURSE PRACTITIONER

## 2019-05-28 PROCEDURE — 700102 HCHG RX REV CODE 250 W/ 637 OVERRIDE(OP): Performed by: NURSE PRACTITIONER

## 2019-05-28 PROCEDURE — 36415 COLL VENOUS BLD VENIPUNCTURE: CPT

## 2019-05-28 PROCEDURE — A9270 NON-COVERED ITEM OR SERVICE: HCPCS | Performed by: INTERNAL MEDICINE

## 2019-05-28 PROCEDURE — 700102 HCHG RX REV CODE 250 W/ 637 OVERRIDE(OP): Performed by: INTERNAL MEDICINE

## 2019-05-28 PROCEDURE — 80048 BASIC METABOLIC PNL TOTAL CA: CPT

## 2019-05-28 PROCEDURE — 700102 HCHG RX REV CODE 250 W/ 637 OVERRIDE(OP): Performed by: FAMILY MEDICINE

## 2019-05-28 PROCEDURE — 29405 APPL SHORT LEG CAST: CPT

## 2019-05-28 PROCEDURE — A9270 NON-COVERED ITEM OR SERVICE: HCPCS | Performed by: HOSPITALIST

## 2019-05-28 PROCEDURE — 700111 HCHG RX REV CODE 636 W/ 250 OVERRIDE (IP): Performed by: INTERNAL MEDICINE

## 2019-05-28 PROCEDURE — 700102 HCHG RX REV CODE 250 W/ 637 OVERRIDE(OP): Performed by: HOSPITALIST

## 2019-05-28 PROCEDURE — 99239 HOSP IP/OBS DSCHRG MGMT >30: CPT | Performed by: INTERNAL MEDICINE

## 2019-05-28 PROCEDURE — A9270 NON-COVERED ITEM OR SERVICE: HCPCS | Performed by: FAMILY MEDICINE

## 2019-05-28 RX ORDER — LACTOBACILLUS RHAMNOSUS GG 10B CELL
1 CAPSULE ORAL
Qty: 30 CAP | Refills: 0 | Status: SHIPPED | OUTPATIENT
Start: 2019-05-28 | End: 2021-09-01

## 2019-05-28 RX ORDER — ONDANSETRON 4 MG/1
4 TABLET, ORALLY DISINTEGRATING ORAL EVERY 4 HOURS PRN
Qty: 10 TAB | Refills: 0 | Status: SHIPPED | OUTPATIENT
Start: 2019-05-28 | End: 2023-02-27

## 2019-05-28 RX ORDER — SUCRALFATE 1 G/1
1 TABLET ORAL
Qty: 120 TAB | Refills: 3 | Status: SHIPPED | OUTPATIENT
Start: 2019-05-28 | End: 2023-02-27

## 2019-05-28 RX ORDER — CEFAZOLIN SODIUM 2 G/100ML
2 INJECTION, SOLUTION INTRAVENOUS EVERY 8 HOURS
Qty: 3000 ML | Refills: 0 | Status: SHIPPED | OUTPATIENT
Start: 2019-05-28 | End: 2019-06-17

## 2019-05-28 RX ORDER — PREGABALIN 75 MG/1
75 CAPSULE ORAL 3 TIMES DAILY
Qty: 60 CAP | Refills: 0 | Status: SHIPPED | OUTPATIENT
Start: 2019-05-28 | End: 2019-06-17

## 2019-05-28 RX ORDER — CYCLOBENZAPRINE HCL 10 MG
10 TABLET ORAL 3 TIMES DAILY PRN
Status: DISCONTINUED | OUTPATIENT
Start: 2019-05-28 | End: 2019-05-28 | Stop reason: HOSPADM

## 2019-05-28 RX ORDER — OXYCODONE HYDROCHLORIDE 5 MG/1
10 TABLET ORAL EVERY 4 HOURS PRN
Qty: 30 TAB | Refills: 0 | Status: SHIPPED | OUTPATIENT
Start: 2019-05-28 | End: 2019-06-02

## 2019-05-28 RX ADMIN — OXYCODONE HYDROCHLORIDE 10 MG: 5 TABLET ORAL at 06:07

## 2019-05-28 RX ADMIN — Medication 1 CAPSULE: at 08:28

## 2019-05-28 RX ADMIN — OXYCODONE HYDROCHLORIDE 10 MG: 5 TABLET ORAL at 02:08

## 2019-05-28 RX ADMIN — SUCRALFATE 1 G: 1 SUSPENSION ORAL at 06:07

## 2019-05-28 RX ADMIN — PREGABALIN 75 MG: 75 CAPSULE ORAL at 17:23

## 2019-05-28 RX ADMIN — SUCRALFATE 1 G: 1 SUSPENSION ORAL at 11:42

## 2019-05-28 RX ADMIN — CEFAZOLIN SODIUM 2 G: 2 INJECTION, SOLUTION INTRAVENOUS at 02:08

## 2019-05-28 RX ADMIN — CARVEDILOL 12.5 MG: 12.5 TABLET, FILM COATED ORAL at 17:23

## 2019-05-28 RX ADMIN — OXYCODONE HYDROCHLORIDE 10 MG: 5 TABLET ORAL at 14:36

## 2019-05-28 RX ADMIN — VANCOMYCIN HYDROCHLORIDE 125 MG: 10 INJECTION, POWDER, LYOPHILIZED, FOR SOLUTION INTRAVENOUS at 11:42

## 2019-05-28 RX ADMIN — CYCLOBENZAPRINE HYDROCHLORIDE 10 MG: 10 TABLET, FILM COATED ORAL at 11:48

## 2019-05-28 RX ADMIN — OXYCODONE HYDROCHLORIDE 10 MG: 5 TABLET ORAL at 10:32

## 2019-05-28 RX ADMIN — PREGABALIN 75 MG: 75 CAPSULE ORAL at 11:42

## 2019-05-28 RX ADMIN — ENOXAPARIN SODIUM 40 MG: 100 INJECTION SUBCUTANEOUS at 06:07

## 2019-05-28 RX ADMIN — CEFAZOLIN SODIUM 2 G: 2 INJECTION, SOLUTION INTRAVENOUS at 10:32

## 2019-05-28 RX ADMIN — VANCOMYCIN HYDROCHLORIDE 125 MG: 10 INJECTION, POWDER, LYOPHILIZED, FOR SOLUTION INTRAVENOUS at 06:07

## 2019-05-28 RX ADMIN — CARVEDILOL 12.5 MG: 12.5 TABLET, FILM COATED ORAL at 08:28

## 2019-05-28 RX ADMIN — SUCRALFATE 1 G: 1 SUSPENSION ORAL at 17:23

## 2019-05-28 RX ADMIN — PREGABALIN 75 MG: 75 CAPSULE ORAL at 06:07

## 2019-05-28 ASSESSMENT — ENCOUNTER SYMPTOMS
ABDOMINAL PAIN: 0
MYALGIAS: 1
COUGH: 0
VOMITING: 0
NAUSEA: 1
CHILLS: 0
FEVER: 0

## 2019-05-28 NOTE — PROGRESS NOTES
"Infectious Disease Progress Note    Author: Guerline Reed M.D. Date & Time of service: 2019  1:16 PM    Chief Complaint:  Right foot abscess     Interval History:  64 y.o. female  admitted 2019. h/o trauma to right leg resulting in right leg paralysis.  She has a chronic wound on the right lateral foot +abscess, no OM    - Interval 24 hours of Vitals/Labs/Micro,and imaging results reviewed as available. See assessment.    AF WBC 8.2 c/o nausea for weeks. Pain in foot controlled-leg casted   AF continues nausea, now associated with constant epigastric pain \"like my ulcer\"-getting meds. Poor appetite. Stool + Cdiff, soft not watery stools whenever she urinates   AF no clinical change from  AF WBC not done still has same c/o abd discomfort, soft stool whenever she urinates, nausea, decreased oral intake  2019 AF no CBC done abd film normal-same GI sxs.  C/o cramping in right foot-primarily on the dorsum  Review of Systems:  Review of Systems   Constitutional: Positive for malaise/fatigue. Negative for chills and fever.   Respiratory: Negative for cough.    Gastrointestinal: Positive for nausea. Negative for abdominal pain and vomiting.        Soft stools whenever she urinates   Musculoskeletal: Positive for joint pain and myalgias.   All other systems reviewed and are negative.      Hemodynamics:  Temp (24hrs), Av.7 °C (98 °F), Min:36.2 °C (97.2 °F), Max:37.2 °C (98.9 °F)  Temperature: 36.9 °C (98.4 °F)  Pulse  Av.2  Min: 62  Max: 107   Blood Pressure : 123/72       Physical Exam:  Physical Exam   Constitutional: She is oriented to person, place, and time. She appears well-developed. No distress.   HENT:   Head: Normocephalic and atraumatic.   Mouth/Throat: No oropharyngeal exudate.   Eyes: Pupils are equal, round, and reactive to light. Conjunctivae and EOM are normal. No scleral icterus.   Neck: Neck supple. No JVD present. No thyromegaly present. "   Cardiovascular: Normal rate, regular rhythm and normal heart sounds.    Pulmonary/Chest: Effort normal. No stridor. No respiratory distress. She has no wheezes. She has no rales.   Abdominal: Soft. Bowel sounds are normal. She exhibits no distension. There is no tenderness. There is no rebound and no guarding.   Musculoskeletal: She exhibits edema and deformity.   R foot in bandage and casted-toes warm   Lymphadenopathy:     She has no cervical adenopathy.   Neurological: She is alert and oriented to person, place, and time. No cranial nerve deficit.   Skin: Skin is warm and dry. No rash noted. She is not diaphoretic.   Psychiatric: She has a normal mood and affect. Her behavior is normal.       Meds:    Current Facility-Administered Medications:   •  cyclobenzaprine  •  vancomycin 50 mg/mL  •  sucralfate  •  traZODone  •  pregabalin  •  prochlorperazine  •  lactobacillus rhamnosus  •  ceFAZolin  •  oxyCODONE immediate-release  •  carvedilol  •  enoxaparin  •  acetaminophen  •  enalaprilat  •  ondansetron  •  ondansetron    Labs:  No results for input(s): WBC, RBC, HEMOGLOBIN, HEMATOCRIT, MCV, MCH, RDW, PLATELETCT, MPV, NEUTSPOLYS, LYMPHOCYTES, MONOCYTES, EOSINOPHILS, BASOPHILS, RBCMORPHOLO in the last 72 hours.  Recent Labs      05/27/19   0905  05/28/19   0105   SODIUM  138  136   POTASSIUM  4.0  4.1   CHLORIDE  103  104   CO2  29  28   GLUCOSE  117*  117*   BUN  12  14     Recent Labs      05/27/19   0905  05/28/19   0105   CREATININE  0.75  0.81       Imaging:  Dx-foot-complete 3+ Right    Result Date: 5/17/2019 5/17/2019 5:09 PM HISTORY/REASON FOR EXAM: Nonhealing lateral wound for the years, recent significant worsening. Paralysis 3 years ago TECHNIQUE/EXAM DESCRIPTION AND NUMBER OF VIEWS: 3 nonweightbearing views of the RIGHT foot. COMPARISON:  None. FINDINGS: With severe lateral soft tissue swelling with skin ulceration. There is no underlying bony destruction confirmed. Severe osteopenia Metallic foreign  body overlies the plantar medial great toe distally. There is solid periosteal reaction involving the fifth metatarsal shaft medially. No periostitis to suggest active inflammation. No acute displaced fracture. No subluxation. Mild first metatarsal-phalangeal joint space narrowing Cavus configuration of the midfoot     Lateral skin ulceration with associated soft tissue gas compatible with cellulitis. Underlying abscess not excluded No acute bony destruction is detected. If there is high clinical concern for osteomyelitis, MRI with contrast could be performed Severe osteopenia    Mr-foot-with & W/o Right    Result Date: 5/19/2019 5/18/2019 7:54 AM HISTORY/REASON FOR EXAM:  Osteomyelitis suspected, foot swelling, diabetic. TECHNIQUE/EXAM DESCRIPTION: MRI of the RIGHT foot with and without contrast. Using a Idea2 Signa 1.5 Mary MRI scanner, T1 axial, sagittal, and coronal, fast spin-echo T2 fat-suppressed axial and coronal, fast inversion recovery sagittal, and T1 fat suppressed axial and sagittal post intravenous contrast images were obtained. A total of 10 mL Gadavist given. COMPARISON:  6/20/2017 MRI, radiographs May 17. FINDINGS: There is lateral forefoot soft tissue swelling with ulceration overlying the fifth TMT articulation, worse than on comparison. The cutaneous ulceration does not extend all the way to the bony margins. There is underlying 42 x 16 x 34 mm T2 hyperintensity, T1 hypointensity with central absent enhancement and a draining sinus to a site of ulceration. This indicates draining abscess. There has been resolution of the fourth and fifth TMT joint effusion but there is still some spurring. No bony destruction. The remainder of the TMT articulations are normal There has been near resolution of subcutaneous fatty replacement overlying the fifth metatarsal head with low T1 and T2 signal. Resolved enhancement. The remainder the bony structures are notable for unchanged multiple bone infarcts, seen  best in the calcaneus and talus. These have no associated articular surface collapse There is similar artifact involving the great toe compatible with metallic foreign body seen on x-ray No abnormal joint effusion is noted There is worsening severe fatty atrophy of the intrinsic foot musculature     Lateral forefoot cellulitis with worsening skin ulceration and a new 4 cm draining abscess which overlies the fifth TMT. No osteomyelitis or septic arthropathy. Resolution of fourth and fifth TMT centered marrow edema and joint effusion. 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  no pvd at rest in either leg  CESAR PLUMMER  Age:    64    Gender:     F  MRN:    [id number]  :    1955      BSA:  Exam Date:     2019 16:21  Room #:     Inpatient  Priority:     Routine  Ht (in):             Wt (lb):  Ordering Physician:        LANDON TOLBERT  Referring Physician:       LANDON TOLBERT  Sonographer:               Artemio Galan RVT  Study Type:                Complete Bilateral  Technical Quality:         Adequate  Indications:     Ulcer of lower extremity  CPT Codes:       59740  ICD Codes:       707.1  History:         Ulceration of lateral aspect of right foot X 3 years  Limitations:                 RIGHT  Waveform            Systolic BPs (mmHg)                             123           Brachial  Triphasic                                Common Femoral  Triphasic                  155           Posterior Tibial  Triphasic                  153           Dorsalis Pedis                                           Peroneal                             1.17          TOSHIA                                            TBI                       LEFT  Waveform        Systolic BPs (mmHg)                             132           Brachial  Triphasic                                Common Femoral  Triphasic                  152           Posterior Tibial  Triphasic                  145           Dorsalis Pedis                                           Peroneal                             1.15          JENNIFER                                           TBI  Findings  Bilateral.  Doppler waveform of the common femoral artery is of high amplitude and  triphasic.  Doppler waveforms at the ankle are brisk and triphasic.  Ankle-brachial index is normal.  Additional testing was not performed in accordance with lower extremity  arterial evaluation protocol.  Matty Gallo MD  (Electronically Signed)  Final Date:      20 May 2019 05:50    Ir-midline Catheter Insertion Wo Guidance > Age 3    Result Date: 5/22/2019  HISTORY/REASON FOR EXAM:  Midline Placement   TECHNIQUE/EXAM DESCRIPTION AND NUMBER OF VIEWS: Midline insertion with ultrasound guidance.  FINDINGS: Midline insertion with Ultrasound Guidance was performed by qualified nursing staff without the assistance of a Radiologist. Midline positioning as measured by RN or as appropriate length of catheter selected.              Ultrasound-guided midline placement performed by qualified nursing staff as above.       Micro:  Results     Procedure Component Value Units Date/Time    C Diff Toxin [[id number]]  (Abnormal) Collected:  05/24/19 0930    Order Status:  Completed Updated:  05/24/19 2036     C.Diff Toxin A&B Positive (A)     Comment: TOXIN POSITIVE  Toxin detected by EIA; C. difficile detected by PCR.  If clinically correlated, treatment indicated per guidelines.  Test of cure is not recommended.         Narrative:       61 tel. [phone] 05/24/2019, 20:35, RB PERF. RESULTS CALLED TO:[id number].  Special Contact [id number] MARCIE HOOVER  Does this patient have  "risk factors for C-diff?->Yes    C Diff by PCR rflx Toxin [[id number]] Collected:  05/24/19 0930    Order Status:  Completed Specimen:  Stool from Stool Updated:  05/24/19 2032     C Diff by PCR See Toxin     027-NAP1-BI Presumptive Negative     Comment: Presumptive 027/NAP1/BI target DNA sequences are NOT DETECTED.       Narrative:       Special Contact [id number] MARCIE HOOVER  Does this patient have risk factors for C-diff?->Yes    CULTURE TISSUE W/ GRM STAIN [[id number]]  (Abnormal) Collected:  05/20/19 1539    Order Status:  Completed Specimen:  Tissue Updated:  05/23/19 1619     Significant Indicator POS (POS)     Source TISS     Site Right Lateral Foot Wound     Culture Result Growth noted after further incubation, see below for  organism identification.   (A)     Gram Stain Result Few WBCs.  Few Gram positive cocci.   (A)     Culture Result Diphtheroids  Rare growth  not J-K   (A)    Narrative:       Surgery Specimen    Anaerobic Culture [[id number]] Collected:  05/20/19 1539    Order Status:  Completed Specimen:  Tissue Updated:  05/23/19 1619     Significant Indicator NEG     Source TISS     Site Right Lateral Foot Wound     Culture Result No Anaerobes isolated.    Narrative:       Surgery Specimen    BLOOD CULTURE [[id number]] Collected:  05/18/19 0318    Order Status:  Completed Specimen:  Blood from Peripheral Updated:  05/23/19 0700     Significant Indicator NEG     Source BLD     Site PERIPHERAL     Culture Result No growth after 5 days of incubation.    Narrative:       Per Hospital Policy: Only change Specimen Src: to \"Line\" if  specified by physician order.  Right Hand    BLOOD CULTURE [[id number]] Collected:  05/18/19 0318    Order Status:  Completed Specimen:  Blood from Peripheral Updated:  05/23/19 0700     Significant Indicator NEG     Source BLD     Site PERIPHERAL     Culture Result No growth after 5 days of incubation.    Narrative:       Per Hospital Policy: Only change Specimen Src: to " "\"Line\" if  specified by physician order.  Left Hand          Assessment:  Active Hospital Problems    Diagnosis   • *Skin ulcer of right foot with fat layer exposed (HCC) [L97.512]   • Monoparesis of lower extremity (HCC) [G83.10]   • Weakness of right leg [R29.898]        ASSESSMENT/PLAN:   Right foot abscess  Chronic ulcer secondary to right leg paralysis  Afebrile  MRI foot on 5/18- lateral forefoot cellulitis and new 7 cm draining abscess which overlies the fifth TMT.  No osteomyelitis or septic arthropathy.  Wound culture results 5/17 +MSSA & diptheroids   S/p I and D with Dr. Gutierrez on 5/20, right foot I&D, tendo Achilles lengthening, ankle manipulation joint and flexor release, per op note no purulence found, but necrotic tissue seen   Continue cefazolin for MSSA   Anticipate 4 weeks of IV antibiotics from OR  Stop date 6/17/19 and then an additional 2 weeks of oral antibiotics    Cdiff +, mild  C diff test done-stools are not liquid but toxin + and on abx  Agree with vanco PO for 14 days then prophylactic dosing  Abd films showed no ileus, free air, or obstruction  Continue oral vancomycin while on abx + 2 days     I have performed a physical exam and reviewed and updated ROS as of today.  In review of yesterday's note dated 5/27/2019, there are no changes except as documented above.      FU ID clinic 1-2 weeks after discharge   "

## 2019-05-28 NOTE — FACE TO FACE
Face to Face Supporting Documentation - Home Health    The encounter with this patient was in whole or in part the primary reason for home health admission.    Date of encounter:   Patient:                    MRN:                       YOB: 2021  Joan Olivares  [id number]  1955     Home health to see patient for:  Skilled Nursing care for assessment, interventions & education and Wound Care    Skilled need for:  Surgical Aftercare Assistance with IV antibiotics and surgical wound care.    Skilled nursing interventions to include:  Home IV infusion therapy and Wound Care    Homebound status evidenced by:  Need the aid of supportive devices such as crutches, canes, wheelchairs or walkers. Leaving home requires a considerable and taxing effort. There is a normal inability to leave the home.    Community Physician to provide follow up care: Dl Tsai M.D.     Optional Interventions? No      I certify the face to face encounter for this home health care referral meets the CMS requirements and the encounter/clinical assessment with the patient was, in whole, or in part, for the medical condition(s) listed above, which is the primary reason for home health care. Based on my clinical findings: the service(s) are medically necessary, support the need for home health care, and the homebound criteria are met.  I certify that this patient has had a face to face encounter by myself.  LUKE Neil - NPI: [id number]

## 2019-05-28 NOTE — DISCHARGE PLANNING
Medical Social Work  Message left with [employer] answering service to call SW on when a nurse will be out to educate patient

## 2019-05-28 NOTE — FACE TO FACE
Face to Face Note  -  Durable Medical Equipment    LUKE Neil - NPI: [id number]  I certify that this patient is under my care and that they had a durable medical equipment(DME)face to face encounter by myself that meets the physician DME face-to-face encounter requirements with this patient on:    Date of encounter:   Patient:                    MRN:                       YOB: 2021  Joan Olivares  [id number]  1955     The encounter with the patient was in whole, or in part, for the following medical condition, which is the primary reason for durable medical equipment:  Post-Op Surgery    I certify that, based on my findings, the following durable medical equipment is medically necessary:  Other DME Equipment - bedside comode.    HOME O2 Saturation Measurements:(Values must be present for Home Oxygen orders)         ,     ,         My Clinical findings support the need for the above equipment due to:  Bedbound/non-weight bearing    Supporting Symptoms: Difficulty ambulating with frequent diarrhea.

## 2019-05-28 NOTE — PROGRESS NOTES
" LIMB PRESERVATION SERVICE      HPI:  64 y.o. female, with a past medical history that includes Paresthesia, HTN. 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  This wound is chronic since 2016 and has had multiple debridements and courses of antibiotics. She was receiving home health for wound care regularly until Dec 2018 when pt reported the agency stopped coming. Ulcer worsened and became infected within last week. Went to ED for further care.     SURGERY DATE: 5/20/19    PROCEDURE:   1.  Right percutaneous tendo-Achilles lengthening.  2.  Right ankle manipulation.  3.  Right open medial release including subtalar joint, talonavicular joint,   and releasing the posterior tibialis tendon, flexor digitorum longus, and   flexor hallucis longus.  4.  Right lateral foot irrigation and debridement, dorsal and lateral   compartments.    5/21: Patient denies fevers, chills, nausea, vomiting.  Pain well controlled. Previous AFO from [facility]. Wearing PRAFO in bed.  5/22: Pain controlled.  Foot is rotating inward in PRAFO.  Medicare unfortunately will not cover a new AFO since it has been less than 5 years.  [facility] orthotist to discuss with patient her options.  5/23: Pain controlled.  Patient placed in cast today by Dr. Harper to maintain neutral foot position.  RN changing dressings about twice a day.  Reviewed plan of care with patient, casting, follow-ups.  Verbalized understanding.  5/28: c/o intermittent shooting pain to midfoot. Pain med effective. Anxious to discharge. Traction called to modify cast edge from rubbing  skin below knee      /72   Pulse 67   Temp 36.9 °C (98.4 °F) (Temporal)   Resp 18   Ht 1.676 m (5' 6\")   Wt 98.1 kg (216 lb 4.3 oz)   LMP 04/09/1977   SpO2 92%   Breastfeeding? No   BMI 34.91 kg/m²     SURGICAL SITE ASSESSMENT:        Cast in place with window cut out for medial and lateral foot incisions.    Toes warm without " discoloration  Unable to wiggle toes, but this is baseline      Cast material cutting and rubbing skin below the knee circumferentially.    red irritated skin circumferentially, blanching and scattered abrasions with scabs to posterior medial calf.   No drainage. Sensitive to touch.   Cast padded with ABD pad until traction can modify cast edge        Incision to right medial ankle,   Approximated. No drainage. Minimal edema. No erythema. .      Incision to R lateral foot:   Approximated, old dry drainage. Min-mod edema. No erythema            INFECTION MANAGEMENT:  WBC: 8.2 on 5/24/2019  Pathology:  Right lateral foot wound:         Fragments of benign fibrous connective tissue with focal acute          inflammation, chronic inflammation and necrosis.    Wound culture results:   Results     Procedure Component Value Units Date/Time    C Diff Toxin [[id number]]  (Abnormal) Collected:  05/24/19 0930    Order Status:  Completed Updated:  05/24/19 2036     C.Diff Toxin A&B Positive (A)     Comment: TOXIN POSITIVE  Toxin detected by EIA; C. difficile detected by PCR.  If clinically correlated, treatment indicated per guidelines.  Test of cure is not recommended.         Narrative:       61 tel. [phone] 05/24/2019, 20:35, RB PERF. RESULTS CALLED TO:[id number].  Special Contact [id number] MARCIE HOOVER  Does this patient have risk factors for C-diff?->Yes    C Diff by PCR rflx Toxin [[id number]] Collected:  05/24/19 0930    Order Status:  Completed Specimen:  Stool from Stool Updated:  05/24/19 2032     C Diff by PCR See Toxin     027-NAP1-BI Presumptive Negative     Comment: Presumptive 027/NAP1/BI target DNA sequences are NOT DETECTED.       Narrative:       Special Contact [id number] MARCIE HOOVER  Does this patient have risk factors for C-diff?->Yes    CULTURE TISSUE W/ GRM STAIN [[id number]]  (Abnormal) Collected:  05/20/19 1539    Order Status:  Completed Specimen:  Tissue Updated:  05/23/19 1619      "Significant Indicator POS (POS)     Source TISS     Site Right Lateral Foot Wound     Culture Result Growth noted after further incubation, see below for  organism identification.   (A)     Gram Stain Result Few WBCs.  Few Gram positive cocci.   (A)     Culture Result Diphtheroids  Rare growth  not J-K   (A)    Narrative:       Surgery Specimen    Anaerobic Culture [[id number]] Collected:  05/20/19 1539    Order Status:  Completed Specimen:  Tissue Updated:  05/23/19 1619     Significant Indicator NEG     Source TISS     Site Right Lateral Foot Wound     Culture Result No Anaerobes isolated.    Narrative:       Surgery Specimen    BLOOD CULTURE [[id number]] Collected:  05/18/19 0318    Order Status:  Completed Specimen:  Blood from Peripheral Updated:  05/23/19 0700     Significant Indicator NEG     Source BLD     Site PERIPHERAL     Culture Result No growth after 5 days of incubation.    Narrative:       Per Hospital Policy: Only change Specimen Src: to \"Line\" if  specified by physician order.  Right Hand    BLOOD CULTURE [[id number]] Collected:  05/18/19 0318    Order Status:  Completed Specimen:  Blood from Peripheral Updated:  05/23/19 0700     Significant Indicator NEG     Source BLD     Site PERIPHERAL     Culture Result No growth after 5 days of incubation.    Narrative:       Per Hospital Policy: Only change Specimen Src: to \"Line\" if  specified by physician order.  Left Hand                 PLAN:   POD # 8 s/p R JENNIFER, ankle manipulation, PT, FDL, FHL tendon releases, release of subtalar and talonavicular joints  Incisions approximated with sutures.      Wound care: Non adhesive foam to medial and lateral incisions, Hypafix tape,  RN to change daily and as needed for drainage     Antibiotics: Per ID recommendation , culture 5/17+ MSSA, OR culture 5/20+ diptheroids.  ID anticipating at least 4 weeks IV antibiotics. 6/17 with two weeks of orals. Also being treated for C.diff.    Weight Bearing Status: Transfer " weight bearing    Offloading: Cast to be changed weekly.  Patient to follow-up with [facility] once discharged for weekly cast change. Will need change this Thursday 5/30. Updated Dr. Matt Moore's MA.  Contacted traction. Tech to modify cast edge to prevent further rubbing into skin below knee.   Currently padded with ABD pads until traction is available.         PT Consult: Yes involved        Plan to return to O.R.: No    D/W: Patient, RNLauri with traction      DISCHARGE PLAN:    Disposition: Home with Home health for wound care and IV antibiotics    Follow-up: LPS rounds in Wound Clinic in 3 weeks, scheduled for 6/14. sutures to be removed at this appointment  Follow-up at [facility] for cast changes weekly  Patient to follow-up with [facility] for AFO once edema decreases, sutures are removed  Okay to discharge from LPS/Ortho standpoint.          DANIELE Beyer.R.N.    If any questions or concerns, please call [phone]

## 2019-05-28 NOTE — DISCHARGE SUMMARY
Discharge Summary    CHIEF COMPLAINT ON ADMISSION  Chief Complaint   Patient presents with   • Sent by MD     from [facility] for IV abx       Reason for Admission  Abcess     Admission Date  5/17/2019    CODE STATUS  Full Code    HPI & HOSPITAL COURSE  This is a 64 y.o. 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  The patient developed erythema and swelling with increased drainage.  On admission MRI revealed lateral foot cellulitis with worsening skin ulceration and possible abscess over the fifth TMT.  There was no evidence of osteoarthritis or septic arthropathy.  The patient was initiated on IV antibiotics.  She was evaluated by orthopedic surgery and underwent surgical intervention on 5/20/2019 by Dr. Gutierrez.  The surgery included irrigation and debridement of her right lateral foot.  There was no evidence of abscess at that time.  The patient also underwent correction of right neurogenic cavovarus foot alignment with right percutaneous tendo-Achilles lengthening, right ankle manipulation, right open medial release including subtalar joint, talonavicular joint, and releasing the posterior tibialis tendon, flexor digitorum longus, and flexor hallucis longus.    Surgical cultures grew MSSA and diphtheroids.  Per recommendation by infectious disease the patient will require IV antibiotics for 4 weeks with an additional 2 weeks of oral antibiotics.  She will complete a regimen of IV cefazolin with a stop date of 6/17/2019.  She will follow with infectious disease as an outpatient for further recommendations and transitioning to oral antibiotics.    The patient's hospital stay was further complicated by the development of C. difficile diarrhea.  The patient was initiated on oral vancomycin and will complete 14 days of this.  The patient's symptoms have slowly improved and she is currently  able to tolerate an oral diet.  She does continue to have frequent diarrhea.  She will follow-up with infectious disease as an outpatient for recommendations concerning prophylactic dosing after completion of her 14-day regimen.    At this time the patient is medically stable.  Her pain is controlled with oral medications.  She has remained afebrile with stable vital signs.  Her right foot surgical wound appears to be healing well.  The patient currently has a cast in place with cut outs at her surgical incision sites for wound care.  The patient will follow with the orthopedic clinic weekly for replacement of her cast.  She will also follow with limb preservation services on 6/14/2019.  She will have home health following for wound care and assistance with IV antibiotics.  She will follow closely with infectious disease for management of her antibiotics.  The patient has no further need for acute intervention and is safe for discharge at this time.       Therefore, she is discharged in fair and stable condition to home with close outpatient follow-up.    The patient met 2-midnight criteria for an inpatient stay at the time of discharge.    Discharge Date  5/28/2019      DISCHARGE DIAGNOSES  Principal Problem:    Skin ulcer of right foot with fat layer exposed (HCC) POA: Yes  Active Problems:    HTN (hypertension) (Chronic) POA: Yes    Monoparesis of lower extremity (HCC) POA: Yes    Hyperglycemia POA: Yes    Class 1 obesity in adult POA: Yes    C. difficile diarrhea POA: No    Status post incision and drainage POA: Unknown  Resolved Problems:    Nausea POA: Yes      FOLLOW UP  Future Appointments  Date Time Provider Department Center   6/14/2019 8:00 AM Emanuel Gutierrez M.D. [facility]     Faheem Deluna M.D.  [address] [address]  [phone]      Orthopedics for ankle deformity evaluation    [facility], Emergency Dept  [address]  [address]  [phone]    fever, redness at foot, signifcant worsening edema      MEDICATIONS ON DISCHARGE     Medication List      START taking these medications      Instructions   ceFAZolin in dextrose 2 g/100mL IVPB  Commonly known as:  ANCEF   100 mL by Intravenous route every 8 hours for 20 days.  Dose:  2 g     lactobacillus rhamnosus Caps capsule   Take 1 Cap by mouth every morning with breakfast.  Dose:  1 Cap     ondansetron 4 MG Tbdp  Commonly known as:  ZOFRAN ODT   Take 1 Tab by mouth every four hours as needed for Nausea (give PO if IV route is unavailable.).  Dose:  4 mg     oxyCODONE immediate-release 5 MG Tabs  Commonly known as:  ROXICODONE   Take 2 Tabs by mouth every four hours as needed for Severe Pain for up to 5 days.  Dose:  10 mg     pregabalin 75 MG Caps  Commonly known as:  LYRICA   Take 1 Cap by mouth 3 times a day for 20 days.  Dose:  75 mg     sucralfate 1 GM Tabs  Commonly known as:  CARAFATE   Take 1 Tab by mouth 4 Times a Day,Before Meals and at Bedtime.  Dose:  1 g     vancomycin 50 mg/mL 50 mg/mL Soln   Take 2.5 mL by mouth every 6 hours for 12 days.  Dose:  125 mg        CONTINUE taking these medications      Instructions   carvedilol 12.5 MG Tabs  Commonly known as:  COREG   Take 12.5 mg by mouth 2 times a day, with meals.  Dose:  12.5 mg            Allergies  Allergies   Allergen Reactions   • Penicillins Rash and Swelling     Tolerated ceftriaxone on 6/20/17       DIET  Orders Placed This Encounter   Procedures   • Diet Order Regular     Standing Status:   Standing     Number of Occurrences:   1     Order Specific Question:   Diet:     Answer:   Regular [1]       ACTIVITY  As tolerated.  Weight bearing as tolerated    CONSULTATIONS  Orthopedic surgery - Dr. Gutierrez  Infectious Disease - Dr. Reed    PROCEDURES  5/20/2019:  1.  Right percutaneous tendo-Achilles lengthening.  2.  Right ankle manipulation.  3.  Right open medial release including subtalar joint, talonavicular  joint,   and releasing the posterior tibialis tendon, flexor digitorum longus, and   flexor hallucis longus.  4.  Right lateral foot irrigation and debridement, dorsal and lateral   compartments.       LABORATORY  Lab Results   Component Value Date    SODIUM 136 05/28/2019    POTASSIUM 4.1 05/28/2019    CHLORIDE 104 05/28/2019    CO2 28 05/28/2019    GLUCOSE 117 (H) 05/28/2019    BUN 14 05/28/2019    CREATININE 0.81 05/28/2019        Lab Results   Component Value Date    WBC 8.2 05/24/2019    HEMOGLOBIN 12.0 05/24/2019    HEMATOCRIT 37.4 05/24/2019    PLATELETCT 329 05/24/2019        Total time of the discharge process was 32 minutes.

## 2019-05-28 NOTE — DISCHARGE INSTRUCTIONS
Discharge Instructions    Discharged to home by car with relative. Discharged via wheelchair, hospital escort: Yes.  Special equipment needed: Not Applicable    Be sure to schedule a follow-up appointment with your primary care doctor or any specialists as instructed.     Discharge Plan:   Diet Plan: Discussed  Activity Level: Discussed  Confirmed Follow up Appointment: No (Comments)  Confirmed Symptoms Management: Discussed  Medication Reconciliation Updated: Yes  Pneumococcal Vaccine Administered/Refused: Not given - Patient refused pneumococcal vaccine  Influenza Vaccine Indication: Patient Refuses    I understand that a diet low in cholesterol, fat, and sodium is recommended for good health. Unless I have been given specific instructions below for another diet, I accept this instruction as my diet prescription.   Other diet: regular      Special Instructions: None    · Is patient discharged on Warfarin / Coumadin?   No     Depression / Suicide Risk    As you are discharged from this [facility] facility, it is important to learn how to keep safe from harming yourself.    Recognize the warning signs:  · Abrupt changes in personality, positive or negative- including increase in energy   · Giving away possessions  · Change in eating patterns- significant weight changes-  positive or negative  · Change in sleeping patterns- unable to sleep or sleeping all the time   · Unwillingness or inability to communicate  · Depression  · Unusual sadness, discouragement and loneliness  · Talk of wanting to die  · Neglect of personal appearance   · Rebelliousness- reckless behavior  · Withdrawal from people/activities they love  · Confusion- inability to concentrate     If you or a loved one observes any of these behaviors or has concerns about self-harm, here's what you can do:  · Talk about it- your feelings and reasons for harming yourself  · Remove any means that you might use to hurt yourself (examples: pills, rope,  extension cords, firearm)  · Get professional help from the community (Mental Health, Substance Abuse, psychological counseling)  · Do not be alone:Call your Safe Contact- someone whom you trust who will be there for you.  · Call your local CRISIS HOTLINE 848-2959 or 508-931-5039  · Call your local Children's Mobile Crisis Response Team [place] (039) 243-7545 or www.iMeigu  · Call the toll free National Suicide Prevention Hotlines   · National Suicide Prevention Lifeline 515-432-YAGD (4665)  · National Hope Line Network 800-SUICIDE (283-3679)

## 2019-05-28 NOTE — PROGRESS NOTES
"Went in patient room to fix alarming IV pump, patient claiming to have lost ring over the last few days \"cannot find the darn thing anywhere\"  Placed call to [employer] (ext. [phone]) and got busy signal perhaps because it being a holiday? Will pass along to pt RN in hopes the number can be called again tomorrow.  "

## 2019-05-28 NOTE — CARE PLAN
Problem: Safety  Goal: Will remain free from falls  Outcome: PROGRESSING AS EXPECTED  Fall precautions in place. Treaded socks on pt. Appropriate signs on doorway. IV pole on same side as bathroom. Bedrails up. Bed in lowest position and locked.  Call light and phone within reach. Patient educated on importance of calling nurses before getting out of bed, verbalizes understanding. Pt continues to refuse bed alarm despite education.     Problem: Pain Management  Goal: Pain level will decrease to patient's comfort goal  Outcome: PROGRESSING SLOWER THAN EXPECTED   05/27/19 2046   OTHER   Pain Rating Scale (NPRS) 9   Comfort Goal Comfort at Rest;Comfort with Movement;Perform Activity;Sleep Comfortably     Medicated with PRN oxycodone 10mg q.4 for 9/10 R leg and foot pain.

## 2019-05-28 NOTE — PROGRESS NOTES
Bedside report received, pt care assumed. Isolation precautions for c.diff still in place. Pt reports R foot and leg pain rated 9/10, medicated per MAR. Dressings also changed per orders. Bed in lowest position, pt educated on fall risk and verbalized understanding, call light within reach. Hourly rounding in place.

## 2019-05-29 NOTE — PROGRESS NOTES
Short leg cast was redone due to skin breakdown posterior distal aspect of the of the right knee causing discomfort to pt. New short leg cast was applied to right LE with medial and lateral windows cut into cast for wound care assessment. CMS appears intact upon completion of cast.

## 2019-05-29 NOTE — PROGRESS NOTES
Discharging patient home with home health per physician order. Demonstrated understanding of discharge instructions, for cellulitis. Ambulating 1 assistance, voiding without difficulty, pain well controlled, tolerating oral medications, oxygen saturation greater than 90%, tolerating regular diet, IV removed. Midline in place d/t home health infusion. Educational handouts for cellulitis and wound care given and discussed. Verbalized understanding of discharge instructions and educational handouts. All questions answered. Belongings with patient at time of discharge. Paperwork place in pt's chart.

## 2019-05-31 NOTE — DOCUMENTATION QUERY
"                                                                         [facility]                                                                       Query Response Note      PATIENT:               CESAR PLUMMER  ACCT #:                  [id number]  MRN:                     [id number]  :                      1955  ADMIT DATE:       2019 4:20 PM  DISCH DATE:        2019 8:04 PM  RESPONDING  PROVIDER #:        [id number]           QUERY TEXT:    The type of ulcer is unclear.    Based on clinical findings, risk factors and treatment, can the patient's ulcer be further clarified as    NOTE:  If an appropriate response is not listed below, please respond with a new note.    The patient's Clinical Indicators include:  ED Report-pressure ulcer  HP-\"Penetrating foot wound\"  PN -\"Right foot infection, chronic ulcer secondary to right leg paralysis\"  PN -\"*Skin ulcer of right foot with fat layer exposed (HCC) [L97.512]\"    Query created by: Jennifer Jin on 2019 5:46 AM    RESPONSE TEXT:    Unable to determine          Electronically signed by:  ALIDA TOWNSEND MD 2019 11:31 AM              "

## 2019-06-05 NOTE — ADDENDUM NOTE
Encounter addended by: Guerline Reed M.D. on: 6/5/2019 10:14 AM<BR>    Actions taken: Sign clinical note

## 2019-06-06 ENCOUNTER — HOSPITAL ENCOUNTER (OUTPATIENT)
Dept: LAB | Facility: MEDICAL CENTER | Age: 64
End: 2019-06-06
Attending: INTERNAL MEDICINE
Payer: MEDICARE

## 2019-06-06 LAB
ALBUMIN SERPL BCP-MCNC: 3.6 G/DL (ref 3.2–4.9)
ALBUMIN/GLOB SERPL: 1.3 G/DL
ALP SERPL-CCNC: 94 U/L (ref 30–99)
ALT SERPL-CCNC: <5 U/L (ref 2–50)
ANION GAP SERPL CALC-SCNC: 7 MMOL/L (ref 0–11.9)
AST SERPL-CCNC: 11 U/L (ref 12–45)
BASOPHILS # BLD AUTO: 0.9 % (ref 0–1.8)
BASOPHILS # BLD: 0.06 K/UL (ref 0–0.12)
BILIRUB SERPL-MCNC: 0.2 MG/DL (ref 0.1–1.5)
BUN SERPL-MCNC: 12 MG/DL (ref 8–22)
CALCIUM SERPL-MCNC: 9.1 MG/DL (ref 8.5–10.5)
CHLORIDE SERPL-SCNC: 110 MMOL/L (ref 96–112)
CK SERPL-CCNC: 36 U/L (ref 0–154)
CO2 SERPL-SCNC: 24 MMOL/L (ref 20–33)
CREAT SERPL-MCNC: 0.64 MG/DL (ref 0.5–1.4)
CRP SERPL HS-MCNC: 0.14 MG/DL (ref 0–0.75)
EOSINOPHIL # BLD AUTO: 0.43 K/UL (ref 0–0.51)
EOSINOPHIL NFR BLD: 6.6 % (ref 0–6.9)
ERYTHROCYTE [DISTWIDTH] IN BLOOD BY AUTOMATED COUNT: 44.5 FL (ref 35.9–50)
ERYTHROCYTE [SEDIMENTATION RATE] IN BLOOD BY WESTERGREN METHOD: 28 MM/HOUR (ref 0–30)
GLOBULIN SER CALC-MCNC: 2.7 G/DL (ref 1.9–3.5)
GLUCOSE SERPL-MCNC: 97 MG/DL (ref 65–99)
HCT VFR BLD AUTO: 37.7 % (ref 37–47)
HGB BLD-MCNC: 11.4 G/DL (ref 12–16)
IMM GRANULOCYTES # BLD AUTO: 0.01 K/UL (ref 0–0.11)
IMM GRANULOCYTES NFR BLD AUTO: 0.2 % (ref 0–0.9)
LYMPHOCYTES # BLD AUTO: 2.4 K/UL (ref 1–4.8)
LYMPHOCYTES NFR BLD: 36.9 % (ref 22–41)
MCH RBC QN AUTO: 26.3 PG (ref 27–33)
MCHC RBC AUTO-ENTMCNC: 30.2 G/DL (ref 33.6–35)
MCV RBC AUTO: 87.1 FL (ref 81.4–97.8)
MONOCYTES # BLD AUTO: 0.41 K/UL (ref 0–0.85)
MONOCYTES NFR BLD AUTO: 6.3 % (ref 0–13.4)
NEUTROPHILS # BLD AUTO: 3.19 K/UL (ref 2–7.15)
NEUTROPHILS NFR BLD: 49.1 % (ref 44–72)
NRBC # BLD AUTO: 0 K/UL
NRBC BLD-RTO: 0 /100 WBC
PLATELET # BLD AUTO: 439 K/UL (ref 164–446)
PMV BLD AUTO: 9.9 FL (ref 9–12.9)
POTASSIUM SERPL-SCNC: 3.8 MMOL/L (ref 3.6–5.5)
PROT SERPL-MCNC: 6.3 G/DL (ref 6–8.2)
RBC # BLD AUTO: 4.33 M/UL (ref 4.2–5.4)
SODIUM SERPL-SCNC: 141 MMOL/L (ref 135–145)
WBC # BLD AUTO: 6.5 K/UL (ref 4.8–10.8)

## 2019-06-06 PROCEDURE — 82550 ASSAY OF CK (CPK): CPT

## 2019-06-06 PROCEDURE — 80053 COMPREHEN METABOLIC PANEL: CPT

## 2019-06-06 PROCEDURE — 85025 COMPLETE CBC W/AUTO DIFF WBC: CPT

## 2019-06-06 PROCEDURE — 86140 C-REACTIVE PROTEIN: CPT

## 2019-06-06 PROCEDURE — 36415 COLL VENOUS BLD VENIPUNCTURE: CPT

## 2019-06-06 PROCEDURE — 85652 RBC SED RATE AUTOMATED: CPT

## 2019-06-12 ENCOUNTER — HOSPITAL ENCOUNTER (OUTPATIENT)
Dept: LAB | Facility: MEDICAL CENTER | Age: 64
End: 2019-06-12
Attending: NURSE PRACTITIONER
Payer: MEDICARE

## 2019-06-12 ENCOUNTER — OFFICE VISIT (OUTPATIENT)
Dept: INFECTIOUS DISEASES | Facility: MEDICAL CENTER | Age: 64
End: 2019-06-12
Payer: MEDICARE

## 2019-06-12 VITALS
HEART RATE: 75 BPM | SYSTOLIC BLOOD PRESSURE: 140 MMHG | WEIGHT: 199 LBS | DIASTOLIC BLOOD PRESSURE: 90 MMHG | BODY MASS INDEX: 31.98 KG/M2 | TEMPERATURE: 97.4 F | OXYGEN SATURATION: 95 % | HEIGHT: 66 IN

## 2019-06-12 DIAGNOSIS — L02.611 ABSCESS OF RIGHT FOOT: ICD-10-CM

## 2019-06-12 DIAGNOSIS — A49.01 MSSA (METHICILLIN SUSCEPTIBLE STAPHYLOCOCCUS AUREUS) INFECTION: ICD-10-CM

## 2019-06-12 DIAGNOSIS — R11.2 NAUSEA AND VOMITING, INTRACTABILITY OF VOMITING NOT SPECIFIED, UNSPECIFIED VOMITING TYPE: ICD-10-CM

## 2019-06-12 DIAGNOSIS — A49.8 CLOSTRIDIUM DIFFICILE INFECTION: ICD-10-CM

## 2019-06-12 LAB
ALBUMIN SERPL BCP-MCNC: 4 G/DL (ref 3.2–4.9)
ALBUMIN/GLOB SERPL: 1.3 G/DL
ALP SERPL-CCNC: 109 U/L (ref 30–99)
ALT SERPL-CCNC: <5 U/L (ref 2–50)
ANION GAP SERPL CALC-SCNC: 9 MMOL/L (ref 0–11.9)
AST SERPL-CCNC: 13 U/L (ref 12–45)
BASOPHILS # BLD AUTO: 0.7 % (ref 0–1.8)
BASOPHILS # BLD: 0.06 K/UL (ref 0–0.12)
BILIRUB SERPL-MCNC: 0.3 MG/DL (ref 0.1–1.5)
BUN SERPL-MCNC: 12 MG/DL (ref 8–22)
CALCIUM SERPL-MCNC: 9.5 MG/DL (ref 8.5–10.5)
CHLORIDE SERPL-SCNC: 105 MMOL/L (ref 96–112)
CO2 SERPL-SCNC: 26 MMOL/L (ref 20–33)
CREAT SERPL-MCNC: 0.77 MG/DL (ref 0.5–1.4)
EOSINOPHIL # BLD AUTO: 0.39 K/UL (ref 0–0.51)
EOSINOPHIL NFR BLD: 4.3 % (ref 0–6.9)
ERYTHROCYTE [DISTWIDTH] IN BLOOD BY AUTOMATED COUNT: 45.1 FL (ref 35.9–50)
ERYTHROCYTE [SEDIMENTATION RATE] IN BLOOD BY WESTERGREN METHOD: 26 MM/HOUR (ref 0–30)
GLOBULIN SER CALC-MCNC: 3.2 G/DL (ref 1.9–3.5)
GLUCOSE SERPL-MCNC: 95 MG/DL (ref 65–99)
HCT VFR BLD AUTO: 40.6 % (ref 37–47)
HGB BLD-MCNC: 12.3 G/DL (ref 12–16)
IMM GRANULOCYTES # BLD AUTO: 0.03 K/UL (ref 0–0.11)
IMM GRANULOCYTES NFR BLD AUTO: 0.3 % (ref 0–0.9)
LYMPHOCYTES # BLD AUTO: 3.14 K/UL (ref 1–4.8)
LYMPHOCYTES NFR BLD: 34.5 % (ref 22–41)
MCH RBC QN AUTO: 26.4 PG (ref 27–33)
MCHC RBC AUTO-ENTMCNC: 30.3 G/DL (ref 33.6–35)
MCV RBC AUTO: 87.1 FL (ref 81.4–97.8)
MONOCYTES # BLD AUTO: 0.57 K/UL (ref 0–0.85)
MONOCYTES NFR BLD AUTO: 6.3 % (ref 0–13.4)
NEUTROPHILS # BLD AUTO: 4.91 K/UL (ref 2–7.15)
NEUTROPHILS NFR BLD: 53.9 % (ref 44–72)
NRBC # BLD AUTO: 0 K/UL
NRBC BLD-RTO: 0 /100 WBC
PLATELET # BLD AUTO: 418 K/UL (ref 164–446)
PMV BLD AUTO: 10.3 FL (ref 9–12.9)
POTASSIUM SERPL-SCNC: 4.1 MMOL/L (ref 3.6–5.5)
PROT SERPL-MCNC: 7.2 G/DL (ref 6–8.2)
RBC # BLD AUTO: 4.66 M/UL (ref 4.2–5.4)
SODIUM SERPL-SCNC: 140 MMOL/L (ref 135–145)
WBC # BLD AUTO: 9.1 K/UL (ref 4.8–10.8)

## 2019-06-12 PROCEDURE — 85025 COMPLETE CBC W/AUTO DIFF WBC: CPT

## 2019-06-12 PROCEDURE — 80053 COMPREHEN METABOLIC PANEL: CPT

## 2019-06-12 PROCEDURE — 99214 OFFICE O/P EST MOD 30 MIN: CPT | Performed by: NURSE PRACTITIONER

## 2019-06-12 PROCEDURE — 36415 COLL VENOUS BLD VENIPUNCTURE: CPT

## 2019-06-12 PROCEDURE — 85652 RBC SED RATE AUTOMATED: CPT

## 2019-06-12 RX ORDER — SULFAMETHOXAZOLE AND TRIMETHOPRIM 800; 160 MG/1; MG/1
1 TABLET ORAL 2 TIMES DAILY
Qty: 28 TAB | Refills: 0 | Status: SHIPPED | OUTPATIENT
Start: 2019-06-18 | End: 2019-07-02

## 2019-06-12 RX ORDER — PROCHLORPERAZINE MALEATE 5 MG/1
5 TABLET ORAL EVERY 8 HOURS PRN
Qty: 9 TAB | Refills: 0 | Status: SHIPPED | OUTPATIENT
Start: 2019-06-12 | End: 2019-06-15

## 2019-06-12 RX ORDER — VANCOMYCIN HYDROCHLORIDE 125 MG/1
125 CAPSULE ORAL 2 TIMES DAILY
Qty: 44 CAP | Refills: 0 | Status: SHIPPED | OUTPATIENT
Start: 2019-06-12 | End: 2019-07-04

## 2019-06-12 ASSESSMENT — ENCOUNTER SYMPTOMS
SENSORY CHANGE: 1
FEVER: 0
SORE THROAT: 0
DIARRHEA: 1
HEADACHES: 1
NERVOUS/ANXIOUS: 0
ABDOMINAL PAIN: 0
TINGLING: 1
CHILLS: 0
SHORTNESS OF BREATH: 0
NAUSEA: 1
BLURRED VISION: 0
CONSTIPATION: 0
MYALGIAS: 0
VOMITING: 1

## 2019-06-12 NOTE — PROGRESS NOTES
Infectious Disease Clinic    Subjective:     Chief Complaint   Patient presents with   • Hospital Follow-up     Right foot abscess     Interval History: 64 y.o. 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  Hospitalized from 5/17-5/28/2019, admitted due to increased drainage with pus, odor and swelling to right foot wound.  Wound culture of right foot on 5/17+ MSSA.  X-ray on 5/17 with lateral skin ulceration associate with soft tissue gas compatible with cellulitis.  Blood cultures on 5/18 were negative.  MRI foot on 5/18 showed lateral forefoot cellulitis and new 7 cm draining abscess which overlies the fifth TMT.  No osteomyelitis or septic arthropathy.  S/p Right foot I&D with Dr. Gutierrez on 5/20 with tendo Achilles lengthening, ankle manipulation, joint and flexor release.  Per op note no purulence found, but necrotic tissue seen. OR culture on 5/20+ diphtheroids.  C diff toxin positive on 5/24.  Patient discharged home on p.o. vancomycin x14 days to be followed with prophylactic dosing.  Also discharged home on IV Cefazolin x4 weeks, stop date 6/17/2019 and then to be followed with 2 weeks p.o. antibiotics.    Hospital records reviewed    Today, 6/12/2019: Accompanied by her niece, Loni.  Patient reports feeling well and has been tolerating the IV cefazolin with minimal adverse effect.  States she has had nausea with occasional vomiting, noting that she took all of the Zofran she was given from her hospitalization.  Requesting more antinausea medication as she does not have a follow-up with her PCP.  Says she has had a headache since being in the hospital, this is improved with Tylenol.  She continues to have soft bowel movements every time she urinates, but denies it being watery and loose, denies odor.  Otherwise, she denies feeling generally ill, fevers/chills, general malaise, abdominal pain, chest  pain or shortness of breath.  Pt stating that the wound and surgical site are healing well.  States she was seen by Dr. Gutierrez last week, at which time the sutures were removed and the surgical site was noted to be healed without any drainage, redness or swelling.  A cast was applied with the plan to remove on 7/9/2019.  States that she cannot feel anything to her RLE due to her paralysis and cannot wiggle her toes.  Notes that she has severe neuropathy to her RLE that is fairly chronic with a burning sharp sensation that is improved with rest and pain meds.    Review of Systems   Constitutional: Negative for chills, fever and malaise/fatigue.   HENT: Negative for sore throat.    Eyes: Negative for blurred vision.   Respiratory: Negative for shortness of breath.    Cardiovascular: Negative for chest pain and leg swelling.   Gastrointestinal: Positive for diarrhea (Soft stools), nausea and vomiting (Occasional). Negative for abdominal pain and constipation.   Genitourinary: Negative for dysuria and hematuria.   Musculoskeletal: Negative for joint pain and myalgias.   Skin: Negative for rash.   Neurological: Positive for tingling, sensory change and headaches.   Psychiatric/Behavioral: The patient is not nervous/anxious.        Past Medical History:   Diagnosis Date   • Arthritis    • ASTHMA    • Back pain    • Bladder infection    • Bronchitis    • Heart burn    • Hemorrhoids    • Hypertension    • Kidney stones    • Migraine    • Pain    • Paresthesia of right foot    • Pleurisy    • Pneumonia    • Scarlet fever    • Ulcer    • Unspecified disorder of thyroid        Family History   Problem Relation Age of Onset   • Lung Disease Mother    • Cancer Father    • Heart Disease Father    • Lung Disease Father    • Thyroid Sister    • Cancer Brother    • Psychiatry Son        Social History   Substance Use Topics   • Smoking status: Never Smoker   • Smokeless tobacco: Never Used   • Alcohol use No       Allergies:  "Penicillins    Pt's medication and problem list reviewed.     Objective:     PE:  /90   Pulse 75   Temp 36.3 °C (97.4 °F) (Temporal)   Ht 1.676 m (5' 6\")   Wt 90.3 kg (199 lb)   LMP 04/09/1977   SpO2 95%   Breastfeeding? No   BMI 32.12 kg/m²     Vital signs reviewed    Constitutional: Appears well-developed and well-nourished. No acute distress.  Obese.  Appears older than stated age.      Eyes: Conjunctivae and EOM are normal. Pupils are equal, round, and reactive to light.   ENMT: Lips without lesions, good dentition and gums.  Oropharynx is clear and moist.  Neck: Trachea midline. Neck supple. No masses.  No JVD.  No cervical lymphadenopathy.  Respiratory/chest: No respiratory distress, unlabored respiratory effort.  Lungs clear to auscultation bilaterally. No wheezes or rales.  Cardiovascular: Normal rate, regular rhythm.  Normal heart sounds on auscultation- no murmur, gallop, or friction rub. No edema.   Abdomen: Soft, non tender, non-distended. BS + x 4. No masses or hernias.   Musculoskeletal: Wheelchair.  Limited range of motion to RLE due to cast and paralysis.  No clubbing or cyanosis.  RLE-cast in place (unable to visualize surgical sites), toes warm, capillary refill <2.  LUE midline- CDI, non tender, no erythema.  Skin: Warm and dry.  No visible rashes or lesions.  Neurological: No cranial nerve deficit. Coordination normal.  Insensate to RLE.  Psychiatric: Alert and oriented to person, place, and time. Normal mood, calm affect.  Normal behavior and judgment. Memory intact.    Labs:  WBC   Date/Time Value Ref Range Status   06/06/2019 01:35 PM 6.5 4.8 - 10.8 K/uL Final     RBC   Date/Time Value Ref Range Status   06/06/2019 01:35 PM 4.33 4.20 - 5.40 M/uL Final     Hemoglobin   Date/Time Value Ref Range Status   06/06/2019 01:35 PM 11.4 (L) 12.0 - 16.0 g/dL Final     Hematocrit   Date/Time Value Ref Range Status   06/06/2019 01:35 PM 37.7 37.0 - 47.0 % Final     MCV   Date/Time Value Ref " Range Status   06/06/2019 01:35 PM 87.1 81.4 - 97.8 fL Final     MCH   Date/Time Value Ref Range Status   06/06/2019 01:35 PM 26.3 (L) 27.0 - 33.0 pg Final     MCHC   Date/Time Value Ref Range Status   06/06/2019 01:35 PM 30.2 (L) 33.6 - 35.0 g/dL Final     MPV   Date/Time Value Ref Range Status   06/06/2019 01:35 PM 9.9 9.0 - 12.9 fL Final        Sodium   Date/Time Value Ref Range Status   06/06/2019 01:35  135 - 145 mmol/L Final     Potassium   Date/Time Value Ref Range Status   06/06/2019 01:35 PM 3.8 3.6 - 5.5 mmol/L Final     Chloride   Date/Time Value Ref Range Status   06/06/2019 01:35  96 - 112 mmol/L Final     Co2   Date/Time Value Ref Range Status   06/06/2019 01:35 PM 24 20 - 33 mmol/L Final     Glucose   Date/Time Value Ref Range Status   06/06/2019 01:35 PM 97 65 - 99 mg/dL Final     Bun   Date/Time Value Ref Range Status   06/06/2019 01:35 PM 12 8 - 22 mg/dL Final     Creatinine   Date/Time Value Ref Range Status   06/06/2019 01:35 PM 0.64 0.50 - 1.40 mg/dL Final       Alkaline Phosphatase   Date/Time Value Ref Range Status   06/06/2019 01:35 PM 94 30 - 99 U/L Final     AST(SGOT)   Date/Time Value Ref Range Status   06/06/2019 01:35 PM 11 (L) 12 - 45 U/L Final     ALT(SGPT)   Date/Time Value Ref Range Status   06/06/2019 01:35 PM <5 2 - 50 U/L Final     Total Bilirubin   Date/Time Value Ref Range Status   06/06/2019 01:35 PM 0.2 0.1 - 1.5 mg/dL Final        CPK Total   Date/Time Value Ref Range Status   06/06/2019 01:35 PM 36 0 - 154 U/L Final      ESR 28  CRP 0.14    Assessment and Plan:   The following treatment plan was discussed with patient at length:    1. Abscess of right foot  sulfamethoxazole-trimethoprim (BACTRIM DS) 800-160 MG tablet    CBC WITH DIFFERENTIAL    Comp Metabolic Panel    WESTERGREN SED RATE    -Finish IV cefazolin on 6/17/2019.  DC midline after last infusion dose.  -On 6/18, start p.o. Bactrim DS twice daily x2 weeks.  Monitor for s/sx of worsening transitioning  from IV to PO abx: increased redness, pain, swelling, drainage, breakdown of surgical site, fevers, chills, general malaise, etc.  Notify ID or go to ER should these s/sx occur.  -CBC, CMP and ESR to be done today and again in about 2 weeks to monitor for leukocytosis, thrombocytopenia, hyperkalemia, hepato-nephrotoxicity and trend inflammatory markers.  -Follow-up with Dr. Gutierrez as directed.   2. MSSA (methicillin susceptible Staphylococcus aureus) infection  sulfamethoxazole-trimethoprim (BACTRIM DS) 800-160 MG tablet    As above.   3. Clostridium difficile infection  vancomycin (VANCOCIN) 125 MG capsule    Finish active treatment with dosing 4 times a day this evening.  Starting tomorrow, will begin prophylactic dosing of p.o. vancomycin twice daily.  Will remain on prophylactic dosing until finished with antibiotics +2 days.  Estimated end date 7/4/2019.   4. Nausea and vomiting, intractability of vomiting not specified, unspecified vomiting type  prochlorperazine (COMPAZINE) 5 MG Tab    Will give 3 days worth of Compazine for nausea.  If persistent, patient needs to discuss further treatment with PCP.     Follow up: 3 weeks, RTC sooner if needed. FU with PCP for ongoing chronic medical conditions.     Marta Yusuf, A.P.R.N.       Please note that this dictation was created using voice recognition software. I have  worked with technical experts from [employer] to optimize the interface.  I have made every reasonable attempt to correct obvious errors, but there may be errors of grammar and possibly content that I did not discover before finalizing the note.

## 2019-06-13 ENCOUNTER — TELEPHONE (OUTPATIENT)
Dept: INFECTIOUS DISEASES | Facility: MEDICAL CENTER | Age: 64
End: 2019-06-13

## 2019-06-13 NOTE — ADDENDUM NOTE
Encounter addended by: Dayna Wolf R.N. on: 6/13/2019  4:46 PM<BR>    Actions taken: Flowsheet accepted

## 2019-06-13 NOTE — TELEPHONE ENCOUNTER
Received fax from pt's pharmacy that compazine is on backorder from the  and request and alternative be sent in.  Per ANGY Yusuf, pt needs to contact PCP for anti-nausea medication.   LM with pt explaining above.  Faxed response back to pharmacy.  -AMP

## 2019-06-24 ENCOUNTER — HOSPITAL ENCOUNTER (OUTPATIENT)
Dept: LAB | Facility: MEDICAL CENTER | Age: 64
End: 2019-06-24
Attending: NURSE PRACTITIONER
Payer: MEDICARE

## 2019-06-24 DIAGNOSIS — L02.611 ABSCESS OF RIGHT FOOT: ICD-10-CM

## 2019-06-24 LAB
ALBUMIN SERPL BCP-MCNC: 4 G/DL (ref 3.2–4.9)
ALBUMIN/GLOB SERPL: 1.2 G/DL
ALP SERPL-CCNC: 110 U/L (ref 30–99)
ALT SERPL-CCNC: 10 U/L (ref 2–50)
ANION GAP SERPL CALC-SCNC: 8 MMOL/L (ref 0–11.9)
AST SERPL-CCNC: 13 U/L (ref 12–45)
BASOPHILS # BLD AUTO: 0.6 % (ref 0–1.8)
BASOPHILS # BLD: 0.05 K/UL (ref 0–0.12)
BILIRUB SERPL-MCNC: 0.3 MG/DL (ref 0.1–1.5)
BUN SERPL-MCNC: 30 MG/DL (ref 8–22)
CALCIUM SERPL-MCNC: 9.5 MG/DL (ref 8.5–10.5)
CHLORIDE SERPL-SCNC: 104 MMOL/L (ref 96–112)
CO2 SERPL-SCNC: 23 MMOL/L (ref 20–33)
CREAT SERPL-MCNC: 1.28 MG/DL (ref 0.5–1.4)
EOSINOPHIL # BLD AUTO: 0.41 K/UL (ref 0–0.51)
EOSINOPHIL NFR BLD: 4.6 % (ref 0–6.9)
ERYTHROCYTE [DISTWIDTH] IN BLOOD BY AUTOMATED COUNT: 43.7 FL (ref 35.9–50)
ERYTHROCYTE [SEDIMENTATION RATE] IN BLOOD BY WESTERGREN METHOD: 43 MM/HOUR (ref 0–30)
GLOBULIN SER CALC-MCNC: 3.3 G/DL (ref 1.9–3.5)
GLUCOSE SERPL-MCNC: 96 MG/DL (ref 65–99)
HCT VFR BLD AUTO: 41.3 % (ref 37–47)
HGB BLD-MCNC: 12.6 G/DL (ref 12–16)
IMM GRANULOCYTES # BLD AUTO: 0.03 K/UL (ref 0–0.11)
IMM GRANULOCYTES NFR BLD AUTO: 0.3 % (ref 0–0.9)
LYMPHOCYTES # BLD AUTO: 2.69 K/UL (ref 1–4.8)
LYMPHOCYTES NFR BLD: 30.3 % (ref 22–41)
MCH RBC QN AUTO: 26.1 PG (ref 27–33)
MCHC RBC AUTO-ENTMCNC: 30.5 G/DL (ref 33.6–35)
MCV RBC AUTO: 85.5 FL (ref 81.4–97.8)
MONOCYTES # BLD AUTO: 0.53 K/UL (ref 0–0.85)
MONOCYTES NFR BLD AUTO: 6 % (ref 0–13.4)
NEUTROPHILS # BLD AUTO: 5.17 K/UL (ref 2–7.15)
NEUTROPHILS NFR BLD: 58.2 % (ref 44–72)
NRBC # BLD AUTO: 0 K/UL
NRBC BLD-RTO: 0 /100 WBC
PLATELET # BLD AUTO: 399 K/UL (ref 164–446)
PMV BLD AUTO: 11.1 FL (ref 9–12.9)
POTASSIUM SERPL-SCNC: 6.1 MMOL/L (ref 3.6–5.5)
PROT SERPL-MCNC: 7.3 G/DL (ref 6–8.2)
RBC # BLD AUTO: 4.83 M/UL (ref 4.2–5.4)
SODIUM SERPL-SCNC: 135 MMOL/L (ref 135–145)
WBC # BLD AUTO: 8.9 K/UL (ref 4.8–10.8)

## 2019-06-24 PROCEDURE — 80053 COMPREHEN METABOLIC PANEL: CPT

## 2019-06-24 PROCEDURE — 85652 RBC SED RATE AUTOMATED: CPT

## 2019-06-24 PROCEDURE — 85025 COMPLETE CBC W/AUTO DIFF WBC: CPT

## 2019-06-24 PROCEDURE — 36415 COLL VENOUS BLD VENIPUNCTURE: CPT

## 2019-06-25 ENCOUNTER — TELEPHONE (OUTPATIENT)
Dept: INFECTIOUS DISEASES | Facility: MEDICAL CENTER | Age: 64
End: 2019-06-25

## 2019-06-25 NOTE — TELEPHONE ENCOUNTER
"Per Marta Yusuf, APRN: \"K=6.1.  Any symptoms- N/V, dizziness or lightheadedness, heart racing or palpitations?  Is she taking Potassium?\"  Called and LM with pt to call back and discuss.  Pt called back on 07/02/19. Reported some nausea and lightheadedness. No supplemental potassium of multivitamins. Marta ordered ESR and CMP.  Called pt back and informed of lab work. Pt said she would get it done as soon as she can.  -AMP  "

## 2019-07-02 DIAGNOSIS — L02.611 ABSCESS OF RIGHT FOOT: ICD-10-CM

## 2019-07-03 ENCOUNTER — HOSPITAL ENCOUNTER (OUTPATIENT)
Dept: LAB | Facility: MEDICAL CENTER | Age: 64
End: 2019-07-03
Attending: NURSE PRACTITIONER
Payer: MEDICARE

## 2019-07-03 ENCOUNTER — OFFICE VISIT (OUTPATIENT)
Dept: INFECTIOUS DISEASES | Facility: MEDICAL CENTER | Age: 64
End: 2019-07-03
Payer: MEDICARE

## 2019-07-03 ENCOUNTER — TELEPHONE (OUTPATIENT)
Dept: INFECTIOUS DISEASES | Facility: MEDICAL CENTER | Age: 64
End: 2019-07-03

## 2019-07-03 VITALS
HEART RATE: 65 BPM | BODY MASS INDEX: 32.12 KG/M2 | HEIGHT: 66 IN | SYSTOLIC BLOOD PRESSURE: 100 MMHG | DIASTOLIC BLOOD PRESSURE: 60 MMHG | OXYGEN SATURATION: 96 % | TEMPERATURE: 98.6 F

## 2019-07-03 DIAGNOSIS — A04.72 C. DIFFICILE DIARRHEA: ICD-10-CM

## 2019-07-03 DIAGNOSIS — L02.611 ABSCESS OF RIGHT FOOT: ICD-10-CM

## 2019-07-03 DIAGNOSIS — A49.01 MSSA (METHICILLIN SUSCEPTIBLE STAPHYLOCOCCUS AUREUS) INFECTION: ICD-10-CM

## 2019-07-03 DIAGNOSIS — R11.0 NAUSEA: ICD-10-CM

## 2019-07-03 LAB
ALBUMIN SERPL BCP-MCNC: 3.7 G/DL (ref 3.2–4.9)
ALBUMIN/GLOB SERPL: 1.2 G/DL
ALP SERPL-CCNC: 84 U/L (ref 30–99)
ALT SERPL-CCNC: 6 U/L (ref 2–50)
ANION GAP SERPL CALC-SCNC: 8 MMOL/L (ref 0–11.9)
AST SERPL-CCNC: 10 U/L (ref 12–45)
BILIRUB SERPL-MCNC: 0.3 MG/DL (ref 0.1–1.5)
BUN SERPL-MCNC: 25 MG/DL (ref 8–22)
CALCIUM SERPL-MCNC: 9.7 MG/DL (ref 8.5–10.5)
CHLORIDE SERPL-SCNC: 109 MMOL/L (ref 96–112)
CO2 SERPL-SCNC: 23 MMOL/L (ref 20–33)
CREAT SERPL-MCNC: 1.09 MG/DL (ref 0.5–1.4)
ERYTHROCYTE [SEDIMENTATION RATE] IN BLOOD BY WESTERGREN METHOD: 22 MM/HOUR (ref 0–30)
GLOBULIN SER CALC-MCNC: 3.1 G/DL (ref 1.9–3.5)
GLUCOSE SERPL-MCNC: 98 MG/DL (ref 65–99)
POTASSIUM SERPL-SCNC: 4.7 MMOL/L (ref 3.6–5.5)
PROT SERPL-MCNC: 6.8 G/DL (ref 6–8.2)
SODIUM SERPL-SCNC: 140 MMOL/L (ref 135–145)

## 2019-07-03 PROCEDURE — 80053 COMPREHEN METABOLIC PANEL: CPT

## 2019-07-03 PROCEDURE — 99213 OFFICE O/P EST LOW 20 MIN: CPT | Performed by: NURSE PRACTITIONER

## 2019-07-03 PROCEDURE — 36415 COLL VENOUS BLD VENIPUNCTURE: CPT

## 2019-07-03 PROCEDURE — 85652 RBC SED RATE AUTOMATED: CPT

## 2019-07-03 RX ORDER — SULFAMETHOXAZOLE AND TRIMETHOPRIM 800; 160 MG/1; MG/1
1 TABLET ORAL 2 TIMES DAILY
COMMUNITY
End: 2021-05-11

## 2019-07-03 RX ORDER — VANCOMYCIN HYDROCHLORIDE 125 MG/1
125 CAPSULE ORAL 2 TIMES DAILY
Qty: 18 CAP | Refills: 0 | Status: SHIPPED | OUTPATIENT
Start: 2019-07-03 | End: 2019-07-12

## 2019-07-03 NOTE — PROGRESS NOTES
[facility]    Subjective:     Chief Complaint   Patient presents with   • Follow-Up     Abscess of right foot     Interval History: 64 y.o. 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  Hospitalized from 5/17-5/28/2019, admitted due to increased drainage with pus, odor and swelling to right foot wound.  Wound culture of right foot on 5/17+ MSSA.  X-ray on 5/17 with lateral skin ulceration associate with soft tissue gas compatible with cellulitis.  Blood cultures on 5/18 were negative.  MRI foot on 5/18 showed lateral forefoot cellulitis and new 7 cm draining abscess which overlies the fifth TMT.  No osteomyelitis or septic arthropathy.  S/p Right foot I&D with Dr. Gutierrez on 5/20 with tendo Achilles lengthening, ankle manipulation, joint and flexor release.  Per op note no purulence found, but necrotic tissue seen. OR culture on 5/20+ diphtheroids.  C diff toxin positive on 5/24.  Patient discharged home on p.o. vancomycin x14 days to be followed with prophylactic dosing.  Also discharged home on IV Cefazolin x4 weeks, stop date 6/17/2019 and then to be followed with 2 weeks p.o. antibiotics.    6/12/2019: Seen by ANGY Tillman.  States she has had nausea with occasional vomiting, noting that she took all of the Zofran she was given from her hospitalization.  Requesting more antinausea medication as she does not have a follow-up with her PCP.  Pt stating that the wound and surgical site are healing well.  States she was seen by Dr. Gutierrez last week, at which time the sutures were removed and the surgical site was noted to be healed without any drainage, redness or swelling.  A cast was applied with the plan to remove on 7/9/2019.  Finish IV cefazolin on 6/17/2019.  DC midline after last infusion dose.  On 6/18, start p.o. Bactrim DS twice daily x2 weeks.  For C diff, finish active  treatment with dosing 4 times a day this evening.  Starting tomorrow, will begin prophylactic dosing of p.o. vancomycin twice daily.  Will remain on prophylactic dosing until finished with antibiotics +2 days.  Estimated end date 7/4/2019.    Today, 7/3/2019:  Accompanied by her niece, Loni.  Has been tolerating the p.o. Bactrim with continued nausea that did not change coming off the IV antibiotics.  Denies having vomiting.  Says occasionally she has chills, but no fevers.  She continues to have loose bowel movements with urination, this is the same from when she was in the hospital, no better no worse.  She does note having some lightheadedness the last couple of weeks, stating it is worse when she lays down and moves her head from side to side.  She denies any dizziness, heart palpitations, heart racing or swelling.  She is supposed to follow-up with her primary care physician in the next couple weeks.  She continues to have her baseline headaches, these are no worse than normal.  She is scheduled to have the cast removed on 7/9/2019.  She cannot comment on how the surgical site is doing since next week will be the first time seeing the surgical site since cast placement.  She notes an internal pain to her right foot, noting that his has worsened over the last week.  Getting up to an 8/10, described as being a deep/sharp/stabbing sensation that is somewhat improved with naproxen.  She notes that she is taking Lyrica, but does not feel that this is been helping much.    Review of Systems   Constitutional: Positive for chills. Negative for fever.   Respiratory: Negative for shortness of breath.    Cardiovascular: Negative for chest pain and leg swelling.   Gastrointestinal: Positive for diarrhea and nausea. Negative for abdominal pain, constipation and vomiting.   Genitourinary: Negative for dysuria.   Musculoskeletal: Positive for myalgias. Negative for back pain and joint pain.   Skin: Negative for rash.  "  Neurological: Positive for tingling, sensory change and headaches.   Psychiatric/Behavioral: The patient is not nervous/anxious.           Past Medical History:   Diagnosis Date   • Arthritis    • ASTHMA    • Back pain    • Bladder infection    • Bronchitis    • Heart burn    • Hemorrhoids    • Hypertension    • Kidney stones    • Migraine    • Pain    • Paresthesia of right foot    • Pleurisy    • Pneumonia    • Scarlet fever    • Ulcer    • Unspecified disorder of thyroid        Family History   Problem Relation Age of Onset   • Lung Disease Mother    • Cancer Father    • Heart Disease Father    • Lung Disease Father    • Thyroid Sister    • Cancer Brother    • Psychiatry Son        Social History   Substance Use Topics   • Smoking status: Never Smoker   • Smokeless tobacco: Never Used   • Alcohol use No       Allergies: Penicillins    Pt's medication and problem list reviewed.     Objective:     PE:  /60   Pulse 65   Temp 37 °C (98.6 °F) (Temporal)   Ht 1.676 m (5' 6\")   LMP 04/09/1977   SpO2 96%   Breastfeeding? No   BMI 32.12 kg/m²     Vital signs reviewed    Constitutional: Appears well-developed and well-nourished. No acute distress.  Obese.    Eyes: Conjunctivae and EOM are normal. Pupils are equal, round, and reactive to light.   Neck: Trachea midline. Neck supple. No JVD.    Respiratory/chest: No respiratory distress, unlabored respiratory effort.  Lungs clear to auscultation bilaterally. No wheezes or rales.  Cardiovascular: Normal rate, regular rhythm.  Normal heart sounds on auscultation- no murmur, gallop, or friction rub. No edema.   Abdomen: Soft, right quadrant tenderness, non-distended. BS + x 4. No masses or hernias.   Musculoskeletal: Wheelchair.  Limited range of motion to RLE due to cast and paralysis.  No clubbing or cyanosis.  RLE- cast remains in place (unable to visualize surgical sites), toes warm, capillary refill <2, unable to wiggle toes due to paralysis.  Skin: Warm and " dry.  No visible rashes or lesions.  Neurological: No cranial nerve deficit. Coordination normal.  Decreased sensation to RLE.  Psychiatric: Alert and oriented to person, place, and time. Normal mood, calm affect.  Normal behavior and judgment. Memory intact.    Labs:  WBC   Date/Time Value Ref Range Status   06/24/2019 03:11 PM 8.9 4.8 - 10.8 K/uL Final     RBC   Date/Time Value Ref Range Status   06/24/2019 03:11 PM 4.83 4.20 - 5.40 M/uL Final     Hemoglobin   Date/Time Value Ref Range Status   06/24/2019 03:11 PM 12.6 12.0 - 16.0 g/dL Final     Hematocrit   Date/Time Value Ref Range Status   06/24/2019 03:11 PM 41.3 37.0 - 47.0 % Final     MCV   Date/Time Value Ref Range Status   06/24/2019 03:11 PM 85.5 81.4 - 97.8 fL Final     MCH   Date/Time Value Ref Range Status   06/24/2019 03:11 PM 26.1 (L) 27.0 - 33.0 pg Final     MCHC   Date/Time Value Ref Range Status   06/24/2019 03:11 PM 30.5 (L) 33.6 - 35.0 g/dL Final     MPV   Date/Time Value Ref Range Status   06/24/2019 03:11 PM 11.1 9.0 - 12.9 fL Final        Sodium   Date/Time Value Ref Range Status   07/03/2019 01:43  135 - 145 mmol/L Final     Potassium   Date/Time Value Ref Range Status   07/03/2019 01:43 PM 4.7 3.6 - 5.5 mmol/L Final     Chloride   Date/Time Value Ref Range Status   07/03/2019 01:43  96 - 112 mmol/L Final     Co2   Date/Time Value Ref Range Status   07/03/2019 01:43 PM 23 20 - 33 mmol/L Final     Glucose   Date/Time Value Ref Range Status   07/03/2019 01:43 PM 98 65 - 99 mg/dL Final     Bun   Date/Time Value Ref Range Status   07/03/2019 01:43 PM 25 (H) 8 - 22 mg/dL Final     Creatinine   Date/Time Value Ref Range Status   07/03/2019 01:43 PM 1.09 0.50 - 1.40 mg/dL Final       Alkaline Phosphatase   Date/Time Value Ref Range Status   07/03/2019 01:43 PM 84 30 - 99 U/L Final     AST(SGOT)   Date/Time Value Ref Range Status   07/03/2019 01:43 PM 10 (L) 12 - 45 U/L Final     ALT(SGPT)   Date/Time Value Ref Range Status   07/03/2019  01:43 PM 6 2 - 50 U/L Final     Total Bilirubin   Date/Time Value Ref Range Status   07/03/2019 01:43 PM 0.3 0.1 - 1.5 mg/dL Final       ESR 22    Assessment and Plan:   The following treatment plan was discussed with patient at length:    1. Abscess of right foot  sulfamethoxazole-trimethoprim (BACTRIM DS) 800-160 MG tablet    Labs reviewed, okay to continue p.o. Bactrim for an additional week until cast is removed and surgical site may be examined.  If surgical site is well-healed next week then will be able to finish antibiotics.  Additional 7-day prescription of Bactrim sent to pharmacy.   2. MSSA (methicillin susceptible Staphylococcus aureus) infection      As above.   3. C. difficile diarrhea  vancomycin (VANCOCIN) 125 MG capsule    Extend p.o. vancomycin for 2 days past antibiotics, estimated end date 7/12/19.   4. Nausea      Was unable to get Compazine at [facility], prescription will be called into [facility].  Needs to talk to PCP about persistent nausea.     Follow up: 1 week, RTC sooner if needed. FU with PCP for ongoing chronic medical conditions.     DANIELE [email or web address].       Please note that this dictation was created using voice recognition software. I have  worked with technical experts from [employer] to optimize the interface.  I have made every reasonable attempt to correct obvious errors, but there may be errors of grammar and possibly content that I did not discover before finalizing the note.

## 2019-07-04 RX ORDER — SULFAMETHOXAZOLE AND TRIMETHOPRIM 800; 160 MG/1; MG/1
1 TABLET ORAL 2 TIMES DAILY
Qty: 14 TAB | Refills: 0 | Status: SHIPPED | OUTPATIENT
Start: 2019-07-04 | End: 2019-07-11

## 2019-07-04 ASSESSMENT — ENCOUNTER SYMPTOMS
HEADACHES: 1
TINGLING: 1
SENSORY CHANGE: 1
CONSTIPATION: 0
BACK PAIN: 0
SHORTNESS OF BREATH: 0
NERVOUS/ANXIOUS: 0
MYALGIAS: 1
CHILLS: 1
ABDOMINAL PAIN: 0
NAUSEA: 1
VOMITING: 0
FEVER: 0
DIARRHEA: 1

## 2019-07-10 ENCOUNTER — OFFICE VISIT (OUTPATIENT)
Dept: INFECTIOUS DISEASES | Facility: MEDICAL CENTER | Age: 64
End: 2019-07-10
Payer: MEDICARE

## 2019-07-10 VITALS
HEART RATE: 72 BPM | OXYGEN SATURATION: 94 % | WEIGHT: 199 LBS | BODY MASS INDEX: 31.98 KG/M2 | HEIGHT: 66 IN | TEMPERATURE: 98.4 F | DIASTOLIC BLOOD PRESSURE: 60 MMHG | SYSTOLIC BLOOD PRESSURE: 110 MMHG

## 2019-07-10 DIAGNOSIS — L02.611 ABSCESS OF RIGHT FOOT: ICD-10-CM

## 2019-07-10 DIAGNOSIS — R11.0 NAUSEA: ICD-10-CM

## 2019-07-10 DIAGNOSIS — A04.72 C. DIFFICILE DIARRHEA: ICD-10-CM

## 2019-07-10 DIAGNOSIS — A49.01 MSSA (METHICILLIN SUSCEPTIBLE STAPHYLOCOCCUS AUREUS) INFECTION: ICD-10-CM

## 2019-07-10 PROCEDURE — 99213 OFFICE O/P EST LOW 20 MIN: CPT | Performed by: NURSE PRACTITIONER

## 2019-07-10 RX ORDER — LACTOBACILLUS RHAMNOSUS GG 10B CELL
CAPSULE ORAL
Refills: 0 | COMMUNITY
Start: 2019-05-28 | End: 2021-05-11

## 2019-07-10 RX ORDER — TRAMADOL HYDROCHLORIDE 50 MG/1
TABLET ORAL
Refills: 0 | COMMUNITY
Start: 2019-06-06 | End: 2021-05-11

## 2019-07-10 RX ORDER — PREGABALIN 75 MG/1
75 CAPSULE ORAL 3 TIMES DAILY
COMMUNITY
End: 2021-05-11

## 2019-07-10 RX ORDER — PROCHLORPERAZINE MALEATE 5 MG/1
5 TABLET ORAL EVERY 8 HOURS PRN
Refills: 0 | COMMUNITY
Start: 2019-07-03 | End: 2021-05-11

## 2019-07-10 ASSESSMENT — ENCOUNTER SYMPTOMS
BACK PAIN: 0
TINGLING: 1
HEADACHES: 1
DIARRHEA: 1
CONSTIPATION: 0
NERVOUS/ANXIOUS: 0
SORE THROAT: 0
SENSORY CHANGE: 1
SHORTNESS OF BREATH: 0
CHILLS: 0
VOMITING: 0
MYALGIAS: 1
NAUSEA: 1
ABDOMINAL PAIN: 0
FEVER: 0

## 2019-07-10 NOTE — PROGRESS NOTES
Infectious Disease Clinic    Subjective:     Chief Complaint   Patient presents with   • Follow-Up     Abscess of right foot     Interval History: 64 y.o. 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  Hospitalized from 5/17-5/28/2019, admitted due to increased drainage with pus, odor and swelling to right foot wound.  Wound culture of right foot on 5/17+ MSSA.  X-ray on 5/17 with lateral skin ulceration associate with soft tissue gas compatible with cellulitis.  Blood cultures on 5/18 were negative.  MRI foot on 5/18 showed lateral forefoot cellulitis and new 7 cm draining abscess which overlies the fifth TMT.  No osteomyelitis or septic arthropathy.  S/p Right foot I&D with Dr. Gutierrez on 5/20 with tendo Achilles lengthening, ankle manipulation, joint and flexor release.  Per op note no purulence found, but necrotic tissue seen. OR culture on 5/20+ diphtheroids.  C diff toxin positive on 5/24.  Patient discharged home on p.o. vancomycin x14 days to be followed with prophylactic dosing.  Also discharged home on IV Cefazolin x4 weeks, stop date 6/17/2019 and then to be followed with 2 weeks p.o. antibiotics.    6/12/2019: Seen by ANGY Tillman.  States she has had nausea with occasional vomiting, noting that she took all of the Zofran she was given from her hospitalization.  Requesting more antinausea medication as she does not have a follow-up with her PCP.  Pt stating that the wound and surgical site are healing well.  States she was seen by Dr. Gutierrez last week, at which time the sutures were removed and the surgical site was noted to be healed without any drainage, redness or swelling.  A cast was applied with the plan to remove on 7/9/2019.  Finish IV cefazolin on 6/17/2019.  DC midline after last infusion dose.  On 6/18, start p.o. Bactrim DS twice daily x2 weeks.  For C diff, finish active  treatment with dosing 4 times a day this evening.  Starting tomorrow, will begin prophylactic dosing of p.o. vancomycin twice daily.  Will remain on prophylactic dosing until finished with antibiotics +2 days.  Estimated end date 2019.    7/3/2019: Seen by ANGY Tillman.  She continues to have loose bowel movements with urination, this is the same from when she was in the hospital, no better no worse.  She is scheduled to have the cast removed on 2019.  She cannot comment on how the surgical site is doing since next week will be the first time seeing the surgical site since cast placement.  Continue p.o. Bactrim for an additional week until cast is removed and surgical site may be examined.    Today, 7/10/2019: Accompanied by her niece, Loni.  Cast removed yesterday, patient and niece notes that calving was not removed from surgical site.  Continues to take the p.o. Bactrim, states she feels like she has had more abdominal pain and generalized upset over the past week; however, her stools have improved and she is no longer having a bowel movement every time she is urinating.  She notes that she has had some nausea, but she was able to obtain the Compazine which seems to have helped.  She still needs to schedule herself follow-up with her PCP.  Otherwise, she denies feeling generally ill, fevers/chills, general malaise, n/v, chest pain or shortness of breath.  States that her next follow-up at [facility] is in 6 weeks.  She continues to be seen by home health, but notes that the order is due to  in approximately 2 weeks.  Denies there being any drainage from the left wound site, but patient's niece notes that she feels the area has dehisced since being placed in the cast approximately 4 weeks ago.  Once again notes that she has normal sensation of her RLE and the pain that she feels is occasional, more of a deep internal sensation of achiness.    Review of Systems   Constitutional: Negative for  "chills and fever.   HENT: Negative for sore throat.    Respiratory: Negative for shortness of breath.    Cardiovascular: Positive for leg swelling (trace RLE). Negative for chest pain.   Gastrointestinal: Positive for diarrhea (Improving) and nausea. Negative for abdominal pain, constipation and vomiting.   Genitourinary: Negative for dysuria.   Musculoskeletal: Positive for myalgias. Negative for back pain and joint pain.   Skin: Negative for itching and rash.   Neurological: Positive for tingling, sensory change and headaches (baseline).   Psychiatric/Behavioral: The patient is not nervous/anxious.         Past Medical History:   Diagnosis Date   • Arthritis    • ASTHMA    • Back pain    • Bladder infection    • Bronchitis    • Heart burn    • Hemorrhoids    • Hypertension    • Kidney stones    • Migraine    • Pain    • Paresthesia of right foot    • Pleurisy    • Pneumonia    • Scarlet fever    • Ulcer    • Unspecified disorder of thyroid        Family History   Problem Relation Age of Onset   • Lung Disease Mother    • Cancer Father    • Heart Disease Father    • Lung Disease Father    • Thyroid Sister    • Cancer Brother    • Psychiatry Son        Social History   Substance Use Topics   • Smoking status: Never Smoker   • Smokeless tobacco: Never Used   • Alcohol use No       Allergies: Penicillins    Pt's medication and problem list reviewed.     Objective:     PE:  /60 (BP Location: Left arm, Patient Position: Sitting, BP Cuff Size: Large adult)   Pulse 72   Temp 36.9 °C (98.4 °F) (Temporal)   Ht 1.676 m (5' 6\")   Wt 90.3 kg (199 lb)   LMP 04/09/1977   SpO2 94%   BMI 32.12 kg/m²     Vital signs reviewed    Constitutional: Appears well-developed and well-nourished. No acute distress.  Obese.    Eyes: Conjunctivae and EOM are normal. Pupils are equal, round, and reactive to light.   Neck: Trachea midline. Neck supple. No JVD.  No cervical lymphadenopathy.  Respiratory/chest: No respiratory distress, " unlabored respiratory effort.  Lungs clear to auscultation bilaterally. No wheezes or rales.  Cardiovascular: Normal rate, regular rhythm.  Normal heart sounds on auscultation- no murmur, gallop, or friction rub.  Trace RLE edema.   Abdomen: Soft, slight right quadrant tenderness, non-distended. BS + x 4. No masses or hernias.  Protuberant.  Musculoskeletal: Wheelchair.  Limited range of motion to RLE due to cast and paralysis.  No clubbing or cyanosis.  Right lateral foot- wound bed approximately 1 x 0.8 x 0.4 cm, wound bed pink, no maceration or erythema to surrounding tissue, no active drainage, nontender to palpation.  Skin: Warm and dry.  No visible rashes or lesions.  Neurological: No cranial nerve deficit. Coordination normal.  Decreased sensation to RLE.  Psychiatric: Alert and oriented to person, place, and time. Normal mood, calm affect.  Normal behavior and judgment. Memory intact.    Labs:  Sodium   Date/Time Value Ref Range Status   07/03/2019 01:43  135 - 145 mmol/L Final     Potassium   Date/Time Value Ref Range Status   07/03/2019 01:43 PM 4.7 3.6 - 5.5 mmol/L Final     Chloride   Date/Time Value Ref Range Status   07/03/2019 01:43  96 - 112 mmol/L Final     Co2   Date/Time Value Ref Range Status   07/03/2019 01:43 PM 23 20 - 33 mmol/L Final     Glucose   Date/Time Value Ref Range Status   07/03/2019 01:43 PM 98 65 - 99 mg/dL Final     Bun   Date/Time Value Ref Range Status   07/03/2019 01:43 PM 25 (H) 8 - 22 mg/dL Final     Creatinine   Date/Time Value Ref Range Status   07/03/2019 01:43 PM 1.09 0.50 - 1.40 mg/dL Final       Alkaline Phosphatase   Date/Time Value Ref Range Status   07/03/2019 01:43 PM 84 30 - 99 U/L Final     AST(SGOT)   Date/Time Value Ref Range Status   07/03/2019 01:43 PM 10 (L) 12 - 45 U/L Final     ALT(SGPT)   Date/Time Value Ref Range Status   07/03/2019 01:43 PM 6 2 - 50 U/L Final     Total Bilirubin   Date/Time Value Ref Range Status   07/03/2019 01:43 PM 0.3 0.1  - 1.5 mg/dL Final       ESR 22    Assessment and Plan:   The following treatment plan was discussed with patient at length:    1. Abscess of right foot      -Start p.o. Bactrim, no S/SX of active infection, no need for additional antibiotics.  Monitor for s/sx of worsening off abx: increased redness, pain, swelling, drainage, breakdown of surgical site, fevers, chills, general malaise, etc.  Notify ID or go to ER should these s/sx occur.  -Follow-up at [facility] as directed.  -Continue with wound care as directed.   2. MSSA (methicillin susceptible Staphylococcus aureus) infection      As above.   3. C. difficile diarrhea      Finish p.o. vancomycin on 7/12/2019.  No additional vancomycin needed as she will be off antibiotics.   4. Nausea      Slightly improved, continue with Compazine as needed.  Follow-up with PCP for persistent nausea.     Follow up: PRN, RTC sooner if needed. FU with PCP for ongoing chronic medical conditions.     Marta Yusuf, A.P.R.N.       Please note that this dictation was created using voice recognition software. I have  worked with technical experts from [facility] to optimize the interface.  I have made every reasonable attempt to correct obvious errors, but there may be errors of grammar and possibly content that I did not discover before finalizing the note.

## 2019-07-17 ENCOUNTER — HOSPITAL ENCOUNTER (OUTPATIENT)
Dept: RADIOLOGY | Facility: MEDICAL CENTER | Age: 64
End: 2019-07-17
Attending: FAMILY MEDICINE
Payer: MEDICARE

## 2019-07-17 DIAGNOSIS — M79.89 SWELLING OF RIGHT FOOT: ICD-10-CM

## 2019-07-17 DIAGNOSIS — R79.89 ELEVATED D-DIMER: ICD-10-CM

## 2019-07-17 DIAGNOSIS — Z86.718 HISTORY OF BLOOD CLOTS: ICD-10-CM

## 2019-07-17 DIAGNOSIS — Z98.890 RECENT MAJOR SURGERY: ICD-10-CM

## 2019-07-17 PROCEDURE — 93971 EXTREMITY STUDY: CPT | Mod: RT

## 2019-08-26 NOTE — WOUND TEAM
Pt seen during ortho rounds with Dr Wu and LPS team for RLE foot ulcer lateral wound.  Measurements(cm): 2.2 x 2.0 x0. Following physician assessment and debridement of wound by ANGY zhu of subcutaneous and periwound of approx 4.4cm2, pt wound was evaluated and dressed with AqAg covered with non-adhesive foam and secured with hypafix tape. [employer] offloading horeshoe secured with hypafix, and tubi F    
no

## 2020-01-18 NOTE — PROGRESS NOTES
Infectious Disease Progress Note    Author: Guerline Reed M.D. DOS & Time created: 2017  3:23 PM    Chief Complaint:  Chief Complaint   Patient presents with   • Abscess     R foot        Interval History:   AF, WBC 5.6, c/o headache and sore gums, c/o wheezing but no SOB, tolerating abx without issues, has not had a BM in 5 days, cx +grp B strep    AF, WBC 5, still has some wheezing but respiratory treatments helping, no plans for surgery, plan for wound vac change today   AF WBC 6.5 continued wheezing  Labs Reviewed, Medications Reviewed, Radiology Reviewed and Wound Reviewed.    Review of Systems:  Review of Systems   Constitutional: Negative for fever and chills.   Respiratory: Positive for wheezing. Negative for shortness of breath.    Gastrointestinal: Positive for constipation. Negative for nausea, vomiting, abdominal pain and diarrhea.   Genitourinary: Negative for dysuria.   Neurological: Positive for sensory change. Negative for headaches.       Hemodynamics:  Temp (24hrs), Av.6 °C (97.9 °F), Min:36.3 °C (97.4 °F), Max:36.8 °C (98.3 °F)  Temperature: 36.8 °C (98.2 °F)  Pulse  Av.8  Min: 74  Max: 118  Blood Pressure: 160/87 mmHg (nurse aware)       Physical Exam:  Physical Exam   Constitutional: She is oriented to person, place, and time. She appears well-developed. No distress.   Obese   HENT:   Head: Normocephalic and atraumatic.   Eyes: EOM are normal. Pupils are equal, round, and reactive to light.   Neck: Neck supple.   Cardiovascular: Normal rate and regular rhythm.    Pulmonary/Chest: Effort normal. No respiratory distress. She has wheezes. She has no rales.   Abdominal: Soft. She exhibits no distension. There is no tenderness.   Musculoskeletal: She exhibits edema.   R foot deformity  Wound vac on dorsum of R foot    R TKA surgical scar well healed and clean   Neurological: She is alert and oriented to person, place, and time.   Nursing note and vitals  reviewed.      Meds:    Current facility-administered medications:   •  oxyCODONE CR (OXYCONTIN) tablet 10 mg, 10 mg, Oral, Q12HRS, Edson Lewis D.O., 10 mg at 06/24/17 1240  •  ipratropium-albuterol (DUONEB) nebulizer solution 3 mL, 3 mL, Nebulization, Q4H PRN (RT), Edson Lewis D.O.  •  lorazepam (ATIVAN) injection 1 mg, 1 mg, Intravenous, Once PRN, Edson Lewis D.O.  •  albuterol inhaler 2 Puff, 2 Puff, Inhalation, Q4HRS PRN, Edson Lewis D.O., 2 Puff at 06/23/17 2346  •  zolpidem (AMBIEN) tablet 5 mg, 5 mg, Oral, HS PRN, Liz Norris, A.P.N., 5 mg at 06/23/17 2348  •  cefTRIAXone (ROCEPHIN) 2 g in  mL IVPB, 2 g, Intravenous, Q24HRS, Grace Escobar M.D., Stopped at 06/24/17 0815  •  NS (BOLUS) infusion 1,000 mL, 1,000 mL, Intravenous, Once PRN, Grace Escobar M.D.  •  senna-docusate (PERICOLACE or SENOKOT S) 8.6-50 MG per tablet 2 Tab, 2 Tab, Oral, BID, Stopped at 06/24/17 0900 **AND** polyethylene glycol/lytes (MIRALAX) PACKET 1 Packet, 1 Packet, Oral, QDAY PRN, 1 Packet at 06/21/17 1609 **AND** magnesium hydroxide (MILK OF MAGNESIA) suspension 30 mL, 30 mL, Oral, QDAY PRN **AND** bisacodyl (DULCOLAX) suppository 10 mg, 10 mg, Rectal, QDAY PRN, Grace Escobar M.D.  •  Respiratory Care per Protocol, , Nebulization, Continuous RT, Grace Escobar M.D.  •  heparin injection 5,000 Units, 5,000 Units, Subcutaneous, Q8HRS, Grace Escobar M.D., 5,000 Units at 06/24/17 1412  •  labetalol (NORMODYNE,TRANDATE) injection 10 mg, 10 mg, Intravenous, Q4HRS PRN, Grace Escobar M.D.  •  ondansetron (ZOFRAN) syringe/vial injection 4 mg, 4 mg, Intravenous, Q4HRS PRN, Grace Escobar M.D., 4 mg at 06/20/17 0943  •  ondansetron (ZOFRAN ODT) dispertab 4 mg, 4 mg, Oral, Q4HRS PRN, Grace Escobar M.D., 4 mg at 06/24/17 1411  •  acetaminophen (TYLENOL) tablet 650 mg, 650 mg, Oral, Q6HRS PRN, Grace Escobar M.D., 650 mg at 06/20/17 0824  •  Notify provider if pain remains uncontrolled, , , CONTINUOUS **AND** Use the numeric rating scale (NRS-11)  on regular floors and Critical-Care Pain Observation Tool (CPOT) on ICUs/Trauma to assess pain, , , CONTINUOUS **AND** Pulse Ox (Oximetry), , , CONTINUOUS **AND** Pharmacy Consult Request ...Pain Management Review, , Other, PRN **AND** If patient difficult to arouse and/or has respiratory depression, stop any opiates that are currently infusing and call a Rapid Response., , , CONTINUOUS **AND** oxycodone immediate-release (ROXICODONE) tablet 2.5 mg, 2.5 mg, Oral, Q3HRS PRN **AND** oxycodone immediate-release (ROXICODONE) tablet 5 mg, 5 mg, Oral, Q3HRS PRN, 5 mg at 06/24/17 1412 **AND** [DISCONTINUED] morphine (pf) 4 mg/ml injection 2 mg, 2 mg, Intravenous, Q3HRS PRN, Grace Escobar M.D., 2 mg at 06/24/17 0747  •  clonazepam (KLONOPIN) tablet 0.5 mg, 0.5 mg, Oral, BID PRN, Grace Escobar M.D.  •  pregabalin (LYRICA) capsule 75 mg, 75 mg, Oral, TID, Grace Escobar M.D., 75 mg at 06/24/17 0745  •  telmisartan (MICARDIS) tablet 40 mg, 40 mg, Oral, DAILY, Grace Escobar M.D., 40 mg at 06/24/17 0746    Labs:  Recent Labs      06/22/17   0224  06/23/17   0311  06/24/17   0214   WBC  5.6  5.0  6.5   RBC  3.96*  4.00*  4.15*   HEMOGLOBIN  11.3*  11.5*  11.7*   HEMATOCRIT  35.5*  36.1*  36.8*   MCV  89.6  90.3  88.7   MCH  28.5  28.8  28.2   RDW  49.7  48.8  47.8   PLATELETCT  344  325  349   MPV  9.2  9.2  9.5   NEUTSPOLYS  40.50*  42.60*  51.50   LYMPHOCYTES  44.10*  42.00*  36.30   MONOCYTES  8.10  9.50  7.40   EOSINOPHILS  6.10  5.10  3.70   BASOPHILS  0.70  0.60  0.80     Recent Labs      06/22/17 0224  06/23/17 0311  06/24/17   0214   SODIUM  136  140  137   POTASSIUM  3.7  3.9  3.8   CHLORIDE  110  110  105   CO2  22  25  25   GLUCOSE  121*  114*  108*   BUN  11  8  12     Recent Labs      06/22/17 0224 06/23/17 0311 06/24/17   0214   ALBUMIN  3.0*  3.1*  3.2   TBILIRUBIN  0.2  0.2  0.2   ALKPHOSPHAT  57  57  58   TOTPROTEIN  5.7*  5.9*  6.1   ALTSGPT  19  15  13   ASTSGOT  18  15  13   CREATININE  0.74  0.60  0.71        Imaging:  Dx-foot-complete 3+ Right  6/20/2017  1.  Diffuse lateral soft tissue swelling of RIGHT foot with focal ulceration over the 5th metatarsal base. 2.  No gross bony destruction, however osteomyelitis is not excluded by plain film. 3.  No fracture or dislocation. 4.  Possible metallic foreign body within the plantar soft tissues of great toe.    Mr-foot-with Right  6/21/2017  Fourth and fifth TMT centered marrow edema, marginal spurring and joint effusion. No clear bony destruction. This is more likely secondary to degenerative change than septic arthropathy and osteomyelitis although given the overlying skin ulceration, infection cannot be excluded Lateral forefoot cellulitis with medium depth ulceration overlying the fifth TMT Multiple bone infarctions without articular surface collapse    Le Venous Duplex - Dvt ([facility] Only)    6/20/2017   Vascular Laboratory  CONCLUSIONS  Normal right lower extremity superficial and deep venous examination.    Micro:  BLOOD CULTURE   Date Value Ref Range Status   06/20/2017   Preliminary    No Growth    Note: Blood cultures are incubated for 5 days and  are monitored continuously.Positive blood cultures  are called to the RN and reported as soon as  they are identified.        Results     Procedure Component Value Units Date/Time    URINALYSIS [[id number]] Collected:  06/24/17 1200    Order Status:  Completed Updated:  06/24/17 1237     Color Lt. Yellow      Character Clear      Specific Gravity 1.009      Ph 6.5      Glucose Negative mg/dL      Ketones Negative mg/dL      Protein Negative mg/dL      Bilirubin Negative      Nitrite Negative      Leukocyte Esterase Negative      Occult Blood Negative      Micro Urine Req see below      Comment: Microscopic examination not performed when specimen is clear  and chemically negative for protein, blood, leukocyte esterase  and nitrite.         URINALYSIS [[id number]]     Order Status:  No result Specimen  "Information:  Urine from Urine, Clean Catch     CULTURE WOUND W/ GRAM STAIN [[id number]]  (Abnormal) Collected:  06/20/17 0810    Order Status:  Completed Specimen Information:  Wound from Right Foot Updated:  06/22/17 0846     Gram Stain Result --      Result:        Rare WBCs.  Rare Gram positive cocci.       Significant Indicator POS (POS)      Source WND      Site RIGHT FOOT      Culture Result Wound -- (A)      Culture Result Wound -- (A)      Result:        Streptococcus agalactiae (Group B)  Light growth      BLOOD CULTURE [[id number]] Collected:  06/20/17 1904    Order Status:  Completed Specimen Information:  Blood from Peripheral Updated:  06/21/17 0633     Significant Indicator NEG      Source BLD      Site PERIPHERAL      Blood Culture --      Result:        No Growth    Note: Blood cultures are incubated for 5 days and  are monitored continuously.Positive blood cultures  are called to the RN and reported as soon as  they are identified.      Narrative:      Per Hospital Policy: Only change Specimen Src: to \"Line\" if  specified by physician order.    BLOOD CULTURE [[id number]] Collected:  06/20/17 1907    Order Status:  Completed Specimen Information:  Blood from Peripheral Updated:  06/21/17 0633     Significant Indicator NEG      Source BLD      Site PERIPHERAL      Blood Culture --      Result:        No Growth    Note: Blood cultures are incubated for 5 days and  are monitored continuously.Positive blood cultures  are called to the RN and reported as soon as  they are identified.      Narrative:      Per Hospital Policy: Only change Specimen Src: to \"Line\" if  specified by physician order.    GRAM STAIN [[id number]] Collected:  06/20/17 0810    Order Status:  Completed Specimen Information:  Wound Updated:  06/20/17 2146     Significant Indicator .      Source WND      Site RIGHT FOOT      Gram Stain Result --      Result:        Rare WBCs.  Rare Gram positive cocci.              Assessment:  Active " Hospital Problems    Diagnosis   • *Foot ulcer, right (CMS-HCC) [L97.519]   • HTN (hypertension) [I10]   • Monoparesis of lower extremity (CMS-HCC) [G83.10]   • Fibromyalgia [M79.7]   • Chronic pain syndrome [G89.4]       Plan:  Right foot ulcer  Afebrile  No leukocytosis  Wound cx - grp B strep  MRI possible OM  Continue ceftriaxone (PCN allergy).   Anticipate 4-6 weeks of abx   Last 2 weeks can be Zyvox  No plans for surgery at this time per ortho  Wound care - wound vac in place. Plan for wound vac change today    No s/RLE paresthesias  2/2 traumatic injury 2 years ago  Resulting in above    Discussed with Dr. Lewis   Home

## 2021-05-11 ENCOUNTER — HOSPITAL ENCOUNTER (EMERGENCY)
Facility: MEDICAL CENTER | Age: 66
End: 2021-05-11
Attending: EMERGENCY MEDICINE
Payer: MEDICARE

## 2021-05-11 ENCOUNTER — APPOINTMENT (OUTPATIENT)
Dept: RADIOLOGY | Facility: MEDICAL CENTER | Age: 66
End: 2021-05-11
Attending: EMERGENCY MEDICINE
Payer: MEDICARE

## 2021-05-11 VITALS
BODY MASS INDEX: 31.34 KG/M2 | RESPIRATION RATE: 16 BRPM | OXYGEN SATURATION: 94 % | TEMPERATURE: 97.7 F | HEIGHT: 66 IN | WEIGHT: 195 LBS | HEART RATE: 82 BPM | SYSTOLIC BLOOD PRESSURE: 154 MMHG | DIASTOLIC BLOOD PRESSURE: 96 MMHG

## 2021-05-11 DIAGNOSIS — R51.9 CHRONIC NONINTRACTABLE HEADACHE, UNSPECIFIED HEADACHE TYPE: ICD-10-CM

## 2021-05-11 DIAGNOSIS — H57.11 EYE PAIN, RIGHT: ICD-10-CM

## 2021-05-11 DIAGNOSIS — G89.29 CHRONIC NONINTRACTABLE HEADACHE, UNSPECIFIED HEADACHE TYPE: ICD-10-CM

## 2021-05-11 LAB
ALBUMIN SERPL BCP-MCNC: 3.7 G/DL (ref 3.2–4.9)
ALBUMIN/GLOB SERPL: 1.2 G/DL
ALP SERPL-CCNC: 86 U/L (ref 30–99)
ALT SERPL-CCNC: 16 U/L (ref 2–50)
ANION GAP SERPL CALC-SCNC: 9 MMOL/L (ref 7–16)
AST SERPL-CCNC: 19 U/L (ref 12–45)
BASOPHILS # BLD AUTO: 0.3 % (ref 0–1.8)
BASOPHILS # BLD: 0.03 K/UL (ref 0–0.12)
BILIRUB SERPL-MCNC: 0.2 MG/DL (ref 0.1–1.5)
BUN SERPL-MCNC: 12 MG/DL (ref 8–22)
CALCIUM SERPL-MCNC: 8.8 MG/DL (ref 8.5–10.5)
CHLORIDE SERPL-SCNC: 108 MMOL/L (ref 96–112)
CO2 SERPL-SCNC: 23 MMOL/L (ref 20–33)
CREAT SERPL-MCNC: 0.82 MG/DL (ref 0.5–1.4)
EOSINOPHIL # BLD AUTO: 0.23 K/UL (ref 0–0.51)
EOSINOPHIL NFR BLD: 2.2 % (ref 0–6.9)
ERYTHROCYTE [DISTWIDTH] IN BLOOD BY AUTOMATED COUNT: 52.1 FL (ref 35.9–50)
ERYTHROCYTE [SEDIMENTATION RATE] IN BLOOD BY WESTERGREN METHOD: 8 MM/HOUR (ref 0–25)
GLOBULIN SER CALC-MCNC: 3 G/DL (ref 1.9–3.5)
GLUCOSE SERPL-MCNC: 155 MG/DL (ref 65–99)
HCT VFR BLD AUTO: 42.2 % (ref 37–47)
HGB BLD-MCNC: 13.1 G/DL (ref 12–16)
IMM GRANULOCYTES # BLD AUTO: 0.02 K/UL (ref 0–0.11)
IMM GRANULOCYTES NFR BLD AUTO: 0.2 % (ref 0–0.9)
LYMPHOCYTES # BLD AUTO: 3.12 K/UL (ref 1–4.8)
LYMPHOCYTES NFR BLD: 30.2 % (ref 22–41)
MCH RBC QN AUTO: 27.2 PG (ref 27–33)
MCHC RBC AUTO-ENTMCNC: 31 G/DL (ref 33.6–35)
MCV RBC AUTO: 87.6 FL (ref 81.4–97.8)
MONOCYTES # BLD AUTO: 0.64 K/UL (ref 0–0.85)
MONOCYTES NFR BLD AUTO: 6.2 % (ref 0–13.4)
NEUTROPHILS # BLD AUTO: 6.29 K/UL (ref 2–7.15)
NEUTROPHILS NFR BLD: 60.9 % (ref 44–72)
NRBC # BLD AUTO: 0 K/UL
NRBC BLD-RTO: 0 /100 WBC
PLATELET # BLD AUTO: 275 K/UL (ref 164–446)
PMV BLD AUTO: 10.1 FL (ref 9–12.9)
POTASSIUM SERPL-SCNC: 4.1 MMOL/L (ref 3.6–5.5)
PROT SERPL-MCNC: 6.7 G/DL (ref 6–8.2)
RBC # BLD AUTO: 4.82 M/UL (ref 4.2–5.4)
SODIUM SERPL-SCNC: 140 MMOL/L (ref 135–145)
WBC # BLD AUTO: 10.3 K/UL (ref 4.8–10.8)

## 2021-05-11 PROCEDURE — 70450 CT HEAD/BRAIN W/O DYE: CPT | Mod: ME

## 2021-05-11 PROCEDURE — 85025 COMPLETE CBC W/AUTO DIFF WBC: CPT

## 2021-05-11 PROCEDURE — 85652 RBC SED RATE AUTOMATED: CPT

## 2021-05-11 PROCEDURE — 99283 EMERGENCY DEPT VISIT LOW MDM: CPT | Mod: 25

## 2021-05-11 PROCEDURE — 80053 COMPREHEN METABOLIC PANEL: CPT

## 2021-05-11 RX ORDER — ATORVASTATIN CALCIUM 20 MG/1
20 TABLET, FILM COATED ORAL DAILY
COMMUNITY
Start: 2021-02-06 | End: 2021-08-05

## 2021-05-11 RX ORDER — PREGABALIN 150 MG/1
150 CAPSULE ORAL 2 TIMES DAILY
Status: SHIPPED | COMMUNITY
End: 2022-06-22

## 2021-05-11 RX ORDER — HYDROXYZINE HYDROCHLORIDE 25 MG/1
25 TABLET, FILM COATED ORAL
Status: SHIPPED | COMMUNITY
End: 2021-09-01

## 2021-05-11 RX ORDER — ACETAMINOPHEN/DIPHENHYDRAMINE 500MG-25MG
2 TABLET ORAL NIGHTLY PRN
COMMUNITY

## 2021-05-11 ASSESSMENT — LIFESTYLE VARIABLES: DO YOU DRINK ALCOHOL: NO

## 2021-05-11 ASSESSMENT — FIBROSIS 4 INDEX: FIB4 SCORE: 0.7

## 2021-05-11 NOTE — ED NOTES
Reports chronic severe headaches in temple and into eye x3 months. Sent by Dr. Snow Opthomologist in [facility]

## 2021-05-11 NOTE — ED NOTES
Med rec updated and complete. Allergies reviewed and updated. Pt reports that > 2 weeks ago she finished a course of fluconazole and  Nystatin suspension for a yeast infection.      Pt unable to recall the medication   ( recent start)  For sleep/aniexty . Placed call to home pharmacy to clarify.      Home pharmacy  [facility] [phone]

## 2021-05-11 NOTE — ED TRIAGE NOTES
"Joan Tinsley Olivares  66 y.o. female  Chief Complaint   Patient presents with   • Sent by MD     saw opthamalogist 4/8 who advised to come to ED to r/o termporal arteritis and for neuro consult   • Eye Pain     c/o severe pain to bilat eye sockets and temples x 3 months, R > L        Patient to triage in  with niece who is caregiver for above complaint.     Patient is alert and oriented, speaking in full sentences, following commands, and responding appropriately to questions. Educated on triage process and instructed patient to alert staff to any changes in condition or worsening symptoms.     /78   Pulse 79   Temp 36.5 °C (97.7 °F) (Temporal)   Resp 16   Ht 1.676 m (5' 6\")   Wt 88.5 kg (195 lb)   LMP 04/09/1977   SpO2 92%   BMI 31.47 kg/m²       "

## 2021-05-11 NOTE — ED PROVIDER NOTES
ED Provider Note    Scribed for Shameka Bertrand M.D. by Matty Carmichael. 5/11/2021, 4:09 PM.    This patient was cared for during the COVID-19 pandemic. History and physical exam may be limited/truncated by the inherent challenges of PPE and the need to decrease staff exposure to novel coronavirus. Some aspects of disease management may be different to protect staff and help slow the spread of disease. I verified that, if possible, the patient was wearing a mask and I was wearing appropriate PPE every time I encountered the patient.     Primary care provider: Dl Tsai M.D.  Means of arrival: walk-in  History obtained from: patient  History limited by: none    CHIEF COMPLAINT  Chief Complaint   Patient presents with   • Sent by MD     saw opthamalogist 4/8 who advised to come to ED to r/o termporal arteritis and for neuro consult   • Eye Pain     c/o severe pain to bilat eye sockets and temples x 3 months, R > L        HPI  Joan Olivares is a 66 y.o. female who presents to the Emergency Department with complaints of pain to her right temple and behind her right eye that has been ongoing for about the past 3 months. She states that it is intermittent, but the pain is fairly severe when it onsets. It can last for hours at a time, and she will develop tearing of her eyes. She typically has associated nausea. She has not noticed any specific exacerbating factors, and she states it can start with simple activities such as reading but it will also wake her up from her sleep. She saw her PCP who recommended that she see her ophthalmologist. She was then seen by an optometrist in [place] a month ago, who then advised that the patient go to the ED. She adds that she occasionally has pain in her left temple as well but it's much more mild. She wears glasses at her baseline, but she denies any acute changes to her vision.     REVIEW OF SYSTEMS  Pertinent positives include pain to the right temple and behind her  right eye. Pertinent negatives include no vision changes.  All other systems reviewed and negative.    PAST MEDICAL HISTORY   has a past medical history of Arthritis, ASTHMA, Back pain, Bladder infection, Bronchitis, Heart burn, Hemorrhoids, Hypertension, Kidney stones, Migraine, Pain, Paresthesia of right foot, Pleurisy, Pneumonia, Scarlet fever, Ulcer, and Unspecified disorder of thyroid.    SURGICAL HISTORY   has a past surgical history that includes tonsillectomy and adenoidectomy; other orthopedic surgery (2004); appendectomy; thyroid lobectomy (11/11/2009); hysterectomy laparoscopy; and irrigation & debridement ortho (Right, 5/20/2019).    SOCIAL HISTORY  Social History     Tobacco Use   • Smoking status: Never Smoker   • Smokeless tobacco: Never Used   Substance Use Topics   • Alcohol use: No   • Drug use: Yes     Types: Inhaled     Comment: marijuana occ      Social History     Substance and Sexual Activity   Drug Use Yes   • Types: Inhaled    Comment: marijuana occ       FAMILY HISTORY  Family History   Problem Relation Age of Onset   • Lung Disease Mother    • Cancer Father    • Heart Disease Father    • Lung Disease Father    • Thyroid Sister    • Cancer Brother    • Psychiatric Illness Son        CURRENT MEDICATIONS  Home Medications     Reviewed by Mar Grimaldo (Pharmacy Tech) on 05/11/21 at 1654  Med List Status: Complete   Medication Last Dose Status   atorvastatin (LIPITOR) 20 MG Tab 5/11/2021 Active   carvedilol (COREG) 12.5 MG Tab 5/11/2021 Active   diphenhydrAMINE-APAP, sleep, (TYLENOL PM EXTRA STRENGTH)  MG Tab 5/11/2021 Active   hydrOXYzine HCl (ATARAX) 25 MG Tab 5/10/2021 Active   lactobacillus rhamnosus (CULTURELLE) Cap capsule Not Taking Active   ondansetron (ZOFRAN ODT) 4 MG TABLET DISPERSIBLE Not Taking Active   pregabalin (LYRICA) 150 MG Cap 5/11/2021 Active   sucralfate (CARAFATE) 1 GM Tab 5/6/2021 Active                ALLERGIES  Allergies   Allergen Reactions   • Penicillins  "Rash and Swelling     Tolerated ceftriaxone on 6/20/17      Pt reports that she received PCN Approximately 30 years ago       PHYSICAL EXAM  VITAL SIGNS: /74   Pulse 81   Temp 36.5 °C (97.7 °F) (Temporal)   Resp 16   Ht 1.676 m (5' 6\")   Wt 88.5 kg (195 lb)   LMP 04/09/1977   SpO2 93%   BMI 31.47 kg/m²   Constitutional: Alert in no apparent distress.  HENT: No signs of trauma, Bilateral external ears normal, Nose normal.   Eyes: Pupils are equal and reactive, Conjunctiva normal, Non-icteric.   Neck: Normal range of motion, No tenderness, Supple, No stridor.   Cardiovascular: Regular rate and rhythm, no murmurs.   Thorax & Lungs: Normal breath sounds, No respiratory distress, No wheezing, No chest tenderness.   Abdomen: Bowel sounds normal, Soft, No tenderness, No masses, No peritoneal signs.  Skin: Warm, Dry, No erythema, No rash.   Musculoskeletal:  No major deformities noted.   Neurologic: Alert, moving all extremities without difficulty, no focal deficits.    LABS  Labs Reviewed   CBC WITH DIFFERENTIAL - Abnormal; Notable for the following components:       Result Value    MCHC 31.0 (*)     RDW 52.1 (*)     All other components within normal limits   COMP METABOLIC PANEL - Abnormal; Notable for the following components:    Glucose 155 (*)     All other components within normal limits   SED RATE   ESTIMATED GFR     All labs reviewed by me.    RADIOLOGY  CT-HEAD W/O   Final Result         NO ACUTE ABNORMALITIES ARE NOTED ON CT SCAN OF THE HEAD.           The radiologist's interpretation of all radiological studies have been reviewed by me.    COURSE & MEDICAL DECISION MAKING  Pertinent Labs & Imaging studies reviewed. (See chart for details)    Review of past medical records shows the patient had a normal sed rate in February 2021.      Differential diagnoses include but are not limited to: temporal arteritis vs cluster headaches    4:09 PM - Patient seen and examined at bedside.  Ordered CT-head w/o, " CBC w/ diff, CMP, and a sed rate to evaluate her symptoms.     9:07 PM - Patient's sed rate was normal, making temporal arteritis less likely. CT scan of the head did not show any acute abnormalities. Patient reevaluated at bedside. Discussed plan for discharge. I emphasized to the patient that she should follow-up with neurology for further evaluation and treatment. ED return precautions were discussed. Patient was given the opportunity for questions. I addressed all questions or concerns at this time and they verbalize agreement to the plan of care.       HTN/IDDM FOLLOW UP:  The patient is referred to a primary physician for blood pressure management, diabetic screening, and for all other preventive health concerns    Decision Making:  This is a 66 y.o. year old female who presents with 3 months of left-sided headaches.  She was sent here to rule out temporal arteritis.  Labs are obtained they are unremarkable sed rate is normal this makes temporal arteritis much less likely.  CT of the head was performed given she has not had any imaging it was a concern for any significant masses.  This was negative she will be referred to the neurology outpatient headache clinic.    The patient will return for new or worsening symptoms and is stable at the time of discharge. Patient was given return precautions. Patient and/or family member verbalizes understanding and will comply.    DISPOSITION:  Patient will be discharged home in stable condition.    FOLLOW UP:  Roberto Johnson M.D.  [facility] [address] [phone]    Schedule an appointment as soon as possible for a visit in 1 week  to help arrange outpatient neuro follow up    [facility], Emergency Dept  [address] [facility] [address] [phone]    Worsening pain, vomiting or other concerns    [facility]  [address] [place] [address] [address]  [facility] [address] [phone]    Neurology follow up      FINAL  IMPRESSION  1. Eye pain, right    2. Chronic nonintractable headache, unspecified headache type         This dictation has been created using voice recognition software and/or scribes. The accuracy of the dictation is limited by the abilities of the software and the expertise of the scribes. I expect there may be some errors of grammar and possibly content. I made every attempt to manually correct the errors within my dictation. However, errors related to voice recognition software and/or scribes may still exist and should be interpreted within the appropriate context.     Matty LAIRD (Scribe), am scribing for, and in the presence of, Shameka Bertrand M.D..    Electronically signed by: Matty Carmichael (Scribe), 5/11/2021    Shameka LAIRD M.D. personally performed the services described in this documentation, as scribed by Matty Carmichael in my presence, and it is both accurate and complete.    The note accurately reflects work and decisions made by me.  Shameka Bertrand M.D.  5/11/2021  10:22 PM

## 2021-05-12 NOTE — ED NOTES
Pt DCed at this time, AAOx4, VSS, ambulating with steady gait. Education provided on neuro referral, f/u care, and all questions addressed. Pt verbalized understanding and ambulated off unit with family member and PHOEBE

## 2021-09-01 ENCOUNTER — OFFICE VISIT (OUTPATIENT)
Dept: NEUROLOGY | Facility: MEDICAL CENTER | Age: 66
End: 2021-09-01
Attending: PSYCHIATRY & NEUROLOGY
Payer: MEDICARE

## 2021-09-01 VITALS
SYSTOLIC BLOOD PRESSURE: 128 MMHG | TEMPERATURE: 97.8 F | HEART RATE: 78 BPM | DIASTOLIC BLOOD PRESSURE: 80 MMHG | HEIGHT: 66 IN | OXYGEN SATURATION: 95 % | BODY MASS INDEX: 31.34 KG/M2 | WEIGHT: 195 LBS

## 2021-09-01 DIAGNOSIS — G43.719 INTRACTABLE CHRONIC MIGRAINE WITHOUT AURA AND WITHOUT STATUS MIGRAINOSUS: Primary | ICD-10-CM

## 2021-09-01 PROCEDURE — 99211 OFF/OP EST MAY X REQ PHY/QHP: CPT | Performed by: PSYCHIATRY & NEUROLOGY

## 2021-09-01 PROCEDURE — 99204 OFFICE O/P NEW MOD 45 MIN: CPT | Performed by: PSYCHIATRY & NEUROLOGY

## 2021-09-01 RX ORDER — TOPIRAMATE 25 MG/1
TABLET ORAL
Qty: 120 TABLET | Refills: 1 | Status: SHIPPED | OUTPATIENT
Start: 2021-09-01 | End: 2021-12-07 | Stop reason: SDUPTHER

## 2021-09-01 RX ORDER — ATORVASTATIN CALCIUM 20 MG/1
20 TABLET, FILM COATED ORAL NIGHTLY
COMMUNITY
End: 2022-06-22

## 2021-09-01 RX ORDER — ALBUTEROL SULFATE 90 UG/1
2 AEROSOL, METERED RESPIRATORY (INHALATION) EVERY 6 HOURS PRN
COMMUNITY
End: 2023-02-27

## 2021-09-01 RX ORDER — RIMEGEPANT SULFATE 75 MG/75MG
1 TABLET, ORALLY DISINTEGRATING ORAL PRN
Qty: 4 TABLET | Refills: 0 | COMMUNITY
Start: 2021-09-01 | End: 2022-06-22

## 2021-09-01 RX ORDER — METOCLOPRAMIDE 10 MG/1
TABLET ORAL
Qty: 45 TABLET | Refills: 1 | Status: SHIPPED | OUTPATIENT
Start: 2021-09-01 | End: 2023-02-27

## 2021-09-01 ASSESSMENT — FIBROSIS 4 INDEX: FIB4 SCORE: 1.17

## 2021-09-01 ASSESSMENT — ENCOUNTER SYMPTOMS
HEADACHES: 1
PHOTOPHOBIA: 1

## 2021-09-01 ASSESSMENT — PATIENT HEALTH QUESTIONNAIRE - PHQ9: CLINICAL INTERPRETATION OF PHQ2 SCORE: 0

## 2021-09-01 NOTE — PROGRESS NOTES
Subjective     Joan Olivares is a 66 y.o. female who presents with her niece Liz, from the office of Dr. Roberto Johnson MD, for consultation, with a history of persistent headaches starting in late December, 2020 which have continued in an unrelenting, daily pattern ever since.    MOISÉS Franco is a pleasant 66-year-old right-handed woman whose headaches started fairly suddenly in late December, 2020.    Prodrome: None    Aura: None    Headache: She describes a persistent pain into the right temple and over the right eye, at times retro-orbital.  At its worst it is stabbing and aching, there is some mild temporal tenderness ipsilaterally, but there is notable nausea, diarrhea, and some elevated photophobia.  She denies sensitivity to sound, but having to move around is certainly unpleasant, concentration is clearly curtailed.  She denies autonomic symptoms or neck pain and symptoms.    Duration: Severe attacks last for hours.    Onset: The severe headaches increase beginning the afternoons and into the evenings.    Frequency: Daily.  This has not changed.    Triggers: She has not been able to identify why these headaches worsen on a near daily basis.  Chewing, talking, compression of the right side of the face, etc., have no effect.    She was seen in the emergency room on May 11, 2021 for the headaches.  Laboratory studies included ESR, DENIS, TSH, and CT scan of the brain were all normal.  She followed up with an optometrist who evidently found nothing wrong on her ophthalmologic assessment.  They did recommend seeing a neurologist.    Already on Lyrica for fibromyalgia, this obviously has not been helping.  The drug itself had not been changed in dosing when the headache started.  She denied any other changes in her medication regimen at that time.  She is using Tylenol on a daily basis with little benefit.    She describes a history of migraine headaches when she was a younger woman, the started in her  "teens.  These were unilateral but on either side, they were throbbing, nausea, heightened sensitivities and impaired concentration were all quite notable.  These also were on a near daily basis, only over time which she aware that they improved.  She cannot recall if they tended to start in a particular time of day or night.  Work-up included imaging of the brain, everything proving unremarkable.  She saw a neurologist at the time who diagnosed her with migraine.  She never was receiving any maintenance therapies, only narcotics to be used as needed.    She also has a history of dyslipidemia, hypertension, and fibromyalgia.  Related to trauma, she developed a right lower extremity paresis, little movement with the foot, weakening quadriceps and hip stabilizers.  There is no history of CAD, CVA, PVD, MS, seizure, neurodegenerative disease, autoimmune disease, psychiatric disease, SAM, thyroid disease, blood dyscrasia, malignancy, or diabetes.  There is no surgical history of note from my standpoint.    No one in the family has a history of stroke or heart disease, her father did have headaches and cancer.  She has no children.  Her siblings do not have headaches.    She does not smoke or drink.  She does use cannabis, both THC and CBD components, which does help her sleep, but which does nothing for the headaches.    Review of Systems   Constitutional: Negative for malaise/fatigue.   Eyes: Positive for photophobia.   Neurological: Positive for headaches.     Objective     /80 (BP Location: Left arm)   Pulse 78   Temp 36.6 °C (97.8 °F) (Temporal)   Ht 1.676 m (5' 6\")   Wt 88.5 kg (195 lb)   LMP 04/09/1977   SpO2 95%   BMI 31.47 kg/m²      Physical Exam    She appears in no acute distress.  Vital signs are stable.  There is no malar rash, there is some mild right-sided temporal tenderness without claudication or TMJ tenderness.  There is no allodynia, ipsilateral conjunctival injection, rhinorrhea, or " lacrimation.  The neck is supple, range of motion is full.  There is no tenderness down the spinal axis or the occipital ridge bilaterally.  Cardiac evaluation is unremarkable.     Neurological Exam    Fully oriented, there is no aphasia, apraxia, or inattention.    PERRLA/EOMI, visual fields are full to finger counting on confrontation bilaterally, funduscopic exam reveals a sharp disc margin on the left.  I could not visualize the fundus on the right.  Facial movements are symmetric, sensory exam is intact to temperature and light touch bilaterally.  The tongue and uvula are midline.  Shoulder shrug and head rotation are intact and symmetric.    Musculoskeletal exam reveals normal tone bilaterally, there is no tremor, asterixis, or drift.  Strength is intact throughout except for the right lower extremity, no movement distally, some weakness with quadriceps and hamstring muscles.    Reflexes are present at the ankles, diminished on the right as well as at the right knee jerk when compared to the left.  They are brisk and symmetric in the upper extremities.  There are no pathologic reflexes, right toe equivocal, the left downgoing.    I did not stand her to walk.  There is no appendicular dystaxia with both upper extremities and left lower extremity.  The weakness with the right prevents testing.  Repetitive movements and fine motor control are intact in these 3 extremities.    Sensory exam is intact to temperature, vibration and pinprick in the left lower both upper extremities.  There is diminished sensation to vibration at and below the knee level in the right leg.    Assessment & Plan     1. Intractable chronic migraine without aura and without status migrainosus  Though different from her headaches of the past, themselves migraine, she is already undergone a rather thorough work-up to rule out symptomatic headache disorders related to GCA or other autoimmune disease, etc.  This is not another primary headache  syndrome such as cluster or other sympathetically mediated cephalalgia (SUNCT, chronic paroxysmal hemicrania or hemicrania continua).  I do not think additional diagnostics are indicated.    She has a risk for a migrainous headache disorder involving based on her past experience with migraine headaches.  The headache she has now have migrainous features to them, this gives us an opportunity for treatment.  The headaches may respond to migraine treatment, she has criteria for chronic migraine, so options include Topamax as well as the new CGRP group along with Botox.  By her request, Topamax will be tried, but she was told the injections are very effective and her legitimate options if Topamax were to fail.  For rescue, Reglan and Nurtec will be used for severe headaches; already using Zofran for daily nausea, Relpax can improve the efficacy of Nurtec.    Topamax titration will be started as 25 mg nightly, increase weekly by 25 mg up to 100 mg nightly.  Side effects are reviewed.  Nurtec 75 mg with Reglan 10 mg as the pain first begins, dosing is repeated only once in the next 24 hours.  Samples of Nurtec are provided.  We will communicate to adjust medications moving forwards, we will follow-up in the next 4 or 5 months.    - topiramate (TOPAMAX) 25 MG Tab; 1 tab qhs for 1 week, then 2 tab qhs for 1 week, then 3 tab qhs for 1 week, then 4 tab qhs  Dispense: 120 Tablet; Refill: 1  - Rimegepant Sulfate (NURTEC) 75 MG TABLET DISPERSIBLE; Take 1 Tablet by mouth as needed. 1 tab at headache onset; repeat in 24 hours  Dispense: 4 Tablet; Refill: 0  - metoclopramide (REGLAN) 10 MG Tab; 1-3 tab at headache/nausea onset; repeat in 4-6 hours prn  Dispense: 45 Tablet; Refill: 1    Time: 50 minutes spent face-to-face for exam, review, discussion, and education, of this over 50% of the time spent counseling and coordinating care.

## 2021-09-30 ENCOUNTER — TELEPHONE (OUTPATIENT)
Dept: NEUROLOGY | Facility: MEDICAL CENTER | Age: 66
End: 2021-09-30

## 2021-09-30 NOTE — TELEPHONE ENCOUNTER
Is the patient interested in trying one of the new CGRP injections, Aimovig, Ajovy, or Emgality?  These would be the next logical choices.

## 2021-09-30 NOTE — TELEPHONE ENCOUNTER
Patient's niece Liz states that her migraines are not better and in fact are worse. She was calling to check in since she was advised to do so after about 30 days. She is completely out of all Topiramate and is just about out of the Metoclopramide. Patient states she needs something for the pain.  Please advise.

## 2021-10-01 NOTE — TELEPHONE ENCOUNTER
Patient's Niece Liz wants to know if there is anyway to be certain these are genuine migraines. She states she just feels uncertain that they are migraines and seems worried something else could be going on, she was not able to elaborate.     Patient is interested in trying an injectable medication to prevent migraines.

## 2021-10-05 NOTE — TELEPHONE ENCOUNTER
"Please tell the patient's niece that there is no \"diagnostic\" test to guarantee that the patient's headaches are migraine.  Migraine headache is a clinical diagnosis based on the patient's history of symptoms and the fact that we have ruled out every other headache disorder that might present in this fashion.  The latter has already been done, thus we are left with the diagnosis of migraine.  "

## 2021-10-05 NOTE — TELEPHONE ENCOUNTER
As for the patient's headaches, Topamax provided no benefit.  I have already responded, asking them if they wish to try another maintenance therapy, specifically one of the new injectable medications.  We have discussed them when I had first seen the patient back on September 1, 2021.

## 2021-10-07 ENCOUNTER — TELEPHONE (OUTPATIENT)
Dept: NEUROLOGY | Facility: MEDICAL CENTER | Age: 66
End: 2021-10-07
Payer: COMMERCIAL

## 2021-10-07 DIAGNOSIS — G43.719 INTRACTABLE CHRONIC MIGRAINE WITHOUT AURA AND WITHOUT STATUS MIGRAINOSUS: ICD-10-CM

## 2021-10-07 RX ORDER — GALCANEZUMAB 120 MG/ML
1 INJECTION, SOLUTION SUBCUTANEOUS
Qty: 1 EACH | Refills: 5 | Status: SHIPPED | OUTPATIENT
Start: 2021-10-07 | End: 2021-10-08 | Stop reason: SDUPTHER

## 2021-10-07 RX ORDER — GALCANEZUMAB 120 MG/ML
2 INJECTION, SOLUTION SUBCUTANEOUS ONCE
Qty: 2 EACH | Refills: 0 | Status: SHIPPED | OUTPATIENT
Start: 2021-10-07 | End: 2021-10-07

## 2021-10-07 NOTE — TELEPHONE ENCOUNTER
Is the patient interested in trying one of the new CGRP injections, Aimovig, Ajovy, or Emgality?  These would be the next logical choices    Patient called and would like to try Emgality.

## 2021-10-08 ENCOUNTER — TELEPHONE (OUTPATIENT)
Dept: NEUROLOGY | Facility: MEDICAL CENTER | Age: 66
End: 2021-10-08

## 2021-10-08 DIAGNOSIS — G43.719 INTRACTABLE CHRONIC MIGRAINE WITHOUT AURA AND WITHOUT STATUS MIGRAINOSUS: ICD-10-CM

## 2021-10-08 RX ORDER — GALCANEZUMAB 120 MG/ML
1 INJECTION, SOLUTION SUBCUTANEOUS
Qty: 1 ML | Refills: 5 | Status: SHIPPED | OUTPATIENT
Start: 2021-10-08 | End: 2022-06-22

## 2021-10-08 RX ORDER — GALCANEZUMAB 120 MG/ML
2 INJECTION, SOLUTION SUBCUTANEOUS ONCE
Qty: 2 ML | Refills: 0 | Status: SHIPPED | OUTPATIENT
Start: 2021-10-08 | End: 2021-10-12

## 2021-10-08 NOTE — TELEPHONE ENCOUNTER
Hello,    Pt would like to fill with us. Please sign pending order and send a new loading dose order to the pharmacy since the first one has .      Thanks

## 2021-10-08 NOTE — TELEPHONE ENCOUNTER
Let the patient know that I forwarded a prescription for the Emgality injections to her [facility] pharmacy in [place].  Reeducate about the initial 2 injections followed by 1 injection every 4 weeks.  Side effects can include depression, constipation, jitteriness, and injection site reactions.

## 2021-10-11 ENCOUNTER — PHARMACY VISIT (OUTPATIENT)
Dept: PHARMACY | Facility: MEDICAL CENTER | Age: 66
End: 2021-10-11
Payer: COMMERCIAL

## 2021-10-11 PROCEDURE — RXMED WILLOW AMBULATORY MEDICATION CHARGE: Performed by: PSYCHIATRY & NEUROLOGY

## 2021-10-13 ENCOUNTER — TELEPHONE (OUTPATIENT)
Dept: NEUROLOGY | Facility: MEDICAL CENTER | Age: 66
End: 2021-10-13

## 2021-10-13 DIAGNOSIS — G43.719 INTRACTABLE CHRONIC MIGRAINE WITHOUT AURA AND WITHOUT STATUS MIGRAINOSUS: ICD-10-CM

## 2021-10-13 RX ORDER — ONDANSETRON 4 MG/1
4 TABLET, FILM COATED ORAL EVERY 12 HOURS PRN
Qty: 10 TABLET | Refills: 0 | Status: SHIPPED | OUTPATIENT
Start: 2021-10-13 | End: 2021-11-12

## 2021-10-13 NOTE — TELEPHONE ENCOUNTER
Patient stating she took her first Emgality shot and wants to know if she she keep taking topiramate and metoclopramide, and the rescue medications. She is still having a lot of nausea and what else she can take for that.    Please advise

## 2021-10-19 ENCOUNTER — TELEPHONE (OUTPATIENT)
Dept: NEUROLOGY | Facility: MEDICAL CENTER | Age: 66
End: 2021-10-19

## 2021-10-19 NOTE — TELEPHONE ENCOUNTER
Jose Antonio patient-had Emgality shot last week and wondering if it is safe to take Topiramate along with it? Please advise. Thank you.DALILA Bateman.

## 2021-11-09 ENCOUNTER — PHARMACY VISIT (OUTPATIENT)
Dept: PHARMACY | Facility: MEDICAL CENTER | Age: 66
End: 2021-11-09
Payer: COMMERCIAL

## 2021-11-09 PROCEDURE — RXMED WILLOW AMBULATORY MEDICATION CHARGE: Performed by: PSYCHIATRY & NEUROLOGY

## 2021-12-06 ENCOUNTER — PHARMACY VISIT (OUTPATIENT)
Dept: PHARMACY | Facility: MEDICAL CENTER | Age: 66
End: 2021-12-06
Payer: COMMERCIAL

## 2021-12-06 PROCEDURE — RXMED WILLOW AMBULATORY MEDICATION CHARGE: Performed by: PSYCHIATRY & NEUROLOGY

## 2022-01-06 ENCOUNTER — APPOINTMENT (OUTPATIENT)
Dept: NEUROLOGY | Facility: MEDICAL CENTER | Age: 67
End: 2022-01-06
Attending: PSYCHIATRY & NEUROLOGY
Payer: MEDICARE

## 2022-01-11 ENCOUNTER — PHARMACY VISIT (OUTPATIENT)
Dept: PHARMACY | Facility: MEDICAL CENTER | Age: 67
End: 2022-01-11
Payer: COMMERCIAL

## 2022-01-11 PROCEDURE — RXMED WILLOW AMBULATORY MEDICATION CHARGE: Performed by: PSYCHIATRY & NEUROLOGY

## 2022-02-28 RX ORDER — ONDANSETRON 4 MG/1
4 TABLET, ORALLY DISINTEGRATING ORAL EVERY 4 HOURS PRN
Qty: 10 TABLET | Refills: 0 | OUTPATIENT
Start: 2022-02-28

## 2022-03-01 RX ORDER — ONDANSETRON 4 MG/1
4 TABLET, ORALLY DISINTEGRATING ORAL EVERY 4 HOURS PRN
Qty: 10 TABLET | Refills: 0 | OUTPATIENT
Start: 2022-03-01

## 2022-03-24 ENCOUNTER — TELEPHONE (OUTPATIENT)
Dept: SCHEDULING | Facility: IMAGING CENTER | Age: 67
End: 2022-03-24
Payer: MEDICARE

## 2022-06-02 ENCOUNTER — APPOINTMENT (OUTPATIENT)
Dept: MEDICAL GROUP | Facility: PHYSICIAN GROUP | Age: 67
End: 2022-06-02
Payer: MEDICARE

## 2022-06-03 ENCOUNTER — TELEPHONE (OUTPATIENT)
Dept: MEDICAL GROUP | Facility: PHYSICIAN GROUP | Age: 67
End: 2022-06-03
Payer: MEDICARE

## 2022-06-03 ENCOUNTER — TELEPHONE (OUTPATIENT)
Dept: URGENT CARE | Facility: PHYSICIAN GROUP | Age: 67
End: 2022-06-03

## 2022-06-03 RX ORDER — CARVEDILOL 12.5 MG/1
12.5 TABLET ORAL 2 TIMES DAILY WITH MEALS
Qty: 60 TABLET | Refills: 0 | Status: SHIPPED | OUTPATIENT
Start: 2022-06-03 | End: 2022-06-22 | Stop reason: SDUPTHER

## 2022-06-03 NOTE — TELEPHONE ENCOUNTER
Received staff message that patient is establishing with Sharlene Jak on 6/22/22 and out of her carvedilol 12.5 ( bid). Her previous doctor is no longer with [facility] and she was scheduled to see someone new but it was cancelled and never rescheduled. I explained that Sharlene was out of the office until Monday and that she woudln't respond until then and if the patient was concerned about being out of this medication all weekend, to go to urgent care to have a temp refill until she can see Sunshine on 6/22/22.

## 2022-06-06 RX ORDER — CARVEDILOL 12.5 MG/1
12.5 TABLET ORAL 2 TIMES DAILY WITH MEALS
Qty: 60 TABLET | Refills: 0 | Status: CANCELLED | OUTPATIENT
Start: 2022-06-06

## 2022-06-22 ENCOUNTER — OFFICE VISIT (OUTPATIENT)
Dept: MEDICAL GROUP | Facility: PHYSICIAN GROUP | Age: 67
End: 2022-06-22
Payer: MEDICARE

## 2022-06-22 VITALS
OXYGEN SATURATION: 97 % | TEMPERATURE: 96.4 F | HEIGHT: 66 IN | BODY MASS INDEX: 32.14 KG/M2 | DIASTOLIC BLOOD PRESSURE: 80 MMHG | SYSTOLIC BLOOD PRESSURE: 130 MMHG | HEART RATE: 78 BPM | WEIGHT: 200 LBS

## 2022-06-22 DIAGNOSIS — Z78.0 POSTMENOPAUSAL: ICD-10-CM

## 2022-06-22 DIAGNOSIS — Z11.59 ENCOUNTER FOR HCV SCREENING TEST FOR LOW RISK PATIENT: ICD-10-CM

## 2022-06-22 DIAGNOSIS — Z76.89 ENCOUNTER TO ESTABLISH CARE: ICD-10-CM

## 2022-06-22 DIAGNOSIS — L97.519 ULCER OF RIGHT FOOT, UNSPECIFIED ULCER STAGE (HCC): ICD-10-CM

## 2022-06-22 DIAGNOSIS — R87.811 VAGINAL HIGH RISK HUMAN PAPILLOMAVIRUS (HPV) DNA TEST POSITIVE: ICD-10-CM

## 2022-06-22 DIAGNOSIS — E78.2 MIXED HYPERLIPIDEMIA: ICD-10-CM

## 2022-06-22 DIAGNOSIS — Z12.31 ENCOUNTER FOR SCREENING MAMMOGRAM FOR BREAST CANCER: ICD-10-CM

## 2022-06-22 DIAGNOSIS — A04.72 C. DIFFICILE DIARRHEA: ICD-10-CM

## 2022-06-22 DIAGNOSIS — Z12.11 COLON CANCER SCREENING: ICD-10-CM

## 2022-06-22 DIAGNOSIS — L97.512 SKIN ULCER OF RIGHT FOOT WITH FAT LAYER EXPOSED (HCC): ICD-10-CM

## 2022-06-22 DIAGNOSIS — E55.9 VITAMIN D DEFICIENCY: ICD-10-CM

## 2022-06-22 DIAGNOSIS — I10 PRIMARY HYPERTENSION: ICD-10-CM

## 2022-06-22 DIAGNOSIS — G43.719 INTRACTABLE CHRONIC MIGRAINE WITHOUT AURA AND WITHOUT STATUS MIGRAINOSUS: ICD-10-CM

## 2022-06-22 DIAGNOSIS — Z90.09 HISTORY OF LOBECTOMY OF THYROID: ICD-10-CM

## 2022-06-22 DIAGNOSIS — L82.1 SEBORRHEIC KERATOSIS: ICD-10-CM

## 2022-06-22 DIAGNOSIS — R73.9 HYPERGLYCEMIA: ICD-10-CM

## 2022-06-22 DIAGNOSIS — F51.01 PRIMARY INSOMNIA: ICD-10-CM

## 2022-06-22 PROBLEM — E78.5 HYPERLIPIDEMIA: Status: ACTIVE | Noted: 2022-06-22

## 2022-06-22 PROBLEM — R73.01 IMPAIRED FASTING GLUCOSE: Status: ACTIVE | Noted: 2022-06-22

## 2022-06-22 PROBLEM — Z98.890 STATUS POST INCISION AND DRAINAGE: Status: RESOLVED | Noted: 2019-05-28 | Resolved: 2022-06-22

## 2022-06-22 PROCEDURE — 99214 OFFICE O/P EST MOD 30 MIN: CPT | Performed by: NURSE PRACTITIONER

## 2022-06-22 RX ORDER — CARVEDILOL 12.5 MG/1
12.5 TABLET ORAL 2 TIMES DAILY WITH MEALS
Qty: 180 TABLET | Refills: 3 | Status: SHIPPED | OUTPATIENT
Start: 2022-06-22 | End: 2022-06-22

## 2022-06-22 RX ORDER — CARVEDILOL 12.5 MG/1
12.5 TABLET ORAL 2 TIMES DAILY WITH MEALS
Qty: 180 TABLET | Refills: 3 | Status: SHIPPED | OUTPATIENT
Start: 2022-06-22 | End: 2023-06-22

## 2022-06-22 ASSESSMENT — PATIENT HEALTH QUESTIONNAIRE - PHQ9: CLINICAL INTERPRETATION OF PHQ2 SCORE: 0

## 2022-06-22 ASSESSMENT — FIBROSIS 4 INDEX: FIB4 SCORE: 1.19

## 2022-06-22 NOTE — ASSESSMENT & PLAN NOTE
Patient reports that she has not had a Pap since at least 2017.  She has not followed up for high risk HPV positive test.    Review of HPV test in 2010 reports positive abnormality for high risk HPV.     She denies any abnormal vaginal discharge, abnormal vaginal bleeding, or abdominal bloating.  She does have chronic epigastric and lower abdominal pain intermittently.

## 2022-06-22 NOTE — ASSESSMENT & PLAN NOTE
TheNew condition.  Patient reports that she has mole on her right breast that she noticed several weeks ago.

## 2022-06-22 NOTE — ASSESSMENT & PLAN NOTE
Chronic condition.  Patient currently takes Tylenol PM nightly to help with insomnia symptoms.    Has previously tried melatonin with little relief.

## 2022-06-22 NOTE — ASSESSMENT & PLAN NOTE
Chronic ongoing condition.  Patient and caregiver both report that she has been on multiple oral medications as well as injectables which have not helped with her chronic migraines.  She does continue to have chronic nausea associated with chronic migraines which she does take Zofran for on occasion.
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
## 2022-07-07 ENCOUNTER — APPOINTMENT (OUTPATIENT)
Dept: RADIOLOGY | Facility: MEDICAL CENTER | Age: 67
End: 2022-07-07
Attending: NURSE PRACTITIONER
Payer: MEDICARE

## 2022-07-18 ENCOUNTER — HOSPITAL ENCOUNTER (OUTPATIENT)
Dept: LAB | Facility: MEDICAL CENTER | Age: 67
End: 2022-07-18
Attending: NURSE PRACTITIONER
Payer: MEDICARE

## 2022-07-18 DIAGNOSIS — Z11.59 ENCOUNTER FOR HCV SCREENING TEST FOR LOW RISK PATIENT: ICD-10-CM

## 2022-07-18 DIAGNOSIS — Z90.09 HISTORY OF LOBECTOMY OF THYROID: ICD-10-CM

## 2022-07-18 DIAGNOSIS — R73.9 HYPERGLYCEMIA: ICD-10-CM

## 2022-07-18 DIAGNOSIS — E78.2 MIXED HYPERLIPIDEMIA: ICD-10-CM

## 2022-07-18 DIAGNOSIS — E55.9 VITAMIN D DEFICIENCY: ICD-10-CM

## 2022-07-18 DIAGNOSIS — I10 PRIMARY HYPERTENSION: ICD-10-CM

## 2022-07-18 LAB
25(OH)D3 SERPL-MCNC: 13 NG/ML (ref 30–100)
ALBUMIN SERPL BCP-MCNC: 4.1 G/DL (ref 3.2–4.9)
ALBUMIN/GLOB SERPL: 1.3 G/DL
ALP SERPL-CCNC: 72 U/L (ref 30–99)
ALT SERPL-CCNC: 11 U/L (ref 2–50)
ANION GAP SERPL CALC-SCNC: 10 MMOL/L (ref 7–16)
AST SERPL-CCNC: 11 U/L (ref 12–45)
BASOPHILS # BLD AUTO: 0.6 % (ref 0–1.8)
BASOPHILS # BLD: 0.05 K/UL (ref 0–0.12)
BILIRUB SERPL-MCNC: 0.5 MG/DL (ref 0.1–1.5)
BUN SERPL-MCNC: 11 MG/DL (ref 8–22)
CALCIUM SERPL-MCNC: 9.5 MG/DL (ref 8.5–10.5)
CHLORIDE SERPL-SCNC: 104 MMOL/L (ref 96–112)
CHOLEST SERPL-MCNC: 215 MG/DL (ref 100–199)
CO2 SERPL-SCNC: 23 MMOL/L (ref 20–33)
CREAT SERPL-MCNC: 0.81 MG/DL (ref 0.5–1.4)
EOSINOPHIL # BLD AUTO: 0.29 K/UL (ref 0–0.51)
EOSINOPHIL NFR BLD: 3.6 % (ref 0–6.9)
ERYTHROCYTE [DISTWIDTH] IN BLOOD BY AUTOMATED COUNT: 45.5 FL (ref 35.9–50)
EST. AVERAGE GLUCOSE BLD GHB EST-MCNC: 123 MG/DL
FASTING STATUS PATIENT QL REPORTED: NORMAL
GFR SERPLBLD CREATININE-BSD FMLA CKD-EPI: 78 ML/MIN/1.73 M 2
GLOBULIN SER CALC-MCNC: 3.1 G/DL (ref 1.9–3.5)
GLUCOSE SERPL-MCNC: 120 MG/DL (ref 65–99)
HBA1C MFR BLD: 5.9 % (ref 4–5.6)
HCT VFR BLD AUTO: 47.1 % (ref 37–47)
HCV AB SER QL: NORMAL
HDLC SERPL-MCNC: 31 MG/DL
HGB BLD-MCNC: 15.1 G/DL (ref 12–16)
IMM GRANULOCYTES # BLD AUTO: 0.03 K/UL (ref 0–0.11)
IMM GRANULOCYTES NFR BLD AUTO: 0.4 % (ref 0–0.9)
LDLC SERPL CALC-MCNC: 150 MG/DL
LYMPHOCYTES # BLD AUTO: 2.75 K/UL (ref 1–4.8)
LYMPHOCYTES NFR BLD: 33.9 % (ref 22–41)
MCH RBC QN AUTO: 28 PG (ref 27–33)
MCHC RBC AUTO-ENTMCNC: 32.1 G/DL (ref 33.6–35)
MCV RBC AUTO: 87.2 FL (ref 81.4–97.8)
MONOCYTES # BLD AUTO: 0.41 K/UL (ref 0–0.85)
MONOCYTES NFR BLD AUTO: 5 % (ref 0–13.4)
NEUTROPHILS # BLD AUTO: 4.59 K/UL (ref 2–7.15)
NEUTROPHILS NFR BLD: 56.5 % (ref 44–72)
NRBC # BLD AUTO: 0 K/UL
NRBC BLD-RTO: 0 /100 WBC
PLATELET # BLD AUTO: 366 K/UL (ref 164–446)
PMV BLD AUTO: 10.5 FL (ref 9–12.9)
POTASSIUM SERPL-SCNC: 4.3 MMOL/L (ref 3.6–5.5)
PROT SERPL-MCNC: 7.2 G/DL (ref 6–8.2)
RBC # BLD AUTO: 5.4 M/UL (ref 4.2–5.4)
SODIUM SERPL-SCNC: 137 MMOL/L (ref 135–145)
TRIGL SERPL-MCNC: 170 MG/DL (ref 0–149)
TSH SERPL DL<=0.005 MIU/L-ACNC: 2.35 UIU/ML (ref 0.38–5.33)
WBC # BLD AUTO: 8.1 K/UL (ref 4.8–10.8)

## 2022-07-18 PROCEDURE — 80053 COMPREHEN METABOLIC PANEL: CPT

## 2022-07-18 PROCEDURE — 82306 VITAMIN D 25 HYDROXY: CPT

## 2022-07-18 PROCEDURE — 84443 ASSAY THYROID STIM HORMONE: CPT

## 2022-07-18 PROCEDURE — 83036 HEMOGLOBIN GLYCOSYLATED A1C: CPT | Mod: GA

## 2022-07-18 PROCEDURE — 36415 COLL VENOUS BLD VENIPUNCTURE: CPT | Mod: GA

## 2022-07-18 PROCEDURE — 85025 COMPLETE CBC W/AUTO DIFF WBC: CPT

## 2022-07-18 PROCEDURE — 80061 LIPID PANEL: CPT

## 2022-07-18 PROCEDURE — G0472 HEP C SCREEN HIGH RISK/OTHER: HCPCS | Mod: GA

## 2023-02-23 ENCOUNTER — HOSPITAL ENCOUNTER (OUTPATIENT)
Dept: LAB | Facility: MEDICAL CENTER | Age: 68
End: 2023-02-23
Attending: UROLOGY
Payer: MEDICARE

## 2023-02-23 LAB
APPEARANCE UR: ABNORMAL
APTT PPP: 25 SEC (ref 24.7–36)
BACTERIA #/AREA URNS HPF: ABNORMAL /HPF
BASOPHILS # BLD AUTO: 0.9 % (ref 0–1.8)
BASOPHILS # BLD: 0.07 K/UL (ref 0–0.12)
BILIRUB UR QL STRIP.AUTO: ABNORMAL
CAOX CRY #/AREA URNS HPF: ABNORMAL /HPF
COLOR UR: ABNORMAL
EOSINOPHIL # BLD AUTO: 0.36 K/UL (ref 0–0.51)
EOSINOPHIL NFR BLD: 4.8 % (ref 0–6.9)
EPI CELLS #/AREA URNS HPF: ABNORMAL /HPF
ERYTHROCYTE [DISTWIDTH] IN BLOOD BY AUTOMATED COUNT: 47.3 FL (ref 35.9–50)
GLUCOSE UR STRIP.AUTO-MCNC: 100 MG/DL
HCT VFR BLD AUTO: 47 % (ref 37–47)
HGB BLD-MCNC: 15.2 G/DL (ref 12–16)
IMM GRANULOCYTES # BLD AUTO: 0.02 K/UL (ref 0–0.11)
IMM GRANULOCYTES NFR BLD AUTO: 0.3 % (ref 0–0.9)
INR PPP: 0.99 (ref 0.87–1.13)
KETONES UR STRIP.AUTO-MCNC: NEGATIVE MG/DL
LEUKOCYTE ESTERASE UR QL STRIP.AUTO: ABNORMAL
LYMPHOCYTES # BLD AUTO: 2.62 K/UL (ref 1–4.8)
LYMPHOCYTES NFR BLD: 34.9 % (ref 22–41)
MCH RBC QN AUTO: 28.6 PG (ref 27–33)
MCHC RBC AUTO-ENTMCNC: 32.3 G/DL (ref 33.6–35)
MCV RBC AUTO: 88.5 FL (ref 81.4–97.8)
MICRO URNS: ABNORMAL
MONOCYTES # BLD AUTO: 0.44 K/UL (ref 0–0.85)
MONOCYTES NFR BLD AUTO: 5.9 % (ref 0–13.4)
NEUTROPHILS # BLD AUTO: 4 K/UL (ref 2–7.15)
NEUTROPHILS NFR BLD: 53.2 % (ref 44–72)
NITRITE UR QL STRIP.AUTO: NEGATIVE
NRBC # BLD AUTO: 0 K/UL
NRBC BLD-RTO: 0 /100 WBC
PH UR STRIP.AUTO: 6 [PH] (ref 5–8)
PLATELET # BLD AUTO: 355 K/UL (ref 164–446)
PMV BLD AUTO: 10.3 FL (ref 9–12.9)
PROT UR QL STRIP: >=1000 MG/DL
PROTHROMBIN TIME: 13 SEC (ref 12–14.6)
RBC # BLD AUTO: 5.31 M/UL (ref 4.2–5.4)
RBC # URNS HPF: >150 /HPF
RBC UR QL AUTO: ABNORMAL
SP GR UR STRIP.AUTO: 1.02
UROBILINOGEN UR STRIP.AUTO-MCNC: 0.2 MG/DL
WBC # BLD AUTO: 7.5 K/UL (ref 4.8–10.8)
WBC #/AREA URNS HPF: ABNORMAL /HPF

## 2023-02-23 PROCEDURE — 87086 URINE CULTURE/COLONY COUNT: CPT

## 2023-02-23 PROCEDURE — 85025 COMPLETE CBC W/AUTO DIFF WBC: CPT

## 2023-02-23 PROCEDURE — 85610 PROTHROMBIN TIME: CPT

## 2023-02-23 PROCEDURE — 80048 BASIC METABOLIC PNL TOTAL CA: CPT

## 2023-02-23 PROCEDURE — 36415 COLL VENOUS BLD VENIPUNCTURE: CPT

## 2023-02-23 PROCEDURE — 85730 THROMBOPLASTIN TIME PARTIAL: CPT

## 2023-02-23 PROCEDURE — 81001 URINALYSIS AUTO W/SCOPE: CPT

## 2023-02-24 LAB
ANION GAP SERPL CALC-SCNC: 13 MMOL/L (ref 7–16)
BUN SERPL-MCNC: 11 MG/DL (ref 8–22)
CALCIUM SERPL-MCNC: 10.1 MG/DL (ref 8.5–10.5)
CHLORIDE SERPL-SCNC: 104 MMOL/L (ref 96–112)
CO2 SERPL-SCNC: 23 MMOL/L (ref 20–33)
CREAT SERPL-MCNC: 0.69 MG/DL (ref 0.5–1.4)
GFR SERPLBLD CREATININE-BSD FMLA CKD-EPI: 93 ML/MIN/1.73 M 2
GLUCOSE SERPL-MCNC: 131 MG/DL (ref 65–99)
POTASSIUM SERPL-SCNC: 3.9 MMOL/L (ref 3.6–5.5)
SODIUM SERPL-SCNC: 140 MMOL/L (ref 135–145)

## 2023-02-25 LAB
BACTERIA UR CULT: NORMAL
SIGNIFICANT IND 70042: NORMAL
SITE SITE: NORMAL
SOURCE SOURCE: NORMAL

## 2023-02-26 ENCOUNTER — HOSPITAL ENCOUNTER (INPATIENT)
Facility: MEDICAL CENTER | Age: 68
LOS: 3 days | DRG: 660 | End: 2023-03-03
Attending: EMERGENCY MEDICINE | Admitting: INTERNAL MEDICINE
Payer: MEDICARE

## 2023-02-26 ENCOUNTER — APPOINTMENT (OUTPATIENT)
Dept: RADIOLOGY | Facility: MEDICAL CENTER | Age: 68
DRG: 660 | End: 2023-02-26
Attending: EMERGENCY MEDICINE
Payer: MEDICARE

## 2023-02-26 DIAGNOSIS — R11.2 NAUSEA AND VOMITING, UNSPECIFIED VOMITING TYPE: ICD-10-CM

## 2023-02-26 DIAGNOSIS — N39.0 E-COLI UTI: ICD-10-CM

## 2023-02-26 DIAGNOSIS — B96.20 E-COLI UTI: ICD-10-CM

## 2023-02-26 DIAGNOSIS — R10.9 FLANK PAIN: ICD-10-CM

## 2023-02-26 DIAGNOSIS — N20.0 CALCULUS OF KIDNEY: ICD-10-CM

## 2023-02-26 DIAGNOSIS — E86.0 DEHYDRATION: ICD-10-CM

## 2023-02-26 LAB — LACTATE SERPL-SCNC: 1.3 MMOL/L (ref 0.5–2)

## 2023-02-26 PROCEDURE — 94760 N-INVAS EAR/PLS OXIMETRY 1: CPT

## 2023-02-26 PROCEDURE — A9270 NON-COVERED ITEM OR SERVICE: HCPCS | Performed by: EMERGENCY MEDICINE

## 2023-02-26 PROCEDURE — 83605 ASSAY OF LACTIC ACID: CPT

## 2023-02-26 PROCEDURE — 700111 HCHG RX REV CODE 636 W/ 250 OVERRIDE (IP): Performed by: EMERGENCY MEDICINE

## 2023-02-26 PROCEDURE — 87077 CULTURE AEROBIC IDENTIFY: CPT

## 2023-02-26 PROCEDURE — 87086 URINE CULTURE/COLONY COUNT: CPT

## 2023-02-26 PROCEDURE — 700102 HCHG RX REV CODE 250 W/ 637 OVERRIDE(OP): Performed by: EMERGENCY MEDICINE

## 2023-02-26 PROCEDURE — 99285 EMERGENCY DEPT VISIT HI MDM: CPT

## 2023-02-26 PROCEDURE — 87040 BLOOD CULTURE FOR BACTERIA: CPT

## 2023-02-26 PROCEDURE — 96374 THER/PROPH/DIAG INJ IV PUSH: CPT

## 2023-02-26 PROCEDURE — 700105 HCHG RX REV CODE 258: Performed by: EMERGENCY MEDICINE

## 2023-02-26 PROCEDURE — 96375 TX/PRO/DX INJ NEW DRUG ADDON: CPT

## 2023-02-26 PROCEDURE — 87186 SC STD MICRODIL/AGAR DIL: CPT

## 2023-02-26 PROCEDURE — 36415 COLL VENOUS BLD VENIPUNCTURE: CPT

## 2023-02-26 RX ORDER — ONDANSETRON 2 MG/ML
4 INJECTION INTRAMUSCULAR; INTRAVENOUS ONCE
Status: COMPLETED | OUTPATIENT
Start: 2023-02-26 | End: 2023-02-26

## 2023-02-26 RX ORDER — SODIUM CHLORIDE 9 MG/ML
1000 INJECTION, SOLUTION INTRAVENOUS ONCE
Status: COMPLETED | OUTPATIENT
Start: 2023-02-26 | End: 2023-02-27

## 2023-02-26 RX ORDER — KETOROLAC TROMETHAMINE 30 MG/ML
15 INJECTION, SOLUTION INTRAMUSCULAR; INTRAVENOUS ONCE
Status: COMPLETED | OUTPATIENT
Start: 2023-02-26 | End: 2023-02-26

## 2023-02-26 RX ORDER — SODIUM CHLORIDE, SODIUM LACTATE, POTASSIUM CHLORIDE, AND CALCIUM CHLORIDE .6; .31; .03; .02 G/100ML; G/100ML; G/100ML; G/100ML
1000 INJECTION, SOLUTION INTRAVENOUS ONCE
Status: COMPLETED | OUTPATIENT
Start: 2023-02-26 | End: 2023-02-27

## 2023-02-26 RX ORDER — SULFAMETHOXAZOLE AND TRIMETHOPRIM 800; 160 MG/1; MG/1
1 TABLET ORAL ONCE
Status: COMPLETED | OUTPATIENT
Start: 2023-02-26 | End: 2023-02-26

## 2023-02-26 RX ADMIN — KETOROLAC TROMETHAMINE 15 MG: 30 INJECTION, SOLUTION INTRAMUSCULAR; INTRAVENOUS at 22:44

## 2023-02-26 RX ADMIN — SODIUM CHLORIDE 1000 ML: 9 INJECTION, SOLUTION INTRAVENOUS at 22:07

## 2023-02-26 RX ADMIN — ONDANSETRON 4 MG: 2 INJECTION INTRAMUSCULAR; INTRAVENOUS at 22:07

## 2023-02-26 RX ADMIN — SULFAMETHOXAZOLE AND TRIMETHOPRIM 1 TABLET: 800; 160 TABLET ORAL at 22:58

## 2023-02-26 ASSESSMENT — FIBROSIS 4 INDEX: FIB4 SCORE: 0.64

## 2023-02-27 PROBLEM — R11.2 INTRACTABLE NAUSEA AND VOMITING: Status: ACTIVE | Noted: 2023-02-27

## 2023-02-27 PROBLEM — R30.0 DYSURIA: Status: ACTIVE | Noted: 2023-02-27

## 2023-02-27 LAB
ALBUMIN SERPL BCP-MCNC: 3.4 G/DL (ref 3.2–4.9)
ALBUMIN/GLOB SERPL: 1 G/DL
ALP SERPL-CCNC: 65 U/L (ref 30–99)
ALT SERPL-CCNC: 8 U/L (ref 2–50)
ANION GAP SERPL CALC-SCNC: 10 MMOL/L (ref 7–16)
AST SERPL-CCNC: 9 U/L (ref 12–45)
BASOPHILS # BLD AUTO: 0.4 % (ref 0–1.8)
BASOPHILS # BLD: 0.05 K/UL (ref 0–0.12)
BILIRUB SERPL-MCNC: 1.1 MG/DL (ref 0.1–1.5)
BUN SERPL-MCNC: 12 MG/DL (ref 8–22)
CALCIUM ALBUM COR SERPL-MCNC: 9.4 MG/DL (ref 8.5–10.5)
CALCIUM SERPL-MCNC: 8.9 MG/DL (ref 8.5–10.5)
CHLORIDE SERPL-SCNC: 104 MMOL/L (ref 96–112)
CO2 SERPL-SCNC: 21 MMOL/L (ref 20–33)
CREAT SERPL-MCNC: 0.78 MG/DL (ref 0.5–1.4)
EKG IMPRESSION: NORMAL
EOSINOPHIL # BLD AUTO: 0.04 K/UL (ref 0–0.51)
EOSINOPHIL NFR BLD: 0.3 % (ref 0–6.9)
ERYTHROCYTE [DISTWIDTH] IN BLOOD BY AUTOMATED COUNT: 46.5 FL (ref 35.9–50)
GFR SERPLBLD CREATININE-BSD FMLA CKD-EPI: 82 ML/MIN/1.73 M 2
GLOBULIN SER CALC-MCNC: 3.4 G/DL (ref 1.9–3.5)
GLUCOSE SERPL-MCNC: 118 MG/DL (ref 65–99)
HCT VFR BLD AUTO: 40.6 % (ref 37–47)
HGB BLD-MCNC: 13.3 G/DL (ref 12–16)
IMM GRANULOCYTES # BLD AUTO: 0.08 K/UL (ref 0–0.11)
IMM GRANULOCYTES NFR BLD AUTO: 0.7 % (ref 0–0.9)
LACTATE SERPL-SCNC: 1 MMOL/L (ref 0.5–2)
LYMPHOCYTES # BLD AUTO: 2.65 K/UL (ref 1–4.8)
LYMPHOCYTES NFR BLD: 22.4 % (ref 22–41)
MCH RBC QN AUTO: 28 PG (ref 27–33)
MCHC RBC AUTO-ENTMCNC: 32.8 G/DL (ref 33.6–35)
MCV RBC AUTO: 85.5 FL (ref 81.4–97.8)
MONOCYTES # BLD AUTO: 1.01 K/UL (ref 0–0.85)
MONOCYTES NFR BLD AUTO: 8.6 % (ref 0–13.4)
NEUTROPHILS # BLD AUTO: 7.98 K/UL (ref 2–7.15)
NEUTROPHILS NFR BLD: 67.6 % (ref 44–72)
NRBC # BLD AUTO: 0 K/UL
NRBC BLD-RTO: 0 /100 WBC
PLATELET # BLD AUTO: 245 K/UL (ref 164–446)
PMV BLD AUTO: 9.9 FL (ref 9–12.9)
POTASSIUM SERPL-SCNC: 3.8 MMOL/L (ref 3.6–5.5)
PROT SERPL-MCNC: 6.8 G/DL (ref 6–8.2)
RBC # BLD AUTO: 4.75 M/UL (ref 4.2–5.4)
SODIUM SERPL-SCNC: 135 MMOL/L (ref 135–145)
WBC # BLD AUTO: 11.8 K/UL (ref 4.8–10.8)

## 2023-02-27 PROCEDURE — 93010 ELECTROCARDIOGRAM REPORT: CPT | Performed by: INTERNAL MEDICINE

## 2023-02-27 PROCEDURE — 96376 TX/PRO/DX INJ SAME DRUG ADON: CPT

## 2023-02-27 PROCEDURE — 96375 TX/PRO/DX INJ NEW DRUG ADDON: CPT

## 2023-02-27 PROCEDURE — 700111 HCHG RX REV CODE 636 W/ 250 OVERRIDE (IP): Performed by: STUDENT IN AN ORGANIZED HEALTH CARE EDUCATION/TRAINING PROGRAM

## 2023-02-27 PROCEDURE — 700105 HCHG RX REV CODE 258: Performed by: STUDENT IN AN ORGANIZED HEALTH CARE EDUCATION/TRAINING PROGRAM

## 2023-02-27 PROCEDURE — 700105 HCHG RX REV CODE 258: Performed by: EMERGENCY MEDICINE

## 2023-02-27 PROCEDURE — 36415 COLL VENOUS BLD VENIPUNCTURE: CPT

## 2023-02-27 PROCEDURE — G0378 HOSPITAL OBSERVATION PER HR: HCPCS

## 2023-02-27 PROCEDURE — 80053 COMPREHEN METABOLIC PANEL: CPT

## 2023-02-27 PROCEDURE — 74176 CT ABD & PELVIS W/O CONTRAST: CPT

## 2023-02-27 PROCEDURE — 83605 ASSAY OF LACTIC ACID: CPT

## 2023-02-27 PROCEDURE — 93005 ELECTROCARDIOGRAM TRACING: CPT | Performed by: STUDENT IN AN ORGANIZED HEALTH CARE EDUCATION/TRAINING PROGRAM

## 2023-02-27 PROCEDURE — 85025 COMPLETE CBC W/AUTO DIFF WBC: CPT

## 2023-02-27 PROCEDURE — 700102 HCHG RX REV CODE 250 W/ 637 OVERRIDE(OP): Performed by: STUDENT IN AN ORGANIZED HEALTH CARE EDUCATION/TRAINING PROGRAM

## 2023-02-27 PROCEDURE — 700111 HCHG RX REV CODE 636 W/ 250 OVERRIDE (IP): Performed by: EMERGENCY MEDICINE

## 2023-02-27 PROCEDURE — 99222 1ST HOSP IP/OBS MODERATE 55: CPT | Mod: GC | Performed by: STUDENT IN AN ORGANIZED HEALTH CARE EDUCATION/TRAINING PROGRAM

## 2023-02-27 PROCEDURE — A9270 NON-COVERED ITEM OR SERVICE: HCPCS | Performed by: STUDENT IN AN ORGANIZED HEALTH CARE EDUCATION/TRAINING PROGRAM

## 2023-02-27 RX ORDER — AMOXICILLIN 250 MG
2 CAPSULE ORAL 2 TIMES DAILY
Status: DISCONTINUED | OUTPATIENT
Start: 2023-02-27 | End: 2023-03-02

## 2023-02-27 RX ORDER — HYDROMORPHONE HYDROCHLORIDE 1 MG/ML
0.5 INJECTION, SOLUTION INTRAMUSCULAR; INTRAVENOUS; SUBCUTANEOUS
Status: DISCONTINUED | OUTPATIENT
Start: 2023-02-27 | End: 2023-03-03 | Stop reason: HOSPADM

## 2023-02-27 RX ORDER — LORAZEPAM 2 MG/ML
1 INJECTION INTRAMUSCULAR EVERY 6 HOURS PRN
Status: DISCONTINUED | OUTPATIENT
Start: 2023-02-27 | End: 2023-03-03 | Stop reason: HOSPADM

## 2023-02-27 RX ORDER — ONDANSETRON 2 MG/ML
4 INJECTION INTRAMUSCULAR; INTRAVENOUS ONCE
Status: COMPLETED | OUTPATIENT
Start: 2023-02-27 | End: 2023-02-27

## 2023-02-27 RX ORDER — LORAZEPAM 1 MG/1
1 TABLET ORAL EVERY 4 HOURS PRN
Status: DISCONTINUED | OUTPATIENT
Start: 2023-02-27 | End: 2023-03-03 | Stop reason: HOSPADM

## 2023-02-27 RX ORDER — PROCHLORPERAZINE MALEATE 10 MG
10 TABLET ORAL EVERY 6 HOURS PRN
Status: DISCONTINUED | OUTPATIENT
Start: 2023-02-27 | End: 2023-03-03 | Stop reason: HOSPADM

## 2023-02-27 RX ORDER — ACETAMINOPHEN 500 MG
1000 TABLET ORAL EVERY 6 HOURS PRN
Status: DISCONTINUED | OUTPATIENT
Start: 2023-03-04 | End: 2023-03-01

## 2023-02-27 RX ORDER — ACETAMINOPHEN 325 MG/1
650 TABLET ORAL EVERY 6 HOURS PRN
Status: DISCONTINUED | OUTPATIENT
Start: 2023-02-27 | End: 2023-02-27

## 2023-02-27 RX ORDER — BISACODYL 10 MG
10 SUPPOSITORY, RECTAL RECTAL
Status: DISCONTINUED | OUTPATIENT
Start: 2023-02-27 | End: 2023-03-02

## 2023-02-27 RX ORDER — OXYCODONE HYDROCHLORIDE 10 MG/1
10 TABLET ORAL
Status: DISCONTINUED | OUTPATIENT
Start: 2023-02-27 | End: 2023-03-03 | Stop reason: HOSPADM

## 2023-02-27 RX ORDER — ACETAMINOPHEN 500 MG
1000 TABLET ORAL EVERY 6 HOURS
Status: DISCONTINUED | OUTPATIENT
Start: 2023-02-27 | End: 2023-03-01

## 2023-02-27 RX ORDER — ONDANSETRON 4 MG/1
4 TABLET, ORALLY DISINTEGRATING ORAL EVERY 4 HOURS PRN
Status: DISCONTINUED | OUTPATIENT
Start: 2023-02-27 | End: 2023-02-27

## 2023-02-27 RX ORDER — HYDROCODONE BITARTRATE AND ACETAMINOPHEN 5; 325 MG/1; MG/1
1 TABLET ORAL EVERY 6 HOURS PRN
COMMUNITY
Start: 2023-02-07

## 2023-02-27 RX ORDER — TAMSULOSIN HYDROCHLORIDE 0.4 MG/1
0.4 CAPSULE ORAL DAILY
COMMUNITY
Start: 2023-02-08

## 2023-02-27 RX ORDER — NITROFURANTOIN 25; 75 MG/1; MG/1
100 CAPSULE ORAL 2 TIMES DAILY
Status: ON HOLD | COMMUNITY
Start: 2023-02-07 | End: 2023-03-03

## 2023-02-27 RX ORDER — CEFTRIAXONE 1 G/1
1000 INJECTION, POWDER, FOR SOLUTION INTRAMUSCULAR; INTRAVENOUS ONCE
Status: COMPLETED | OUTPATIENT
Start: 2023-02-27 | End: 2023-02-27

## 2023-02-27 RX ORDER — POLYETHYLENE GLYCOL 3350 17 G/17G
1 POWDER, FOR SOLUTION ORAL
Status: DISCONTINUED | OUTPATIENT
Start: 2023-02-27 | End: 2023-03-02

## 2023-02-27 RX ORDER — PROCHLORPERAZINE EDISYLATE 5 MG/ML
10 INJECTION INTRAMUSCULAR; INTRAVENOUS ONCE
Status: COMPLETED | OUTPATIENT
Start: 2023-02-27 | End: 2023-02-27

## 2023-02-27 RX ORDER — PHENAZOPYRIDINE HYDROCHLORIDE 100 MG/1
100 TABLET, FILM COATED ORAL
Status: ON HOLD | COMMUNITY
Start: 2023-02-07 | End: 2023-03-03

## 2023-02-27 RX ORDER — OXYCODONE HYDROCHLORIDE 5 MG/1
5 TABLET ORAL EVERY 4 HOURS PRN
Status: DISCONTINUED | OUTPATIENT
Start: 2023-02-27 | End: 2023-02-27

## 2023-02-27 RX ORDER — LABETALOL HYDROCHLORIDE 5 MG/ML
10 INJECTION, SOLUTION INTRAVENOUS EVERY 4 HOURS PRN
Status: DISCONTINUED | OUTPATIENT
Start: 2023-02-27 | End: 2023-02-27

## 2023-02-27 RX ORDER — PROMETHAZINE HYDROCHLORIDE 25 MG/1
25 SUPPOSITORY RECTAL EVERY 6 HOURS PRN
Status: DISCONTINUED | OUTPATIENT
Start: 2023-02-27 | End: 2023-03-03 | Stop reason: HOSPADM

## 2023-02-27 RX ORDER — PROCHLORPERAZINE EDISYLATE 5 MG/ML
10 INJECTION INTRAMUSCULAR; INTRAVENOUS EVERY 6 HOURS PRN
Status: DISCONTINUED | OUTPATIENT
Start: 2023-02-27 | End: 2023-03-03 | Stop reason: HOSPADM

## 2023-02-27 RX ORDER — OXYCODONE HYDROCHLORIDE 5 MG/1
5 TABLET ORAL
Status: DISCONTINUED | OUTPATIENT
Start: 2023-02-27 | End: 2023-03-03 | Stop reason: HOSPADM

## 2023-02-27 RX ORDER — ACETAMINOPHEN 500 MG
1000 TABLET ORAL EVERY 6 HOURS PRN
COMMUNITY

## 2023-02-27 RX ORDER — CARVEDILOL 12.5 MG/1
12.5 TABLET ORAL 2 TIMES DAILY WITH MEALS
Status: DISCONTINUED | OUTPATIENT
Start: 2023-02-27 | End: 2023-03-03 | Stop reason: HOSPADM

## 2023-02-27 RX ORDER — SODIUM CHLORIDE, SODIUM LACTATE, POTASSIUM CHLORIDE, CALCIUM CHLORIDE 600; 310; 30; 20 MG/100ML; MG/100ML; MG/100ML; MG/100ML
INJECTION, SOLUTION INTRAVENOUS CONTINUOUS
Status: DISCONTINUED | OUTPATIENT
Start: 2023-02-27 | End: 2023-03-03 | Stop reason: HOSPADM

## 2023-02-27 RX ORDER — MORPHINE SULFATE 4 MG/ML
2 INJECTION INTRAVENOUS EVERY 4 HOURS PRN
Status: DISCONTINUED | OUTPATIENT
Start: 2023-02-27 | End: 2023-02-27

## 2023-02-27 RX ORDER — ONDANSETRON 2 MG/ML
4 INJECTION INTRAMUSCULAR; INTRAVENOUS EVERY 4 HOURS PRN
Status: DISCONTINUED | OUTPATIENT
Start: 2023-02-27 | End: 2023-02-27

## 2023-02-27 RX ORDER — TAMSULOSIN HYDROCHLORIDE 0.4 MG/1
0.4 CAPSULE ORAL DAILY
Status: DISCONTINUED | OUTPATIENT
Start: 2023-02-27 | End: 2023-03-03 | Stop reason: HOSPADM

## 2023-02-27 RX ADMIN — PROCHLORPERAZINE EDISYLATE 10 MG: 5 INJECTION INTRAMUSCULAR; INTRAVENOUS at 21:59

## 2023-02-27 RX ADMIN — SODIUM CHLORIDE, POTASSIUM CHLORIDE, SODIUM LACTATE AND CALCIUM CHLORIDE 1000 ML: 600; 310; 30; 20 INJECTION, SOLUTION INTRAVENOUS at 00:50

## 2023-02-27 RX ADMIN — TAMSULOSIN HYDROCHLORIDE 0.4 MG: 0.4 CAPSULE ORAL at 05:08

## 2023-02-27 RX ADMIN — ONDANSETRON 4 MG: 2 INJECTION INTRAMUSCULAR; INTRAVENOUS at 05:08

## 2023-02-27 RX ADMIN — MORPHINE SULFATE 2 MG: 4 INJECTION INTRAVENOUS at 08:18

## 2023-02-27 RX ADMIN — PROCHLORPERAZINE EDISYLATE 10 MG: 5 INJECTION INTRAMUSCULAR; INTRAVENOUS at 07:39

## 2023-02-27 RX ADMIN — SODIUM CHLORIDE, POTASSIUM CHLORIDE, SODIUM LACTATE AND CALCIUM CHLORIDE: 600; 310; 30; 20 INJECTION, SOLUTION INTRAVENOUS at 05:10

## 2023-02-27 RX ADMIN — ONDANSETRON 4 MG: 2 INJECTION INTRAMUSCULAR; INTRAVENOUS at 03:28

## 2023-02-27 RX ADMIN — MORPHINE SULFATE 2 MG: 4 INJECTION INTRAVENOUS at 13:33

## 2023-02-27 RX ADMIN — ONDANSETRON 4 MG: 2 INJECTION INTRAMUSCULAR; INTRAVENOUS at 15:48

## 2023-02-27 RX ADMIN — CEFTRIAXONE SODIUM 1000 MG: 1 INJECTION, POWDER, FOR SOLUTION INTRAMUSCULAR; INTRAVENOUS at 03:21

## 2023-02-27 ASSESSMENT — ENCOUNTER SYMPTOMS
SHORTNESS OF BREATH: 0
FALLS: 0
HEARTBURN: 0
BLURRED VISION: 0
FOCAL WEAKNESS: 0
NAUSEA: 1
SINUS PAIN: 0
FEVER: 0
WEAKNESS: 0
DEPRESSION: 0
FLANK PAIN: 1
NERVOUS/ANXIOUS: 0
CONSTIPATION: 0
COUGH: 0
DIARRHEA: 0
CHILLS: 0
DOUBLE VISION: 0
SORE THROAT: 0
CHILLS: 1
ABDOMINAL PAIN: 1
VOMITING: 1
LOSS OF CONSCIOUSNESS: 0

## 2023-02-27 ASSESSMENT — LIFESTYLE VARIABLES
TOTAL SCORE: 0
HAVE PEOPLE ANNOYED YOU BY CRITICIZING YOUR DRINKING: NO
ON A TYPICAL DAY WHEN YOU DRINK ALCOHOL HOW MANY DRINKS DO YOU HAVE: 0
EVER HAD A DRINK FIRST THING IN THE MORNING TO STEADY YOUR NERVES TO GET RID OF A HANGOVER: NO
AVERAGE NUMBER OF DAYS PER WEEK YOU HAVE A DRINK CONTAINING ALCOHOL: 0
TOTAL SCORE: 0
TOTAL SCORE: 0
ALCOHOL_USE: NO
SUBSTANCE_ABUSE: 0
HAVE YOU EVER FELT YOU SHOULD CUT DOWN ON YOUR DRINKING: NO
DOES PATIENT WANT TO STOP DRINKING: NO
HOW MANY TIMES IN THE PAST YEAR HAVE YOU HAD 5 OR MORE DRINKS IN A DAY: 0
EVER FELT BAD OR GUILTY ABOUT YOUR DRINKING: NO
CONSUMPTION TOTAL: NEGATIVE

## 2023-02-27 ASSESSMENT — COGNITIVE AND FUNCTIONAL STATUS - GENERAL
DAILY ACTIVITIY SCORE: 23
TOILETING: A LITTLE
SUGGESTED CMS G CODE MODIFIER DAILY ACTIVITY: CI
MOBILITY SCORE: 22
MOVING FROM LYING ON BACK TO SITTING ON SIDE OF FLAT BED: A LITTLE
SUGGESTED CMS G CODE MODIFIER MOBILITY: CJ
STANDING UP FROM CHAIR USING ARMS: A LITTLE

## 2023-02-27 ASSESSMENT — PAIN DESCRIPTION - PAIN TYPE
TYPE: ACUTE PAIN

## 2023-02-27 ASSESSMENT — PATIENT HEALTH QUESTIONNAIRE - PHQ9
2. FEELING DOWN, DEPRESSED, IRRITABLE, OR HOPELESS: NOT AT ALL
1. LITTLE INTEREST OR PLEASURE IN DOING THINGS: NOT AT ALL
SUM OF ALL RESPONSES TO PHQ9 QUESTIONS 1 AND 2: 0

## 2023-02-27 NOTE — ED PROVIDER NOTES
"  ER Provider Note    Scribed for Sp Farr M.d. by Michaelle Molina. 2/26/2023  9:53 PM    Primary Care Provider: LUKE Ktae    CHIEF COMPLAINT  Chief Complaint   Patient presents with    N/V    Flank Pain     Both sides, hx kidney stones, sx scheduled in 2-3 days for removal surgery     LIMITATION TO HISTORY   Select: : None    HPI/ROS  OUTSIDE HISTORIAN(S):  Family see below    EXTERNAL RECORDS REVIEWED  Outpatient labs & studies had a 5 mm left proximal ureter  stent on CT done at outlying facility on the fifth of this month.  Patient's urine was positive and resistant to Bactrim.  and Care everywhere History of C. diff. Requested records from [facility]    Joan Olivares is a 67 y.o. female with history of C. diff and kidney stones with a stent recently placed who presents to the ED for \"debilitating\" nausea and vomiting onset 1 week ago and was recommended to come to the ED for further evaluation by MD. Family member at bedside reports she was recently seen at [email or web address] for bilateral flank pain and kidney stones and a stent was placed by Dr. Schultz (Urology). Family member reports she was placed on antibiotics, but is unsure of which type, and will check the car for medication name. Family member at bedside states she has been unable to eat, drink, or keep any medications down secondary to her nausea and vomiting. She admits to associated symptoms of bilateral flank pain, fever (99.6 °F), diarrhea, increased urinary frequency, and dysuria, but denies diarrhea. No alleviating factors were reported. She reports her gallbladder is intact. She endorses allergy to penicillins which causes swelling to her throat and chest.     PAST MEDICAL HISTORY  Past Medical History:   Diagnosis Date    Anxiety     Arthritis     ASTHMA     Back pain     Bladder infection     Bronchitis     Clotting disorder (HCC)     Depression     GERD (gastroesophageal reflux " disease)     Heart burn     Hemorrhoids     Hyperlipidemia     Hypertension     Kidney stones     Migraine     Muscle disorder     Pain     Paresthesia of right foot     Pleurisy     Pneumonia     Scarlet fever     Ulcer     Unspecified disorder of thyroid        SURGICAL HISTORY  Past Surgical History:   Procedure Laterality Date    IRRIGATION & DEBRIDEMENT ORTHO Right 5/20/2019    Procedure: IRRIGATION AND DEBRIDEMENT RIGHT FOOT WOUND;  Surgeon: Emanuel Gutierrez M.D.;  Location: SURGERY [facility];  Service: Orthopedics    THYROID LOBECTOMY  11/11/2009    Performed by PERLITA HERNANDEZ at SURGERY [facility]    OTHER ORTHOPEDIC SURGERY  2004    Right Leg ORIF    APPENDECTOMY      HYSTERECTOMY LAPAROSCOPY      bso/ 1977    TONSILLECTOMY AND ADENOIDECTOMY         FAMILY HISTORY  Family History   Problem Relation Age of Onset    Lung Disease Mother     Cancer Father     Heart Disease Father     Lung Disease Father     Melanoma Father     Other Father         Midline granuloma    Thyroid Sister     Cancer Brother     Prostate cancer Brother     No Known Problems Maternal Grandmother     No Known Problems Maternal Grandfather     No Known Problems Paternal Grandmother     No Known Problems Paternal Grandfather     Psychiatric Illness Son        SOCIAL HISTORY   reports that she has never smoked. She has never used smokeless tobacco. She reports current drug use. Drug: Inhaled. She reports that she does not drink alcohol.    CURRENT MEDICATIONS  Previous Medications    ALBUTEROL 108 (90 BASE) MCG/ACT AERO SOLN INHALATION AEROSOL    Inhale 2 Puffs every 6 hours as needed for Shortness of Breath.    CARVEDILOL (COREG) 12.5 MG TAB    Take 1 Tablet by mouth 2 times a day with meals.    DIPHENHYDRAMINE-APAP, SLEEP, (TYLENOL PM EXTRA STRENGTH)  MG TAB    Take 2 Tablets by mouth every 6 hours as needed. Indications: Headache, Mild Pain, Runny Nose    METOCLOPRAMIDE (REGLAN) 10 MG TAB    1-3 tab at headache/nausea  "onset; repeat in 4-6 hours prn    ONDANSETRON (ZOFRAN ODT) 4 MG TABLET DISPERSIBLE    Take 1 Tab by mouth every four hours as needed for Nausea (give PO if IV route is unavailable.).    SUCRALFATE (CARAFATE) 1 GM TAB    Take 1 Tab by mouth 4 Times a Day,Before Meals and at Bedtime.       ALLERGIES  Penicillins    PHYSICAL EXAM  /80   Pulse (!) 121   Temp 36.6 °C (97.8 °F) (Temporal)   Resp 18   Ht 1.676 m (5' 6\")   Wt 95.3 kg (210 lb)   LMP 04/09/1977   SpO2 97%   BMI 33.89 kg/m²     Constitutional: Well developed, Well nourished, mild distress.   HENT: Normocephalic, Atraumatic, Oropharynx moist, No oral exudates.   Eyes: Conjunctiva normal, No discharge.   Cardiovascular: Tachycardic heart rate, Normal rhythm, No murmurs, equal pulses.   Pulmonary: Normal breath sounds, No respiratory distress, No wheezing, No rales, No rhonchi.  Abdomen:Soft, right upper quadrant tenderness with guarding and positive Li's sign.   Back: Right sided CVA tenderness.   Musculoskeletal: No major deformities noted, No tenderness. Brace to right lower leg.  Skin: Warm, Dry, No erythema, No rash.   Neurologic: Alert & oriented x 3, Normal motor function,  No focal deficits noted.   Psychiatric: Affect normal, Judgment normal, Mood normal.       DIAGNOSTIC STUDIES & PROCEDURES    Labs:   Results for orders placed or performed during the hospital encounter of 02/26/23   Lactic Acid   Result Value Ref Range    Lactic Acid 1.3 0.5 - 2.0 mmol/L   CBC WITH DIFFERENTIAL   Result Value Ref Range    WBC 11.8 (H) 4.8 - 10.8 K/uL    RBC 4.75 4.20 - 5.40 M/uL    Hemoglobin 13.3 12.0 - 16.0 g/dL    Hematocrit 40.6 37.0 - 47.0 %    MCV 85.5 81.4 - 97.8 fL    MCH 28.0 27.0 - 33.0 pg    MCHC 32.8 (L) 33.6 - 35.0 g/dL    RDW 46.5 35.9 - 50.0 fL    Platelet Count 245 164 - 446 K/uL    MPV 9.9 9.0 - 12.9 fL    Neutrophils-Polys 67.60 44.00 - 72.00 %    Lymphocytes 22.40 22.00 - 41.00 %    Monocytes 8.60 0.00 - 13.40 %    Eosinophils 0.30 " 0.00 - 6.90 %    Basophils 0.40 0.00 - 1.80 %    Immature Granulocytes 0.70 0.00 - 0.90 %    Nucleated RBC 0.00 /100 WBC    Neutrophils (Absolute) 7.98 (H) 2.00 - 7.15 K/uL    Lymphs (Absolute) 2.65 1.00 - 4.80 K/uL    Monos (Absolute) 1.01 (H) 0.00 - 0.85 K/uL    Eos (Absolute) 0.04 0.00 - 0.51 K/uL    Baso (Absolute) 0.05 0.00 - 0.12 K/uL    Immature Granulocytes (abs) 0.08 0.00 - 0.11 K/uL    NRBC (Absolute) 0.00 K/uL       All labs reviewed by me.    Radiology:   The attending Emergency Physician has independently interpreted the diagnostic imaging associated with this visit and is awaiting the final reading from the radiologist, which will be displayed below.    Preliminary interpretation is a follows: Ureteral  stent in place  Radiologist interpretation:   CT-RENAL COLIC EVALUATION(A/P W/O)   Final Result      1.  There is a left ureteral stent in place grossly appropriate in position. There is no obstructing urolithiasis.   2.  No hydronephrosis.   3.  Simple bilateral renal cysts.   4.  Distal colonic diverticulosis without diverticulitis.   5.  No bowel obstruction or acute inflammation.           COURSE & MEDICAL DECISION MAKING    ED Observation Status? Yes; I am placing the patient in to an observation status due to a diagnostic uncertainty as well as therapeutic intensity. Patient placed in observation status at 10:30 PM, 2/26/2023.     Observation plan is as follows: Pending CT results, blood work and response to antiemetics and fluids        INITIAL ASSESSMENT AND PLAN  Care Narrative:       9:53 PM - I discussed the patient's case and the above findings with Pharmacy regarding antibiotics because of history of C. Diff.     10:00 PM Patient seen and evaluated at bedside. Patient will be treated with Bactrim -160 mg tablet, Zofran 4 mg INJ, NS infusion 1,000 mL, Toradol 15 mg INJ, and lactated ringers infusion (bolus) for her symptoms. Ordered CT-Renal Colic Evaluation (A/P W/O), lactic acid now  and every 2 hours, urine culture, and blood culture x2 to evaluate. She understands and agrees to the plan of care. Differential diagnoses include but are not limited to: kidney stone, sepsis, infected kidney stone.    1 AM the case with Dr. Herrera urology on-call for Dr. Schultz.  Discussed the CT well as previous CT from outlying facility.  He thinks at this point time the patient may have passed the stone.  He states that urine may have leukocyte esterase.  But if it does have nitrite since more likely infectious if not this all could be inflammation from the patient's ureteral stent.    Patient is turned over at 2 AM pending labs to Dr. Brice      HYDRATION: Based on the patient's presentation of Tachycardia the patient was given IV fluids. IV Hydration was used because oral hydration was not adequate alone. Upon recheck following hydration, the patient was improved.     PROBLEM LIST AND DISPOSITION  Problem #1 nausea and vomiting at this point time patient appears  clinically dehydrated and has been started on IV fluids.  Her flank pain is on the right side.  CT was done and I do not see any continued stone on the left.  The patient's ureteral stent is in place.                   DISPOSITION AND DISCUSSIONS  I have discussed management of the patient with the following physicians and RACQUEL's:  Dr. Herrera urology    Discussion of management with other [facility] or appropriate source(s): Pharmacy  discussed the patient's case and the above findings with Pharmacy regarding antibiotics because of history of C. Diff.      Decision tools and prescription drugs considered including, but not limited to: Antibiotics patient's urine was resistant to Bactrim.  But we will give the patient a dose of Rocephin which she has tolerated previous .    DISPOSITION:  I suspect the patient will be hospitalized      FINAL IMPRESSION   1. Nausea and vomiting, unspecified vomiting type    2. Dehydration    3. Flank pain         Michaelle LAIRD  Jesse (Scribe), am scribing for, and in the presence of, JUNIOR Lopes*.    Electronically signed by: Michaelle Molina (Bhaktiibglenroy), 2/26/2023    ISp M.* personally performed the services described in this documentation, as scribed by Michaelle Molina in my presence, and it is both accurate and complete.    The note accurately reflects work and decisions made by me.  Sp Farr M.D.  2/27/2023  2:24 AM

## 2023-02-27 NOTE — CARE PLAN
Problem: Pain - Standard  Goal: Alleviation of pain or a reduction in pain to the patient’s comfort goal  Outcome: Progressing     Problem: Knowledge Deficit - Standard  Goal: Patient and family/care givers will demonstrate understanding of plan of care, disease process/condition, diagnostic tests and medications  Outcome: Progressing     Problem: Fall Risk  Goal: Patient will remain free from falls  Outcome: Progressing     Problem: Skin Integrity  Goal: Skin integrity is maintained or improved  Outcome: Progressing   The patient is Stable - Low risk of patient condition declining or worsening    Shift Goals  Clinical Goals: pain and nausea control    Progress made toward(s) clinical / shift goals:      Patient is not progressing towards the following goals:    Pt AOx4. Pain controlled by Morphine IV. Zofran and Compazine given for nausea with positive results. Pt complains about pain while voiding. PIV patent SL. 1 p assist. Pt refuses bed alarm despite education; pt calls appropriately.

## 2023-02-27 NOTE — ED NOTES
PATIENT DID NOT WANT TO GO TO CT TILL HER PAIN MEDS KICKED IN. PATIENT WANTS TO JUST BE IN ROOM FOR A BIT AND RELAX. PT IS RESTING IN BED. BREATHING IS EVEN AND UNLABORED, SKIN IS WARM AND DRY, VSS, NAD, WILL CONTINUE TO MONITOR.

## 2023-02-27 NOTE — CONSULTS
[facility] [place] Consult/H&P Note    Primary Service: Medicine  Attending: Lucho Whiting M.D.  Patient's Name/MRN: Joan Olivares, [id number]    Admit Date:2/26/2023  Today's Date: 2/27/2023   Length of stay:  LOS: 0 days   Room #: R323/00      Reason for consult/chief complaint: L flank pain  ID/HPI: Joan Olivares is a 67 y.o. female patient known to the urology service with a history of UTI and L ureteral stone, who underwent ureteral stent placement on 2/6 with Dr Andrade. At that time, plans were made for definitive treatment of stone with CULTS to be performed on 2/28 at [facility] in [place], after treating her infection with antibiotics.    Pt presented to the ED on 2/26 with c/o L flank pain and intractable N/V. A urine culture done several days ago demonstrated >100k cfu/mL mixed shantel, and UA in the ED was suggestive of infection. WBC 11.8, Cr normal at 0.78, and cultures obtained during this admission are pending. Pt was started on rocephin and admitted to medicine.    2/27: seen and examined, lying in bed in NAD. Reports nausea has somewhat improved, experiencing subjective fever although records review indicates pt has been afebrile, but tachycardic. Pt notes L flank pain has been intermittent since her stent was placed, but seems to have worsened lately. Endorses intermittent GH and dysuria as well. CT demonstrates stent in good position without hydronephrosis.       Past Medical History:   Past Medical History:   Diagnosis Date    Anxiety     Arthritis     ASTHMA     Back pain     Bladder infection     Bronchitis     Clotting disorder (HCC)     Depression     GERD (gastroesophageal reflux disease)     Heart burn     Hyperlipidemia     Hypertension     Kidney stones     Migraine     Muscle disorder     Pain     Paresthesia of right foot     Pleurisy     Pneumonia     Scarlet fever     Ulcer         Past Surgical History:   Past Surgical History:   Procedure Laterality Date    IRRIGATION &  DEBRIDEMENT ORTHO Right 5/20/2019    Procedure: IRRIGATION AND DEBRIDEMENT RIGHT FOOT WOUND;  Surgeon: Emanuel Gutierrez M.D.;  Location: SURGERY [facility];  Service: Orthopedics    THYROID LOBECTOMY  11/11/2009    Performed by PERLITA HERNANDEZ at SURGERY [facility]    OTHER ORTHOPEDIC SURGERY  2004    Right Leg ORIF    APPENDECTOMY      HYSTERECTOMY LAPAROSCOPY      bso/ 1977    TONSILLECTOMY AND ADENOIDECTOMY          Family History:   Family History   Problem Relation Age of Onset    Lung Disease Mother     Cancer Father     Heart Disease Father     Lung Disease Father     Melanoma Father     Other Father         Midline granuloma    Thyroid Sister     Cancer Brother     Prostate cancer Brother     No Known Problems Maternal Grandmother     No Known Problems Maternal Grandfather     No Known Problems Paternal Grandmother     No Known Problems Paternal Grandfather     Psychiatric Illness Son          Social History:   Social History     Tobacco Use    Smoking status: Never    Smokeless tobacco: Never   Vaping Use    Vaping Use: Never used   Substance Use Topics    Alcohol use: No    Drug use: Yes     Types: Inhaled     Comment: marijuana occ      Social History     Social History Narrative    Not on file        Allergies: she Penicillins    Medications:   Medications Prior to Admission   Medication Sig Dispense Refill Last Dose    acetaminophen (TYLENOL) 500 MG Tab Take 1,000 mg by mouth every 6 hours as needed for Mild Pain.   2/24/2023 at [facility]    nitrofurantoin (MACROBID) 100 MG Cap Take 100 mg by mouth 2 times a day. 10 day course   02/22/2023 at [facility]    tamsulosin (FLOMAX) 0.4 MG capsule Take 0.4 mg by mouth every day.   2/24/2023 at [facility]    HYDROcodone-acetaminophen (NORCO) 5-325 MG Tab per tablet Take 1 Tablet by mouth every 6 hours as needed for Moderate Pain. 3 day course   [facility]/2023 [facility]    phenazopyridine (PYRIDIUM) 100 MG Tab Take 100 mg by mouth 3 times a day with meals. 7 day course    "2/22/2023 at [facility]    carvedilol (COREG) 12.5 MG Tab Take 1 Tablet by mouth 2 times a day with meals. 180 Tablet 3 2/24/2023 at PM    diphenhydrAMINE-APAP, sleep, (TYLENOL PM EXTRA STRENGTH)  MG Tab Take 2 Tablets by mouth at bedtime as needed. Indications: Headache, Mild Pain, Runny Nose   2/24/2023 at PM         Review of Systems  Review of Systems   Constitutional:  Positive for chills.   Respiratory:  Negative for cough.    Cardiovascular:  Negative for chest pain.   Gastrointestinal:  Positive for abdominal pain, nausea and vomiting.   Genitourinary:  Positive for dysuria, flank pain, frequency, hematuria and urgency.   All other systems reviewed and are negative.     Physical Exam  VITAL SIGNS: BP (!) 140/73   Pulse (!) 110 Comment: RN notified   Temp 37.7 °C (99.9 °F) (Temporal)   Resp 19   Ht 1.676 m (5' 6\")   Wt 95.3 kg (210 lb)   LMP 04/09/1977   SpO2 95%   BMI 33.89 kg/m²   Physical Exam  Vitals reviewed.   Constitutional:       General: She is not in acute distress.     Appearance: She is obese.   HENT:      Head: Normocephalic.      Nose: Nose normal.      Mouth/Throat:      Pharynx: Oropharynx is clear.   Eyes:      Conjunctiva/sclera: Conjunctivae normal.   Pulmonary:      Effort: Pulmonary effort is normal.   Abdominal:      General: There is no distension.      Tenderness: There is abdominal tenderness. There is left CVA tenderness.   Musculoskeletal:         General: Normal range of motion.   Skin:     General: Skin is warm.   Neurological:      General: No focal deficit present.      Mental Status: She is alert and oriented to person, place, and time.   Psychiatric:         Mood and Affect: Mood normal.         Behavior: Behavior normal.         Labs:  Recent Labs     02/27/23  0153   WBC 11.8*   RBC 4.75   HEMOGLOBIN 13.3   HEMATOCRIT 40.6   MCV 85.5   MCH 28.0   MCHC 32.8*   RDW 46.5   PLATELETCT 245   MPV 9.9     Recent Labs     02/27/23  0153   SODIUM 135   POTASSIUM 3.8 "   CHLORIDE 104   CO2 21   GLUCOSE 118*   BUN 12   CREATININE 0.78   CALCIUM 8.9         Glucose:  Recent Labs     02/27/23  0153   GLUCOSE 118*     Coags:  No results for input(s): INR in the last 72 hours.      Urinalysis:   No results for input(s): COLORURINE, CLARITY, SPECGRAVITY, PHURINE, GLUCOSEUR, KETONES, NITRITE, OCCULTBLOOD, RBCURINE, BACTERIA, EPITHELCELL in the last 72 hours.    Invalid input(s): BILRUBINUR, LEUESTERASE    Imaging:  CT-RENAL COLIC EVALUATION(A/P W/O)   Final Result      1.  There is a left ureteral stent in place grossly appropriate in position. There is no obstructing urolithiasis.   2.  No hydronephrosis.   3.  Simple bilateral renal cysts.   4.  Distal colonic diverticulosis without diverticulitis.   5.  No bowel obstruction or acute inflammation.          @lastct@     Assessment/Recommendation   67 y.o. F with L ureteral stone and L ureteral stent, admitted with nausea and L flank pain    Discussed with Dr Andrade; patient is scheduled for L CULTS on 2/28 at [facility], but will coordinate with [facility] OR to move her case to this hospital, to be done during this admission  Pending OR availability, will endeavor to complete case tomorrow as planned  Continue IV abx  NPO at midnight in anticipation of OR, consent obtained    Dr Andrade is aware of consult and has directed this patient's plan of care.  Also discussed with nursing and with hospitalist.       MANDY Rice-LINDA   [address] [place] [address]   [phone]

## 2023-02-27 NOTE — CARE PLAN
The patient is Stable - Low risk of patient condition declining or worsening         Progress made toward(s) clinical / shift goals:        Problem: Pain - Standard  Goal: Alleviation of pain or a reduction in pain to the patient’s comfort goal  Description: Target End Date:  Prior to discharge or change in level of care    Document on Vitals flowsheet    1.  Document pain using the appropriate pain scale per order or unit policy  2.  Educate and implement non-pharmacologic comfort measures (i.e. relaxation, distraction, massage, cold/heat therapy, etc.)  3.  Pain management medications as ordered  4.  Reassess pain after pain med administration per policy  5.  If opiods administered assess patient's response to pain medication is appropriate per POSS sedation scale  6.  Follow pain management plan developed in collaboration with patient and interdisciplinary team (including palliative care or pain specialists if applicable)  Outcome: Progressing         Patient admitted from ED. Aox4, able to make needs known. SBA - wheelchair bound. RA. IV infusing. Continent of bowel, new onset urinary incontinence. NPO for possible lithotripsy or procedure. Call light in reach. Bed locked. Safety precautions in place.

## 2023-02-27 NOTE — DISCHARGE SUMMARY
"  ED Observation Discharge Summary    Patient:Joan Olivares  Patient : 1955  Patient MRN: [id number]  Patient PCP: Pcp Pt States None    Admit Date: 2023  Discharge Date and Time: 23 3:40 AM  Discharge Diagnosis: Intractable nausea vomiting abdominal pain dysuria ureteral stent  Discharge Attending: Justin Yang D.O.  Discharge Service: ED Observation    ED Course  Joan is a 67 y.o. female who was evaluated at [facility] evaluation of abdominal pain and nausea vomiting.  Patient was seen evaluated today, had CT scan which did show ureteral stents with a planned removal on the .  In the emergency department she had several episodes of nonbloody nonbilious vomiting.  Her labs were obtained were nonactionable CT on pelvis did show a ureteral stent in place urology was consulted, patient continues to vomit despite multiple rounds of antiemetics this does require admission for further care and evaluation of her intractable nausea vomiting.    Discharge Exam:  BP (!) 157/79   Pulse 95   Temp 36.6 °C (97.8 °F) (Temporal)   Resp 18   Ht 1.676 m (5' 6\")   Wt 95.3 kg (210 lb)   LMP 1977   SpO2 94%   BMI 33.89 kg/m² .    Constitutional: Awake and alert. Nontoxic  HENT:  Grossly normal  Eyes: Grossly normal  Neck: Normal range of motion  Cardiovascular: Normal heart rate   Thorax & Lungs: No respiratory distress  Abdomen: Nontender  Skin:  No pathologic rash.   Extremities: Well perfused  Psychiatric: Affect normal    Labs  Results for orders placed or performed during the hospital encounter of 23   Lactic Acid   Result Value Ref Range    Lactic Acid 1.3 0.5 - 2.0 mmol/L   Lactic Acid   Result Value Ref Range    Lactic Acid 1.0 0.5 - 2.0 mmol/L   COMP METABOLIC PANEL   Result Value Ref Range    Sodium 135 135 - 145 mmol/L    Potassium 3.8 3.6 - 5.5 mmol/L    Chloride 104 96 - 112 mmol/L    Co2 21 20 - 33 mmol/L    Anion Gap 10.0 7.0 - 16.0    Glucose 118 (H) 65 - 99 mg/dL    " Bun 12 8 - 22 mg/dL    Creatinine 0.78 0.50 - 1.40 mg/dL    Calcium 8.9 8.5 - 10.5 mg/dL    AST(SGOT) 9 (L) 12 - 45 U/L    ALT(SGPT) 8 2 - 50 U/L    Alkaline Phosphatase 65 30 - 99 U/L    Total Bilirubin 1.1 0.1 - 1.5 mg/dL    Albumin 3.4 3.2 - 4.9 g/dL    Total Protein 6.8 6.0 - 8.2 g/dL    Globulin 3.4 1.9 - 3.5 g/dL    A-G Ratio 1.0 g/dL   CBC WITH DIFFERENTIAL   Result Value Ref Range    WBC 11.8 (H) 4.8 - 10.8 K/uL    RBC 4.75 4.20 - 5.40 M/uL    Hemoglobin 13.3 12.0 - 16.0 g/dL    Hematocrit 40.6 37.0 - 47.0 %    MCV 85.5 81.4 - 97.8 fL    MCH 28.0 27.0 - 33.0 pg    MCHC 32.8 (L) 33.6 - 35.0 g/dL    RDW 46.5 35.9 - 50.0 fL    Platelet Count 245 164 - 446 K/uL    MPV 9.9 9.0 - 12.9 fL    Neutrophils-Polys 67.60 44.00 - 72.00 %    Lymphocytes 22.40 22.00 - 41.00 %    Monocytes 8.60 0.00 - 13.40 %    Eosinophils 0.30 0.00 - 6.90 %    Basophils 0.40 0.00 - 1.80 %    Immature Granulocytes 0.70 0.00 - 0.90 %    Nucleated RBC 0.00 /100 WBC    Neutrophils (Absolute) 7.98 (H) 2.00 - 7.15 K/uL    Lymphs (Absolute) 2.65 1.00 - 4.80 K/uL    Monos (Absolute) 1.01 (H) 0.00 - 0.85 K/uL    Eos (Absolute) 0.04 0.00 - 0.51 K/uL    Baso (Absolute) 0.05 0.00 - 0.12 K/uL    Immature Granulocytes (abs) 0.08 0.00 - 0.11 K/uL    NRBC (Absolute) 0.00 K/uL   CORRECTED CALCIUM   Result Value Ref Range    Correct Calcium 9.4 8.5 - 10.5 mg/dL   ESTIMATED GFR   Result Value Ref Range    GFR (CKD-EPI) 82 >60 mL/min/1.73 m 2       Radiology  CT-RENAL COLIC EVALUATION(A/P W/O)   Final Result      1.  There is a left ureteral stent in place grossly appropriate in position. There is no obstructing urolithiasis.   2.  No hydronephrosis.   3.  Simple bilateral renal cysts.   4.  Distal colonic diverticulosis without diverticulitis.   5.  No bowel obstruction or acute inflammation.          Medications:   New Prescriptions    No medications on file       My final assessment includes intractable nausea vomiting abdominal pain ureteral stent  Upon  Reevaluation, the patient's condition has: not improved; and will be escalated to hospitalization.    Patient discharged from ED Observation status at 0340 (Time) 2/27 (Date).     Total time spent on this ED Observation discharge encounter is < 30 Minutes    Electronically signed by: Justin Yang D.O., 2/27/2023 3:40 AM

## 2023-02-27 NOTE — H&P
[facility] Internal Medicine History & Physical Note    Date of Service  2/27/2023    [facility] Team: TOMEKA   Attending: Matthew Anderson M.d.  Senior Resident: Dr. Allen  Contact Number: [phone]    Primary Care Physician  Pcp Pt States None    Consultants  urology      Code Status  Full Code    Chief Complaint  Chief Complaint   Patient presents with    N/V    Flank Pain     Both sides, hx kidney stones, sx scheduled in 2-3 days for removal surgery       History of Presenting Illness (HPI):   Joan Olivares is a 67 y.o. female with history of recurrent kidney stones with recent ureteral stent placement on 2/6/23 at outside hospital who presented 2/26/2023 with a week long history of abdominal pain, dysuria, nausea and vomiting.    She shares that about 5 days prior to presentation she began to have abdominal pain as well as nausea. She mentions that 3 days prior to presentation the nausea got worse to the point where she could not take her home medications or tolerate consistent PO intake.   She mentions that the ED given nausea meds helped with symptoms, ED provided toradol helped with pain.     I discussed the plan of care with patient.    Review of Systems  Review of Systems   Constitutional:  Negative for chills and fever.   HENT:  Negative for sinus pain and sore throat.    Eyes:  Negative for blurred vision and double vision.   Respiratory:  Negative for cough and shortness of breath.    Cardiovascular:  Negative for chest pain.   Gastrointestinal:  Positive for abdominal pain, nausea and vomiting. Negative for constipation, diarrhea and heartburn.   Genitourinary:  Positive for dysuria. Negative for frequency and hematuria.   Musculoskeletal:  Negative for falls.   Neurological:  Negative for focal weakness, loss of consciousness and weakness.   Psychiatric/Behavioral:  Negative for depression and substance abuse. The patient is not nervous/anxious.      Past Medical History   has a past medical history of  Anxiety, Arthritis, ASTHMA, Back pain, Bladder infection, Bronchitis, Clotting disorder (HCC), Depression, GERD (gastroesophageal reflux disease), Heart burn, Hemorrhoids, Hyperlipidemia, Hypertension, Kidney stones, Migraine, Muscle disorder, Pain, Paresthesia of right foot, Pleurisy, Pneumonia, Scarlet fever, Ulcer, and Unspecified disorder of thyroid.    Surgical History   has a past surgical history that includes tonsillectomy and adenoidectomy; other orthopedic surgery (2004); appendectomy; thyroid lobectomy (11/11/2009); hysterectomy laparoscopy; and irrigation & debridement ortho (Right, 5/20/2019).     Family History  family history includes Cancer in her brother and father; Heart Disease in her father; Lung Disease in her father and mother; Melanoma in her father; No Known Problems in her maternal grandfather, maternal grandmother, paternal grandfather, and paternal grandmother; Other in her father; Prostate cancer in her brother; Psychiatric Illness in her son; Thyroid in her sister.   Family history reviewed with patient.     Social History  Tobacco: Denies   Alcohol: Denies  Recreational drugs (illegal or prescription): Marijuana  Income: Retired  Living Situation: Lives In [facility]  Recent Travel: Denies  Primary Care Provider: Reviewed None  Other (stressors, spirituality, exposures): NA    Allergies  Allergies   Allergen Reactions    Penicillins Rash and Swelling     Tolerated ceftriaxone on 6/20/17      Pt reports that she received PCN Approximately 30 years ago       Medications  Prior to Admission Medications   Prescriptions Last Dose Informant Patient Reported? Taking?   HYDROcodone-acetaminophen (NORCO) 5-325 MG Tab per tablet 2/5/2023 at [facility] Patient Yes No   Sig: Take 1 Tablet by mouth every 6 hours as needed for Moderate Pain. 3 day course   acetaminophen (TYLENOL) 500 MG Tab 2/24/2023 at [facility] Patient Yes Yes   Sig: Take 1,000 mg by mouth every 6 hours as needed for Mild Pain.   carvedilol  (COREG) 12.5 MG Tab 2/24/2023 at PM Patient No No   Sig: Take 1 Tablet by mouth 2 times a day with meals.   diphenhydrAMINE-APAP, sleep, (TYLENOL PM EXTRA STRENGTH)  MG Tab 2/24/2023 at [facility] Patient Yes No   Sig: Take 2 Tablets by mouth at bedtime as needed. Indications: Headache, Mild Pain, Runny Nose   nitrofurantoin (MACROBID) 100 MG Cap 2/12/2023 at [facility] Patient Yes No   Sig: Take 100 mg by mouth 2 times a day. 10 day course   phenazopyridine (PYRIDIUM) 100 MG Tab 2/9/2023 at [facility] Patient Yes No   Sig: Take 100 mg by mouth 3 times a day with meals. 7 day course   tamsulosin (FLOMAX) 0.4 MG capsule 2/24/2023 at [facility] Patient Yes No   Sig: Take 0.4 mg by mouth every day.      Facility-Administered Medications: None       Physical Exam  Temp:  [36.6 °C (97.8 °F)] 36.6 °C (97.8 °F)  Pulse:  [] 95  Resp:  [18] 18  BP: (113-208)/() 157/79  SpO2:  [93 %-97 %] 94 %  Blood Pressure : (!) 157/79   Temperature: 36.6 °C (97.8 °F)   Pulse: 95   Respiration: 18   Pulse Oximetry: 94 %       Physical Exam  Constitutional:       Appearance: Normal appearance.   HENT:      Head: Normocephalic and atraumatic.      Mouth/Throat:      Mouth: Mucous membranes are dry.   Eyes:      Extraocular Movements: Extraocular movements intact.      Pupils: Pupils are equal, round, and reactive to light.   Cardiovascular:      Rate and Rhythm: Normal rate.   Pulmonary:      Effort: Pulmonary effort is normal. No respiratory distress.      Breath sounds: Normal breath sounds.   Abdominal:      General: There is no distension.      Palpations: Abdomen is soft. There is no mass.      Tenderness: There is abdominal tenderness. There is no left CVA tenderness, guarding or rebound.   Musculoskeletal:      Right lower leg: No edema.      Left lower leg: No edema.   Skin:     General: Skin is warm and dry.   Neurological:      Mental Status: She is alert and oriented to person, place, and time.      Cranial Nerves: No cranial nerve  deficit.   Psychiatric:         Mood and Affect: Mood normal.         Behavior: Behavior normal.       Laboratory:  Recent Labs     02/27/23 0153   WBC 11.8*   RBC 4.75   HEMOGLOBIN 13.3   HEMATOCRIT 40.6   MCV 85.5   MCH 28.0   MCHC 32.8*   RDW 46.5   PLATELETCT 245   MPV 9.9     Recent Labs     02/27/23 0153   SODIUM 135   POTASSIUM 3.8   CHLORIDE 104   CO2 21   GLUCOSE 118*   BUN 12   CREATININE 0.78   CALCIUM 8.9     Recent Labs     02/27/23 0153   ALTSGPT 8   ASTSGOT 9*   ALKPHOSPHAT 65   TBILIRUBIN 1.1   GLUCOSE 118*         No results for input(s): NTPROBNP in the last 72 hours.      No results for input(s): TROPONINT in the last 72 hours.    Imaging:  CT-RENAL COLIC EVALUATION(A/P W/O)   Final Result      1.  There is a left ureteral stent in place grossly appropriate in position. There is no obstructing urolithiasis.   2.  No hydronephrosis.   3.  Simple bilateral renal cysts.   4.  Distal colonic diverticulosis without diverticulitis.   5.  No bowel obstruction or acute inflammation.          X-Ray:  I have personally reviewed the images and compared with prior images.    Assessment/Plan:  Problem Representation: 67 year old woman with history of nephrolithiasis presenting with intractable nausea and vomiting, abdominal pain in the context of CT imaging showing intact ureteral stent with no obstructing stone.  I anticipate this patient is appropriate for observation status at this time because of the need for control of her nausea.    Patient will need a Med/Surg bed on MEDICAL service .  The need is secondary to intractable nausea.     * Intractable nausea and vomiting- (present on admission)  Assessment & Plan  -likely combination, pain as below as well as possibly some component of vertigo  HINTS negative  PLAN  -Zofran PRN  -NPO meds okay, advance diet as tolerated  -maintenance LR at 125ml/hr    Dysuria  Assessment & Plan  -flank pain  -unclear etiology UTI vs renal stone vs inflammation of  stent  -was given Ceftriaxone in the ED  PLAN  -UA ordered in ED, pending results  -if UA is positive plan for continuation of ceftriaxone   -urology consulted in the ED, appreciate recs  -oxycodone with breakthrough morphine for pain    Nephrolithiasis- (present on admission)  Assessment & Plan  -history of ureteral stent placed on 2/6/23 per patient, was planned for removal on 2/28/23  -CT renal stent negative for signs of obstructing stone  -Urology consulted in the ED  -notes she has not taken flomax in 3 days due to nausea  PLAN  Appreciate urology recs  -resume home flomax    HTN (hypertension)- (present on admission)  Assessment & Plan  -reports has missed last 3 days of home coreg due to nausea  PLAN  -resumed home coreg  -labetalol PRN for elevated BPs        VTE prophylaxis: SCDs/TEDs

## 2023-02-27 NOTE — ASSESSMENT & PLAN NOTE
-reports has missed last 3 days of home coreg due to nausea  PLAN  -resumed home coreg  -labetalol PRN for elevated BPs

## 2023-02-27 NOTE — ED TRIAGE NOTES
"Joan Tinsley Olivares  67 y.o.  Chief Complaint   Patient presents with    N/V    Flank Pain     Both sides, hx kidney stones, sx scheduled in 2-3 days for removal surgery     Wheeled to triage by family, pt has hx kidney stones with removal surgery scheduled. Pt has been experiencing \"debilitating nausea and vomiting\" for past week, recommended for ER visit by MD. ORDAZ&Arden, speaking in full sentences, placed back in lobby.  "

## 2023-02-27 NOTE — ED NOTES
CT NOTIFIED PT IS NOW READY TO GO. THEY HAVE AN ICU PATIENT ON THE TABLE AND THEN THEY WILL COME.     CT TRIED TO COME THREE TIMES AND PATIENT REFUSED.     ERP AWARE

## 2023-02-27 NOTE — ED NOTES
PT IS RESTING IN BED. BREATHING IS EVEN AND UNLABORED, SKIN IS WARM AND DRY, VSS, NAD, WILL CONTINUE TO MONITOR.  PT FAMILY IS AT BEDSIDE. NEW IV STARTED AND NEW LABS SENT.

## 2023-02-27 NOTE — PROGRESS NOTES
4 Eyes Skin Assessment Completed by Tracy RN and MIHIR Fermin.    Head WDL  Ears WDL  Nose WDL  Mouth WDL  Neck WDL  Breast/Chest WDL  Shoulder Blades WDL  Spine WDL  (R) Arm/Elbow/Hand WDL  (L) Arm/Elbow/Hand WDL  Abdomen WDL  Groin WDL  Scrotum/Coccyx/Buttocks WDL  (R) Leg WDL  (L) Leg WDL  (R) Heel/Foot/Toe WDL  (L) Heel/Foot/Toe WDL          Devices In Places N/A      Interventions In Place pt refuses waffle overlay    Possible Skin Injury No    Pictures Uploaded Into Epic N/A  Wound Consult Placed N/A  RN Wound Prevention Protocol Ordered No

## 2023-02-27 NOTE — ASSESSMENT & PLAN NOTE
-history of ureteral stent placed on 2/6/23 per patient, was planned for removal on 2/28/23  -CT renal stent negative for signs of obstructing stone  -Urology consulted in the ED  -notes she has not taken flomax in 3 days due to nausea  PLAN  Appreciate urology recs  -resume home flomax

## 2023-02-27 NOTE — ASSESSMENT & PLAN NOTE
-flank pain  -unclear etiology UTI vs renal stone vs inflammation of stent  -was given Ceftriaxone in the ED  PLAN  -UA ordered in ED, pending results  -if UA is positive plan for continuation of ceftriaxone   -urology consulted in the ED, appreciate recs  -oxycodone with breakthrough morphine for pain

## 2023-02-27 NOTE — ED NOTES
PATIENT STATES SHE HAD A STENT PLACED AND NOT A LITHOTRIPSY DUE TO INFLAMMATION AND INFECTION. PATIENT WAS ON ANTIBIOTICS, PT FINISHED COURSE OF ANTIBIOTICS AND THEN PATIENT HAS HAD NAUSEA AND UNABLE TO KEEP FLUIDS DOWN. PATIENT WITH FREQUENT URINATION THAT IS PAINFUL BUT DOES NOT URINATE A LOT AT A TIME.     PT IS RESTING IN BED. BREATHING IS EVEN AND UNLABORED, SKIN IS WARM AND DRY, VSS, NAD, WILL CONTINUE TO MONITOR. FAMILY AT BEDSIDE. PENDING MD EVALUATION.

## 2023-02-27 NOTE — PROGRESS NOTES
Interval Progress Note    Interval History    She reports flank pain and nausea without vomiting. Zofran was ineffective so she got relief from compazine. Counseled to utilize oxycodone as able with low-dose high-frequency rather than variations in pain from IV pain meds.  POC discussed with urology LORA Xavier who advised ureteral stent retrieval tomorrow. OR time confirmed.    Physical Exam  No acute distress.  Heart RRR s1s2 no MRG  Lungs CTA no WRR  Abdomen soft, normoactive, nontender.  Moves all extremities, appropriately conversant.  No peripheral edema.  Mood and affect normal.    Assessment & Plan    Intractable Nausea  -Due to ureteropyelonephritis  -Zofran discontinued, initiated compazine  -Phenergan PRN if refractory, then lorazepam PRN abortive    Nephrolithiases  -Continue tamuslosin  -Oxycodone PRN with breakthrough dilaudid IV PRN  -NPO midnight for stent removal tomorrow    Bacteruria  -Ucx 2/23 no apparent pathogen, high CFU of shantel  -Empiric ceftriaxone for planned urologic procedure tomorrow    VTE Ppx: SCDs,

## 2023-02-27 NOTE — ED NOTES
PT IS RESTING IN BED STATES SHE IS READY FOR CT WHEN THEY ARE. BREATHING IS EVEN AND UNLABORED, SKIN IS WARM AND DRY, VSS, NAD, WILL CONTINUE TO MONITOR. FAMILY REMAINS AT BEDSIDE.

## 2023-02-27 NOTE — DISCHARGE PLANNING
Care Transition Team Assessment    LMSW met with pt at bedside to complete assessment. Pt A&Ox4 and able to verify the information on the face sheet. Pt lives with her niece in a single-story house that has one step to enter. Prior to this hospitalization pt was independent at home with ADLs and most IADLs. Pt does not use any DME at baseline. Pt reported that her family is a good support for her.    Pt is retired and receives SSI monthly. Pt denies any SA or MH concerns.  Pt does not have an advance directive. Pt requested bedside commode, face to face placed by MD.     Information Source  Orientation Level: Oriented X4  Information Given By: Patient    Readmission Evaluation  Is this a readmission?: No    Elopement Risk  Legal Hold: No  Ambulatory or Self Mobile in Wheelchair: No-Not an Elopement Risk    Interdisciplinary Discharge Planning  Lives with - Patient's Self Care Capacity: Related Adult  Patient or legal guardian wants to designate a caregiver: No  Support Systems: Family Member(s), Friends / Neighbors  Housing / Facility: 1 Story House  Durable Medical Equipment: Other - Specify  DME Provider / Phone: Wheelchair    Discharge Preparedness  What is your plan after discharge?: Home with help  What are your discharge supports?: Other (comment) (Cesar- Liz)  Prior Functional Level: Ambulatory  Difficulity with ADLs: None  Difficulity with IADLs: None    Functional Assesment  Prior Functional Level: Ambulatory    Finances  Financial Barriers to Discharge: No  Prescription Coverage: Yes    Vision / Hearing Impairment  Vision Impairment : Yes  Right Eye Vision: Wears Glasses  Left Eye Vision: Wears Glasses  Hearing Impairment : No    Advance Directive  Advance Directive?: None    Domestic Abuse  Have you ever been the victim of abuse or violence?: No  Physical Abuse or Sexual Abuse: No  Verbal Abuse or Emotional Abuse: No  Possible Abuse/Neglect Reported to:: Not Applicable    Psychological  Assessment  History of Substance Abuse: None  History of Psychiatric Problems: No    Discharge Risks or Barriers  Discharge risks or barriers?: No    Anticipated Discharge Information  Discharge Disposition: Discharged to home/self care (01)

## 2023-02-27 NOTE — ASSESSMENT & PLAN NOTE
-likely combination, pain as below as well as possibly some component of vertigo  HINTS negative  PLAN  -Zofran PRN  -NPO meds okay, advance diet as tolerated  -maintenance LR at 125ml/hr

## 2023-02-28 ENCOUNTER — APPOINTMENT (OUTPATIENT)
Dept: RADIOLOGY | Facility: MEDICAL CENTER | Age: 68
DRG: 660 | End: 2023-02-28
Attending: UROLOGY
Payer: MEDICARE

## 2023-02-28 ENCOUNTER — ANESTHESIA EVENT (OUTPATIENT)
Dept: SURGERY | Facility: MEDICAL CENTER | Age: 68
DRG: 660 | End: 2023-02-28
Payer: MEDICARE

## 2023-02-28 ENCOUNTER — ANESTHESIA (OUTPATIENT)
Dept: SURGERY | Facility: MEDICAL CENTER | Age: 68
DRG: 660 | End: 2023-02-28
Payer: MEDICARE

## 2023-02-28 ENCOUNTER — APPOINTMENT (OUTPATIENT)
Dept: RADIOLOGY | Facility: MEDICAL CENTER | Age: 68
DRG: 660 | End: 2023-02-28
Attending: INTERNAL MEDICINE
Payer: MEDICARE

## 2023-02-28 PROBLEM — N20.0 LEFT NEPHROLITHIASIS: Status: ACTIVE | Noted: 2023-02-28

## 2023-02-28 LAB
PATHOLOGY CONSULT NOTE: NORMAL
PATHOLOGY CONSULT NOTE: NORMAL

## 2023-02-28 PROCEDURE — 700101 HCHG RX REV CODE 250: Performed by: ANESTHESIOLOGY

## 2023-02-28 PROCEDURE — 99232 SBSQ HOSP IP/OBS MODERATE 35: CPT | Performed by: INTERNAL MEDICINE

## 2023-02-28 PROCEDURE — 700111 HCHG RX REV CODE 636 W/ 250 OVERRIDE (IP): Performed by: STUDENT IN AN ORGANIZED HEALTH CARE EDUCATION/TRAINING PROGRAM

## 2023-02-28 PROCEDURE — 160036 HCHG PACU - EA ADDL 30 MINS PHASE I: Performed by: UROLOGY

## 2023-02-28 PROCEDURE — A9270 NON-COVERED ITEM OR SERVICE: HCPCS | Performed by: STUDENT IN AN ORGANIZED HEALTH CARE EDUCATION/TRAINING PROGRAM

## 2023-02-28 PROCEDURE — 82365 CALCULUS SPECTROSCOPY: CPT

## 2023-02-28 PROCEDURE — 700102 HCHG RX REV CODE 250 W/ 637 OVERRIDE(OP): Performed by: UROLOGY

## 2023-02-28 PROCEDURE — 160048 HCHG OR STATISTICAL LEVEL 1-5: Performed by: UROLOGY

## 2023-02-28 PROCEDURE — 700102 HCHG RX REV CODE 250 W/ 637 OVERRIDE(OP): Performed by: STUDENT IN AN ORGANIZED HEALTH CARE EDUCATION/TRAINING PROGRAM

## 2023-02-28 PROCEDURE — C1769 GUIDE WIRE: HCPCS | Performed by: UROLOGY

## 2023-02-28 PROCEDURE — 160002 HCHG RECOVERY MINUTES (STAT): Performed by: UROLOGY

## 2023-02-28 PROCEDURE — 0T778DZ DILATION OF LEFT URETER WITH INTRALUMINAL DEVICE, VIA NATURAL OR ARTIFICIAL OPENING ENDOSCOPIC: ICD-10-PCS | Performed by: UROLOGY

## 2023-02-28 PROCEDURE — 700102 HCHG RX REV CODE 250 W/ 637 OVERRIDE(OP): Performed by: ANESTHESIOLOGY

## 2023-02-28 PROCEDURE — 160028 HCHG SURGERY MINUTES - 1ST 30 MINS LEVEL 3: Performed by: UROLOGY

## 2023-02-28 PROCEDURE — 110371 HCHG SHELL REV 272: Performed by: UROLOGY

## 2023-02-28 PROCEDURE — C2617 STENT, NON-COR, TEM W/O DEL: HCPCS | Performed by: UROLOGY

## 2023-02-28 PROCEDURE — 160039 HCHG SURGERY MINUTES - EA ADDL 1 MIN LEVEL 3: Performed by: UROLOGY

## 2023-02-28 PROCEDURE — 0TC78ZZ EXTIRPATION OF MATTER FROM LEFT URETER, VIA NATURAL OR ARTIFICIAL OPENING ENDOSCOPIC: ICD-10-PCS | Performed by: UROLOGY

## 2023-02-28 PROCEDURE — 0TP98DZ REMOVAL OF INTRALUMINAL DEVICE FROM URETER, VIA NATURAL OR ARTIFICIAL OPENING ENDOSCOPIC: ICD-10-PCS | Performed by: UROLOGY

## 2023-02-28 PROCEDURE — 700111 HCHG RX REV CODE 636 W/ 250 OVERRIDE (IP): Performed by: ANESTHESIOLOGY

## 2023-02-28 PROCEDURE — 160009 HCHG ANES TIME/MIN: Performed by: UROLOGY

## 2023-02-28 PROCEDURE — 88300 SURGICAL PATH GROSS: CPT

## 2023-02-28 PROCEDURE — A9270 NON-COVERED ITEM OR SERVICE: HCPCS | Performed by: ANESTHESIOLOGY

## 2023-02-28 PROCEDURE — 700111 HCHG RX REV CODE 636 W/ 250 OVERRIDE (IP): Performed by: INTERNAL MEDICINE

## 2023-02-28 PROCEDURE — 00918 ANES TRURL PX URTRL CAL RMVL: CPT | Performed by: ANESTHESIOLOGY

## 2023-02-28 PROCEDURE — 96376 TX/PRO/DX INJ SAME DRUG ADON: CPT

## 2023-02-28 PROCEDURE — 160035 HCHG PACU - 1ST 60 MINS PHASE I: Performed by: UROLOGY

## 2023-02-28 PROCEDURE — A9270 NON-COVERED ITEM OR SERVICE: HCPCS | Performed by: UROLOGY

## 2023-02-28 PROCEDURE — 700105 HCHG RX REV CODE 258: Performed by: INTERNAL MEDICINE

## 2023-02-28 PROCEDURE — 770004 HCHG ROOM/CARE - ONCOLOGY PRIVATE *

## 2023-02-28 DEVICE — STENT UROLOGICAL POLARIS 6X26  ULTRA: Type: IMPLANTABLE DEVICE | Site: URETER | Status: FUNCTIONAL

## 2023-02-28 RX ORDER — HYDROMORPHONE HYDROCHLORIDE 1 MG/ML
0.4 INJECTION, SOLUTION INTRAMUSCULAR; INTRAVENOUS; SUBCUTANEOUS
Status: DISCONTINUED | OUTPATIENT
Start: 2023-02-28 | End: 2023-02-28 | Stop reason: HOSPADM

## 2023-02-28 RX ORDER — HALOPERIDOL 5 MG/ML
1 INJECTION INTRAMUSCULAR
Status: DISCONTINUED | OUTPATIENT
Start: 2023-02-28 | End: 2023-02-28 | Stop reason: HOSPADM

## 2023-02-28 RX ORDER — PHENAZOPYRIDINE HYDROCHLORIDE 100 MG/1
200 TABLET, FILM COATED ORAL
Status: DISCONTINUED | OUTPATIENT
Start: 2023-02-28 | End: 2023-03-01

## 2023-02-28 RX ORDER — HYDROMORPHONE HYDROCHLORIDE 1 MG/ML
0.1 INJECTION, SOLUTION INTRAMUSCULAR; INTRAVENOUS; SUBCUTANEOUS
Status: DISCONTINUED | OUTPATIENT
Start: 2023-02-28 | End: 2023-02-28 | Stop reason: HOSPADM

## 2023-02-28 RX ORDER — PHENYLEPHRINE HCL IN 0.9% NACL 0.5 MG/5ML
SYRINGE (ML) INTRAVENOUS PRN
Status: DISCONTINUED | OUTPATIENT
Start: 2023-02-28 | End: 2023-02-28 | Stop reason: SURG

## 2023-02-28 RX ORDER — DIPHENHYDRAMINE HYDROCHLORIDE 50 MG/ML
12.5 INJECTION INTRAMUSCULAR; INTRAVENOUS
Status: DISCONTINUED | OUTPATIENT
Start: 2023-02-28 | End: 2023-02-28 | Stop reason: HOSPADM

## 2023-02-28 RX ORDER — CEFAZOLIN SODIUM 1 G/3ML
INJECTION, POWDER, FOR SOLUTION INTRAMUSCULAR; INTRAVENOUS PRN
Status: DISCONTINUED | OUTPATIENT
Start: 2023-02-28 | End: 2023-02-28 | Stop reason: SURG

## 2023-02-28 RX ORDER — MEPERIDINE HYDROCHLORIDE 25 MG/ML
6.25 INJECTION INTRAMUSCULAR; INTRAVENOUS; SUBCUTANEOUS
Status: DISCONTINUED | OUTPATIENT
Start: 2023-02-28 | End: 2023-02-28 | Stop reason: HOSPADM

## 2023-02-28 RX ORDER — ONDANSETRON 2 MG/ML
INJECTION INTRAMUSCULAR; INTRAVENOUS PRN
Status: DISCONTINUED | OUTPATIENT
Start: 2023-02-28 | End: 2023-02-28 | Stop reason: SURG

## 2023-02-28 RX ORDER — SODIUM CHLORIDE, SODIUM LACTATE, POTASSIUM CHLORIDE, CALCIUM CHLORIDE 600; 310; 30; 20 MG/100ML; MG/100ML; MG/100ML; MG/100ML
INJECTION, SOLUTION INTRAVENOUS CONTINUOUS
Status: DISCONTINUED | OUTPATIENT
Start: 2023-02-28 | End: 2023-02-28 | Stop reason: HOSPADM

## 2023-02-28 RX ORDER — ONDANSETRON 2 MG/ML
4 INJECTION INTRAMUSCULAR; INTRAVENOUS
Status: COMPLETED | OUTPATIENT
Start: 2023-02-28 | End: 2023-02-28

## 2023-02-28 RX ORDER — HYDROMORPHONE HYDROCHLORIDE 1 MG/ML
0.2 INJECTION, SOLUTION INTRAMUSCULAR; INTRAVENOUS; SUBCUTANEOUS
Status: DISCONTINUED | OUTPATIENT
Start: 2023-02-28 | End: 2023-02-28 | Stop reason: HOSPADM

## 2023-02-28 RX ORDER — ONDANSETRON 2 MG/ML
4 INJECTION INTRAMUSCULAR; INTRAVENOUS EVERY 4 HOURS PRN
Status: DISCONTINUED | OUTPATIENT
Start: 2023-02-28 | End: 2023-03-03 | Stop reason: HOSPADM

## 2023-02-28 RX ORDER — OXYCODONE HCL 5 MG/5 ML
10 SOLUTION, ORAL ORAL
Status: COMPLETED | OUTPATIENT
Start: 2023-02-28 | End: 2023-02-28

## 2023-02-28 RX ORDER — OXYCODONE HCL 5 MG/5 ML
5 SOLUTION, ORAL ORAL
Status: COMPLETED | OUTPATIENT
Start: 2023-02-28 | End: 2023-02-28

## 2023-02-28 RX ORDER — DEXAMETHASONE SODIUM PHOSPHATE 4 MG/ML
INJECTION, SOLUTION INTRA-ARTICULAR; INTRALESIONAL; INTRAMUSCULAR; INTRAVENOUS; SOFT TISSUE PRN
Status: DISCONTINUED | OUTPATIENT
Start: 2023-02-28 | End: 2023-02-28 | Stop reason: SURG

## 2023-02-28 RX ADMIN — SENNOSIDES AND DOCUSATE SODIUM 2 TABLET: 50; 8.6 TABLET ORAL at 17:32

## 2023-02-28 RX ADMIN — OXYCODONE HYDROCHLORIDE 10 MG: 10 TABLET ORAL at 19:41

## 2023-02-28 RX ADMIN — FENTANYL CITRATE 50 MCG: 50 INJECTION, SOLUTION INTRAMUSCULAR; INTRAVENOUS at 12:33

## 2023-02-28 RX ADMIN — ACETAMINOPHEN 1000 MG: 500 TABLET, FILM COATED ORAL at 05:51

## 2023-02-28 RX ADMIN — CARVEDILOL 12.5 MG: 12.5 TABLET, FILM COATED ORAL at 09:15

## 2023-02-28 RX ADMIN — ONDANSETRON 4 MG: 2 INJECTION INTRAMUSCULAR; INTRAVENOUS at 14:23

## 2023-02-28 RX ADMIN — Medication 200 MCG: at 12:41

## 2023-02-28 RX ADMIN — OXYCODONE HYDROCHLORIDE 10 MG: 10 TABLET ORAL at 16:14

## 2023-02-28 RX ADMIN — TAMSULOSIN HYDROCHLORIDE 0.4 MG: 0.4 CAPSULE ORAL at 05:51

## 2023-02-28 RX ADMIN — SODIUM CHLORIDE, POTASSIUM CHLORIDE, SODIUM LACTATE AND CALCIUM CHLORIDE: 600; 310; 30; 20 INJECTION, SOLUTION INTRAVENOUS at 20:19

## 2023-02-28 RX ADMIN — HYDROMORPHONE HYDROCHLORIDE 0.4 MG: 1 INJECTION, SOLUTION INTRAMUSCULAR; INTRAVENOUS; SUBCUTANEOUS at 14:06

## 2023-02-28 RX ADMIN — PROCHLORPERAZINE EDISYLATE 10 MG: 5 INJECTION INTRAMUSCULAR; INTRAVENOUS at 16:20

## 2023-02-28 RX ADMIN — OXYCODONE HYDROCHLORIDE 10 MG: 5 SOLUTION ORAL at 13:15

## 2023-02-28 RX ADMIN — SENNOSIDES AND DOCUSATE SODIUM 2 TABLET: 50; 8.6 TABLET ORAL at 05:51

## 2023-02-28 RX ADMIN — HYDROMORPHONE HYDROCHLORIDE 0.4 MG: 1 INJECTION, SOLUTION INTRAMUSCULAR; INTRAVENOUS; SUBCUTANEOUS at 13:41

## 2023-02-28 RX ADMIN — HYDROMORPHONE HYDROCHLORIDE 0.2 MG: 1 INJECTION, SOLUTION INTRAMUSCULAR; INTRAVENOUS; SUBCUTANEOUS at 13:52

## 2023-02-28 RX ADMIN — PROCHLORPERAZINE EDISYLATE 10 MG: 5 INJECTION INTRAMUSCULAR; INTRAVENOUS at 05:51

## 2023-02-28 RX ADMIN — HYDROMORPHONE HYDROCHLORIDE 0.4 MG: 1 INJECTION, SOLUTION INTRAMUSCULAR; INTRAVENOUS; SUBCUTANEOUS at 13:46

## 2023-02-28 RX ADMIN — ACETAMINOPHEN 1000 MG: 500 TABLET, FILM COATED ORAL at 17:32

## 2023-02-28 RX ADMIN — CEFTRIAXONE SODIUM 2000 MG: 10 INJECTION, POWDER, FOR SOLUTION INTRAVENOUS at 05:51

## 2023-02-28 RX ADMIN — ONDANSETRON 4 MG: 2 INJECTION INTRAMUSCULAR; INTRAVENOUS at 09:39

## 2023-02-28 RX ADMIN — OXYCODONE HYDROCHLORIDE 10 MG: 10 TABLET ORAL at 23:51

## 2023-02-28 RX ADMIN — CEFAZOLIN 2 G: 330 INJECTION, POWDER, FOR SOLUTION INTRAMUSCULAR; INTRAVENOUS at 12:37

## 2023-02-28 RX ADMIN — FENTANYL CITRATE 50 MCG: 50 INJECTION, SOLUTION INTRAMUSCULAR; INTRAVENOUS at 13:25

## 2023-02-28 RX ADMIN — CARVEDILOL 12.5 MG: 12.5 TABLET, FILM COATED ORAL at 17:33

## 2023-02-28 RX ADMIN — HYDROMORPHONE HYDROCHLORIDE 0.2 MG: 1 INJECTION, SOLUTION INTRAMUSCULAR; INTRAVENOUS; SUBCUTANEOUS at 14:19

## 2023-02-28 RX ADMIN — ACETAMINOPHEN 1000 MG: 500 TABLET, FILM COATED ORAL at 23:51

## 2023-02-28 RX ADMIN — PHENAZOPYRIDINE HYDROCHLORIDE 200 MG: 100 TABLET ORAL at 17:32

## 2023-02-28 RX ADMIN — OXYCODONE HYDROCHLORIDE 10 MG: 10 TABLET ORAL at 05:52

## 2023-02-28 RX ADMIN — HYDROMORPHONE HYDROCHLORIDE 0.4 MG: 1 INJECTION, SOLUTION INTRAMUSCULAR; INTRAVENOUS; SUBCUTANEOUS at 14:11

## 2023-02-28 RX ADMIN — PROPOFOL 200 MG: 10 INJECTION, EMULSION INTRAVENOUS at 12:33

## 2023-02-28 RX ADMIN — ONDANSETRON 4 MG: 2 INJECTION INTRAMUSCULAR; INTRAVENOUS at 12:45

## 2023-02-28 RX ADMIN — ONDANSETRON 4 MG: 2 INJECTION INTRAMUSCULAR; INTRAVENOUS at 19:42

## 2023-02-28 RX ADMIN — Medication 200 MCG: at 12:45

## 2023-02-28 RX ADMIN — FENTANYL CITRATE 50 MCG: 50 INJECTION, SOLUTION INTRAMUSCULAR; INTRAVENOUS at 13:19

## 2023-02-28 RX ADMIN — DEXAMETHASONE SODIUM PHOSPHATE 4 MG: 4 INJECTION, SOLUTION INTRA-ARTICULAR; INTRALESIONAL; INTRAMUSCULAR; INTRAVENOUS; SOFT TISSUE at 12:45

## 2023-02-28 RX ADMIN — FENTANYL CITRATE 50 MCG: 50 INJECTION, SOLUTION INTRAMUSCULAR; INTRAVENOUS at 14:55

## 2023-02-28 RX ADMIN — FENTANYL CITRATE 50 MCG: 50 INJECTION, SOLUTION INTRAMUSCULAR; INTRAVENOUS at 14:43

## 2023-02-28 RX ADMIN — EPHEDRINE SULFATE 30 MG: 50 INJECTION INTRAMUSCULAR; INTRAVENOUS; SUBCUTANEOUS at 12:50

## 2023-02-28 ASSESSMENT — PAIN DESCRIPTION - PAIN TYPE
TYPE: ACUTE PAIN;SURGICAL PAIN
TYPE: ACUTE PAIN
TYPE: ACUTE PAIN;SURGICAL PAIN
TYPE: SURGICAL PAIN

## 2023-02-28 ASSESSMENT — ENCOUNTER SYMPTOMS
FALLS: 0
COUGH: 0
DEPRESSION: 0
WEAKNESS: 0
DIZZINESS: 0
CHILLS: 0
HEADACHES: 0
SORE THROAT: 0
BLURRED VISION: 0
VOMITING: 1
CONSTIPATION: 0
DIARRHEA: 0
PALPITATIONS: 0
FEVER: 0
SHORTNESS OF BREATH: 0
NAUSEA: 1
ABDOMINAL PAIN: 1
NERVOUS/ANXIOUS: 0

## 2023-02-28 ASSESSMENT — PAIN SCALES - GENERAL: PAIN_LEVEL: 5

## 2023-02-28 NOTE — CARE PLAN
A&Ox4. Up x1 PA stand and pivot to [facility]. Nausea treated per MAR. Pain reported 9/10; reviewed PRS. Patient asleep before pain medication administered; refused tylenol at scheduled time. Frequency and urgency to void expressed. POC discussed. All questions answered; patient demonstrates understanding. Call light and belongings within reach, calls appropriately to make needs known; hourly rounding in place.       The patient is Stable - Low risk of patient condition declining or worsening    Shift Goals  Clinical Goals: Nausea and pain control  Patient Goals: Rest.  Family Goals: Not present    Progress made toward(s) clinical / shift goals:  Nausea treated per MAR and resolved. Patient slept comfortably throughout shift without pain medication.     Patient is not progressing towards the following goals:  Problem: Pain - Standard  Goal: Alleviation of pain or a reduction in pain to the patient’s comfort goal  Outcome: Progressing  Flowsheets (Taken 2/28/2023 0432)  Non Verbal Scale:   Sleeping   Calm  Note: Pain medication not required this shift. PRS discussed.    Problem: Knowledge Deficit - Standard  Goal: Patient and family/care givers will demonstrate understanding of plan of care, disease process/condition, diagnostic tests and medications  Outcome: Progressing  Note: POC discussed; all questions answered.      Problem: Fall Risk  Goal: Patient will remain free from falls  Outcome: Progressing  Note: Patient remains free from injury and falls. Calls appropriately.      Problem: Skin Integrity  Goal: Skin integrity is maintained or improved  Outcome: Progressing  Note: Patient turns self side to side; out of bed frequently.

## 2023-02-28 NOTE — ANESTHESIA POSTPROCEDURE EVALUATION
Patient: Joan Olivares    Procedure Summary     Date: 02/28/23 Room / Location: [place] [address] / SURGERY [facility]    Anesthesia Start: 1230 Anesthesia Stop: 1309    Procedures:       CYSTOSCOPY, WITH URETERAL STENT INSERTION (Left: Ureter)      URETEROSCOPY (Left: Ureter)      LITHOTRIPSY, USING LASER (Left: Ureter) Diagnosis: (ureteral stone )    Surgeons: Bhupendra Andrade M.D. Responsible Provider: Wu Mg M.D.    Anesthesia Type: general ASA Status: 2          Final Anesthesia Type: general  Last vitals  BP   Blood Pressure : (!) 151/79    Temp   36.3 °C (97.4 °F)    Pulse   91   Resp   18    SpO2   91 %      Anesthesia Post Evaluation    Patient location during evaluation: PACU  Patient participation: complete - patient participated  Level of consciousness: awake and alert  Pain score: 5    Airway patency: patent  Anesthetic complications: no  Cardiovascular status: hemodynamically stable  Respiratory status: acceptable  Hydration status: euvolemic    PONV: none          No notable events documented.     Nurse Pain Score: 8 (NPRS)

## 2023-02-28 NOTE — OR NURSING
Pt on 2 L NC.  No c/o nausea, tolerating PO fluids and medication.  Left ureteral stent sting tapped to pt's left thigh.  Warm packs applied to pt's left abdomen/flank for pain.  Difficult to bring pt's pain down to a tolerable level. Procedural pain now tolerable per pt, 5/10.  VSS, afebrile, TORREZ, A/O x4.     No belongings in PACU.   Transferred with face mask in place.   Oxygen tank 100% full.

## 2023-02-28 NOTE — OP REPORT
[facility] Operative Report    Pre-operative Diagnosis: 1. Left ureteral calculus   Post-operative Diagnosis: Same as above   Procedure: 1.left    Surgeon: Bhupendra Andrade M.D., MD   Assistant: none   Anesthesia: General  Anesthesiologist: Wu Mg M.D.    Estimated Blood Loss: Minimal   IV fluids Less than 1000 cc crystalloid   Specimens: 1.  Left ureteral stone fragments   Drains: 6 Danish by 24 cm double-J stent with externalized string   Complications: None   Wound class Clean contaminated   Condition: Stable, procedure well tolerated    Disposition:  Stable to PACU    Findings: Stone alongside indwelling stent in the mid left ureter; stone fragmented and completely removed     Indications for Procedure:  Joan Olivares is a 67 y.o. female who had presented with urosepsis and obstructing left ureteral stone to an outside hospital.  She was stented.  Antibiotics cleared her infection and she returns now for removal of stone and stent    Procedure in Detail:  The patient was identified in the holding area, and taken to the operative suite.  She was positioned supine on the operating room table in the dorsal lithotomy position.  General anesthesia was administered by Dr. Mg.  Cefazolin was given intravenously.  Timeout was called.  Correct patient and site of surgery was confirmed.    A 22 Danish cystourethroscope was advanced through the urethral meatus into the urethra.  No stricturing was noted.  The bladder was entered.  Urine was drained.  The bladder was refilled.  Inspection of the bladder showed tumors or stones.  Ureteral orifices were orthotopic.  There was a stent emanating from the left ureteral orifice.  The stent was grasped and withdrawn to the urethral meatus.  A sensor wire was navigated through the stent and coiled in the collecting system.     I then accessed the ureteral orifice with a semirigid ureteroscope and advanced the scope alongside the indwelling Sensor safety wire up to  the level of the stone    Using a 365 µm  holmium laser fiber the stone was fragmented.  Energy settings were 0.8 J and 8 Hz for fragmentation.  Stone fragments were then collected with a nitinol 0 tip basket and deposited in the bladder and later collected and submitted for pathologic analysis.  Inspection of the ureter at the end of the lithotripsy revealed no injury to the ureter and no residual fragments.    The Sensor guidewire was backloaded onto a cystoscope.  A 6 Romanian X 24 centimeters double-J ureteral stent was advanced over the wire with proximal coil formed in the renal collecting system and distal coil in the bladder.  A string was left attached and externalized and taped to the patient's thigh.  This will be removed in several days..    The bladder was emptied.  The patient was sent to recovery room in stable condition.    Complications: None noted    Disposition: Stable to PACU          Bhupendra Andrade M.D.   [address] [place] [id number]   [phone]

## 2023-02-28 NOTE — ANESTHESIA PROCEDURE NOTES
Airway    Date/Time: 2/28/2023 12:34 PM  Performed by: Wu Mg M.D.  Authorized by: Wu Mg M.D.     Location:  OR  Urgency:  Elective  Difficult Airway: No    Indications for Airway Management:  Anesthesia      Spontaneous Ventilation: absent    Sedation Level:  Deep  Preoxygenated: Yes    Mask Difficulty Assessment:  0 - not attempted  Final Airway Type:  Supraglottic airway  Final Supraglottic Airway:  Standard LMA    SGA Size:  4  Number of Attempts at Approach:  1

## 2023-02-28 NOTE — ANESTHESIA TIME REPORT
Anesthesia Start and Stop Event Times     Date Time Event    2/28/2023 1151 Ready for Procedure     1230 Anesthesia Start     1309 Anesthesia Stop        Responsible Staff  02/28/23    Name Role Begin End    Wu Mg M.D. Anesth 1230 1309        Overtime Reason:  no overtime (within assigned shift)    Comments:

## 2023-02-28 NOTE — ANESTHESIA PREPROCEDURE EVALUATION
" Case: [id number] Date/Time: 02/28/23 1145    Procedures:       CYSTOSCOPY, WITH URETERAL STENT INSERTION (Left)      URETEROSCOPY      LITHOTRIPSY, USING LASER    Location: [facility] OR 18 / SURGERY [facility]    Surgeons: Bhupendra Andrade M.D.          Relevant Problems   NEURO   (positive) Intractable chronic migraine without aura and without status migrainosus      CARDIAC   (positive) DVT (deep venous thrombosis) (HCC)   (positive) HTN (hypertension)   (positive) Intractable chronic migraine without aura and without status migrainosus         (positive) Nephrolithiasis     BP (!) 148/97   Pulse (!) 101   Temp 36.2 °C (97.2 °F) (Temporal)   Resp 18   Ht 1.676 m (5' 6\")   Wt 95.3 kg (210 lb)   LMP 04/09/1977   SpO2 91%   BMI 33.89 kg/m²     Physical Exam    Airway   Mallampati: II  TM distance: >3 FB  Neck ROM: full       Cardiovascular - normal exam  Rhythm: regular  Rate: normal  (-) murmur     Dental - normal exam           Pulmonary - normal exam  Breath sounds clear to auscultation     Abdominal    Neurological - normal exam                 Anesthesia Plan    ASA 2       Plan - general       Airway plan will be LMA          Induction: intravenous    Postoperative Plan: Postoperative administration of opioids is intended.    Pertinent diagnostic labs and testing reviewed    Informed Consent:    Anesthetic plan and risks discussed with patient.    Use of blood products discussed with: patient whom consented to blood products.         "

## 2023-02-28 NOTE — OR NURSING
1331: pt's Niece Liz phoned and updated on pt status in Recovery and return to [id number].  Liz is in the surgical waiting area.   1459: Updated Liz on pt status in Recovery.

## 2023-02-28 NOTE — PROGRESS NOTES
[facility] Medicine Daily Progress Note    Date of Service  2/28/2023    Chief Complaint  Joan Olivares is a 67 y.o. female admitted 2/26/2023 with left-sided abdominal pain, left flank pain, dysuria    Hospital Course  No notes on file    CT renal colic showed left ureteral stent in place with no obstructive urolithiasis, no hydronephrosis, simple bilateral renal cyst.    Urology was consulted.  Status post cystoscopy with laser lithotripsy and placement of ureteral stent on 2/28.    Interval Problem Update  Patient was seen and examined at bedside.  I have personally reviewed and interpreted vitals, labs, and imaging.    2/28.  Afebrile.  Tachycardic.  Hypertension is improved.  On room air.  Denies fevers, chills, chest pain, shortness of breath.  Still reports some left-sided abdominal pain.  Tolerated cystoscopy with lithotripsy and replacement of ureteral stent today.  Patient is expected to stay more than 2 midnights to monitor after procedure, pain control, speciation of urine culture and need for antibiotics.    I have discussed this patient's plan of care and discharge plan at IDT rounds today with Case Management, Nursing, Nursing leadership, and other members of the IDT team.    Consultants/Specialty  urology    Code Status  Full Code    Disposition  Patient is not medically cleared for discharge.   Anticipate discharge to to home with close outpatient follow-up.  I have placed the appropriate orders for post-discharge needs.    Review of Systems  Review of Systems   Constitutional:  Negative for chills and fever.   HENT:  Negative for congestion and sore throat.    Eyes:  Negative for blurred vision.   Respiratory:  Negative for cough and shortness of breath.    Cardiovascular:  Negative for chest pain, palpitations and leg swelling.   Gastrointestinal:  Positive for abdominal pain, nausea and vomiting. Negative for constipation and diarrhea.   Genitourinary:  Positive for dysuria. Negative for  frequency and urgency.   Musculoskeletal:  Negative for falls.   Skin:  Negative for rash.   Neurological:  Negative for dizziness, weakness and headaches.   Psychiatric/Behavioral:  Negative for depression. The patient is not nervous/anxious.    All other systems reviewed and are negative.     Physical Exam  Temp:  [36.6 °C (97.8 °F)-37.1 °C (98.7 °F)] 36.6 °C (97.8 °F)  Pulse:  [] 103  Resp:  [18-19] 19  BP: (131-153)/(73-84) 153/78  SpO2:  [92 %-95 %] 92 %    Physical Exam  Vitals and nursing note reviewed.   Constitutional:       Appearance: Normal appearance. She is obese. She is ill-appearing.   HENT:      Head: Normocephalic and atraumatic.      Right Ear: External ear normal.      Left Ear: External ear normal.      Nose: Nose normal.      Mouth/Throat:      Mouth: Mucous membranes are moist.      Pharynx: Oropharynx is clear.   Eyes:      Extraocular Movements: Extraocular movements intact.      Conjunctiva/sclera: Conjunctivae normal.   Cardiovascular:      Rate and Rhythm: Regular rhythm. Tachycardia present.      Pulses: Normal pulses.      Heart sounds: Normal heart sounds. No murmur heard.  Pulmonary:      Effort: Pulmonary effort is normal. No respiratory distress.      Breath sounds: Normal breath sounds. No stridor. No wheezing or rales.   Abdominal:      General: Abdomen is flat. Bowel sounds are normal. There is no distension.      Palpations: Abdomen is soft. There is no mass.      Tenderness: There is abdominal tenderness. There is left CVA tenderness.   Musculoskeletal:      Cervical back: Normal range of motion.   Skin:     General: Skin is warm.      Capillary Refill: Capillary refill takes less than 2 seconds.   Neurological:      General: No focal deficit present.      Mental Status: She is alert and oriented to person, place, and time. Mental status is at baseline.      Cranial Nerves: No cranial nerve deficit.   Psychiatric:         Mood and Affect: Mood normal.         Behavior:  Behavior normal.       Fluids  No intake or output data in the 24 hours ending 02/28/23 0729    Laboratory  Recent Labs     02/27/23  0153   WBC 11.8*   RBC 4.75   HEMOGLOBIN 13.3   HEMATOCRIT 40.6   MCV 85.5   MCH 28.0   MCHC 32.8*   RDW 46.5   PLATELETCT 245   MPV 9.9     Recent Labs     02/27/23  0153   SODIUM 135   POTASSIUM 3.8   CHLORIDE 104   CO2 21   GLUCOSE 118*   BUN 12   CREATININE 0.78   CALCIUM 8.9                   Imaging  DX-CYSTO FLUORO < 1 HOUR   Final Result      Cysto fluoroscopy utilized for 12 seconds.      INTERPRETING LOCATION: [address] [place] [address], [place], [address]      IR-US GUIDED PIV   Final Result    Ultrasound-guided PERIPHERAL IV INSERTION performed by    qualified nursing staff as above.      CT-RENAL COLIC EVALUATION(A/P W/O)   Final Result      1.  There is a left ureteral stent in place grossly appropriate in position. There is no obstructing urolithiasis.   2.  No hydronephrosis.   3.  Simple bilateral renal cysts.   4.  Distal colonic diverticulosis without diverticulitis.   5.  No bowel obstruction or acute inflammation.           Assessment/Plan  * Intractable nausea and vomiting- (present on admission)  Assessment & Plan  Restarted diet after procedure  Zofran as needed    Dysuria  Assessment & Plan  Urine culture growing E. coli.  Continue ceftriaxone for now.    Nephrolithiasis- (present on admission)  Assessment & Plan  CT renal reviewed.  Urology following  Status post cystoscopy with laser lithotripsy and replacement of ureteral stent 2/28  Continue tamsulosin    HTN (hypertension)- (present on admission)  Assessment & Plan  Likely secondary to pain, which is improved after procedure  Continue carvedilol         VTE prophylaxis: SCDs/TEDs    I have performed a physical exam and reviewed and updated ROS and Plan today (2/28/2023). In review of yesterday's note (2/27/2023), there are no changes except as documented above.

## 2023-02-28 NOTE — PROGRESS NOTES
Prior left ureteral stent for 6 mm left hieu ureteral stone and UTI. Now for left CULTS. On antibiotics. Pt would like to proceed. Will do so at her request. She consents to surgery

## 2023-03-01 ENCOUNTER — APPOINTMENT (OUTPATIENT)
Dept: RADIOLOGY | Facility: MEDICAL CENTER | Age: 68
DRG: 660 | End: 2023-03-01
Attending: INTERNAL MEDICINE
Payer: MEDICARE

## 2023-03-01 PROBLEM — B96.20 E-COLI UTI: Status: ACTIVE | Noted: 2023-03-01

## 2023-03-01 PROBLEM — N39.0 E-COLI UTI: Status: ACTIVE | Noted: 2023-03-01

## 2023-03-01 LAB
ANION GAP SERPL CALC-SCNC: 12 MMOL/L (ref 7–16)
APPEARANCE UR: CLEAR
BACTERIA UR CULT: ABNORMAL
BACTERIA UR CULT: ABNORMAL
BUN SERPL-MCNC: 11 MG/DL (ref 8–22)
CALCIUM SERPL-MCNC: 9.6 MG/DL (ref 8.5–10.5)
CHLORIDE SERPL-SCNC: 104 MMOL/L (ref 96–112)
CO2 SERPL-SCNC: 23 MMOL/L (ref 20–33)
COLOR UR: ABNORMAL
CREAT SERPL-MCNC: 0.73 MG/DL (ref 0.5–1.4)
ERYTHROCYTE [DISTWIDTH] IN BLOOD BY AUTOMATED COUNT: 45.9 FL (ref 35.9–50)
GFR SERPLBLD CREATININE-BSD FMLA CKD-EPI: 89 ML/MIN/1.73 M 2
GLUCOSE SERPL-MCNC: 161 MG/DL (ref 65–99)
HCT VFR BLD AUTO: 42.5 % (ref 37–47)
HGB BLD-MCNC: 13.3 G/DL (ref 12–16)
MAGNESIUM SERPL-MCNC: 1.9 MG/DL (ref 1.5–2.5)
MCH RBC QN AUTO: 28 PG (ref 27–33)
MCHC RBC AUTO-ENTMCNC: 31.3 G/DL (ref 33.6–35)
MCV RBC AUTO: 89.5 FL (ref 81.4–97.8)
PHOSPHATE SERPL-MCNC: 2.9 MG/DL (ref 2.5–4.5)
PLATELET # BLD AUTO: 309 K/UL (ref 164–446)
PMV BLD AUTO: 9.8 FL (ref 9–12.9)
POTASSIUM SERPL-SCNC: 4.4 MMOL/L (ref 3.6–5.5)
PROCALCITONIN SERPL-MCNC: 0.27 NG/ML
RBC # BLD AUTO: 4.75 M/UL (ref 4.2–5.4)
SIGNIFICANT IND 70042: ABNORMAL
SITE SITE: ABNORMAL
SODIUM SERPL-SCNC: 139 MMOL/L (ref 135–145)
SOURCE SOURCE: ABNORMAL
WBC # BLD AUTO: 7.9 K/UL (ref 4.8–10.8)

## 2023-03-01 PROCEDURE — 84145 PROCALCITONIN (PCT): CPT

## 2023-03-01 PROCEDURE — 700105 HCHG RX REV CODE 258: Performed by: INTERNAL MEDICINE

## 2023-03-01 PROCEDURE — 81003 URINALYSIS AUTO W/O SCOPE: CPT

## 2023-03-01 PROCEDURE — 51798 US URINE CAPACITY MEASURE: CPT

## 2023-03-01 PROCEDURE — 76775 US EXAM ABDO BACK WALL LIM: CPT

## 2023-03-01 PROCEDURE — 83735 ASSAY OF MAGNESIUM: CPT

## 2023-03-01 PROCEDURE — 700111 HCHG RX REV CODE 636 W/ 250 OVERRIDE (IP): Performed by: INTERNAL MEDICINE

## 2023-03-01 PROCEDURE — 85027 COMPLETE CBC AUTOMATED: CPT

## 2023-03-01 PROCEDURE — 82652 VIT D 1 25-DIHYDROXY: CPT

## 2023-03-01 PROCEDURE — A9270 NON-COVERED ITEM OR SERVICE: HCPCS | Performed by: STUDENT IN AN ORGANIZED HEALTH CARE EDUCATION/TRAINING PROGRAM

## 2023-03-01 PROCEDURE — 700102 HCHG RX REV CODE 250 W/ 637 OVERRIDE(OP): Performed by: INTERNAL MEDICINE

## 2023-03-01 PROCEDURE — 700102 HCHG RX REV CODE 250 W/ 637 OVERRIDE(OP): Performed by: UROLOGY

## 2023-03-01 PROCEDURE — A9270 NON-COVERED ITEM OR SERVICE: HCPCS | Performed by: UROLOGY

## 2023-03-01 PROCEDURE — 36415 COLL VENOUS BLD VENIPUNCTURE: CPT

## 2023-03-01 PROCEDURE — 700102 HCHG RX REV CODE 250 W/ 637 OVERRIDE(OP): Performed by: STUDENT IN AN ORGANIZED HEALTH CARE EDUCATION/TRAINING PROGRAM

## 2023-03-01 PROCEDURE — 770004 HCHG ROOM/CARE - ONCOLOGY PRIVATE *

## 2023-03-01 PROCEDURE — 99232 SBSQ HOSP IP/OBS MODERATE 35: CPT | Performed by: INTERNAL MEDICINE

## 2023-03-01 PROCEDURE — 80048 BASIC METABOLIC PNL TOTAL CA: CPT

## 2023-03-01 PROCEDURE — A9270 NON-COVERED ITEM OR SERVICE: HCPCS | Performed by: INTERNAL MEDICINE

## 2023-03-01 PROCEDURE — 700111 HCHG RX REV CODE 636 W/ 250 OVERRIDE (IP): Performed by: STUDENT IN AN ORGANIZED HEALTH CARE EDUCATION/TRAINING PROGRAM

## 2023-03-01 PROCEDURE — 84100 ASSAY OF PHOSPHORUS: CPT

## 2023-03-01 RX ORDER — LANOLIN ALCOHOL/MO/W.PET/CERES
400 CREAM (GRAM) TOPICAL 2 TIMES DAILY
Status: COMPLETED | OUTPATIENT
Start: 2023-03-01 | End: 2023-03-02

## 2023-03-01 RX ADMIN — OXYCODONE HYDROCHLORIDE 10 MG: 10 TABLET ORAL at 23:13

## 2023-03-01 RX ADMIN — SENNOSIDES AND DOCUSATE SODIUM 2 TABLET: 50; 8.6 TABLET ORAL at 05:21

## 2023-03-01 RX ADMIN — AMPICILLIN AND SULBACTAM 3 G: 1; 2 INJECTION, POWDER, FOR SOLUTION INTRAMUSCULAR; INTRAVENOUS at 23:17

## 2023-03-01 RX ADMIN — ACETAMINOPHEN 1000 MG: 500 TABLET, FILM COATED ORAL at 05:21

## 2023-03-01 RX ADMIN — CEFTRIAXONE SODIUM 2000 MG: 10 INJECTION, POWDER, FOR SOLUTION INTRAVENOUS at 05:32

## 2023-03-01 RX ADMIN — OXYCODONE HYDROCHLORIDE 10 MG: 10 TABLET ORAL at 18:22

## 2023-03-01 RX ADMIN — PHENAZOPYRIDINE HYDROCHLORIDE 200 MG: 100 TABLET ORAL at 17:15

## 2023-03-01 RX ADMIN — AMPICILLIN AND SULBACTAM 3 G: 1; 2 INJECTION, POWDER, FOR SOLUTION INTRAMUSCULAR; INTRAVENOUS at 18:31

## 2023-03-01 RX ADMIN — OXYCODONE HYDROCHLORIDE 10 MG: 10 TABLET ORAL at 13:51

## 2023-03-01 RX ADMIN — OXYCODONE HYDROCHLORIDE 10 MG: 10 TABLET ORAL at 05:21

## 2023-03-01 RX ADMIN — SENNOSIDES AND DOCUSATE SODIUM 2 TABLET: 50; 8.6 TABLET ORAL at 17:15

## 2023-03-01 RX ADMIN — HYDROMORPHONE HYDROCHLORIDE 0.5 MG: 1 INJECTION, SOLUTION INTRAMUSCULAR; INTRAVENOUS; SUBCUTANEOUS at 16:21

## 2023-03-01 RX ADMIN — ONDANSETRON 4 MG: 2 INJECTION INTRAMUSCULAR; INTRAVENOUS at 05:21

## 2023-03-01 RX ADMIN — CARVEDILOL 12.5 MG: 12.5 TABLET, FILM COATED ORAL at 09:33

## 2023-03-01 RX ADMIN — PHENAZOPYRIDINE HYDROCHLORIDE 200 MG: 100 TABLET ORAL at 13:55

## 2023-03-01 RX ADMIN — DIBASIC SODIUM PHOSPHATE, MONOBASIC POTASSIUM PHOSPHATE AND MONOBASIC SODIUM PHOSPHATE 500 MG: 852; 155; 130 TABLET ORAL at 13:53

## 2023-03-01 RX ADMIN — CARVEDILOL 12.5 MG: 12.5 TABLET, FILM COATED ORAL at 17:15

## 2023-03-01 RX ADMIN — MAGNESIUM GLUCONATE 500 MG ORAL TABLET 400 MG: 500 TABLET ORAL at 17:15

## 2023-03-01 RX ADMIN — TAMSULOSIN HYDROCHLORIDE 0.4 MG: 0.4 CAPSULE ORAL at 05:21

## 2023-03-01 RX ADMIN — PHENAZOPYRIDINE HYDROCHLORIDE 200 MG: 100 TABLET ORAL at 09:33

## 2023-03-01 ASSESSMENT — ENCOUNTER SYMPTOMS
HEADACHES: 0
FEVER: 0
FALLS: 0
NAUSEA: 0
DIARRHEA: 0
DEPRESSION: 0
CONSTIPATION: 0
ABDOMINAL PAIN: 0
WEAKNESS: 0
FLANK PAIN: 1
CHILLS: 0
SORE THROAT: 0
ABDOMINAL PAIN: 1
SHORTNESS OF BREATH: 0
BLURRED VISION: 0
NERVOUS/ANXIOUS: 0
VOMITING: 0
NAUSEA: 1
DIZZINESS: 0
PALPITATIONS: 0
VOMITING: 1
COUGH: 0

## 2023-03-01 ASSESSMENT — COGNITIVE AND FUNCTIONAL STATUS - GENERAL
TOILETING: A LITTLE
SUGGESTED CMS G CODE MODIFIER MOBILITY: CK
SUGGESTED CMS G CODE MODIFIER DAILY ACTIVITY: CJ
CLIMB 3 TO 5 STEPS WITH RAILING: A LITTLE
DAILY ACTIVITIY SCORE: 22
WALKING IN HOSPITAL ROOM: A LOT
MOBILITY SCORE: 19
DRESSING REGULAR LOWER BODY CLOTHING: A LITTLE
STANDING UP FROM CHAIR USING ARMS: A LITTLE
MOVING FROM LYING ON BACK TO SITTING ON SIDE OF FLAT BED: A LITTLE

## 2023-03-01 NOTE — CARE PLAN
A&Ox4. Up SBA with use of rails; stand and pivot. LRE flaccid and numb. Pain reported 7-10/10; treated per MAR and nonpharmacologic interventions. POC discussed. All questions answered; patient demonstrates understanding. Call light and belongings within reach, calls appropriately to make needs known; hourly rounding in place.        The patient is Stable - Low risk of patient condition declining or worsening    Shift Goals  Clinical Goals: nausea and pain control. Safety  Patient Goals: Pain control  Family Goals: Not present    Progress made toward(s) clinical / shift goals:  Nausea treated per MARx1 with resolution of symptoms. Patient remains free from injury and falls this shift.    Patient is not progressing towards the following goals:      Problem: Pain - Standard  Goal: Alleviation of pain or a reduction in pain to the patient’s comfort goal  Outcome: Not Progressing  Flowsheets (Taken 3/1/2023 0402)  OB Pain Intervention:   Medication - See MAR   Repositioned   Rest   Distraction  Note: Pain remains severe despite multimodal interventions.      Problem: Bowel Elimination  Goal: Establish and maintain regular bowel function  Outcome: Not Progressing  Note: Last BM PTA. Education provided pertaining to constipation with pain medication. Encouraged patient to consume fluids. Medication intervention in place.       Problem: Urinary Elimination  Goal: Establish and maintain regular urinary output  Outcome: Progressing  Note: Frequency, urgency, and hesitancy observed. Minimal urine output. Bladder scan complete; no evidence of retention. Urine bright orange in color. Patient reports discomfort in bladder.

## 2023-03-01 NOTE — ASSESSMENT & PLAN NOTE
CT renal reviewed.  Urology following  Status post cystoscopy with laser lithotripsy and replacement of ureteral stent 2/28  Continue tamsulosin

## 2023-03-01 NOTE — PROGRESS NOTES
Note to reader: this note follows the APSO format rather than the historical SOAP format. Assessment and plan located at the top of the note for ease of use.    Chief Complaint  Joan Olivares is a 67 y.o. female patient known to the urology service with a history of UTI and L ureteral stone, who underwent ureteral stent placement on 2/6 with Dr Andrade. At that time, plans were made for definitive treatment of stone with CULTS to be performed on 2/28 at [facility] in [place], after treating her infection with antibiotics. Pt presented to the ED on 2/26 with c/o L flank pain and intractable N/V and was admitted to the medicine service.    Procedures:  2/28: L CULTS with placement of L ureteral stent on strings with Dr Andrade      Assessment/Plan  Interval History   Active Hospital Problems    Diagnosis     Left nephrolithiasis [N20.0]     Intractable nausea and vomiting [R11.2]     Dysuria [R30.0]     Nephrolithiasis [N20.0]      IMO load March 2020      HTN (hypertension) [I10]      3/1 POD1. Seen and examined, lying in bed in NAD. Reports mild discomfort from stent but not as severe as before. WBC 7.9 (11.8), AFVSS. Ucx growing E coli sensitive to cephalosporins, remains on rocephin. Denies N/V/F/C, flank pain is mild.    Plan:  - Continue abx per culture sensitivities, upon discharge, recommend at least 5d of PO antibiotics  - [facility] will contact patient to arrange outpatient follow up and stent removal in clinic later this week  - No further urologic intervention anticipated during this admission. Urology signing off, please call with questions.     Case discussed with Dr Andrade, who has directed this patient's plan of care.    Review of Systems  Physical Exam   Review of Systems   Constitutional:  Negative for chills and fever.   Respiratory:  Negative for cough.    Cardiovascular:  Negative for chest pain.   Gastrointestinal:  Negative for abdominal pain, nausea and vomiting.   Genitourinary:   Positive for flank pain. Negative for hematuria.   All other systems reviewed and are negative.  Vitals:    02/28/23 1704 02/28/23 2000 03/01/23 0400 03/01/23 0806   BP: (!) 151/92 112/67 118/81 131/82   Pulse: 94 89 84 90   Resp: 18 18 18 17   Temp: 36.5 °C (97.7 °F) 36.1 °C (96.9 °F) 36.6 °C (97.9 °F) 36.3 °C (97.4 °F)   TempSrc: Temporal Temporal Temporal Temporal   SpO2: 94% 91% 94% 91%   Weight:       Height:         Physical Exam  Vitals and nursing note reviewed.   Constitutional:       General: She is not in acute distress.  HENT:      Head: Normocephalic.      Nose: Nose normal.   Eyes:      Conjunctiva/sclera: Conjunctivae normal.   Pulmonary:      Effort: Pulmonary effort is normal.   Abdominal:      General: There is no distension.      Palpations: Abdomen is soft.   Musculoskeletal:         General: Normal range of motion.      Cervical back: Normal range of motion and neck supple.   Skin:     General: Skin is warm and dry.   Neurological:      General: No focal deficit present.      Mental Status: She is alert.   Psychiatric:         Mood and Affect: Mood normal.         Behavior: Behavior normal.        Hematology Chemistry   Lab Results   Component Value Date/Time    WBC 7.9 03/01/2023 04:26 AM    HEMOGLOBIN 13.3 03/01/2023 04:26 AM    HEMATOCRIT 42.5 03/01/2023 04:26 AM    PLATELETCT 309 03/01/2023 04:26 AM     Lab Results   Component Value Date/Time    SODIUM 139 03/01/2023 04:26 AM    POTASSIUM 4.4 03/01/2023 04:26 AM    CHLORIDE 104 03/01/2023 04:26 AM    CO2 23 03/01/2023 04:26 AM    GLUCOSE 161 (H) 03/01/2023 04:26 AM    BUN 11 03/01/2023 04:26 AM    CREATININE 0.73 03/01/2023 04:26 AM         Labs not explicitly included in this progress note were reviewed by the author.   Radiology/imaging not explicitly included in this progress note was reviewed by the author.     Radiology images reviewed, Labs reviewed and Medications reviewed

## 2023-03-01 NOTE — ASSESSMENT & PLAN NOTE
Patient with complicated urine tract infection  Urine culture growing E. coli  Ureteral stent removed 2/28  Was on ceftriaxone.  Trial of Unasyn with penicillin allergy

## 2023-03-01 NOTE — PROGRESS NOTES
[facility] Medicine Daily Progress Note    Date of Service  3/1/2023    Chief Complaint  Joan Olivares is a 67 y.o. female admitted 2/26/2023 with left-sided abdominal pain, left flank pain, dysuria    Hospital Course  No notes on file    Ms. Joan Olivares is a 67 y.o. female with history of recurrent kidney stones status post ureteral stent placement on 3/6 at outside hospital who presented on 2/26/2023 with abdominal pain, left flank pain, nausea, vomiting.    CT renal colic showed left ureteral stent in place with no obstructive urolithiasis, no hydronephrosis, simple bilateral renal cyst.    Urology was consulted.  Status post cystoscopy with laser lithotripsy and placement of ureteral stent on 2/28.    Urine culture grew E. coli.  Patient had been on ceftriaxone.  Does have a penicillin allergy but tolerated cefazolin during cystoscopy with no reaction.    Follow-up with urology [place] as outpatient for removal of ureteral stent later this week.    Interval Problem Update  Patient was seen and examined at bedside.  I have personally reviewed and interpreted vitals, labs, and imaging.    2/28.  Afebrile.  Tachycardic.  Hypertension is improved.  On room air.  Denies fevers, chills, chest pain, shortness of breath.  Still reports some left-sided abdominal pain.  Tolerated cystoscopy with lithotripsy and replacement of ureteral stent today.  Patient is expected to stay more than 2 midnights to monitor after procedure, pain control, speciation of urine culture and need for antibiotics.  3/1.  Afebrile.  Tachycardia is improved.  Hypertension is improved.  On room air.  Replete mag, Phos.  Procalcitonin slightly elevated.  Urine culture reviewed growing E. coli.  Tolerated cefazolin prior to cystoscopy yesterday.  Will trial on Unasyn and monitor for reaction.  Denies fevers, chills, chest pains, shortness of breath.  Reports persistent left flank and abdominal pain.  Has not been eating much as she states  p.o. intake makes abdominal pain worse.  Patient did have decreased urine output, but creatinine remained stable.  No significant swelling in lower extremities or abdomen.      I have discussed this patient's plan of care and discharge plan at IDT rounds today with Case Management, Nursing, Nursing leadership, and other members of the IDT team.    Consultants/Specialty  urology    Code Status  Full Code    Disposition  Patient is not medically cleared for discharge.   Anticipate discharge to to home with close outpatient follow-up.  I have placed the appropriate orders for post-discharge needs.    Review of Systems  Review of Systems   Constitutional:  Negative for chills and fever.   HENT:  Negative for congestion and sore throat.    Eyes:  Negative for blurred vision.   Respiratory:  Negative for cough and shortness of breath.    Cardiovascular:  Negative for chest pain, palpitations and leg swelling.   Gastrointestinal:  Positive for abdominal pain, nausea and vomiting. Negative for constipation and diarrhea.   Genitourinary:  Positive for dysuria. Negative for frequency and urgency.   Musculoskeletal:  Negative for falls.   Skin:  Negative for rash.   Neurological:  Negative for dizziness, weakness and headaches.   Psychiatric/Behavioral:  Negative for depression. The patient is not nervous/anxious.    All other systems reviewed and are negative.     Physical Exam  Temp:  [35.9 °C (96.7 °F)-36.6 °C (97.9 °F)] 36.3 °C (97.4 °F)  Pulse:  [81-96] 90  Resp:  [12-22] 17  BP: ()/(56-92) 131/82  SpO2:  [91 %-99 %] 91 %    Physical Exam  Vitals and nursing note reviewed.   Constitutional:       Appearance: Normal appearance. She is obese. She is ill-appearing.   HENT:      Head: Normocephalic and atraumatic.      Right Ear: External ear normal.      Left Ear: External ear normal.      Nose: Nose normal.      Mouth/Throat:      Mouth: Mucous membranes are moist.      Pharynx: Oropharynx is clear.   Eyes:       Extraocular Movements: Extraocular movements intact.      Conjunctiva/sclera: Conjunctivae normal.   Cardiovascular:      Rate and Rhythm: Normal rate and regular rhythm.      Pulses: Normal pulses.      Heart sounds: Normal heart sounds. No murmur heard.  Pulmonary:      Effort: Pulmonary effort is normal. No respiratory distress.      Breath sounds: Normal breath sounds. No stridor. No wheezing or rales.   Abdominal:      General: Abdomen is flat. Bowel sounds are normal. There is no distension.      Palpations: Abdomen is soft. There is no mass.      Tenderness: There is abdominal tenderness. There is left CVA tenderness.   Musculoskeletal:      Cervical back: Normal range of motion.   Skin:     General: Skin is warm.      Capillary Refill: Capillary refill takes less than 2 seconds.   Neurological:      General: No focal deficit present.      Mental Status: She is alert and oriented to person, place, and time. Mental status is at baseline.      Cranial Nerves: No cranial nerve deficit.   Psychiatric:         Mood and Affect: Mood normal.         Behavior: Behavior normal.       Fluids    Intake/Output Summary (Last 24 hours) at 3/1/2023 1147  Last data filed at 2/28/2023 1344  Gross per 24 hour   Intake 650 ml   Output 0 ml   Net 650 ml       Laboratory  Recent Labs     02/27/23  0153 03/01/23  0426   WBC 11.8* 7.9   RBC 4.75 4.75   HEMOGLOBIN 13.3 13.3   HEMATOCRIT 40.6 42.5   MCV 85.5 89.5   MCH 28.0 28.0   MCHC 32.8* 31.3*   RDW 46.5 45.9   PLATELETCT 245 309   MPV 9.9 9.8       Recent Labs     02/27/23  0153 03/01/23  0426   SODIUM 135 139   POTASSIUM 3.8 4.4   CHLORIDE 104 104   CO2 21 23   GLUCOSE 118* 161*   BUN 12 11   CREATININE 0.78 0.73   CALCIUM 8.9 9.6                     Imaging  DX-CYSTO FLUORO < 1 HOUR   Final Result      Cysto fluoroscopy utilized for 12 seconds.      [facility]PRE[facility] [place] [address]      IR-US GUIDED PIV   Final Result    Ultrasound-guided PERIPHERAL IV  INSERTION performed by    qualified nursing staff as above.      CT-RENAL COLIC EVALUATION(A/P W/O)   Final Result      1.  There is a left ureteral stent in place grossly appropriate in position. There is no obstructing urolithiasis.   2.  No hydronephrosis.   3.  Simple bilateral renal cysts.   4.  Distal colonic diverticulosis without diverticulitis.   5.  No bowel obstruction or acute inflammation.             Assessment/Plan  * Intractable nausea and vomiting- (present on admission)  Assessment & Plan  Restarted diet after procedure  Zofran as needed    E-coli UTI- (present on admission)  Assessment & Plan  Patient with complicated urine tract infection  Urine culture growing E. coli  Ureteral stent removed 2/28  Was on ceftriaxone.  Trial of Unasyn with penicillin allergy    Nephrolithiasis- (present on admission)  Assessment & Plan  CT renal reviewed.  Urology following  Status post cystoscopy with laser lithotripsy and replacement of ureteral stent 2/28  Continue tamsulosin    HTN (hypertension)- (present on admission)  Assessment & Plan  Likely secondary to pain, which is improved after procedure  Continue carvedilol         VTE prophylaxis: SCDs/TEDs    I have performed a physical exam and reviewed and updated ROS and Plan today (3/1/2023). In review of yesterday's note (2/28/2023), there are no changes except as documented above.

## 2023-03-02 ENCOUNTER — APPOINTMENT (OUTPATIENT)
Dept: RADIOLOGY | Facility: MEDICAL CENTER | Age: 68
DRG: 660 | End: 2023-03-02
Attending: INTERNAL MEDICINE
Payer: MEDICARE

## 2023-03-02 LAB
1,25(OH)2D3 SERPL-MCNC: 86.7 PG/ML (ref 19.9–79.3)
ANION GAP SERPL CALC-SCNC: 10 MMOL/L (ref 7–16)
APPEARANCE STONE: NORMAL
BUN SERPL-MCNC: 12 MG/DL (ref 8–22)
CALCIUM SERPL-MCNC: 9.6 MG/DL (ref 8.5–10.5)
CHLORIDE SERPL-SCNC: 104 MMOL/L (ref 96–112)
CO2 SERPL-SCNC: 24 MMOL/L (ref 20–33)
COMPN STONE: NORMAL
CREAT SERPL-MCNC: 0.84 MG/DL (ref 0.5–1.4)
ERYTHROCYTE [DISTWIDTH] IN BLOOD BY AUTOMATED COUNT: 46.5 FL (ref 35.9–50)
GFR SERPLBLD CREATININE-BSD FMLA CKD-EPI: 75 ML/MIN/1.73 M 2
GLUCOSE SERPL-MCNC: 120 MG/DL (ref 65–99)
HCT VFR BLD AUTO: 40.4 % (ref 37–47)
HGB BLD-MCNC: 13 G/DL (ref 12–16)
MAGNESIUM SERPL-MCNC: 1.8 MG/DL (ref 1.5–2.5)
MCH RBC QN AUTO: 28.4 PG (ref 27–33)
MCHC RBC AUTO-ENTMCNC: 32.2 G/DL (ref 33.6–35)
MCV RBC AUTO: 88.4 FL (ref 81.4–97.8)
PHOSPHATE SERPL-MCNC: 2.4 MG/DL (ref 2.5–4.5)
PLATELET # BLD AUTO: 350 K/UL (ref 164–446)
PMV BLD AUTO: 9.7 FL (ref 9–12.9)
POTASSIUM SERPL-SCNC: 4.1 MMOL/L (ref 3.6–5.5)
RBC # BLD AUTO: 4.57 M/UL (ref 4.2–5.4)
SODIUM SERPL-SCNC: 138 MMOL/L (ref 135–145)
SPECIMEN WT: 19 MG
WBC # BLD AUTO: 8.2 K/UL (ref 4.8–10.8)

## 2023-03-02 PROCEDURE — 700102 HCHG RX REV CODE 250 W/ 637 OVERRIDE(OP): Performed by: STUDENT IN AN ORGANIZED HEALTH CARE EDUCATION/TRAINING PROGRAM

## 2023-03-02 PROCEDURE — A9270 NON-COVERED ITEM OR SERVICE: HCPCS | Performed by: INTERNAL MEDICINE

## 2023-03-02 PROCEDURE — 770004 HCHG ROOM/CARE - ONCOLOGY PRIVATE *

## 2023-03-02 PROCEDURE — 700105 HCHG RX REV CODE 258: Performed by: INTERNAL MEDICINE

## 2023-03-02 PROCEDURE — 700111 HCHG RX REV CODE 636 W/ 250 OVERRIDE (IP): Performed by: INTERNAL MEDICINE

## 2023-03-02 PROCEDURE — 05HY33Z INSERTION OF INFUSION DEVICE INTO UPPER VEIN, PERCUTANEOUS APPROACH: ICD-10-PCS | Performed by: INTERNAL MEDICINE

## 2023-03-02 PROCEDURE — 87324 CLOSTRIDIUM AG IA: CPT

## 2023-03-02 PROCEDURE — 700102 HCHG RX REV CODE 250 W/ 637 OVERRIDE(OP): Performed by: INTERNAL MEDICINE

## 2023-03-02 PROCEDURE — A9270 NON-COVERED ITEM OR SERVICE: HCPCS | Performed by: STUDENT IN AN ORGANIZED HEALTH CARE EDUCATION/TRAINING PROGRAM

## 2023-03-02 PROCEDURE — C1751 CATH, INF, PER/CENT/MIDLINE: HCPCS

## 2023-03-02 PROCEDURE — 80048 BASIC METABOLIC PNL TOTAL CA: CPT

## 2023-03-02 PROCEDURE — 85027 COMPLETE CBC AUTOMATED: CPT

## 2023-03-02 PROCEDURE — 700101 HCHG RX REV CODE 250: Performed by: INTERNAL MEDICINE

## 2023-03-02 PROCEDURE — 87493 C DIFF AMPLIFIED PROBE: CPT

## 2023-03-02 PROCEDURE — 84100 ASSAY OF PHOSPHORUS: CPT

## 2023-03-02 PROCEDURE — 83735 ASSAY OF MAGNESIUM: CPT

## 2023-03-02 PROCEDURE — 700111 HCHG RX REV CODE 636 W/ 250 OVERRIDE (IP)

## 2023-03-02 PROCEDURE — 36415 COLL VENOUS BLD VENIPUNCTURE: CPT

## 2023-03-02 PROCEDURE — 99232 SBSQ HOSP IP/OBS MODERATE 35: CPT | Performed by: INTERNAL MEDICINE

## 2023-03-02 RX ORDER — KETOROLAC TROMETHAMINE 30 MG/ML
15 INJECTION, SOLUTION INTRAMUSCULAR; INTRAVENOUS ONCE
Status: COMPLETED | OUTPATIENT
Start: 2023-03-02 | End: 2023-03-02

## 2023-03-02 RX ORDER — LIDOCAINE 50 MG/G
1 PATCH TOPICAL EVERY 24 HOURS
Status: DISCONTINUED | OUTPATIENT
Start: 2023-03-02 | End: 2023-03-03 | Stop reason: HOSPADM

## 2023-03-02 RX ORDER — METHOCARBAMOL 500 MG/1
500 TABLET, FILM COATED ORAL 4 TIMES DAILY
Status: DISCONTINUED | OUTPATIENT
Start: 2023-03-02 | End: 2023-03-03 | Stop reason: HOSPADM

## 2023-03-02 RX ORDER — LANOLIN ALCOHOL/MO/W.PET/CERES
400 CREAM (GRAM) TOPICAL ONCE
Status: COMPLETED | OUTPATIENT
Start: 2023-03-02 | End: 2023-03-02

## 2023-03-02 RX ADMIN — PROCHLORPERAZINE MALEATE 10 MG: 10 TABLET ORAL at 05:15

## 2023-03-02 RX ADMIN — KETOROLAC TROMETHAMINE 15 MG: 30 INJECTION, SOLUTION INTRAMUSCULAR at 03:26

## 2023-03-02 RX ADMIN — AMPICILLIN AND SULBACTAM 3 G: 1; 2 INJECTION, POWDER, FOR SOLUTION INTRAMUSCULAR; INTRAVENOUS at 06:33

## 2023-03-02 RX ADMIN — METHOCARBAMOL 500 MG: 500 TABLET ORAL at 16:43

## 2023-03-02 RX ADMIN — AMPICILLIN AND SULBACTAM 3 G: 1; 2 INJECTION, POWDER, FOR SOLUTION INTRAMUSCULAR; INTRAVENOUS at 16:42

## 2023-03-02 RX ADMIN — OXYCODONE HYDROCHLORIDE 10 MG: 10 TABLET ORAL at 01:52

## 2023-03-02 RX ADMIN — OXYCODONE 5 MG: 5 TABLET ORAL at 13:05

## 2023-03-02 RX ADMIN — OXYCODONE HYDROCHLORIDE 10 MG: 10 TABLET ORAL at 05:15

## 2023-03-02 RX ADMIN — POLYETHYLENE GLYCOL 3350 1 PACKET: 17 POWDER, FOR SOLUTION ORAL at 05:14

## 2023-03-02 RX ADMIN — PROCHLORPERAZINE MALEATE 10 MG: 10 TABLET ORAL at 21:43

## 2023-03-02 RX ADMIN — DIBASIC SODIUM PHOSPHATE, MONOBASIC POTASSIUM PHOSPHATE AND MONOBASIC SODIUM PHOSPHATE 500 MG: 852; 155; 130 TABLET ORAL at 11:02

## 2023-03-02 RX ADMIN — CARVEDILOL 12.5 MG: 12.5 TABLET, FILM COATED ORAL at 11:02

## 2023-03-02 RX ADMIN — LIDOCAINE 1 PATCH: 50 PATCH TOPICAL at 21:36

## 2023-03-02 RX ADMIN — SENNOSIDES AND DOCUSATE SODIUM 2 TABLET: 50; 8.6 TABLET ORAL at 05:14

## 2023-03-02 RX ADMIN — OXYCODONE HYDROCHLORIDE 10 MG: 10 TABLET ORAL at 21:35

## 2023-03-02 RX ADMIN — Medication 400 MG: at 17:34

## 2023-03-02 RX ADMIN — AMPICILLIN AND SULBACTAM 3 G: 1; 2 INJECTION, POWDER, FOR SOLUTION INTRAMUSCULAR; INTRAVENOUS at 22:34

## 2023-03-02 RX ADMIN — METHOCARBAMOL 500 MG: 500 TABLET ORAL at 21:36

## 2023-03-02 RX ADMIN — SODIUM CHLORIDE, POTASSIUM CHLORIDE, SODIUM LACTATE AND CALCIUM CHLORIDE: 600; 310; 30; 20 INJECTION, SOLUTION INTRAVENOUS at 03:02

## 2023-03-02 RX ADMIN — OXYCODONE HYDROCHLORIDE 10 MG: 10 TABLET ORAL at 17:35

## 2023-03-02 RX ADMIN — CARVEDILOL 12.5 MG: 12.5 TABLET, FILM COATED ORAL at 16:43

## 2023-03-02 RX ADMIN — TAMSULOSIN HYDROCHLORIDE 0.4 MG: 0.4 CAPSULE ORAL at 05:15

## 2023-03-02 RX ADMIN — METHOCARBAMOL 500 MG: 500 TABLET ORAL at 13:05

## 2023-03-02 RX ADMIN — DIBASIC SODIUM PHOSPHATE, MONOBASIC POTASSIUM PHOSPHATE AND MONOBASIC SODIUM PHOSPHATE 500 MG: 852; 155; 130 TABLET ORAL at 14:11

## 2023-03-02 RX ADMIN — MAGNESIUM GLUCONATE 500 MG ORAL TABLET 400 MG: 500 TABLET ORAL at 05:16

## 2023-03-02 ASSESSMENT — ENCOUNTER SYMPTOMS
NERVOUS/ANXIOUS: 0
COUGH: 0
ABDOMINAL PAIN: 1
DEPRESSION: 0
FEVER: 0
SORE THROAT: 0
BLURRED VISION: 0
PALPITATIONS: 0
VOMITING: 1
HEADACHES: 0
NAUSEA: 1
CHILLS: 0
FALLS: 0
SHORTNESS OF BREATH: 0
WEAKNESS: 0
DIZZINESS: 0
DIARRHEA: 0
CONSTIPATION: 0

## 2023-03-02 NOTE — PROGRESS NOTES
[facility] Medicine Daily Progress Note    Date of Service  3/2/2023    Chief Complaint  Joan Olivares is a 67 y.o. female admitted 2/26/2023 with left-sided abdominal pain, left flank pain, dysuria    Hospital Course  No notes on file    Ms. Joan Olivares is a 67 y.o. female with history of recurrent kidney stones status post ureteral stent placement on 3/6 at outside hospital who presented on 2/26/2023 with abdominal pain, left flank pain, nausea, vomiting.    CT renal colic showed left ureteral stent in place with no obstructive urolithiasis, no hydronephrosis, simple bilateral renal cyst.    Urology was consulted.  Status post cystoscopy with laser lithotripsy and placement of ureteral stent on 2/28.    Urine culture grew E. coli.  Patient had been on ceftriaxone.  Does have a penicillin allergy but tolerated cefazolin during cystoscopy with no reaction.    Follow-up with urology [place] as outpatient for removal of ureteral stent later this week.    Interval Problem Update  Patient was seen and examined at bedside.  I have personally reviewed and interpreted vitals, labs, and imaging.    2/28.  Afebrile.  Tachycardic.  Hypertension is improved.  On room air.  Denies fevers, chills, chest pain, shortness of breath.  Still reports some left-sided abdominal pain.  Tolerated cystoscopy with lithotripsy and replacement of ureteral stent today.  Patient is expected to stay more than 2 midnights to monitor after procedure, pain control, speciation of urine culture and need for antibiotics.  3/1.  Afebrile.  Tachycardia is improved.  Hypertension is improved.  On room air.  Replete mag, Phos.  Procalcitonin slightly elevated.  Urine culture reviewed growing E. coli.  Tolerated cefazolin prior to cystoscopy yesterday.  Will trial on Unasyn and monitor for reaction.  Denies fevers, chills, chest pains, shortness of breath.  Reports persistent left flank and abdominal pain.  Has not been eating much as she states  p.o. intake makes abdominal pain worse.  Patient did have decreased urine output, but creatinine remained stable.  No significant swelling in lower extremities or abdomen.  3/2.  Afebrile.  Hypertensive.  On room air.  Kidney function stable despite decrease in urine output.  Renal US normal.  Replete Phos, mag.  Tolerated Unasyn.  Denies fever, chills, chest pains, shortness of breath.  Reports persistent left flank pain exacerbated by movement.  Has not been eating much or drinking much.  Still with minimal urine output.  Patient states her pain makes her too uncomfortable to eat and even sleep.  Started muscle vaccines.  Adjusted pain scale.  Patient was also having loose bowel movements.  Screen for C. difficile.  Tolerating Unasyn with no reactions    I have discussed this patient's plan of care and discharge plan at IDT rounds today with Case Management, Nursing, Nursing leadership, and other members of the IDT team.    Consultants/Specialty  urology    Code Status  Full Code    Disposition  Patient is not medically cleared for discharge.   Anticipate discharge to to home with close outpatient follow-up.  I have placed the appropriate orders for post-discharge needs.    Review of Systems  Review of Systems   Constitutional:  Negative for chills and fever.   HENT:  Negative for congestion and sore throat.    Eyes:  Negative for blurred vision.   Respiratory:  Negative for cough and shortness of breath.    Cardiovascular:  Negative for chest pain, palpitations and leg swelling.   Gastrointestinal:  Positive for abdominal pain, nausea and vomiting. Negative for constipation and diarrhea.   Genitourinary:  Positive for dysuria. Negative for frequency and urgency.   Musculoskeletal:  Negative for falls.   Skin:  Negative for rash.   Neurological:  Negative for dizziness, weakness and headaches.   Psychiatric/Behavioral:  Negative for depression. The patient is not nervous/anxious.    All other systems reviewed and  are negative.     Physical Exam  Temp:  [36.3 °C (97.4 °F)-36.7 °C (98.1 °F)] 36.6 °C (97.9 °F)  Pulse:  [90-95] 91  Resp:  [17-19] 18  BP: (131-154)/(82-92) 154/92  SpO2:  [91 %-94 %] 94 %    Physical Exam  Vitals and nursing note reviewed.   Constitutional:       Appearance: Normal appearance. She is obese. She is ill-appearing.   HENT:      Head: Normocephalic and atraumatic.      Right Ear: External ear normal.      Left Ear: External ear normal.      Nose: Nose normal.      Mouth/Throat:      Mouth: Mucous membranes are moist.      Pharynx: Oropharynx is clear.   Eyes:      Extraocular Movements: Extraocular movements intact.      Conjunctiva/sclera: Conjunctivae normal.   Cardiovascular:      Rate and Rhythm: Normal rate and regular rhythm.      Pulses: Normal pulses.      Heart sounds: Normal heart sounds. No murmur heard.  Pulmonary:      Effort: Pulmonary effort is normal. No respiratory distress.      Breath sounds: Normal breath sounds. No stridor. No wheezing or rales.   Abdominal:      General: Abdomen is flat. Bowel sounds are normal. There is no distension.      Palpations: Abdomen is soft. There is no mass.      Tenderness: There is abdominal tenderness. There is left CVA tenderness.   Musculoskeletal:      Cervical back: Normal range of motion.   Skin:     General: Skin is warm.      Capillary Refill: Capillary refill takes less than 2 seconds.   Neurological:      General: No focal deficit present.      Mental Status: She is alert and oriented to person, place, and time. Mental status is at baseline.      Cranial Nerves: No cranial nerve deficit.   Psychiatric:         Mood and Affect: Mood normal.         Behavior: Behavior normal.       Fluids    Intake/Output Summary (Last 24 hours) at 3/2/2023 0621  Last data filed at 3/1/2023 2355  Gross per 24 hour   Intake 2194.9 ml   Output 185 ml   Net 2009.9 ml         Laboratory  Recent Labs     03/01/23  0426 03/02/23  0320   WBC 7.9 8.2   RBC 4.75 4.57    HEMOGLOBIN 13.3 13.0   HEMATOCRIT 42.5 40.4   MCV 89.5 88.4   MCH 28.0 28.4   MCHC 31.3* 32.2*   RDW 45.9 46.5   PLATELETCT 309 350   MPV 9.8 9.7       Recent Labs     03/01/23  0426 03/02/23  0320   SODIUM 139 138   POTASSIUM 4.4 4.1   CHLORIDE 104 104   CO2 23 24   GLUCOSE 161* 120*   BUN 11 12   CREATININE 0.73 0.84   CALCIUM 9.6 9.6                     Imaging  US-RENAL   Final Result      1.  No evidence of hydronephrosis.      2.  Left renal cyst.      DX-CYSTO FLUORO < 1 HOUR   Final Result      Cysto fluoroscopy utilized for 12 seconds.      INTERPRETING LOCATION: [facility], [place], [address]      IR-US GUIDED PIV   Final Result    Ultrasound-guided PERIPHERAL IV INSERTION performed by    qualified nursing staff as above.      CT-RENAL COLIC EVALUATION(A/P W/O)   Final Result      1.  There is a left ureteral stent in place grossly appropriate in position. There is no obstructing urolithiasis.   2.  No hydronephrosis.   3.  Simple bilateral renal cysts.   4.  Distal colonic diverticulosis without diverticulitis.   5.  No bowel obstruction or acute inflammation.             Assessment/Plan  * Intractable nausea and vomiting- (present on admission)  Assessment & Plan  Restarted diet after procedure  Zofran as needed    E-coli UTI- (present on admission)  Assessment & Plan  Patient with complicated urine tract infection  Urine culture growing E. coli  Ureteral stent removed 2/28  Was on ceftriaxone.  Trial of Unasyn with penicillin allergy    Nephrolithiasis- (present on admission)  Assessment & Plan  CT renal reviewed.  Urology following  Status post cystoscopy with laser lithotripsy and replacement of ureteral stent 2/28  Continue tamsulosin    HTN (hypertension)- (present on admission)  Assessment & Plan  Likely secondary to pain, which is improved after procedure  Continue carvedilol         VTE prophylaxis: SCDs/TEDs    I have performed a physical exam and reviewed and updated ROS and Plan today  (3/2/2023). In review of yesterday's note (3/1/2023), there are no changes except as documented above.

## 2023-03-02 NOTE — PROCEDURES
Vascular Access Team    Date of Insertion: March 2, 2023  Arm Circumference: 39 cm  Internal length: 15 cm  External Length: 0 cm  Vein Occupancy %: 26%  Reason for Midline: Access  Labs: WBC 8.2, , BUN 12, Cr 0.84, GFR 75, INR 0.99 (2/23/23)    Orders confirmed, vessel patency confirmed with ultrasound. Risks and benefits of procedure explained to patient and education regarding line associated bloodstream infections provided. Questions answered.     Power Midline placed in RUE per licensed provider order with ultrasound guidance. 4 Fr, single lumen Power Midline placed in basilic vein after 1 attempt(s). 2 mL of 1% lidocaine injected intradermally, 21 gauge microintroducer needle was visualized entering the vein and modified Seldinger technique used. 15 cm catheter inserted with good blood return. Secured at 0 cm marker. Internal positioning stylet removed and verified to be intact. Each lumen flushed without resistance with 10 mL 0.9% normal saline. Midline secured with Biopatch and Tegaderm.     Midline placement is confirmed by nurse using ultrasound and ability to flush and draw blood. Midline is appropriate for use at this time.  Patient tolerated procedure well, without complications.  No X-ray is needed for placement confirmation.  Patient condition relayed to unit RN or ordering physician via this post procedure note in the EMR.     Ultrasound images uploaded to PACS and viewable in the EMR - yes  Ultrasound imaged printed and placed in paper chart - no     BARD Power Midline ref # [id number], Lot # [id number], Expiration Date March 31, 2024

## 2023-03-03 VITALS
BODY MASS INDEX: 33.75 KG/M2 | RESPIRATION RATE: 18 BRPM | WEIGHT: 210 LBS | SYSTOLIC BLOOD PRESSURE: 170 MMHG | DIASTOLIC BLOOD PRESSURE: 111 MMHG | HEART RATE: 94 BPM | TEMPERATURE: 98.8 F | HEIGHT: 66 IN | OXYGEN SATURATION: 91 %

## 2023-03-03 PROBLEM — R11.2 INTRACTABLE NAUSEA AND VOMITING: Status: RESOLVED | Noted: 2023-02-27 | Resolved: 2023-03-03

## 2023-03-03 PROBLEM — N20.0 LEFT NEPHROLITHIASIS: Status: RESOLVED | Noted: 2023-02-28 | Resolved: 2023-03-03

## 2023-03-03 PROBLEM — N20.0 CALCULUS OF KIDNEY: Status: RESOLVED | Noted: 2017-06-25 | Resolved: 2023-03-03

## 2023-03-03 LAB
ANION GAP SERPL CALC-SCNC: 12 MMOL/L (ref 7–16)
BUN SERPL-MCNC: 11 MG/DL (ref 8–22)
C DIFF DNA SPEC QL NAA+PROBE: NEGATIVE
C DIFF TOX A+B STL QL IA: NEGATIVE
C DIFF TOX GENS STL QL NAA+PROBE: NORMAL
CALCIUM SERPL-MCNC: 8.6 MG/DL (ref 8.5–10.5)
CHLORIDE SERPL-SCNC: 103 MMOL/L (ref 96–112)
CO2 SERPL-SCNC: 24 MMOL/L (ref 20–33)
CREAT SERPL-MCNC: 0.71 MG/DL (ref 0.5–1.4)
ERYTHROCYTE [DISTWIDTH] IN BLOOD BY AUTOMATED COUNT: 45.9 FL (ref 35.9–50)
GFR SERPLBLD CREATININE-BSD FMLA CKD-EPI: 91 ML/MIN/1.73 M 2
GLUCOSE SERPL-MCNC: 109 MG/DL (ref 65–99)
HCT VFR BLD AUTO: 36.6 % (ref 37–47)
HGB BLD-MCNC: 11.7 G/DL (ref 12–16)
MAGNESIUM SERPL-MCNC: 1.7 MG/DL (ref 1.5–2.5)
MCH RBC QN AUTO: 27.9 PG (ref 27–33)
MCHC RBC AUTO-ENTMCNC: 32 G/DL (ref 33.6–35)
MCV RBC AUTO: 87.1 FL (ref 81.4–97.8)
PHOSPHATE SERPL-MCNC: 3.4 MG/DL (ref 2.5–4.5)
PLATELET # BLD AUTO: 309 K/UL (ref 164–446)
PMV BLD AUTO: 9.7 FL (ref 9–12.9)
POTASSIUM SERPL-SCNC: 3.7 MMOL/L (ref 3.6–5.5)
PROCALCITONIN SERPL-MCNC: 0.14 NG/ML
RBC # BLD AUTO: 4.2 M/UL (ref 4.2–5.4)
SODIUM SERPL-SCNC: 139 MMOL/L (ref 135–145)
WBC # BLD AUTO: 7.2 K/UL (ref 4.8–10.8)

## 2023-03-03 PROCEDURE — 99239 HOSP IP/OBS DSCHRG MGMT >30: CPT | Performed by: INTERNAL MEDICINE

## 2023-03-03 PROCEDURE — A9270 NON-COVERED ITEM OR SERVICE: HCPCS | Performed by: STUDENT IN AN ORGANIZED HEALTH CARE EDUCATION/TRAINING PROGRAM

## 2023-03-03 PROCEDURE — 700102 HCHG RX REV CODE 250 W/ 637 OVERRIDE(OP): Performed by: STUDENT IN AN ORGANIZED HEALTH CARE EDUCATION/TRAINING PROGRAM

## 2023-03-03 PROCEDURE — 84100 ASSAY OF PHOSPHORUS: CPT

## 2023-03-03 PROCEDURE — 700102 HCHG RX REV CODE 250 W/ 637 OVERRIDE(OP): Performed by: INTERNAL MEDICINE

## 2023-03-03 PROCEDURE — 700105 HCHG RX REV CODE 258: Performed by: INTERNAL MEDICINE

## 2023-03-03 PROCEDURE — A9270 NON-COVERED ITEM OR SERVICE: HCPCS | Performed by: INTERNAL MEDICINE

## 2023-03-03 PROCEDURE — 84145 PROCALCITONIN (PCT): CPT

## 2023-03-03 PROCEDURE — 36415 COLL VENOUS BLD VENIPUNCTURE: CPT

## 2023-03-03 PROCEDURE — 700111 HCHG RX REV CODE 636 W/ 250 OVERRIDE (IP): Performed by: INTERNAL MEDICINE

## 2023-03-03 PROCEDURE — 85027 COMPLETE CBC AUTOMATED: CPT

## 2023-03-03 PROCEDURE — 83735 ASSAY OF MAGNESIUM: CPT

## 2023-03-03 PROCEDURE — 80048 BASIC METABOLIC PNL TOTAL CA: CPT

## 2023-03-03 RX ORDER — OXYCODONE HYDROCHLORIDE 5 MG/1
5 TABLET ORAL EVERY 6 HOURS PRN
Qty: 10 TABLET | Refills: 0 | Status: SHIPPED | OUTPATIENT
Start: 2023-03-03 | End: 2023-03-08

## 2023-03-03 RX ORDER — LANOLIN ALCOHOL/MO/W.PET/CERES
400 CREAM (GRAM) TOPICAL 2 TIMES DAILY
Status: DISCONTINUED | OUTPATIENT
Start: 2023-03-03 | End: 2023-03-03 | Stop reason: HOSPADM

## 2023-03-03 RX ORDER — POTASSIUM CHLORIDE 20 MEQ/1
20 TABLET, EXTENDED RELEASE ORAL ONCE
Status: COMPLETED | OUTPATIENT
Start: 2023-03-03 | End: 2023-03-03

## 2023-03-03 RX ORDER — LOSARTAN POTASSIUM 25 MG/1
25 TABLET ORAL
Status: DISCONTINUED | OUTPATIENT
Start: 2023-03-03 | End: 2023-03-03 | Stop reason: HOSPADM

## 2023-03-03 RX ORDER — AMOXICILLIN AND CLAVULANATE POTASSIUM 875; 125 MG/1; MG/1
1 TABLET, FILM COATED ORAL EVERY 12 HOURS
Qty: 4 TABLET | Refills: 0 | Status: ACTIVE | OUTPATIENT
Start: 2023-03-03 | End: 2023-03-03 | Stop reason: SDUPTHER

## 2023-03-03 RX ORDER — AMOXICILLIN AND CLAVULANATE POTASSIUM 875; 125 MG/1; MG/1
1 TABLET, FILM COATED ORAL EVERY 12 HOURS
Status: DISCONTINUED | OUTPATIENT
Start: 2023-03-03 | End: 2023-03-03 | Stop reason: HOSPADM

## 2023-03-03 RX ORDER — AMOXICILLIN AND CLAVULANATE POTASSIUM 875; 125 MG/1; MG/1
1 TABLET, FILM COATED ORAL EVERY 12 HOURS
Qty: 4 TABLET | Refills: 0 | Status: ACTIVE | OUTPATIENT
Start: 2023-03-03 | End: 2023-03-05

## 2023-03-03 RX ADMIN — OXYCODONE HYDROCHLORIDE 10 MG: 10 TABLET ORAL at 03:33

## 2023-03-03 RX ADMIN — AMPICILLIN AND SULBACTAM 3 G: 1; 2 INJECTION, POWDER, FOR SOLUTION INTRAMUSCULAR; INTRAVENOUS at 06:05

## 2023-03-03 RX ADMIN — SODIUM CHLORIDE, POTASSIUM CHLORIDE, SODIUM LACTATE AND CALCIUM CHLORIDE: 600; 310; 30; 20 INJECTION, SOLUTION INTRAVENOUS at 00:10

## 2023-03-03 RX ADMIN — LIDOCAINE HYDROCHLORIDE 30 ML: 20 SOLUTION OROPHARYNGEAL at 03:04

## 2023-03-03 RX ADMIN — METHOCARBAMOL 500 MG: 500 TABLET ORAL at 08:58

## 2023-03-03 RX ADMIN — LOSARTAN POTASSIUM 25 MG: 25 TABLET, FILM COATED ORAL at 08:57

## 2023-03-03 RX ADMIN — CARVEDILOL 12.5 MG: 12.5 TABLET, FILM COATED ORAL at 08:58

## 2023-03-03 RX ADMIN — POTASSIUM CHLORIDE 20 MEQ: 1500 TABLET, EXTENDED RELEASE ORAL at 08:58

## 2023-03-03 RX ADMIN — Medication 400 MG: at 08:58

## 2023-03-03 NOTE — PROGRESS NOTES
"Received report from NOC shift. In to see pt. Pt's ureteral stent sitting on the counter in the pt's room and pt requesting to \"check out\". States she woke up and noted stent to be laying on her bed. States she did not pull it out. Denies pain. MD notified.   "

## 2023-03-03 NOTE — CARE PLAN
The patient is Stable - Low risk of patient condition declining or worsening    Shift Goals  Clinical Goals: (P) Pain management, bowel movement, activity  Patient Goals: (P) Pain management, rest, shower  Family Goals: (P) CRUZ    Progress made toward(s) clinical / shift goals:    Problem: Pain - Standard  Goal: Alleviation of pain or a reduction in pain to the patient’s comfort goal  Outcome: Progressing  Patient reports pain is managed with heat and scheduled pain medications. Patient resting comfortably in bed.      Problem: Knowledge Deficit - Standard  Goal: Patient and family/care givers will demonstrate understanding of plan of care, disease process/condition, diagnostic tests and medications  Outcome: Progressing  Patient demonstrates knowledge of managing ureteral stent on strings.     Problem: Fall Risk  Goal: Patient will remain free from falls  Outcome: Progressing  Patient ambulates to bedside commode SBA and is experiencing bowel movement frequencies. Calls appropriately, call light within reach, bed in lowest position, person items within reach.     Problem: Skin Integrity  Goal: Skin integrity is maintained or improved  Outcome: Progressing  Skin integrity has remained stable. Patient ambulates to the bedside commode regularly and repositions self in bed.      Problem: Bowel Elimination  Goal: Establish and maintain regular bowel function  Outcome: Progressing  Patient progressed from having no bowel movements since 02/26 to having multiple today.        Patient is not progressing towards the following goals:      Problem: Urinary Elimination  Goal: Establish and maintain regular urinary output  Outcome: Not Progressing  Patient's urinary output has decreased from baseline.

## 2023-03-03 NOTE — DISCHARGE SUMMARY
Discharge Summary    CHIEF COMPLAINT ON ADMISSION  Chief Complaint   Patient presents with    N/V    Flank Pain     Both sides, hx kidney stones, sx scheduled in 2-3 days for removal surgery       Reason for Admission  Blood in Urine; N/V; Abdominal Pain     Admission Date  2/26/2023    CODE STATUS  Full Code    HPI & HOSPITAL COURSE  Ms. Joan Olivares is a 67 y.o. female with history of recurrent kidney stones status post ureteral stent placement on 3/6 at outside hospital who presented on 2/26/2023 with abdominal pain, left flank pain, nausea, vomiting.     CT renal colic showed left ureteral stent in place with no obstructive urolithiasis, no hydronephrosis, simple bilateral renal cyst.     Urology was consulted.  Status post cystoscopy with laser lithotripsy and placement of ureteral stent on 2/28.     Urine culture grew E. coli.  Patient had been on ceftriaxone.  Does have a penicillin allergy but tolerated cefazolin during cystoscopy with no reaction.  Patient was transitioned to Unasyn which she tolerated well.     Initially patient has been low oral intake and low urine output but this improved.  Creatinine remained stable.  Patient's ureteral stent fell out while she was asleep on 3/3.  She was urinating well afterwards and all of her flank and abdominal pain had resolved.  Kidney stone had likely already passed.  Transition to oral Augmentin to complete antibiotic course for E. coli urinary tract infection.  Medically stable to discharge home.  Follow-up with primary care and urology as outpatient.       Therefore, she is discharged in fair and stable condition to home with close outpatient follow-up.    The patient met 2-midnight criteria for an inpatient stay at the time of discharge.    Discharge Date  3/3/2023    FOLLOW UP ITEMS POST DISCHARGE  None    DISCHARGE DIAGNOSES  Principal Problem (Resolved):    Intractable nausea and vomiting POA: Yes  Active Problems:    HTN (hypertension) (Chronic)  POA: Yes    E-coli UTI POA: Yes  Resolved Problems:    Nephrolithiasis POA: Yes      Overview: IMO load March 2020    Left nephrolithiasis POA: Yes      FOLLOW UP  Bhupendra Andrade M.D.  [address]  [place] [id number]  [phone]    Follow up  [facility] [place] will contact patient to arrange outpatient follow up.      MEDICATIONS ON DISCHARGE     Medication List        START taking these medications        Instructions   amoxicillin-clavulanate 875-125 MG Tabs  Commonly known as: AUGMENTIN   Take 1 Tablet by mouth every 12 hours for 2 days.  Dose: 1 Tablet     oxyCODONE immediate-release 5 MG Tabs  Commonly known as: ROXICODONE   Take 1 Tablet by mouth every 6 hours as needed for Severe Pain for up to 5 days.  Dose: 5 mg            CONTINUE taking these medications        Instructions   acetaminophen 500 MG Tabs  Commonly known as: TYLENOL   Take 1,000 mg by mouth every 6 hours as needed for Mild Pain.  Dose: 1,000 mg     carvedilol 12.5 MG Tabs  Commonly known as: COREG   Take 1 Tablet by mouth 2 times a day with meals.  Dose: 12.5 mg     HYDROcodone-acetaminophen 5-325 MG Tabs per tablet  Commonly known as: NORCO   Take 1 Tablet by mouth every 6 hours as needed for Moderate Pain. 3 day course  Dose: 1 Tablet     tamsulosin 0.4 MG capsule  Commonly known as: FLOMAX   Take 0.4 mg by mouth every day.  Dose: 0.4 mg     Tylenol PM Extra Strength  MG Tabs  Generic drug: diphenhydrAMINE-APAP (sleep)   Take 2 Tablets by mouth at bedtime as needed. Indications: Headache, Mild Pain, Runny Nose  Dose: 2 Tablet            STOP taking these medications      nitrofurantoin 100 MG Caps  Commonly known as: MACROBID     phenazopyridine 100 MG Tabs  Commonly known as: PYRIDIUM              Allergies  Allergies   Allergen Reactions    Penicillins Rash and Swelling     Tolerated ceftriaxone on 6/20/17      Pt reports that she received PCN Approximately 30 years ago       DIET  Orders Placed This Encounter   Procedures     Diet Order Diet: Regular     Standing Status:   Standing     Number of Occurrences:   1     Order Specific Question:   Diet:     Answer:   Regular [1]       ACTIVITY  As tolerated.  Weight bearing as tolerated    CONSULTATIONS  Urology    PROCEDURES  Cystoscopy, laser lithotripsy, ureteral stent    LABORATORY  Lab Results   Component Value Date    SODIUM 139 03/03/2023    POTASSIUM 3.7 03/03/2023    CHLORIDE 103 03/03/2023    CO2 24 03/03/2023    GLUCOSE 109 (H) 03/03/2023    BUN 11 03/03/2023    CREATININE 0.71 03/03/2023        Lab Results   Component Value Date    WBC 7.2 03/03/2023    HEMOGLOBIN 11.7 (L) 03/03/2023    HEMATOCRIT 36.6 (L) 03/03/2023    PLATELETCT 309 03/03/2023        I discussed medications and side effects with the patient.  I discussed prognosis and importance of medical compliance with the patient.  I counseled the patient about diet, exercise, weight loss, smoking cessation, and life style modifications.  All questions and concerns have been addressed.  Total time of the discharge process was 38 minutes.

## 2023-03-03 NOTE — DISCHARGE INSTRUCTIONS
Discharge Instructions per Dr. Daniel Gross D.O.    DIET: Diet Order Diet: Regular    ACTIVITY: As tolerated    A proper diet that is low in grease, fat, and salt, along with 30 minutes of exercise per day will lead to weight loss, and better controlled blood sugar and blood pressure.    DIAGNOSIS: Intractable nausea and vomiting, nephrolithiasis    Follow up with your Primary Care Provider Pcp Pt States None as scheduled or sooner if your symptoms persist or worsen.  Return to Emergency Room for sever chest pain, shortness of breath, signs of a stroke, or any other emergencies.      Kidney Stones    Kidney stones (urolithiasis) are rock-like masses that form inside of the kidneys. Kidneys are organs that make pee (urine). A kidney stone can cause very bad pain and can block the flow of pee. The stone usually leaves your body (passes) through your pee. You may need to have a doctor take out the stone.  Follow these instructions at home:  Eating and drinking  Drink enough fluid to keep your pee clear or pale yellow. This will help you pass the stone.  If told by your doctor, change the foods you eat (your diet). This may include:  Limiting how much salt (sodium) you eat.  Eating more fruits and vegetables.  Limiting how much meat, poultry, fish, and eggs you eat.  Follow instructions from your doctor about eating or drinking restrictions.  General instructions  Collect pee samples as told by your doctor. You may need to collect a pee sample:  24 hours after a stone comes out.  8-12 weeks after a stone comes out, and every 6-12 months after that.  Strain your pee every time you pee (urinate), for as long as told. Use the strainer that your doctor recommends.  Do not throw out the stone. Keep it so that it can be tested by your doctor.  Take over-the-counter and prescription medicines only as told by your doctor.  Keep all follow-up visits as told by your doctor. This is important. You may need follow-up  tests.  Preventing kidney stones  To prevent another kidney stone:  Drink enough fluid to keep your pee clear or pale yellow. This is the best way to prevent kidney stones.  Eat healthy foods.  Avoid certain foods as told by your doctor. You may be told to eat less protein.  Stay at a healthy weight.  Contact a doctor if:  You have pain that gets worse or does not get better with medicine.  Get help right away if:  You have a fever or chills.  You get very bad pain.  You get new pain in your belly (abdomen).  You pass out (faint).  You cannot pee.  This information is not intended to replace advice given to you by your health care provider. Make sure you discuss any questions you have with your health care provider.  Document Released: 06/05/2009 Document Revised: 07/30/2019 Document Reviewed: 09/05/2017  [employer] Patient Education © 2020 [employer]

## 2023-03-04 LAB
BACTERIA BLD CULT: NORMAL
BACTERIA BLD CULT: NORMAL
SIGNIFICANT IND 70042: NORMAL
SIGNIFICANT IND 70042: NORMAL
SITE SITE: NORMAL
SITE SITE: NORMAL
SOURCE SOURCE: NORMAL
SOURCE SOURCE: NORMAL

## 2023-10-20 RX ORDER — CARVEDILOL 12.5 MG/1
1 TABLET ORAL 2 TIMES DAILY WITH MEALS
COMMUNITY
End: 2023-10-20 | Stop reason: SDUPTHER

## 2023-10-20 RX ORDER — CARVEDILOL 12.5 MG/1
12.5 TABLET ORAL 2 TIMES DAILY WITH MEALS
Qty: 180 TABLET | Refills: 3 | Status: SHIPPED | OUTPATIENT
Start: 2023-10-20

## 2023-10-26 ENCOUNTER — HOSPITAL ENCOUNTER (OUTPATIENT)
Dept: RADIOLOGY | Facility: MEDICAL CENTER | Age: 68
End: 2023-10-26
Attending: STUDENT IN AN ORGANIZED HEALTH CARE EDUCATION/TRAINING PROGRAM
Payer: MEDICARE

## 2023-10-26 DIAGNOSIS — R31.0 GROSS HEMATURIA: ICD-10-CM

## 2023-10-26 PROCEDURE — 74176 CT ABD & PELVIS W/O CONTRAST: CPT

## 2024-04-12 ENCOUNTER — HOSPITAL ENCOUNTER (OUTPATIENT)
Dept: LAB | Facility: MEDICAL CENTER | Age: 69
End: 2024-04-12
Attending: STUDENT IN AN ORGANIZED HEALTH CARE EDUCATION/TRAINING PROGRAM
Payer: MEDICARE

## 2024-04-12 LAB
ALBUMIN SERPL BCP-MCNC: 4.1 G/DL (ref 3.2–4.9)
ALBUMIN/GLOB SERPL: 1.4 G/DL
ALP SERPL-CCNC: 75 U/L (ref 30–99)
ALT SERPL-CCNC: 10 U/L (ref 2–50)
ANION GAP SERPL CALC-SCNC: 10 MMOL/L (ref 7–16)
AST SERPL-CCNC: 14 U/L (ref 12–45)
BILIRUB SERPL-MCNC: 0.5 MG/DL (ref 0.1–1.5)
BUN SERPL-MCNC: 14 MG/DL (ref 8–22)
CALCIUM ALBUM COR SERPL-MCNC: 9.2 MG/DL (ref 8.5–10.5)
CALCIUM SERPL-MCNC: 9.3 MG/DL (ref 8.5–10.5)
CHLORIDE SERPL-SCNC: 102 MMOL/L (ref 96–112)
CO2 SERPL-SCNC: 25 MMOL/L (ref 20–33)
CREAT SERPL-MCNC: 0.65 MG/DL (ref 0.5–1.4)
GFR SERPLBLD CREATININE-BSD FMLA CKD-EPI: 94 ML/MIN/1.73 M 2
GLOBULIN SER CALC-MCNC: 3 G/DL (ref 1.9–3.5)
GLUCOSE SERPL-MCNC: 130 MG/DL (ref 65–99)
POTASSIUM SERPL-SCNC: 4.3 MMOL/L (ref 3.6–5.5)
PROT SERPL-MCNC: 7.1 G/DL (ref 6–8.2)
PTH-INTACT SERPL-MCNC: 85.6 PG/ML (ref 14–72)
SODIUM SERPL-SCNC: 137 MMOL/L (ref 135–145)
URATE SERPL-MCNC: 5.4 MG/DL (ref 1.9–8.2)

## 2024-04-12 PROCEDURE — 84550 ASSAY OF BLOOD/URIC ACID: CPT

## 2024-04-12 PROCEDURE — 36415 COLL VENOUS BLD VENIPUNCTURE: CPT

## 2024-04-12 PROCEDURE — 83970 ASSAY OF PARATHORMONE: CPT

## 2024-04-12 PROCEDURE — 80053 COMPREHEN METABOLIC PANEL: CPT

## 2024-04-19 ENCOUNTER — HOSPITAL ENCOUNTER (OUTPATIENT)
Facility: MEDICAL CENTER | Age: 69
End: 2024-04-19
Attending: STUDENT IN AN ORGANIZED HEALTH CARE EDUCATION/TRAINING PROGRAM
Payer: MEDICARE

## 2024-04-19 PROCEDURE — 84560 ASSAY OF URINE/URIC ACID: CPT

## 2024-04-19 PROCEDURE — 83945 ASSAY OF OXALATE: CPT

## 2024-04-19 PROCEDURE — 82507 ASSAY OF CITRATE: CPT

## 2024-04-19 PROCEDURE — 82340 ASSAY OF CALCIUM IN URINE: CPT

## 2024-04-19 PROCEDURE — 82570 ASSAY OF URINE CREATININE: CPT

## 2024-04-24 LAB
CALCIUM 24H UR-MCNC: 11.1 MG/DL
CALCIUM 24H UR-MRATE: 200 MG/D (ref 100–250)
CITRATE 24H UR-MCNC: 70 MG/L
CITRATE 24H UR-MRATE: 126 MG/D (ref 320–1240)
COLLECT DURATION TIME SPEC: 24 HRS
CREAT 24H UR-MCNC: 74 MG/DL
OXALATE 24H UR-MCNC: 18 MG/L
OXALATE 24H UR-MRATE: 32 MG/D (ref 13–40)
TOTAL VOLUME 1105: 1800 ML
URATE 24H UR-MCNC: 31.6 MG/DL
URATE 24H UR-MRATE: 569 MG/D (ref 250–750)

## 2024-09-16 ENCOUNTER — HOSPITAL ENCOUNTER (OUTPATIENT)
Dept: RADIOLOGY | Facility: MEDICAL CENTER | Age: 69
End: 2024-09-16
Payer: MEDICARE

## 2024-09-16 DIAGNOSIS — N20.0 KIDNEY STONE: ICD-10-CM

## 2024-09-16 PROCEDURE — 74176 CT ABD & PELVIS W/O CONTRAST: CPT

## 2024-10-18 ENCOUNTER — HOSPITAL ENCOUNTER (OUTPATIENT)
Facility: MEDICAL CENTER | Age: 69
End: 2024-10-18
Attending: STUDENT IN AN ORGANIZED HEALTH CARE EDUCATION/TRAINING PROGRAM
Payer: MEDICARE

## 2024-10-18 PROCEDURE — 82365 CALCULUS SPECTROSCOPY: CPT

## 2024-10-23 LAB
APPEARANCE STONE: NORMAL
COMPN STONE: NORMAL
SPECIMEN WT: 31 MG

## 2025-03-18 ENCOUNTER — HOSPITAL ENCOUNTER (OUTPATIENT)
Dept: LAB | Facility: MEDICAL CENTER | Age: 70
End: 2025-03-18
Attending: FAMILY MEDICINE
Payer: MEDICARE

## 2025-03-18 LAB
25(OH)D3 SERPL-MCNC: 11 NG/ML (ref 30–100)
ALBUMIN SERPL BCP-MCNC: 4.1 G/DL (ref 3.2–4.9)
ALBUMIN/GLOB SERPL: 1.3 G/DL
ALP SERPL-CCNC: 82 U/L (ref 30–99)
ALT SERPL-CCNC: 12 U/L (ref 2–50)
ANION GAP SERPL CALC-SCNC: 10 MMOL/L (ref 7–16)
AST SERPL-CCNC: 17 U/L (ref 12–45)
BASOPHILS # BLD AUTO: 0.6 % (ref 0–1.8)
BASOPHILS # BLD: 0.05 K/UL (ref 0–0.12)
BILIRUB SERPL-MCNC: 1 MG/DL (ref 0.1–1.5)
BUN SERPL-MCNC: 9 MG/DL (ref 8–22)
CALCIUM ALBUM COR SERPL-MCNC: 9.3 MG/DL (ref 8.5–10.5)
CALCIUM SERPL-MCNC: 9.4 MG/DL (ref 8.5–10.5)
CHLORIDE SERPL-SCNC: 103 MMOL/L (ref 96–112)
CHOLEST SERPL-MCNC: 150 MG/DL (ref 100–199)
CO2 SERPL-SCNC: 26 MMOL/L (ref 20–33)
CREAT SERPL-MCNC: 0.85 MG/DL (ref 0.5–1.4)
CRP SERPL HS-MCNC: 0.86 MG/DL (ref 0–0.75)
EOSINOPHIL # BLD AUTO: 0.21 K/UL (ref 0–0.51)
EOSINOPHIL NFR BLD: 2.4 % (ref 0–6.9)
ERYTHROCYTE [DISTWIDTH] IN BLOOD BY AUTOMATED COUNT: 45.2 FL (ref 35.9–50)
EST. AVERAGE GLUCOSE BLD GHB EST-MCNC: 197 MG/DL
FASTING STATUS PATIENT QL REPORTED: NORMAL
GFR SERPLBLD CREATININE-BSD FMLA CKD-EPI: 73 ML/MIN/1.73 M 2
GLOBULIN SER CALC-MCNC: 3.2 G/DL (ref 1.9–3.5)
GLUCOSE SERPL-MCNC: 162 MG/DL (ref 65–99)
HBA1C MFR BLD: 8.5 % (ref 4–5.6)
HCT VFR BLD AUTO: 51.6 % (ref 37–47)
HDLC SERPL-MCNC: 38 MG/DL
HGB BLD-MCNC: 16.1 G/DL (ref 12–16)
IMM GRANULOCYTES # BLD AUTO: 0.01 K/UL (ref 0–0.11)
IMM GRANULOCYTES NFR BLD AUTO: 0.1 % (ref 0–0.9)
LDLC SERPL CALC-MCNC: 79 MG/DL
LYMPHOCYTES # BLD AUTO: 2.32 K/UL (ref 1–4.8)
LYMPHOCYTES NFR BLD: 26.3 % (ref 22–41)
MCH RBC QN AUTO: 28.2 PG (ref 27–33)
MCHC RBC AUTO-ENTMCNC: 31.2 G/DL (ref 32.2–35.5)
MCV RBC AUTO: 90.5 FL (ref 81.4–97.8)
MONOCYTES # BLD AUTO: 0.45 K/UL (ref 0–0.85)
MONOCYTES NFR BLD AUTO: 5.1 % (ref 0–13.4)
NEUTROPHILS # BLD AUTO: 5.79 K/UL (ref 1.82–7.42)
NEUTROPHILS NFR BLD: 65.5 % (ref 44–72)
NRBC # BLD AUTO: 0 K/UL
NRBC BLD-RTO: 0 /100 WBC (ref 0–0.2)
PLATELET # BLD AUTO: 239 K/UL (ref 164–446)
PMV BLD AUTO: 10.7 FL (ref 9–12.9)
POTASSIUM SERPL-SCNC: 4.6 MMOL/L (ref 3.6–5.5)
PROT SERPL-MCNC: 7.3 G/DL (ref 6–8.2)
RBC # BLD AUTO: 5.7 M/UL (ref 4.2–5.4)
SODIUM SERPL-SCNC: 139 MMOL/L (ref 135–145)
TRIGL SERPL-MCNC: 163 MG/DL (ref 0–149)
TSH SERPL DL<=0.005 MIU/L-ACNC: 2.3 UIU/ML (ref 0.38–5.33)
VIT B12 SERPL-MCNC: 397 PG/ML (ref 211–911)
WBC # BLD AUTO: 8.8 K/UL (ref 4.8–10.8)

## 2025-03-18 PROCEDURE — 80053 COMPREHEN METABOLIC PANEL: CPT

## 2025-03-18 PROCEDURE — 82306 VITAMIN D 25 HYDROXY: CPT | Mod: GA

## 2025-03-18 PROCEDURE — 84443 ASSAY THYROID STIM HORMONE: CPT

## 2025-03-18 PROCEDURE — 36415 COLL VENOUS BLD VENIPUNCTURE: CPT

## 2025-03-18 PROCEDURE — 85025 COMPLETE CBC W/AUTO DIFF WBC: CPT

## 2025-03-18 PROCEDURE — 83036 HEMOGLOBIN GLYCOSYLATED A1C: CPT | Mod: GA

## 2025-03-18 PROCEDURE — 80061 LIPID PANEL: CPT

## 2025-03-18 PROCEDURE — 86140 C-REACTIVE PROTEIN: CPT

## 2025-03-18 PROCEDURE — 82607 VITAMIN B-12: CPT | Mod: GA

## (undated) DEVICE — BLADE SURGICAL #15 - (50/BX 3BX/CA)

## (undated) DEVICE — WATER IRRIGATION STERILE 1000ML (12EA/CA)

## (undated) DEVICE — SET IRRIGATION CYSTOSCOPY TUBE L80 IN (20EA/CA)

## (undated) DEVICE — LACTATED RINGERS INJ 1000 ML - (14EA/CA 60CA/PF)

## (undated) DEVICE — SPONGE GAUZESTER 4 X 4 4PLY - (128PK/CA)

## (undated) DEVICE — SLEEVE, VASO, THIGH, MED

## (undated) DEVICE — PACK LOWER EXTREMITY - (2/CA)

## (undated) DEVICE — DRESSING 3X8 ADAPTIC GAUZE - NON-ADHERING (36/PK 6PK/BX)

## (undated) DEVICE — SUTURE 3-0 VICRYL PLUS SH - 8X 18 INCH (12/BX)

## (undated) DEVICE — SUCTION INSTRUMENT YANKAUER BULBOUS TIP W/O VENT (50EA/CA)

## (undated) DEVICE — MEDICINE CUP STERILE 2 OZ - (100/CA)

## (undated) DEVICE — BANDAGE ELASTIC 6 HONEYCOMB - 6X5YD LF (20/CA)"

## (undated) DEVICE — MASK ANESTHESIA ADULT  - (100/CA)

## (undated) DEVICE — WATER IRRIG. STER 3000 ML - (4/CA)

## (undated) DEVICE — SET IRRIGATION CYSTOSCOPY Y-TYPE L81 IN (20EA/CA)

## (undated) DEVICE — SET EXTENSION WITH 2 PORTS (48EA/CA) ***PART #2C8610 IS A SUBSTITUTE*****

## (undated) DEVICE — SODIUM CHL. IRRIGATION 0.9% 3000ML (4EA/CA 65CA/PF)

## (undated) DEVICE — GLOVE BIOGEL PI INDICATOR SZ 7.5 SURGICAL PF LF -(50/BX 4BX/CA)

## (undated) DEVICE — SENSOR OXIMETER ADULT SPO2 RD SET (20EA/BX)

## (undated) DEVICE — STOCKINET BIAS 6 IN STERILE - (20/CA)

## (undated) DEVICE — GLOVE BIOGEL PI ORTHO SZ 8 PF LF (40PR/BX)

## (undated) DEVICE — GOWN WARMING STANDARD FLEX - (30/CA)

## (undated) DEVICE — SET LEADWIRE 5 LEAD BEDSIDE DISPOSABLE ECG (1SET OF 5/EA)

## (undated) DEVICE — WIRE GUIDE SENSOR DUAL FLEX - 5/BX

## (undated) DEVICE — MASK, LARYNGEAL AIRWAY #4

## (undated) DEVICE — PROTECTOR ULNA NERVE - (36PR/CA)

## (undated) DEVICE — GLOVE BIOGEL SZ 7.5 SURGICAL PF LTX - (50PR/BX 4BX/CA)

## (undated) DEVICE — PACK CYSTOSCOPY III - (8/CA)

## (undated) DEVICE — GOWN WARMING X-LARGE FLEX - (20/CA)

## (undated) DEVICE — SPONGE GAUZE STER 4X4 8-PL - (2/PK 50PK/BX 12BX/CS)

## (undated) DEVICE — GOWN SURGICAL X-LARGE ULTRA - FILM-REINFORCED (20/CA)

## (undated) DEVICE — HEAD HOLDER JUNIOR/ADULT

## (undated) DEVICE — FIBER LASER MOSES 365 UM (1/EA)

## (undated) DEVICE — ELECTRODE 850 FOAM ADHESIVE - HYDROGEL RADIOTRNSPRNT (50/PK)

## (undated) DEVICE — CANISTER SUCTION 3000ML MECHANICAL FILTER AUTO SHUTOFF MEDI-VAC NONSTERILE LF DISP  (40EA/CA)

## (undated) DEVICE — PADDING CAST 6 IN STERILE - 6 X 4 YDS (24/CA)

## (undated) DEVICE — SUTURE 3-0 ETHILON FS-1 - (36/BX) 30 INCH

## (undated) DEVICE — GLOVE BIOGEL SZ 8.5 SURGICAL PF LTX - (50PR/BX 4BX/CA)

## (undated) DEVICE — BAG URODRAIN WITH TUBING - (20/CA)

## (undated) DEVICE — CONTAINER, SPECIMEN, STERILE

## (undated) DEVICE — GLOVE BIOGEL SZ 6.5 SURGICAL PF LTX (50PR/BX 4BX/CA)

## (undated) DEVICE — SYRINGE 20 ML LL (50EA/BX 4BX/CA)

## (undated) DEVICE — GOWN SURGEONS X-LARGE - DISP. (30/CA)

## (undated) DEVICE — CONTAINER SPECIMEN BAG OR - STERILE 4 OZ W/LID (100EA/CA)

## (undated) DEVICE — TOURNIQUET CUFF 34 X 4 ONE PORT DISP - STERILE (10/BX)

## (undated) DEVICE — SOD. CHL. INJ. 0.9% 1000 ML - (14EA/CA 60CA/PF)

## (undated) DEVICE — COVER LIGHT HANDLE ALC PLUS DISP (18EA/BX)

## (undated) DEVICE — GLOVE BIOGEL ECLIPSE PF LATEX SIZE 8.0  (50PR/BX)

## (undated) DEVICE — COVER FOOT UNIVERSAL DISP. - (25EA/CA)

## (undated) DEVICE — NEPTUNE 4 PORT MANIFOLD - (20/PK)

## (undated) DEVICE — TUBING CLEARLINK DUO-VENT - C-FLO (48EA/CA)

## (undated) DEVICE — KIT ANESTHESIA W/CIRCUIT & 3/LT BAG W/FILTER (20EA/CA)

## (undated) DEVICE — BASKET ZERO 1.9FR X 120CM

## (undated) DEVICE — SENSOR SPO2 NEO LNCS ADHESIVE (20/BX) SEE USER NOTES

## (undated) DEVICE — JELLY SURGILUBE STERILE TUBE 4.25 OZ (1/EA)

## (undated) DEVICE — SYRINGE 30 ML LL (56/BX)

## (undated) DEVICE — CONNECTOR HOSE NEPTUNE FOR CYSTO ROOM

## (undated) DEVICE — DRESSING ABDOMINAL PAD STERILE 8 X 10" (360EA/CA)"

## (undated) DEVICE — SYRINGE DISP. 12 CC LL - (100/BX)